# Patient Record
Sex: FEMALE | Race: BLACK OR AFRICAN AMERICAN | NOT HISPANIC OR LATINO | Employment: OTHER | URBAN - METROPOLITAN AREA
[De-identification: names, ages, dates, MRNs, and addresses within clinical notes are randomized per-mention and may not be internally consistent; named-entity substitution may affect disease eponyms.]

---

## 2017-03-04 ENCOUNTER — HISTORICAL (OUTPATIENT)
Dept: ADMINISTRATIVE | Facility: HOSPITAL | Age: 72
End: 2017-03-04

## 2017-03-04 LAB
ALBUMIN SERPL-MCNC: 4.3 G/DL (ref 3.1–4.7)
ALP SERPL-CCNC: 96 IU/L (ref 40–104)
ALT (SGPT): 14 IU/L (ref 3–33)
AST SERPL-CCNC: 17 IU/L (ref 10–40)
BILIRUB SERPL-MCNC: 0.7 MG/DL (ref 0.3–1)
BUN SERPL-MCNC: 16 MG/DL (ref 8–20)
CALCIUM SERPL-MCNC: 10.1 MG/DL (ref 7.7–10.4)
CHLORIDE: 102 MMOL/L (ref 98–110)
CO2 SERPL-SCNC: 30.6 MMOL/L (ref 22.8–31.6)
CREATININE RANDOM URINE: 43 MG/DL
CREATININE: 1.59 MG/DL (ref 0.6–1.4)
GLUCOSE: 156 MG/DL (ref 70–99)
HBA1C MFR BLD: 7 % (ref 3.1–6.5)
MICROALBUM.,U,RANDOM: 2.4 MCG/ML (ref 0–19.9)
MICROALBUMIN/CREATININE RATIO: 6 (ref 0–30)
PHOSPHATE FLD-MCNC: 3.2 MG/DL (ref 2.5–4.9)
POTASSIUM SERPL-SCNC: 3.9 MMOL/L (ref 3.5–5)
PROT SERPL-MCNC: 7.5 G/DL (ref 6–8.2)
SODIUM: 142 MMOL/L (ref 134–144)

## 2017-03-08 ENCOUNTER — OFFICE VISIT (OUTPATIENT)
Dept: PODIATRY | Facility: CLINIC | Age: 72
End: 2017-03-08
Payer: MEDICARE

## 2017-03-08 VITALS — WEIGHT: 174.38 LBS | HEIGHT: 67 IN | BODY MASS INDEX: 27.37 KG/M2

## 2017-03-08 DIAGNOSIS — B35.1 ONYCHOMYCOSIS DUE TO DERMATOPHYTE: ICD-10-CM

## 2017-03-08 DIAGNOSIS — E11.42 DIABETIC POLYNEUROPATHY ASSOCIATED WITH TYPE 2 DIABETES MELLITUS: Primary | ICD-10-CM

## 2017-03-08 PROCEDURE — 1159F MED LIST DOCD IN RCRD: CPT | Mod: S$GLB,,, | Performed by: PODIATRIST

## 2017-03-08 PROCEDURE — 17999 UNLISTD PX SKN MUC MEMB SUBQ: CPT | Mod: CSM,S$GLB,, | Performed by: PODIATRIST

## 2017-03-08 PROCEDURE — 1157F ADVNC CARE PLAN IN RCRD: CPT | Mod: S$GLB,,, | Performed by: PODIATRIST

## 2017-03-08 PROCEDURE — 2022F DILAT RTA XM EVC RTNOPTHY: CPT | Mod: S$GLB,,, | Performed by: PODIATRIST

## 2017-03-08 PROCEDURE — 1160F RVW MEDS BY RX/DR IN RCRD: CPT | Mod: S$GLB,,, | Performed by: PODIATRIST

## 2017-03-08 PROCEDURE — 99999 PR PBB SHADOW E&M-EST. PATIENT-LVL II: CPT | Mod: PBBFAC,,, | Performed by: PODIATRIST

## 2017-03-08 PROCEDURE — 3046F HEMOGLOBIN A1C LEVEL >9.0%: CPT | Mod: S$GLB,,, | Performed by: PODIATRIST

## 2017-03-08 PROCEDURE — 3060F POS MICROALBUMINURIA REV: CPT | Mod: S$GLB,,, | Performed by: PODIATRIST

## 2017-03-08 PROCEDURE — 99213 OFFICE O/P EST LOW 20 MIN: CPT | Mod: S$GLB,,, | Performed by: PODIATRIST

## 2017-03-08 PROCEDURE — 1125F AMNT PAIN NOTED PAIN PRSNT: CPT | Mod: S$GLB,,, | Performed by: PODIATRIST

## 2017-03-08 RX ORDER — PRAMIPEXOLE DIHYDROCHLORIDE 0.12 MG/1
0.12 TABLET ORAL 3 TIMES DAILY
COMMUNITY
End: 2018-02-27 | Stop reason: SDUPTHER

## 2017-03-08 NOTE — PROGRESS NOTES
Subjective:      Patient ID: Adriana Perez is a 71 y.o. female.    Chief Complaint: Nail Care (nail clipping) and Diabetic Foot Exam (AR Care)    Adriana is a 71 y.o. female who presents to the clinic upon referral from Dr. Mag juarez. provider found  for evaluation and treatment of diabetic feet. Adriana has a past medical history of Acid reflux; Depression; Diabetes mellitus; Hyperlipidemia; Hypertension; MVP (mitral valve prolapse); and Urinary, incontinence, stress female. Patient relates no major problem with feet. Only complaints today consist of dark toenails.  Gradual onset, worsening over past several weeks, aggravated by increased weight bearing, shoe gear, pressure.  No previous medical treatment.  OTC med not helping.  .    PCP: Aiden Koch MD    Date Last Seen by PCP:   Chief Complaint   Patient presents with    Nail Care     nail clipping    Diabetic Foot Exam     AR Care         Current shoe gear: Casual shoes    No results found for: HGBA1C          Review of Systems   Constitution: Negative for chills, diaphoresis, fever, malaise/fatigue and night sweats.   Cardiovascular: Negative for claudication, cyanosis, leg swelling and syncope.   Skin: Positive for nail changes. Negative for color change, dry skin, rash, suspicious lesions and unusual hair distribution.   Musculoskeletal: Negative for falls, joint pain, joint swelling, muscle cramps, muscle weakness and stiffness.   Gastrointestinal: Negative for constipation, diarrhea, nausea and vomiting.   Neurological: Positive for sensory change. Negative for brief paralysis, disturbances in coordination, focal weakness, numbness, paresthesias and tremors.           Objective:      Physical Exam   Constitutional: She is oriented to person, place, and time. She appears well-developed and well-nourished. She is cooperative.   Oriented to time, place, and person.   Cardiovascular:   Pulses:       Dorsalis pedis pulses are 1+ on the right side, and 1+ on the  left side.        Posterior tibial pulses are 1+ on the right side, and 1+ on the left side.   Capillary fill time 3-5 seconds.  All toes warm to touch.      Negative lower extremity edema bilateral.    Negative elevational pallor and dependent rubor bilateral.     Musculoskeletal:   Normal angle, base, station of gait. Decreased stride length, early heel off, moderately propulsive toe off bilateral.    All ten toes without clubbing, cyanosis, or signs of ischemia.      No pain to palpation bilateral lower extremities.      Range of motion, stability, muscle strength, and muscle tone are age and health appropriate normal bilateral feet and legs.       Lymphadenopathy:   Negative lymphadenopathy bilateral popliteal fossa and tarsal tunnel.     Neurological: She is alert and oriented to person, place, and time. She has normal strength. She is not disoriented. She displays no atrophy and no tremor. A sensory deficit is present. She exhibits normal muscle tone.   Reflex Scores:       Patellar reflexes are 2+ on the right side and 2+ on the left side.       Achilles reflexes are 2+ on the right side and 2+ on the left side.  Decreased/absent vibratory sensation bilateral feet to 128Hz tuning fork.     Skin: Skin is warm, dry and intact. No abrasion, no bruising, no burn, no ecchymosis, no laceration, no lesion, no petechiae and no rash noted. She is not diaphoretic. No cyanosis or erythema. No pallor. Nails show no clubbing.   Skin thin, atrophic, with decreased density and distribution of pedal hair bilateral, but without hyperpigmentation, frandy discoloration,  ulcers, masses, nodules or cords palpated bilateral feet and legs.      Toenails 1st, 2nd, 3rd, 4th, 5th  bilateral are  discolored tanish brown with tan, gray crumbly subungual debris.  Long, but not tender to distal nail plate pressure, without periungual skin abnormality of each.               Assessment:       Encounter Diagnoses   Name Primary?    Diabetic  polyneuropathy associated with type 2 diabetes mellitus Yes    Onychomycosis due to dermatophyte          Plan:       Adriana was seen today for nail care and diabetic foot exam.    Diagnoses and all orders for this visit:    Diabetic polyneuropathy associated with type 2 diabetes mellitus    Onychomycosis due to dermatophyte      I counseled the patient on her conditions, their implications and medical management.        - Shoe inspection. Diabetic Foot Education. Patient reminded of the importance of good nutrition and blood sugar control to help prevent podiatric complications of diabetes. Patient instructed on proper foot hygeine. We discussed wearing proper shoe gear, daily foot inspections, never walking without protective shoe gear, never putting sharp instruments to feet, routine podiatric visits at least annually.    Continue penlac.    Discussed conservative treatment with shoes of adequate dimensions, material, and style to alleviate symptoms and delay or prevent surgical intervention.     Non covered foot care:      - With patient's permission, nails were aggressively reduced and debrided x 10 to their soft tissue attachment mechanically and with electric , removing all offending nail and debris. Patient relates relief following the procedure. She will continue to monitor the areas daily, inspect her feet, wear protective shoe gear when ambulatory, moisturizer to maintain skin integrity and follow in this office p.r.n.          Return in about 1 year (around 3/8/2018) for Ar exam.

## 2017-03-08 NOTE — MR AVS SNAPSHOT
[facility]  [address] [place] [address]  Phone: [phone]                  Adriana Perez   3/8/2017 8:00 AM   Office Visit    Description:  Female : 1945   Provider:  Joseph Luke DPM   Department:  [facility]           Reason for Visit     Nail Care     Diabetic Foot Exam           Diagnoses this Visit        Comments    Diabetic polyneuropathy associated with type 2 diabetes mellitus    -  Primary     Onychomycosis due to dermatophyte                To Do List           Goals (5 Years of Data)     None      Follow-Up and Disposition     Return in about 1 year (around 3/8/2018) for Ar exam.      [facility] On Call     [facility] On Call Nurse Care Line -  Assistance  Registered nurses in the [facility] On Call Center provide clinical advisement, health education, appointment booking, and other advisory services.  Call for this free service at [phone].             Medications           Message regarding Medications     Verify the changes and/or additions to your medication regime listed below are the same as discussed with your clinician today.  If any of these changes or additions are incorrect, please notify your healthcare provider.        STOP taking these medications     glyBURIDE (DIABETA) 2.5 MG tablet Take 2.5 mg by mouth daily with breakfast.    pravastatin (PRAVACHOL) 40 MG tablet Take 40 mg by mouth once daily.           Verify that the below list of medications is an accurate representation of the medications you are currently taking.  If none reported, the list may be blank. If incorrect, please contact your healthcare provider. Carry this list with you in case of emergency.           Current Medications     amlodipine (NORVASC) 10 MG tablet Take 10 mg by mouth once daily.    aspirin (ECOTRIN) 81 MG EC tablet Take 81 mg by mouth once daily.    calcitRIOL (ROCALTROL) 0.25 MCG Cap Take 0.25 mcg by mouth every Mon, Wed, Fri.    cholecalciferol, vitamin D3,  "(VITAMIN D3) 2,000 unit Cap Take 1 capsule by mouth once daily.    cyclobenzaprine (FLEXERIL) 10 MG tablet Take 10 mg by mouth nightly as needed.    metoprolol succinate (TOPROL-XL) 50 MG 24 hr tablet Take 50 mg by mouth 2 (two) times daily.    olanzapine (ZYPREXA) 10 MG tablet Take 10 mg by mouth every evening.    pramipexole (MIRAPEX) 0.125 MG tablet Take 0.125 mg by mouth 3 (three) times daily.    SITagliptan (JANUVIA) 25 MG Tab Take 100 mg by mouth once daily.    trazodone (DESYREL) 100 MG tablet Take 100 mg by mouth nightly as needed.    ciclopirox (PENLAC) 8 % Soln Apply topically nightly.    pantoprazole (PROTONIX) 40 MG tablet Take 40 mg by mouth once daily.    potassium chloride SA (K-DUR,KLOR-CON) 20 MEQ tablet Take 20 mEq by mouth once daily.           Clinical Reference Information           Your Vitals Were     Height Weight BMI          5' 7" (1.702 m) 79.1 kg (174 lb 6.1 oz) 27.31 kg/m2        Allergies as of 3/8/2017     No Known Allergies      Immunizations Administered on Date of Encounter - 3/8/2017     None      [facility] Sign-Up     Activating your [facility] account is as easy as 1-2-3!     1) Visit [email or web address], select Sign Up Now, enter this activation code and your date of birth, then select Next.  [email or web address]  Expires: 4/22/2017  7:50 AM      2) Create a username and password to use when you visit [facility] in the future and select a security question in case you lose your password and select Next.    3) Enter your e-mail address and click Sign Up!    Additional Information  If you have questions, please e-mail [email or web address] or call [phone] to talk to our [facility] staff. Remember, [facility] is NOT to be used for urgent needs. For medical emergencies, dial 911.         Language Assistance Services     ATTENTION: Language assistance services are available, free of charge. Please call [phone].      ATENCIÓN: Si habla español, tiene a blackburn disposición servicios " shellios de asistencia lingüística. James urbina [phone].     RAKESH Ý: N?u b?n nói Ti?ng Vi?t, có các d?ch v? h? tr? ngôn ng? mi?n phí dành cho b?n. G?i s? [phone].         [facility] complies with applicable Federal civil rights laws and does not discriminate on the basis of race, color, national origin, age, disability, or sex.

## 2017-03-20 ENCOUNTER — OFFICE VISIT (OUTPATIENT)
Dept: PODIATRY | Facility: CLINIC | Age: 72
End: 2017-03-20
Payer: MEDICARE

## 2017-03-20 VITALS — HEIGHT: 67 IN | WEIGHT: 174.38 LBS | BODY MASS INDEX: 27.37 KG/M2

## 2017-03-20 DIAGNOSIS — E11.42 DIABETIC POLYNEUROPATHY ASSOCIATED WITH TYPE 2 DIABETES MELLITUS: Primary | ICD-10-CM

## 2017-03-20 DIAGNOSIS — M79.674 TOE PAIN, RIGHT: ICD-10-CM

## 2017-03-20 DIAGNOSIS — B35.1 ONYCHOMYCOSIS DUE TO DERMATOPHYTE: ICD-10-CM

## 2017-03-20 PROCEDURE — 99999 PR PBB SHADOW E&M-EST. PATIENT-LVL II: CPT | Mod: PBBFAC,,, | Performed by: PODIATRIST

## 2017-03-20 PROCEDURE — 2022F DILAT RTA XM EVC RTNOPTHY: CPT | Mod: S$GLB,,, | Performed by: PODIATRIST

## 2017-03-20 PROCEDURE — 1159F MED LIST DOCD IN RCRD: CPT | Mod: S$GLB,,, | Performed by: PODIATRIST

## 2017-03-20 PROCEDURE — 3046F HEMOGLOBIN A1C LEVEL >9.0%: CPT | Mod: S$GLB,,, | Performed by: PODIATRIST

## 2017-03-20 PROCEDURE — 99213 OFFICE O/P EST LOW 20 MIN: CPT | Mod: S$GLB,,, | Performed by: PODIATRIST

## 2017-03-20 PROCEDURE — 1157F ADVNC CARE PLAN IN RCRD: CPT | Mod: S$GLB,,, | Performed by: PODIATRIST

## 2017-03-20 PROCEDURE — 1125F AMNT PAIN NOTED PAIN PRSNT: CPT | Mod: S$GLB,,, | Performed by: PODIATRIST

## 2017-03-20 PROCEDURE — 1160F RVW MEDS BY RX/DR IN RCRD: CPT | Mod: S$GLB,,, | Performed by: PODIATRIST

## 2017-03-20 PROCEDURE — 3060F POS MICROALBUMINURIA REV: CPT | Mod: S$GLB,,, | Performed by: PODIATRIST

## 2017-03-20 RX ORDER — CICLOPIROX 80 MG/ML
SOLUTION TOPICAL NIGHTLY
Qty: 6.6 ML | Refills: 11 | Status: SHIPPED | OUTPATIENT
Start: 2017-03-20 | End: 2017-03-29

## 2017-03-20 NOTE — PROGRESS NOTES
Subjective:      Patient ID: Adriana Perez is a 71 y.o. female.    Chief Complaint: Toe Pain (right second toe)    Adriana is a 71 y.o. female who presents to the clinic upon referral from Dr. Mag juarez. provider found  for evaluation and treatment of diabetic feet. Adriana has a past medical history of Acid reflux; Depression; Diabetes mellitus; Hyperlipidemia; Hypertension; MVP (mitral valve prolapse); and Urinary, incontinence, stress female. Patient relates no major problem with feet. Only complaints today consist of dark toenails.  Gradual onset, worsening over past several weeks, aggravated by increased weight bearing, shoe gear, pressure.  No previous medical treatment.  OTC med not helping.  .    PCP: Aiden Koch MD    Date Last Seen by PCP:   Chief Complaint   Patient presents with    Toe Pain     right second toe         Current shoe gear: Casual shoes    No results found for: HGBA1C          Review of Systems   Constitution: Negative for chills, diaphoresis, fever, malaise/fatigue and night sweats.   Cardiovascular: Negative for claudication, cyanosis, leg swelling and syncope.   Skin: Positive for nail changes. Negative for color change, dry skin, rash, suspicious lesions and unusual hair distribution.   Musculoskeletal: Negative for falls, joint pain, joint swelling, muscle cramps, muscle weakness and stiffness.   Gastrointestinal: Negative for constipation, diarrhea, nausea and vomiting.   Neurological: Positive for sensory change. Negative for brief paralysis, disturbances in coordination, focal weakness, numbness, paresthesias and tremors.           Objective:      Physical Exam   Constitutional: She is oriented to person, place, and time. She appears well-developed and well-nourished. She is cooperative.   Oriented to time, place, and person.   Cardiovascular:   Pulses:       Dorsalis pedis pulses are 1+ on the right side, and 1+ on the left side.        Posterior tibial pulses are 1+ on the right  side, and 1+ on the left side.   Capillary fill time 3-5 seconds.  All toes warm to touch.      Negative lower extremity edema bilateral.    Negative elevational pallor and dependent rubor bilateral.     Musculoskeletal:   Normal angle, base, station of gait. Decreased stride length, early heel off, moderately propulsive toe off bilateral.    All ten toes without clubbing, cyanosis, or signs of ischemia.      No pain to palpation bilateral lower extremities.      Range of motion, stability, muscle strength, and muscle tone are age and health appropriate normal bilateral feet and legs.       Lymphadenopathy:   Negative lymphadenopathy bilateral popliteal fossa and tarsal tunnel.     Neurological: She is alert and oriented to person, place, and time. She has normal strength. She is not disoriented. She displays no atrophy and no tremor. A sensory deficit is present. She exhibits normal muscle tone.   Reflex Scores:       Patellar reflexes are 2+ on the right side and 2+ on the left side.       Achilles reflexes are 2+ on the right side and 2+ on the left side.  Decreased/absent vibratory sensation bilateral feet to 128Hz tuning fork.     Skin: Skin is warm, dry and intact. No abrasion, no bruising, no burn, no ecchymosis, no laceration, no lesion, no petechiae and no rash noted. She is not diaphoretic. No cyanosis or erythema. No pallor. Nails show no clubbing.   Skin thin, atrophic, with decreased density and distribution of pedal hair bilateral, but without hyperpigmentation, frandy discoloration,  ulcers, masses, nodules or cords palpated bilateral feet and legs.    Pain to palpation distal toe/toenail right 2nd toe  without ulceration, drainage, pus, tracking, fluctuance, malodor, or cardinal signs infection.       Toenails 1st, 2nd, 3rd, 4th, 5th  bilateral are  discolored tanish brown with tan, gray crumbly subungual debris.  Long, but not tender (except right 2nd toe) to distal nail plate pressure, without  periungual skin abnormality of each.               Assessment:       Encounter Diagnoses   Name Primary?    Diabetic polyneuropathy associated with type 2 diabetes mellitus Yes    Onychomycosis due to dermatophyte     Toe pain, right          Plan:       Adriana was seen today for toe pain.    Diagnoses and all orders for this visit:    Diabetic polyneuropathy associated with type 2 diabetes mellitus    Onychomycosis due to dermatophyte    Toe pain, right    Other orders  -     ciclopirox (PENLAC) 8 % Soln; Apply topically nightly.      I counseled the patient on her conditions, their implications and medical management.        - Shoe inspection. Diabetic Foot Education. Patient reminded of the importance of good nutrition and blood sugar control to help prevent podiatric complications of diabetes. Patient instructed on proper foot hygeine. We discussed wearing proper shoe gear, daily foot inspections, never walking without protective shoe gear, never putting sharp instruments to feet, routine podiatric visits at least annually.    Continue penlac.    Discussed conservative treatment with shoes of adequate dimensions, material, and style to alleviate symptoms and delay or prevent surgical intervention.     Non covered foot care:      - With patient's permission, nails were aggressively reduced and debrided x 10 to their soft tissue attachment mechanically and with electric , removing all offending nail and debris. Patient relates relief following the procedure. She will continue to monitor the areas daily, inspect her feet, wear protective shoe gear when ambulatory, moisturizer to maintain skin integrity and follow in this office p.r.n.          Return in about 1 year (around 3/20/2018) for AR exam.

## 2017-03-28 ENCOUNTER — TELEPHONE (OUTPATIENT)
Dept: PODIATRY | Facility: CLINIC | Age: 72
End: 2017-03-28

## 2017-03-28 NOTE — TELEPHONE ENCOUNTER
----- Message from Ronnie Harris sent at 3/22/2017  9:54 AM CDT -----  Contact: SELF [phone]  Dr Luke sent the prescription for Penlac to [facility] on [address] but she is needing the scrip called to [facility] for mailorder/ please call when completed thanks

## 2017-03-29 RX ORDER — CICLOPIROX 80 MG/ML
SOLUTION TOPICAL NIGHTLY
Qty: 6.6 ML | Refills: 11 | Status: SHIPPED | OUTPATIENT
Start: 2017-03-29 | End: 2018-02-27

## 2017-04-04 ENCOUNTER — TELEPHONE (OUTPATIENT)
Dept: PODIATRY | Facility: CLINIC | Age: 72
End: 2017-04-04

## 2017-04-04 NOTE — TELEPHONE ENCOUNTER
----- Message from Odilon Ch sent at 3/29/2017  9:50 AM CDT -----  Contact: self- [phone]  Patient called asking for rx Ciclopirox 8 % to be faxed to [facility] order [facility].  Yamile faxed request to the doctor's  office. Thanks!

## 2017-05-22 PROBLEM — E11.3293 MILD NONPROLIFERATIVE DIABETIC RETINOPATHY OF BOTH EYES WITHOUT MACULAR EDEMA ASSOCIATED WITH TYPE 2 DIABETES MELLITUS: Status: ACTIVE | Noted: 2017-05-22

## 2017-05-25 RX ORDER — TRAMADOL HYDROCHLORIDE AND ACETAMINOPHEN 37.5; 325 MG/1; MG/1
TABLET, FILM COATED ORAL
Qty: 60 TABLET | Refills: 1 | Status: SHIPPED | OUTPATIENT
Start: 2017-05-25 | End: 2017-07-10 | Stop reason: SDUPTHER

## 2017-06-28 RX ORDER — ASPIRIN 81 MG/1
1 TABLET ORAL DAILY
COMMUNITY
Start: 2015-03-12 | End: 2017-07-10 | Stop reason: SDUPTHER

## 2017-06-28 RX ORDER — GLIMEPIRIDE 2 MG/1
1 TABLET ORAL 2 TIMES DAILY
COMMUNITY
Start: 2017-03-08 | End: 2018-02-27

## 2017-06-28 RX ORDER — PRAVASTATIN SODIUM 40 MG/1
1 TABLET ORAL DAILY
COMMUNITY
Start: 2017-03-08 | End: 2018-03-28 | Stop reason: SDUPTHER

## 2017-06-28 RX ORDER — CELECOXIB 100 MG/1
1 CAPSULE ORAL 2 TIMES DAILY
COMMUNITY
Start: 2016-08-17 | End: 2018-02-27 | Stop reason: ALTCHOICE

## 2017-07-05 ENCOUNTER — TELEPHONE (OUTPATIENT)
Dept: FAMILY MEDICINE | Facility: CLINIC | Age: 72
End: 2017-07-05

## 2017-07-05 LAB
ALBUMIN SERPL-MCNC: 4 G/DL (ref 3.1–4.7)
ALP SERPL-CCNC: 88 IU/L (ref 40–104)
ALT (SGPT): 12 IU/L (ref 3–33)
AST SERPL-CCNC: 17 IU/L (ref 10–40)
BASOPHILS NFR BLD: 0 K/UL (ref 0–0.2)
BASOPHILS NFR BLD: 0.5 %
BILIRUB SERPL-MCNC: 0.7 MG/DL (ref 0.3–1)
BUN SERPL-MCNC: 16 MG/DL (ref 8–20)
CALCIUM SERPL-MCNC: 9.6 MG/DL (ref 7.7–10.4)
CHLORIDE: 106 MMOL/L (ref 98–110)
CO2 SERPL-SCNC: 27.1 MMOL/L (ref 22.8–31.6)
CREATININE: 1.75 MG/DL (ref 0.6–1.4)
EOSINOPHIL NFR BLD: 0.2 K/UL (ref 0–0.7)
EOSINOPHIL NFR BLD: 2.6 %
ERYTHROCYTE [DISTWIDTH] IN BLOOD BY AUTOMATED COUNT: 13.6 % (ref 11.7–14.9)
GLUCOSE: 124 MG/DL (ref 70–99)
GRAN #: 2.1 K/UL (ref 1.4–6.5)
GRAN%: 35.1 %
HBA1C MFR BLD: 6.7 % (ref 3.1–6.5)
HCT VFR BLD AUTO: 35.1 % (ref 36–48)
HGB BLD-MCNC: 11.2 G/DL (ref 12–15)
IMMATURE GRANS (ABS): 0 K/UL (ref 0–1)
IMMATURE GRANULOCYTES: 0.2 %
LYMPH #: 3.1 K/UL (ref 1.2–3.4)
LYMPH%: 52.2 %
MCH RBC QN AUTO: 26.1 PG (ref 25–35)
MCHC RBC AUTO-ENTMCNC: 31.9 G/DL (ref 31–36)
MCV RBC AUTO: 81.8 FL (ref 79–98)
MONO #: 0.6 K/UL (ref 0.1–0.6)
MONO%: 9.4 %
NUCLEATED RBCS: 0 %
PHOSPHATE FLD-MCNC: 3.6 MG/DL (ref 2.5–4.9)
PLATELET # BLD AUTO: 235 K/UL (ref 140–440)
PMV BLD AUTO: 11.2 FL (ref 8.8–12.7)
POTASSIUM SERPL-SCNC: 3.5 MMOL/L (ref 3.5–5)
PROT SERPL-MCNC: 7 G/DL (ref 6–8.2)
RBC # BLD AUTO: 4.29 M/UL (ref 3.5–5.5)
SODIUM: 140 MMOL/L (ref 134–144)
WBC # BLD AUTO: 5.8 K/UL (ref 5–10)

## 2017-07-05 NOTE — TELEPHONE ENCOUNTER
PT NEEDS A RETRO REFF TO   [facility]   FOR DOS 5/2/2017   CODE F20.89  SCHIZOPHRENIA  PLEASE LET ME KNOW AS SOON AS YOU HAVE IT   I HAVE TO FAX IT TO A SPECIAL NUMBER TO GET THE CLAIM PAID   THANKS

## 2017-07-10 ENCOUNTER — OFFICE VISIT (OUTPATIENT)
Dept: ORTHOPEDICS | Facility: CLINIC | Age: 72
End: 2017-07-10
Payer: MEDICARE

## 2017-07-10 VITALS
HEIGHT: 67 IN | BODY MASS INDEX: 27.31 KG/M2 | DIASTOLIC BLOOD PRESSURE: 60 MMHG | WEIGHT: 174 LBS | HEART RATE: 87 BPM | SYSTOLIC BLOOD PRESSURE: 124 MMHG

## 2017-07-10 DIAGNOSIS — M17.0 PRIMARY OSTEOARTHRITIS OF BOTH KNEES: Primary | ICD-10-CM

## 2017-07-10 PROCEDURE — 20610 DRAIN/INJ JOINT/BURSA W/O US: CPT | Mod: RT,,, | Performed by: ORTHOPAEDIC SURGERY

## 2017-07-10 PROCEDURE — 1159F MED LIST DOCD IN RCRD: CPT | Mod: ,,, | Performed by: ORTHOPAEDIC SURGERY

## 2017-07-10 PROCEDURE — 99213 OFFICE O/P EST LOW 20 MIN: CPT | Mod: 25,,, | Performed by: ORTHOPAEDIC SURGERY

## 2017-07-10 RX ORDER — TRIAMCINOLONE ACETONIDE 40 MG/ML
40 INJECTION, SUSPENSION INTRA-ARTICULAR; INTRAMUSCULAR
Status: DISCONTINUED | OUTPATIENT
Start: 2017-07-10 | End: 2017-07-10 | Stop reason: HOSPADM

## 2017-07-10 RX ORDER — TRAMADOL HYDROCHLORIDE AND ACETAMINOPHEN 37.5; 325 MG/1; MG/1
1 TABLET, FILM COATED ORAL EVERY 6 HOURS PRN
Qty: 60 TABLET | Refills: 1 | Status: SHIPPED | OUTPATIENT
Start: 2017-07-10 | End: 2017-08-09

## 2017-07-10 RX ADMIN — TRIAMCINOLONE ACETONIDE 40 MG: 40 INJECTION, SUSPENSION INTRA-ARTICULAR; INTRAMUSCULAR at 04:07

## 2017-07-10 NOTE — PROCEDURES
Large Joint Aspiration/Injection  Date/Time: 7/10/2017 4:23 PM  Performed by: JOE MUNOZ  Authorized by: JOE MUNOZ     Consent Done?:  Yes (Verbal)  Indications:  Pain and joint swelling  Procedure site marked: Yes    Timeout: Prior to procedure the correct patient, procedure, and site was verified      Location:  Knee  Site:  R knee  Prep: Patient was prepped and draped in usual sterile fashion    Needle size:  22 G  Approach:  Lateral  Medications:  40 mg triamcinolone acetonide 40 mg/mL  Patient tolerance:  Patient tolerated the procedure well with no immediate complications

## 2017-07-11 ENCOUNTER — OFFICE VISIT (OUTPATIENT)
Dept: FAMILY MEDICINE | Facility: CLINIC | Age: 72
End: 2017-07-11
Payer: MEDICARE

## 2017-07-11 VITALS
BODY MASS INDEX: 26.84 KG/M2 | SYSTOLIC BLOOD PRESSURE: 138 MMHG | WEIGHT: 171 LBS | DIASTOLIC BLOOD PRESSURE: 70 MMHG | HEART RATE: 83 BPM | HEIGHT: 67 IN

## 2017-07-11 DIAGNOSIS — E11.42 DIABETIC POLYNEUROPATHY ASSOCIATED WITH TYPE 2 DIABETES MELLITUS: Primary | ICD-10-CM

## 2017-07-11 DIAGNOSIS — E78.2 MULTIPLE-TYPE HYPERLIPIDEMIA: ICD-10-CM

## 2017-07-11 DIAGNOSIS — N18.30 CHRONIC KIDNEY DISEASE, STAGE 3 (MODERATE): ICD-10-CM

## 2017-07-11 DIAGNOSIS — I10 BENIGN ESSENTIAL HYPERTENSION: ICD-10-CM

## 2017-07-11 PROBLEM — I05.9 MITRAL VALVE DISORDER: Status: ACTIVE | Noted: 2017-07-11

## 2017-07-11 PROBLEM — N39.46 MIXED STRESS AND URGE URINARY INCONTINENCE: Status: ACTIVE | Noted: 2017-07-11

## 2017-07-11 PROBLEM — F20.9 CHRONIC SCHIZOPHRENIA: Status: ACTIVE | Noted: 2017-07-11

## 2017-07-11 PROBLEM — Z78.9 NON-SMOKER: Status: ACTIVE | Noted: 2017-07-11

## 2017-07-11 PROBLEM — G25.81 RESTLESS LEG: Status: ACTIVE | Noted: 2017-07-11

## 2017-07-11 PROBLEM — G47.33 OBSTRUCTIVE SLEEP APNEA SYNDROME: Status: ACTIVE | Noted: 2017-07-11

## 2017-07-11 PROBLEM — Z87.891 PERSONAL HISTORY OF NICOTINE DEPENDENCE: Status: ACTIVE | Noted: 2017-07-11

## 2017-07-11 PROBLEM — M25.569 KNEE PAIN: Status: ACTIVE | Noted: 2017-07-11

## 2017-07-11 PROBLEM — G47.00 INSOMNIA: Status: ACTIVE | Noted: 2017-07-11

## 2017-07-11 PROBLEM — Z13.89 ENCOUNTER FOR SCREENING FOR OTHER DISORDER: Status: ACTIVE | Noted: 2017-07-11

## 2017-07-11 PROBLEM — E11.9 TYPE 2 DIABETES MELLITUS: Status: ACTIVE | Noted: 2017-07-11

## 2017-07-11 PROBLEM — M17.9 OSTEOARTHRITIS OF KNEE: Status: ACTIVE | Noted: 2017-07-11

## 2017-07-11 PROBLEM — E66.3 OVERWEIGHT: Status: ACTIVE | Noted: 2017-07-11

## 2017-07-11 PROBLEM — E11.9 DIABETES MELLITUS: Status: ACTIVE | Noted: 2017-07-11

## 2017-07-11 PROCEDURE — 99213 OFFICE O/P EST LOW 20 MIN: CPT | Mod: ,,, | Performed by: INTERNAL MEDICINE

## 2017-07-11 PROCEDURE — 1159F MED LIST DOCD IN RCRD: CPT | Mod: ,,, | Performed by: INTERNAL MEDICINE

## 2017-07-11 PROCEDURE — 1126F AMNT PAIN NOTED NONE PRSNT: CPT | Mod: ,,, | Performed by: INTERNAL MEDICINE

## 2017-07-11 PROCEDURE — 3044F HG A1C LEVEL LT 7.0%: CPT | Mod: ,,, | Performed by: INTERNAL MEDICINE

## 2017-07-11 NOTE — PROGRESS NOTES
Subjective:       Patient ID: Adriana Perez is a 71 y.o. female.    Chief Complaint: Diabetes (lab review ) and Hypertension    Ms. Adriana Perez is a 71-year-old -American female who comes for follow-up. She has underlying diabetes, hypertension, chronic kidney disease and dyslipidemia. Recently her intact PTH was found to be elevated. It is unclear if she has spoken to the kidney doctor about this problem. Otherwise she is doing okay. The kidney doctor has not made any new changes.      Diabetes   She presents for her follow-up diabetic visit. She has type 2 diabetes mellitus. Hypoglycemia symptoms include nervousness/anxiousness. Pertinent negatives for hypoglycemia include no confusion, dizziness, headaches, pallor, seizures or tremors. Pertinent negatives for diabetes include no chest pain and no fatigue. Symptoms are stable. Risk factors for coronary artery disease include sedentary lifestyle. Her weight is stable. She is following a generally healthy diet. She has not had a previous visit with a dietitian. Her breakfast blood glucose range is generally 130-140 mg/dl. Her highest blood glucose is >200 mg/dl. An ACE inhibitor/angiotensin II receptor blocker is contraindicated. She does not see a podiatrist.Eye exam is current.   Hypertension   This is a chronic problem. The current episode started more than 1 year ago. The problem has been gradually improving since onset. The problem is controlled. Associated symptoms include anxiety. Pertinent negatives include no chest pain, headaches, palpitations or shortness of breath. Risk factors for coronary artery disease include sedentary lifestyle. Identifiable causes of hypertension include chronic renal disease.   Hyperlipidemia   This is a chronic problem. The current episode started more than 1 year ago. Exacerbating diseases include chronic renal disease. Pertinent negatives include no chest pain or shortness of breath. Current antihyperlipidemic  treatment includes statins. The current treatment provides significant improvement of lipids. Compliance problems include psychosocial issues.  Risk factors for coronary artery disease include a sedentary lifestyle, hypertension, diabetes mellitus and dyslipidemia.       Past Medical History:   Diagnosis Date    Acid reflux     Anxiety     CKD (chronic kidney disease), stage III     Depression     Diabetes mellitus     Diabetes mellitus, type 2     Disorder of kidney and ureter     Hyperlipidemia     Hypertension     MVP (mitral valve prolapse)     Primary osteoarthritis of left knee     Restless leg     Schizophrenia, simple, chronic     Urinary, incontinence, stress female      Social History     Social History    Marital status:      Spouse name: N/A    Number of children: N/A    Years of education: N/A     Occupational History    Not on file.     Social History Main Topics    Smoking status: Former Smoker     Types: Cigarettes     Quit date: 9/13/1992    Smokeless tobacco: Never Used    Alcohol use No    Drug use: No    Sexual activity: No     Other Topics Concern    Not on file     Social History Narrative    No narrative on file     Past Surgical History:   Procedure Laterality Date    HYSTERECTOMY      TUBAL LIGATION       Family History   Problem Relation Age of Onset    Sudden death Father     Cancer Mother     Hypertension Mother     Cancer Sister        Review of Systems   Constitutional: Negative for activity change, chills, fatigue, fever and unexpected weight change.   HENT: Negative for congestion, postnasal drip and sinus pressure.    Eyes: Negative for pain, discharge and visual disturbance.   Respiratory: Negative for cough, chest tightness and shortness of breath.    Cardiovascular: Negative for chest pain, palpitations and leg swelling.   Gastrointestinal: Negative for abdominal distention, anal bleeding, constipation and diarrhea.   Genitourinary: Negative  "for difficulty urinating, dysuria, flank pain, frequency, menstrual problem, pelvic pain, vaginal bleeding and vaginal pain.   Musculoskeletal: Negative for arthralgias and joint swelling.   Skin: Negative for color change, pallor and rash.   Allergic/Immunologic: Negative for environmental allergies, food allergies and immunocompromised state.   Neurological: Negative for dizziness, tremors, seizures, syncope, light-headedness and headaches.   Hematological: Negative for adenopathy. Does not bruise/bleed easily.   Psychiatric/Behavioral: Negative for agitation, confusion and dysphoric mood. The patient is nervous/anxious.        Objective:       Vitals:    07/11/17 1039   BP: 138/70   Pulse: 83   Weight: 77.6 kg (171 lb)   Height: 5' 7" (1.702 m)     Physical Exam   Constitutional: She appears well-developed and well-nourished. She is cooperative. No distress.   HENT:   Head: Normocephalic and atraumatic.   Eyes: Conjunctivae, EOM and lids are normal. Pupils are equal, round, and reactive to light. Lids are everted and swept, no foreign bodies found. Right pupil is round and reactive. Left pupil is round and reactive.   Neck: Trachea normal and normal range of motion. Neck supple.   Cardiovascular: Normal rate, regular rhythm, S1 normal, S2 normal and normal heart sounds.    Pulmonary/Chest: Breath sounds normal.   Abdominal: Soft. Bowel sounds are normal. There is no rigidity and no guarding.   Musculoskeletal: Normal range of motion.   Feet:   Right Foot:   Protective Sensation: 3 sites tested. 3 sites sensed.   Skin Integrity: Negative for ulcer or blister.   Left Foot:   Protective Sensation: 3 sites tested. 3 sites sensed.   Skin Integrity: Negative for ulcer or blister.   Lymphadenopathy:     She has no cervical adenopathy.     She has no axillary adenopathy.   Neurological: She is alert.   Skin: Skin is warm and dry. Capillary refill takes less than 2 seconds.   Psychiatric: She has a normal mood and affect. " Her behavior is normal.   Nursing note and vitals reviewed.      Assessment:       1. Diabetic polyneuropathy associated with type 2 diabetes mellitus    2. Benign essential hypertension    3. Chronic kidney disease, stage 3 (moderate)    4. Multiple-type hyperlipidemia         Plan:           Diabetic polyneuropathy associated with type 2 diabetes mellitus    Benign essential hypertension    Chronic kidney disease, stage 3 (moderate)    Multiple-type hyperlipidemia    Patient has hemoglobin A1c of 6.7. Patient's intact PTH is elevated. She saw the kidney doctor yesterday but was not told anything about it. I will advise her to get in touch with a kidney doctor but she has an appointment in 6 months. She would like to wait for 6 months. I've advised her that if she has any problems seeing the kidney doctors I'll arrange for a new doctor. She chooses to stick with her doctor.    Current Outpatient Prescriptions on File Prior to Visit   Medication Sig Dispense Refill    amlodipine (NORVASC) 10 MG tablet Take 10 mg by mouth once daily.      aspirin (ECOTRIN) 81 MG EC tablet Take 81 mg by mouth once daily.      blood sugar diagnostic Strp 1 strip Daily.      calcitRIOL (ROCALTROL) 0.25 MCG Cap Take 0.25 mcg by mouth every Mon, Wed, Fri.      cholecalciferol, vitamin D3, (VITAMIN D3) 2,000 unit Cap Take 1 capsule by mouth once daily.      glimepiride (AMARYL) 2 MG tablet Take 1 tablet by mouth 2 (two) times daily.      metoprolol succinate (TOPROL-XL) 50 MG 24 hr tablet Take 50 mg by mouth 2 (two) times daily.      olanzapine (ZYPREXA) 10 MG tablet Take 10 mg by mouth every evening.      pantoprazole (PROTONIX) 40 MG tablet Take 40 mg by mouth once daily.      potassium chloride SA (K-DUR,KLOR-CON) 20 MEQ tablet Take 20 mEq by mouth once daily.      pramipexole (MIRAPEX) 0.125 MG tablet Take 0.125 mg by mouth 3 (three) times daily.      pravastatin (PRAVACHOL) 40 MG tablet Take 1 tablet by mouth Daily.       SITagliptan (JANUVIA) 25 MG Tab Take 100 mg by mouth once daily.      tramadol-acetaminophen 37.5-325 mg (ULTRACET) 37.5-325 mg Tab Take 1 tablet by mouth every 6 (six) hours as needed. 60 tablet 1    trazodone (DESYREL) 100 MG tablet Take 100 mg by mouth nightly as needed.      celecoxib (CELEBREX) 100 MG capsule Take 1 capsule by mouth 2 (two) times daily.      ciclopirox (PENLAC) 8 % Soln Apply topically nightly. 6.6 mL 11    cyclobenzaprine (FLEXERIL) 10 MG tablet Take 10 mg by mouth nightly as needed.       Current Facility-Administered Medications on File Prior to Visit   Medication Dose Route Frequency Provider Last Rate Last Dose    [DISCONTINUED] triamcinolone acetonide injection 40 mg  40 mg Intra-articular  Yash Wing MD   40 mg at 07/10/17 9445

## 2017-07-11 NOTE — PATIENT INSTRUCTIONS
"  Exercise to Manage Your Blood Sugar    Being physically active every day can help you manage your blood sugar. Thats because an active lifestyle can improve your bodys ability to use insulin. Daily activity can also help delay or prevent complications of diabetes. And its a great way to relieve stress. If you arent normally active, be sure to consult your healthcare provider before getting started.  How much activity do you need?  If daily activity is new to you, start slow and steady. Begin with 10 minutes of activity each day. Then work up to at least 40 minutes of moderate to high intensity physical activity on at least 3 to 4 days each week. Do this by adding a few minutes each week. It doesnt have to be done all at once. Each active period throughout the day adds up.  Just move!  You dont have to join a gym or own pricey sports equipment. Just get out and walk. Walking is an aerobic exercise that makes your heart and lungs work hard. It helps your heart and blood vessels. Walking needs only a sturdy pair of sneakers and your own two feet. The more you walk, the easier it gets:  · Schedule time every day to move your feet.  · Make it part of your daily routine.  · Walk with a friend or a group to keep it interesting and fun.  · Try taking several short walks during the day to meet your daily activity goal.  A pedometer makes every step count  A pedometer is a small device that keeps track of how many steps you take. You can clip it to your belt (or a strap on your arm or leg) and go about your daily routine. "Smartphones" now also have apps to record your walking. At the end of the day, the pedometer shows the total number of steps you took. Use a pedometer to set daily goals for yourself. For instance, if you walk 4,000 steps a day, try adding 200 more steps each day. Aim for a goal of 7,500. With every step, youre doing a little more to help your body use insulin.   Adding resistance " exercise  Resistance exercise (also called strength training), makes muscles stronger. It also helps muscles use insulin better. Ask your healthcare provider whether this type of exercise is right for you. If it is, your healthcare provider can help you work it in to your activity plan.  Staying safe  Being active may cause blood sugar to drop faster than usual. This is especially true if you take medicine to manage your blood sugar. But there are things you can do to help reduce the risk of accidental lows. Keep these tips in mind:  · Always carry identification when you exercise outside your home. Carry a cell phone to use in case of emergency.  · If you can, include friends and family in your activities.  · Wear a medical ID bracelet that says you have diabetes.  · Use the right safety equipment for the activity you do (such as a bicycle helmet when you ride a bicycle outdoors). Wear closed-toed shoes that fit your feet well.  · Drink plenty of water before and during activity.  · Keep a fast-acting sugar (such as glucose tablets) on hand in case of low blood sugar.  · Dress properly for the weather. Wear a hat if its natividad, or wait until evening if its too hot.  · Avoid being active for long periods in very hot or very cold weather.  · Skip activity if youre sick.     Notice how activity affects blood sugar  Physical activity is important when you have diabetes. But you need to keep an eye on your blood sugar level. Check often if you have been active for longer than usual, or if the activity was unplanned. Make it a habit to check your blood sugar before being active. And check again a few hours later. Use your log book to write down how activity affects your numbers. If you take insulin, you may be able to adjust your dose before a planned activity. This can help prevent lows. You may also need to take a small carbohydrate snack before the exercise. Talk to your healthcare provider to learn more.    Date  Last Reviewed: 6/1/2016  © 9083-6991 [employer]. [address] [place] [address], [place] [address]. All rights reserved. This information is not intended as a substitute for professional medical care. Always follow your healthcare professional's instructions.

## 2017-10-16 PROBLEM — Z13.89 ENCOUNTER FOR SCREENING FOR OTHER DISORDER: Status: RESOLVED | Noted: 2017-07-11 | Resolved: 2017-10-16

## 2017-11-02 RX ORDER — AMLODIPINE BESYLATE 10 MG/1
TABLET ORAL
Qty: 90 TABLET | Refills: 3 | Status: SHIPPED | OUTPATIENT
Start: 2017-11-02 | End: 2018-11-15 | Stop reason: SDUPTHER

## 2017-11-03 RX ORDER — TRAMADOL HYDROCHLORIDE AND ACETAMINOPHEN 37.5; 325 MG/1; MG/1
TABLET, FILM COATED ORAL
Qty: 60 TABLET | Refills: 1 | OUTPATIENT
Start: 2017-11-03

## 2017-11-14 ENCOUNTER — OFFICE VISIT (OUTPATIENT)
Dept: FAMILY MEDICINE | Facility: CLINIC | Age: 72
End: 2017-11-14
Payer: MEDICARE

## 2017-11-14 VITALS
WEIGHT: 171 LBS | DIASTOLIC BLOOD PRESSURE: 80 MMHG | HEART RATE: 85 BPM | BODY MASS INDEX: 26.84 KG/M2 | SYSTOLIC BLOOD PRESSURE: 138 MMHG | HEIGHT: 67 IN

## 2017-11-14 DIAGNOSIS — E78.2 MULTIPLE-TYPE HYPERLIPIDEMIA: ICD-10-CM

## 2017-11-14 DIAGNOSIS — K21.9 GASTROESOPHAGEAL REFLUX DISEASE WITHOUT ESOPHAGITIS: ICD-10-CM

## 2017-11-14 DIAGNOSIS — Z23 INFLUENZA VACCINE ADMINISTERED: Primary | ICD-10-CM

## 2017-11-14 DIAGNOSIS — N18.30 CHRONIC KIDNEY DISEASE, STAGE 3 (MODERATE): ICD-10-CM

## 2017-11-14 DIAGNOSIS — E11.42 DIABETIC POLYNEUROPATHY ASSOCIATED WITH TYPE 2 DIABETES MELLITUS: ICD-10-CM

## 2017-11-14 PROCEDURE — 90662 IIV NO PRSV INCREASED AG IM: CPT | Mod: ,,, | Performed by: INTERNAL MEDICINE

## 2017-11-14 PROCEDURE — G0008 ADMIN INFLUENZA VIRUS VAC: HCPCS | Mod: ,,, | Performed by: INTERNAL MEDICINE

## 2017-11-14 PROCEDURE — 99214 OFFICE O/P EST MOD 30 MIN: CPT | Mod: ,,, | Performed by: INTERNAL MEDICINE

## 2017-11-14 NOTE — PROGRESS NOTES
Subjective:       Patient ID: Adriana Perez is a 71 y.o. female.    Chief Complaint: Diabetes; Hypertension; Hyperlipidemia; and Gastroesophageal Reflux    Patient comes for follow-up. I reviewed her blood sugars at home and this seemed to be in low 100s and mid 100s. Her blood pressures seem to be in 120s and 130 systolic. She has chronic kidney disease stage III which is stable and follows up with Dr. Lance Foy.          Diabetes   She presents for her follow-up diabetic visit. She has type 2 diabetes mellitus. Her disease course has been stable. Hypoglycemia symptoms include nervousness/anxiousness. Pertinent negatives for hypoglycemia include no confusion, dizziness, headaches, pallor, seizures or tremors. Pertinent negatives for diabetes include no chest pain, no fatigue, no foot ulcerations, no polydipsia and no polyphagia. Risk factors for coronary artery disease include hypertension, sedentary lifestyle and dyslipidemia. Current diabetic treatment includes oral agent (dual therapy).   Hypertension   This is a chronic problem. The current episode started more than 1 year ago. The problem is controlled. Pertinent negatives include no chest pain, headaches, palpitations or shortness of breath. Identifiable causes of hypertension include chronic renal disease.   Hyperlipidemia   This is a chronic problem. The current episode started more than 1 year ago. Exacerbating diseases include chronic renal disease and diabetes. She has no history of liver disease or obesity. Pertinent negatives include no chest pain or shortness of breath. Current antihyperlipidemic treatment includes statins.   Gastroesophageal Reflux   She complains of heartburn. She reports no chest pain, no coughing or no globus sensation. This is a chronic problem. Pertinent negatives include no fatigue or melena.       Past Medical History:   Diagnosis Date    Acid reflux     Anxiety     CKD (chronic kidney disease), stage III      Depression     Diabetes mellitus     Diabetes mellitus, type 2     Disorder of kidney and ureter     Hyperlipidemia     Hypertension     MVP (mitral valve prolapse)     Primary osteoarthritis of left knee     Restless leg     Schizophrenia, simple, chronic     Urinary, incontinence, stress female      Social History     Social History    Marital status:      Spouse name: N/A    Number of children: N/A    Years of education: N/A     Occupational History    Not on file.     Social History Main Topics    Smoking status: Former Smoker     Types: Cigarettes     Quit date: 9/13/1992    Smokeless tobacco: Never Used    Alcohol use No    Drug use: No    Sexual activity: No     Other Topics Concern    Not on file     Social History Narrative    No narrative on file     Past Surgical History:   Procedure Laterality Date    HYSTERECTOMY      TUBAL LIGATION       Family History   Problem Relation Age of Onset    Sudden death Father     Cancer Mother     Hypertension Mother     Cancer Sister        Review of Systems   Constitutional: Negative for activity change, chills, fatigue, fever and unexpected weight change.   HENT: Negative for congestion, postnasal drip and sinus pressure.    Eyes: Negative for pain, discharge and visual disturbance.   Respiratory: Negative for cough, chest tightness and shortness of breath.    Cardiovascular: Negative for chest pain, palpitations and leg swelling.   Gastrointestinal: Positive for heartburn. Negative for abdominal distention, anal bleeding, constipation, diarrhea and melena.   Endocrine: Negative for polydipsia and polyphagia.        Diabetes mellitus type 2. Chronic kidney disease stage III.   Genitourinary: Negative for difficulty urinating, dysuria and flank pain.   Musculoskeletal: Negative for arthralgias and joint swelling.   Skin: Negative for color change, pallor and rash.   Allergic/Immunologic: Negative for environmental allergies, food  allergies and immunocompromised state.   Neurological: Negative for dizziness, tremors, seizures, syncope, light-headedness and headaches.        While patient is awake and alert, her understanding and cognition seems to be somewhat constricted.   Hematological: Negative for adenopathy. Does not bruise/bleed easily.   Psychiatric/Behavioral: Negative for agitation, confusion and dysphoric mood. The patient is nervous/anxious.         Patient's has history of schizoaffective disorder. This seems to be stable.       Objective:      Physical Exam   Constitutional: She appears well-developed and well-nourished. She is cooperative. No distress.   HENT:   Head: Normocephalic and atraumatic.   Eyes: Conjunctivae, EOM and lids are normal. Pupils are equal, round, and reactive to light. Lids are everted and swept, no foreign bodies found. Right pupil is round and reactive. Left pupil is round and reactive.   Neck: Trachea normal and normal range of motion. Neck supple.   Cardiovascular: Normal rate, regular rhythm, S1 normal, S2 normal and normal heart sounds.    Pulmonary/Chest: Breath sounds normal.   Abdominal: Soft. Bowel sounds are normal. There is no rigidity and no guarding.   Musculoskeletal: Normal range of motion.   Feet:   Right Foot:   Protective Sensation: 3 sites tested. 3 sites sensed.   Skin Integrity: Negative for ulcer or blister.   Left Foot:   Protective Sensation: 3 sites tested. 3 sites sensed.   Skin Integrity: Negative for ulcer or blister.   Lymphadenopathy:     She has no cervical adenopathy.     She has no axillary adenopathy.   Neurological: She is alert.   Skin: Skin is warm and dry.   Nursing note and vitals reviewed.      Assessment:       1. Influenza vaccine administered    2. Diabetic polyneuropathy associated with type 2 diabetes mellitus    3. Chronic kidney disease, stage 3 (moderate)    4. Multiple-type hyperlipidemia    5. Gastroesophageal reflux disease without esophagitis          Plan:           Influenza vaccine administered  -     Influenza - High Dose (65+) (PF) (IM)    Diabetic polyneuropathy associated with type 2 diabetes mellitus  -     Hemoglobin A1c; Future; Expected date: 11/14/2017    Chronic kidney disease, stage 3 (moderate)    Multiple-type hyperlipidemia    Gastroesophageal reflux disease without esophagitis    Patient's reflux seems to be doing stable. I've advised her to start cutting down on pantoprazole to perhaps every alternate or every third day. She needs to continue to watch her diet. There have been recent reports about PPIs causing kidney problems.    Advised Ms. Perez to monitor Blood sugars at home and record them.  Exercise, watch diet and loose weight.  keep a close eye on feet and keep them clean. Annual eye examination. Annual influenza vaccine.  Monitor HgbA1c every 3 to 6 months. Monitor urine microalbumin every year.keep LDL less than 100. Monitor blood pressure and target blood pressure 120/70.      Follow up in 3 months.

## 2017-11-14 NOTE — PATIENT INSTRUCTIONS
Tips to Control Acid Reflux    To control acid reflux, youll need to make some basic diet and lifestyle changes. The simple steps outlined below may be all youll need to ease discomfort.  Watch what you eat  · Avoid fatty foods and spicy foods.  · Eat fewer acidic foods, such as citrus and tomato-based foods. These can increase symptoms.  · Limit drinking alcohol, caffeine, and fizzy beverages. All increase acid reflux.  · Try limiting chocolate, peppermint, and spearmint. These can worsen acid reflux in some people.  Watch when you eat  · Avoid lying down for 3 hours after eating.  · Do not snack before going to bed.  Raise your head  Raising your head and upper body by 4 to 6 inches helps limit reflux when youre lying down. Put blocks under the head of your bed frame to raise it.  Other changes  · Lose weight, if you need to  · Dont exercise near bedtime  · Avoid tight-fitting clothes  · Limit aspirin and ibuprofen  · Stop smoking   Date Last Reviewed: 7/1/2016  © 7571-6854 [employer], [employer]. [address] [place] [address], [place] [address]. All rights reserved. This information is not intended as a substitute for professional medical care. Always follow your healthcare professional's instructions.

## 2017-12-27 DIAGNOSIS — E11.42 DIABETIC POLYNEUROPATHY ASSOCIATED WITH TYPE 2 DIABETES MELLITUS: Primary | ICD-10-CM

## 2017-12-27 RX ORDER — SITAGLIPTIN 50 MG/1
TABLET, FILM COATED ORAL
Qty: 90 TABLET | Refills: 3 | Status: SHIPPED | OUTPATIENT
Start: 2017-12-27 | End: 2018-02-02 | Stop reason: SDUPTHER

## 2018-02-23 LAB — HBA1C MFR BLD: 6.3 % (ref 3.1–6.5)

## 2018-02-27 ENCOUNTER — OFFICE VISIT (OUTPATIENT)
Dept: FAMILY MEDICINE | Facility: CLINIC | Age: 73
End: 2018-02-27
Payer: MEDICARE

## 2018-02-27 VITALS
WEIGHT: 162 LBS | DIASTOLIC BLOOD PRESSURE: 65 MMHG | HEART RATE: 81 BPM | RESPIRATION RATE: 16 BRPM | SYSTOLIC BLOOD PRESSURE: 125 MMHG | HEIGHT: 67 IN | BODY MASS INDEX: 25.43 KG/M2

## 2018-02-27 DIAGNOSIS — N18.30 TYPE 2 DIABETES MELLITUS WITH STAGE 3 CHRONIC KIDNEY DISEASE, WITHOUT LONG-TERM CURRENT USE OF INSULIN: ICD-10-CM

## 2018-02-27 DIAGNOSIS — N18.30 CHRONIC KIDNEY DISEASE, STAGE 3 (MODERATE): ICD-10-CM

## 2018-02-27 DIAGNOSIS — Z78.0 ASYMPTOMATIC MENOPAUSE: ICD-10-CM

## 2018-02-27 DIAGNOSIS — G25.81 RESTLESS LEG: ICD-10-CM

## 2018-02-27 DIAGNOSIS — E11.22 TYPE 2 DIABETES MELLITUS WITH STAGE 3 CHRONIC KIDNEY DISEASE, WITHOUT LONG-TERM CURRENT USE OF INSULIN: ICD-10-CM

## 2018-02-27 DIAGNOSIS — Z23 PNEUMOCOCCAL VACCINE ADMINISTERED: ICD-10-CM

## 2018-02-27 DIAGNOSIS — E78.2 MULTIPLE-TYPE HYPERLIPIDEMIA: Primary | ICD-10-CM

## 2018-02-27 DIAGNOSIS — Z11.59 NEED FOR HEPATITIS C SCREENING TEST: ICD-10-CM

## 2018-02-27 DIAGNOSIS — F20.89 SIMPLE SCHIZOPHRENIA: ICD-10-CM

## 2018-02-27 DIAGNOSIS — N25.81 SECONDARY HYPERPARATHYROIDISM: ICD-10-CM

## 2018-02-27 DIAGNOSIS — K21.9 GASTROESOPHAGEAL REFLUX DISEASE WITHOUT ESOPHAGITIS: ICD-10-CM

## 2018-02-27 PROCEDURE — 1170F FXNL STATUS ASSESSED: CPT | Mod: ,,, | Performed by: INTERNAL MEDICINE

## 2018-02-27 PROCEDURE — 1159F MED LIST DOCD IN RCRD: CPT | Mod: ,,, | Performed by: INTERNAL MEDICINE

## 2018-02-27 PROCEDURE — G0009 ADMIN PNEUMOCOCCAL VACCINE: HCPCS | Mod: ,,, | Performed by: INTERNAL MEDICINE

## 2018-02-27 PROCEDURE — 99214 OFFICE O/P EST MOD 30 MIN: CPT | Mod: ,,, | Performed by: INTERNAL MEDICINE

## 2018-02-27 PROCEDURE — 90732 PPSV23 VACC 2 YRS+ SUBQ/IM: CPT | Mod: ,,, | Performed by: INTERNAL MEDICINE

## 2018-02-27 PROCEDURE — 1126F AMNT PAIN NOTED NONE PRSNT: CPT | Mod: ,,, | Performed by: INTERNAL MEDICINE

## 2018-02-27 PROCEDURE — 3008F BODY MASS INDEX DOCD: CPT | Mod: ,,, | Performed by: INTERNAL MEDICINE

## 2018-02-27 RX ORDER — PRAMIPEXOLE DIHYDROCHLORIDE 0.12 MG/1
0.12 TABLET ORAL 3 TIMES DAILY
Qty: 270 TABLET | Refills: 1
Start: 2018-02-27 | End: 2018-03-17 | Stop reason: DRUGHIGH

## 2018-02-27 RX ORDER — PANTOPRAZOLE SODIUM 20 MG/1
20 TABLET, DELAYED RELEASE ORAL DAILY
Qty: 30 TABLET | Refills: 5
Start: 2018-02-27 | End: 2018-04-06 | Stop reason: DRUGHIGH

## 2018-02-27 RX ORDER — AMANTADINE HYDROCHLORIDE 100 MG/1
100 CAPSULE, GELATIN COATED ORAL 2 TIMES DAILY
COMMUNITY
End: 2020-05-29

## 2018-02-27 NOTE — PROGRESS NOTES
Subjective:       Patient ID: Adriana Perez is a 72 y.o. female.    Chief Complaint: Diabetes (lab review ); Hypertension; and Mental Health Problem    Ms Perez comes for follow up, with underlying Dm, HTN, Lipids. B Sugars are within acceptable range. She has lost weight    Limited insight as to why she has lost weight when her diet and appetite has not changed.    She follows Dr Patterson for schizophrenia and is on meds for same. Recently added amantadine possibly to counteract EPS     ( One side effect of amantadine has been listed as anorexia as per [facility])    Pt also has RLS and is stable on Mirapax    Pt has chronic kidney disease stage 3. Possibly secondary to DM/ HTN.    Pt has elevated Intact PTH indicating secondary Hyperparathyroidism.      Diabetes   She presents for her follow-up diabetic visit. She has type 2 diabetes mellitus. No MedicAlert identification noted. Her disease course has been stable. Hypoglycemia symptoms include nervousness/anxiousness. Pertinent negatives for hypoglycemia include no confusion, dizziness, headaches, pallor, seizures or tremors. Pertinent negatives for diabetes include no chest pain, no fatigue, no foot ulcerations, no polydipsia, no polyphagia, no visual change and no weakness. Risk factors for coronary artery disease include post-menopausal, sedentary lifestyle, hypertension, dyslipidemia and diabetes mellitus. Current diabetic treatment includes oral agent (monotherapy). She is compliant with treatment most of the time. She is following a generally healthy diet. Her home blood glucose trend is fluctuating minimally. An ACE inhibitor/angiotensin II receptor blocker is not being taken.   Hypertension   This is a chronic problem. The current episode started more than 1 year ago. The problem is controlled. Pertinent negatives include no chest pain, headaches, palpitations, peripheral edema, PND or shortness of breath. Risk factors for coronary artery disease include  sedentary lifestyle. Past treatments include calcium channel blockers and beta blockers. There is no history of a hypertension causing med or pheochromocytoma.   Mental Health Problem   The primary symptoms include disorganized speech. The primary symptoms do not include dysphoric mood or bizarre behavior.   Additional symptoms of the illness include unexpected weight change (7 lbs). Additional symptoms of the illness do not include insomnia, fatigue, agitation, euphoric mood, visual change, headaches or seizures. She does not admit to suicidal ideas. Risk factors that are present for mental illness include a history of mental illness.       Past Medical History:   Diagnosis Date    Acid reflux     Allergy     lisinopril    Anxiety     CKD (chronic kidney disease), stage III     Depression     Diabetes mellitus     Diabetes mellitus, type 2     Disorder of kidney and ureter     Hyperlipidemia     Hypertension     MVP (mitral valve prolapse)     Primary osteoarthritis of left knee     Restless leg     Schizophrenia, simple, chronic     Urinary, incontinence, stress female      Social History     Social History    Marital status:      Spouse name: N/A    Number of children: N/A    Years of education: N/A     Occupational History    Not on file.     Social History Main Topics    Smoking status: Former Smoker     Types: Cigarettes     Quit date: 9/13/1992    Smokeless tobacco: Never Used    Alcohol use No    Drug use: No    Sexual activity: No     Other Topics Concern    Not on file     Social History Narrative    No narrative on file     Past Surgical History:   Procedure Laterality Date    HYSTERECTOMY      TUBAL LIGATION       Family History   Problem Relation Age of Onset    Sudden death Father     Cancer Mother     Hypertension Mother     Cancer Sister        Review of Systems   Constitutional: Positive for unexpected weight change (7 lbs). Negative for activity change,  "chills, fatigue and fever.   HENT: Negative for congestion, postnasal drip and sinus pressure.    Eyes: Negative for pain, discharge and visual disturbance.   Respiratory: Negative for cough, chest tightness and shortness of breath.    Cardiovascular: Negative for chest pain, palpitations, leg swelling and PND.        HTN. Lipids   Gastrointestinal: Negative for abdominal distention, anal bleeding, constipation and diarrhea.   Endocrine: Negative for polydipsia and polyphagia.        Diabetes mellitus type 2. Chronic kidney disease stage III.   Genitourinary: Negative for difficulty urinating, dysuria and flank pain.   Musculoskeletal: Negative for arthralgias and joint swelling.   Skin: Negative for color change, pallor and rash.   Allergic/Immunologic: Negative for environmental allergies, food allergies and immunocompromised state.   Neurological: Negative for dizziness, tremors, seizures, syncope, weakness, light-headedness and headaches.        While patient is awake and alert, her understanding and cognition seems to be somewhat constricted.  Hearing loss and frequently has trouble understanding me and I have to maintain direct eye contact and speak slowly and louder.   Hematological: Negative for adenopathy. Does not bruise/bleed easily.   Psychiatric/Behavioral: Negative for agitation, confusion and dysphoric mood. The patient is nervous/anxious. The patient does not have insomnia.         Patient's has history of schizoaffective disorder. This seems to be stable.       Objective:       Vitals:    02/27/18 1354   BP: 125/65   Pulse: 81   Resp: 16   Weight: 73.5 kg (162 lb)   Height: 5' 7" (1.702 m)     Physical Exam   Constitutional: She appears well-developed and well-nourished. She is cooperative. No distress.   HENT:   Head: Normocephalic and atraumatic.   Right Ear: Decreased hearing is noted.   Left Ear: Decreased hearing is noted.   Eyes: Conjunctivae, EOM and lids are normal. Lids are everted and " swept, no foreign bodies found. Right pupil is round and reactive. Left pupil is round and reactive.   Neck: Trachea normal and normal range of motion. Neck supple.   Cardiovascular: Normal rate, regular rhythm, S1 normal, S2 normal and normal heart sounds.    Pulmonary/Chest: Breath sounds normal.   Abdominal: Soft. Bowel sounds are normal. There is no rigidity and no guarding.   Musculoskeletal: She exhibits no deformity.        Right foot: There is no deformity.        Left foot: There is no deformity.   Feet:   Right Foot:   Protective Sensation: 5 sites tested. 5 sites sensed.   Skin Integrity: Negative for ulcer or blister.   Left Foot:   Protective Sensation: 5 sites tested. 5 sites sensed.   Skin Integrity: Negative for ulcer or blister.   Lymphadenopathy:     She has no cervical adenopathy.     She has no axillary adenopathy.   Neurological: She is alert.   Skin: Skin is warm and dry.   Nursing note and vitals reviewed.      Assessment:       1. Multiple-type hyperlipidemia    2. Chronic kidney disease, stage 3 (moderate)    3. Simple schizophrenia    4. Restless leg    5. Gastroesophageal reflux disease without esophagitis    6. Secondary hyperparathyroidism    7. Type 2 diabetes mellitus with stage 3 chronic kidney disease, without long-term current use of insulin    8. Need for hepatitis C screening test    9. Asymptomatic menopause    10. Pneumococcal vaccine administered       Hemoglobin A1c   Order: [id number]   Status:  Final result   Visible to patient:  No (Not Released) Next appt:  None     Ref Range & Units 4d ago 7mo ago    Hemoglobin A1C 3.1 - 6.5 % 6.3  6.7     Resulting Agency  [facility]      Specimen Collected: 02/23/18 10:26 Last Resulted: 02/23/18 15:11 Lab Flowsheet Order Details View Encounter Lab and Collection Details Routing                Plan:           Multiple-type hyperlipidemia    Chronic kidney disease, stage 3 (moderate)    Simple schizophrenia    Restless leg  -      pramipexole (MIRAPEX) 0.125 MG tablet; Take 1 tablet (0.125 mg total) by mouth 3 (three) times daily.  Dispense: 270 tablet; Refill: 1    Gastroesophageal reflux disease without esophagitis  -     pantoprazole (PROTONIX) 20 MG tablet; Take 1 tablet (20 mg total) by mouth once daily.  Dispense: 30 tablet; Refill: 5    Secondary hyperparathyroidism    Type 2 diabetes mellitus with stage 3 chronic kidney disease, without long-term current use of insulin  -     Hemoglobin A1c; Future; Expected date: 02/27/2018    Need for hepatitis C screening test  -     Hepatitis C antibody; Future; Expected date: 02/27/2018    Asymptomatic menopause  -     DXA Bone Density Spine And Hip    Pneumococcal vaccine administered  -     (In Office Administered) Pneumococcal Polysaccharide Vaccine (23 Valent) (SQ/IM)    Avoid NSAIDS, dehydration BActrim and unnecessary tetsing

## 2018-03-17 RX ORDER — GLIMEPIRIDE 2 MG/1
TABLET ORAL
Qty: 180 TABLET | Refills: 3 | Status: SHIPPED | OUTPATIENT
Start: 2018-03-17 | End: 2018-09-19 | Stop reason: SDUPTHER

## 2018-03-17 RX ORDER — PRAMIPEXOLE DIHYDROCHLORIDE 0.25 MG/1
TABLET ORAL
Qty: 90 TABLET | Refills: 3 | Status: SHIPPED | OUTPATIENT
Start: 2018-03-17 | End: 2018-09-13

## 2018-03-28 RX ORDER — PRAVASTATIN SODIUM 40 MG/1
TABLET ORAL
Qty: 90 TABLET | Refills: 3 | Status: SHIPPED | OUTPATIENT
Start: 2018-03-28 | End: 2018-04-09 | Stop reason: SDUPTHER

## 2018-04-05 ENCOUNTER — OFFICE VISIT (OUTPATIENT)
Dept: FAMILY MEDICINE | Facility: CLINIC | Age: 73
End: 2018-04-05
Payer: MEDICARE

## 2018-04-05 VITALS
TEMPERATURE: 98 F | WEIGHT: 160 LBS | SYSTOLIC BLOOD PRESSURE: 132 MMHG | HEART RATE: 82 BPM | DIASTOLIC BLOOD PRESSURE: 76 MMHG | HEIGHT: 67 IN | BODY MASS INDEX: 25.11 KG/M2

## 2018-04-05 DIAGNOSIS — R63.4 WEIGHT LOSS: ICD-10-CM

## 2018-04-05 DIAGNOSIS — R05.9 COUGH: Primary | ICD-10-CM

## 2018-04-05 DIAGNOSIS — R09.81 NASAL CONGESTION: ICD-10-CM

## 2018-04-05 DIAGNOSIS — N39.41 URGE INCONTINENCE OF URINE: ICD-10-CM

## 2018-04-05 DIAGNOSIS — Z78.0 ASYMPTOMATIC MENOPAUSE: ICD-10-CM

## 2018-04-05 PROCEDURE — 3075F SYST BP GE 130 - 139MM HG: CPT | Mod: ,,, | Performed by: INTERNAL MEDICINE

## 2018-04-05 PROCEDURE — 3078F DIAST BP <80 MM HG: CPT | Mod: ,,, | Performed by: INTERNAL MEDICINE

## 2018-04-05 PROCEDURE — 99214 OFFICE O/P EST MOD 30 MIN: CPT | Mod: ,,, | Performed by: INTERNAL MEDICINE

## 2018-04-05 RX ORDER — BENZONATATE 200 MG/1
200 CAPSULE ORAL 3 TIMES DAILY PRN
Qty: 30 CAPSULE | Refills: 1 | Status: SHIPPED | OUTPATIENT
Start: 2018-04-05 | End: 2018-04-15

## 2018-04-05 RX ORDER — CETIRIZINE HYDROCHLORIDE 10 MG/1
10 TABLET ORAL DAILY
Qty: 30 TABLET | Refills: 2 | Status: SHIPPED | OUTPATIENT
Start: 2018-04-05 | End: 2018-11-16

## 2018-04-05 RX ORDER — IPRATROPIUM BROMIDE 42 UG/1
2 SPRAY, METERED NASAL 3 TIMES DAILY
Qty: 15 ML | Refills: 2 | Status: SHIPPED | OUTPATIENT
Start: 2018-04-05 | End: 2019-05-29

## 2018-04-05 NOTE — PROGRESS NOTES
Subjective:       Patient ID: Adriana Perez is a 72 y.o. female.    Chief Complaint: Cough; Weight Loss; and Nasal Congestion    Ms. Perez complains of a cough for the last 3 weeks. This is productive with clear sputum. She denies any fever or chills. It is unclear if she has any symptoms of nasal congestion or postnasal drip. Nobody at home is sick. She does not have any dogs or animals at home. She is not on ACE inhibitors. She does not have history of asthma or smoking.    She recently had a foot surgery on the left side for a mole by Dr. Wise. This was done under local anesthesia. Patient's cough preceded the foot surgery.    She also complains of urgency and frequency to go to the bathroom. She states all this happened once the ultrasound probe was put on her bladder region.    Patient is unaware but she has lost approximately 7+2 is equal to 9 pounds of weight over the last few months.      Cough   This is a new problem. The current episode started 1 to 4 weeks ago. The problem has been unchanged. The problem occurs every few minutes. The cough is productive of sputum. Associated symptoms include postnasal drip. Pertinent negatives include no chest pain, chills, ear congestion, ear pain, fever, headaches, hemoptysis, myalgias, rash, sore throat or shortness of breath. She has tried OTC cough suppressant (Mucinex) for the symptoms. There is no history of emphysema or environmental allergies.   Sinus Problem   This is a new problem. The current episode started 1 to 4 weeks ago. The problem is unchanged. There has been no fever. She is experiencing no pain. Associated symptoms include congestion and coughing. Pertinent negatives include no chills, diaphoresis, ear pain, headaches, hoarse voice, neck pain, shortness of breath, sinus pressure, sneezing, sore throat or swollen glands. Past treatments include nothing.       Past Medical History:   Diagnosis Date    Acid reflux     Allergy     lisinopril     Anxiety     CKD (chronic kidney disease), stage III     Depression     Diabetes mellitus     Diabetes mellitus, type 2     Disorder of kidney and ureter     Hyperlipidemia     Hypertension     MVP (mitral valve prolapse)     Primary osteoarthritis of left knee     Restless leg     Schizophrenia, simple, chronic     Urinary, incontinence, stress female      Social History     Social History    Marital status:      Spouse name: N/A    Number of children: N/A    Years of education: N/A     Occupational History    Not on file.     Social History Main Topics    Smoking status: Former Smoker     Types: Cigarettes     Quit date: 9/13/1992    Smokeless tobacco: Never Used    Alcohol use No    Drug use: No    Sexual activity: No     Other Topics Concern    Not on file     Social History Narrative    No narrative on file     Past Surgical History:   Procedure Laterality Date    FOOT SURGERY      HYSTERECTOMY      TUBAL LIGATION       Family History   Problem Relation Age of Onset    Sudden death Father     Cancer Mother     Hypertension Mother     Cancer Sister        Review of Systems   Constitutional: Positive for unexpected weight change (7 lbs.+ 2 lbs =9 lbs). Negative for activity change, chills, diaphoresis, fatigue and fever.   HENT: Positive for congestion and postnasal drip. Negative for ear pain, hoarse voice, sinus pressure, sneezing and sore throat.    Eyes: Negative for pain, discharge and visual disturbance.   Respiratory: Positive for cough. Negative for hemoptysis, chest tightness and shortness of breath.    Cardiovascular: Negative for chest pain, palpitations and leg swelling.        HTN. Lipids   Gastrointestinal: Negative for abdominal distention, anal bleeding, constipation and diarrhea.   Endocrine: Negative for polydipsia and polyphagia.        Diabetes mellitus type 2. Chronic kidney disease stage III.   Genitourinary: Positive for urgency. Negative for difficulty  "urinating, dysuria and flank pain.        Patient claims to have new onset of bladder incontinence symptoms after the bladder ultrasound.   Musculoskeletal: Negative for arthralgias, joint swelling, myalgias and neck pain.   Skin: Negative for color change, pallor and rash.        Patient had his surgery on the left foot for a mole by Dr. Wise.   Allergic/Immunologic: Negative for environmental allergies, food allergies and immunocompromised state.   Neurological: Negative for dizziness, tremors, seizures, syncope, weakness, light-headedness and headaches.        While patient is awake and alert, her understanding and cognition seems to be somewhat constricted.  Hearing loss and frequently has trouble understanding me and I have to maintain direct eye contact and speak slowly and louder.   Hematological: Negative for adenopathy. Does not bruise/bleed easily.   Psychiatric/Behavioral: Negative for agitation, confusion and dysphoric mood. The patient is nervous/anxious.         Patient's has history of schizoaffective disorder. This seems to be stable.       Objective:       Vitals:    04/05/18 0937   BP: 132/76   Pulse: 82   Temp: 97.9 °F (36.6 °C)   Weight: 72.6 kg (160 lb)   Height: 5' 7" (1.702 m)     Physical Exam   Constitutional: She appears well-developed and well-nourished. She is cooperative. No distress.   HENT:   Head: Normocephalic and atraumatic.   Right Ear: Decreased hearing is noted.   Left Ear: Decreased hearing is noted.   Nose: Mucosal edema present. Right sinus exhibits no frontal sinus tenderness. Left sinus exhibits no frontal sinus tenderness.   Eyes: Conjunctivae, EOM and lids are normal. Lids are everted and swept, no foreign bodies found. Right pupil is round and reactive. Left pupil is round and reactive.   Neck: Trachea normal and normal range of motion. Neck supple.   Cardiovascular: Normal rate, regular rhythm, S1 normal, S2 normal and normal heart sounds.    Pulmonary/Chest: Breath " sounds normal.   Pulmonary auscultation does not reveal any rhonchi or wheezing even after coughing.    While being examined in the office, patient did not have any cough.   Abdominal: Soft. Bowel sounds are normal. There is no rigidity and no guarding.   Musculoskeletal: She exhibits no deformity.   Lymphadenopathy:     She has no cervical adenopathy.   Neurological: She is alert.   Skin: Skin is warm and dry.   Nursing note and vitals reviewed.      Assessment:       1. Cough    2. Weight loss    3. Nasal congestion    4. Asymptomatic menopause    5. Urge incontinence of urine         Plan:           Cough  -     X-Ray Chest PA And Lateral  -     benzonatate (TESSALON) 200 MG capsule; Take 1 capsule (200 mg total) by mouth 3 (three) times daily as needed.  Dispense: 30 capsule; Refill: 1    Weight loss    Nasal congestion  -     ipratropium (ATROVENT) 42 mcg (0.06 %) nasal spray; 2 sprays by Nasal route 3 (three) times daily.  Dispense: 15 mL; Refill: 2  -     cetirizine (ZYRTEC) 10 MG tablet; Take 1 tablet (10 mg total) by mouth once daily.  Dispense: 30 tablet; Refill: 2    Asymptomatic menopause  -     DXA Bone Density Spine And Hip    Urge incontinence of urine  -     Urinalysis; Future; Expected date: 04/05/2018    I feel patient's cough is because of nasal congestion and postnasal drip. Patient is not a very astute historian. I will give trial of Zyrtec and Atrovent nasal spray. Check chest x-ray because weight loss also. (Patient is a nonsmoker however)    Check bone density also.    Try to check bladder ultrasound reports and kidney ultrasound reports.    Other possible causes of cough could include occult asthma versus silent GERD.

## 2018-04-05 NOTE — PATIENT INSTRUCTIONS
Treating Incontinence in Women: Special Therapies     During biofeedback, sensors send signals from your pelvic floor muscles to a computer screen.     Your doctor will discuss your options for treating your urinary incontinence. These depend on the cause of your problem and any other health issues you have. Usually behavioral changes are tried first, followed by various medicines. If these methods are unsuccessful, one or more of the therapies described below may be part of your treatment plan.  Biofeedback  This technique is taught by a nurse or physical therapist. During the therapy, a small sensor is placed in your vagina or rectum. Another sensor is placed on your stomach. Other types of sensors are also available. These sensors read signals from the pelvic floor muscles. When you contract or relax your muscles, these signals are shown as images on a computer screen. Using the images, you can learn to relax or contract certain muscles. This can help you strengthen and better control these muscles. And it can help you learn pelvic floor muscle exercises.  Electrical stimulation  This is a painless therapy that uses a tiny amount of electric current. It helps strengthen very weak or damaged pelvic floor muscles. The electric current is sent through the muscles of the pelvic floor and bladder. This causes the muscles to contract. In time, this helps make the muscles stronger.  Stimulator implants  This technique is used to treat urge incontinence. A small device is implanted under the skin near the stomach. This device gives off mild electrical signals. These block extra signals that are being sent to the bladder muscle. This helps the bladder work more normally.  Date Last Reviewed: 1/1/2017 © 2000-2017 [employer]. [address] [place] [address], [place] [address]. All rights reserved. This information is not intended as a substitute for professional medical care. Always follow your healthcare  professional's instructions.        Cough, Chronic, Uncertain Cause (Adult)    Everyone has had a cough as part of the common cold, flu, or bronchitis. This kind of cough occurs along with an achy feeling, low-grade fever, nasal and sinus congestion, and a scratchy or sore throat. This usually gets better in 2 to 3 weeks. A cough that lasts longer than 3 weeks may be due to other causes.  If your cough does not improve over the next 2 weeks, further testing may be needed. Follow up with your healthcare provider as advised. Cough suppressants may be recommended. Based on your exam today, the exact cause of your cough is not certain. Below are some common causes for persistent cough.  Smokers cough  Smokers cough doesnt go away. If you continue to smoke, it only gets worse. The cough is from irritation in the air passages. Talk to your healthcare provider about quitting. Medicines or nicotine-replacement products, like gum or the patch, may make quitting easier.  Postnasal drip  A cough that is worse at night may be due to postnasal drip. Excess mucus in the nose drains from the back of your nose to your throat. This triggers the cough reflex. Postnasal drip may be due to a sinus infection or allergy. Common allergens include dust, tobacco smoke (both inhaled and secondhand smoke), environmental pollutants, pollen, mold, pets, cleaning agents, room deodorizers, and chemical fumes. Over-the-counter antihistamines or decongestants may be helpful for allergies. A sinus infection may requires antibiotic treatment. See your healthcare provider if symptoms continue.  Medicines  Certain prescribed medicines can cause a chronic cough in some people:  · ACE inhibitors for high blood pressure. These include benazepril, captopril, enalapril, fosinopril, lisinopril, quinapril, ramipril, and others.  · Beta-blockers for high blood pressure and other conditions. These include propranolol, atenolol, metoprolol, nadolol, and  others.  Let your healthcare provider know if you are taking any of these.  Asthma  Cough may be the only sign of mild asthma. You may have tests to find out if asthma is causing your cough. You may also take asthma medicine on a trial basis.  Acid reflux (heartburn, GERD)  The esophagus is the tube that carries food from the mouth to the stomach. A valve at its lower end prevents stomach acids from flowing upward. If this valve does not work properly, acid from the stomach enters the esophagus. This may cause a burning pain in the upper abdomen or lower chest, belching, or cough. Symptoms are often worse when lying flat. Avoid eating or drinking before bedtime. Try using extra pillows to raise your upper body, or place 4-inch blocks under the head of your bed. You may try an over-the-counter antacid or an acid-blocking medicine such as famotidine, cimetidine, ranitidine, esomeprazole, lansoprazole, or omeprazole. Stronger medicines for this condition can be prescribed by your healthcare provider.  Follow-up care  Follow up with your healthcare provider, or as advised, if your cough does not improve. Further testing may be needed.  Note: If an X-ray was taken, a specialist will review it. You will be notified of any new findings that may affect your care.  When to seek medical advice  Call your healthcare provider right away if any of these occur:  · Mild wheezing or difficulty breathing  · Fever of 100.4ºF (38ºC) or higher, or as directed by your healthcare provider  · Unexpected weight loss  · Coughing up large amounts of colored sputum  · Night sweats (sheets and pajamas get soaking wet)  Call 911, or get immediate medical care  Contact emergency services right away if any of these occur:  · Coughing up blood  · Moderate to severe trouble breathing or wheezing  Date Last Reviewed: 9/13/2015  © 0905-2956 [employer]. [address] [place] [address], [place] [address]. All rights reserved. This information is  not intended as a substitute for professional medical care. Always follow your healthcare professional's instructions.

## 2018-04-06 RX ORDER — CALCIUM CITRATE/VITAMIN D3 200MG-6.25
TABLET ORAL
Qty: 100 STRIP | Refills: 3 | Status: SHIPPED | OUTPATIENT
Start: 2018-04-06 | End: 2019-05-02 | Stop reason: SDUPTHER

## 2018-04-06 RX ORDER — LANCETS 33 GAUGE
EACH MISCELLANEOUS
Qty: 100 EACH | Refills: 3 | Status: SHIPPED | OUTPATIENT
Start: 2018-04-06 | End: 2019-05-02 | Stop reason: SDUPTHER

## 2018-04-06 RX ORDER — PANTOPRAZOLE SODIUM 40 MG/1
TABLET, DELAYED RELEASE ORAL
Qty: 90 TABLET | Refills: 3 | Status: SHIPPED | OUTPATIENT
Start: 2018-04-06 | End: 2018-04-09 | Stop reason: SDUPTHER

## 2018-04-08 PROBLEM — N28.1 BILATERAL RENAL CYSTS: Status: ACTIVE | Noted: 2018-02-27

## 2018-04-08 PROBLEM — R93.429 ECHOGENIC KIDNEYS ON RENAL ULTRASOUND: Status: ACTIVE | Noted: 2018-02-27

## 2018-04-09 RX ORDER — PRAVASTATIN SODIUM 40 MG/1
40 TABLET ORAL NIGHTLY
Qty: 90 TABLET | Refills: 3 | Status: SHIPPED | OUTPATIENT
Start: 2018-04-09 | End: 2019-05-13 | Stop reason: SDUPTHER

## 2018-04-09 RX ORDER — PANTOPRAZOLE SODIUM 40 MG/1
40 TABLET, DELAYED RELEASE ORAL DAILY
Qty: 90 TABLET | Refills: 3 | Status: SHIPPED | OUTPATIENT
Start: 2018-04-09 | End: 2019-05-13 | Stop reason: SDUPTHER

## 2018-04-19 ENCOUNTER — OFFICE VISIT (OUTPATIENT)
Dept: FAMILY MEDICINE | Facility: CLINIC | Age: 73
End: 2018-04-19
Payer: MEDICARE

## 2018-04-19 VITALS
BODY MASS INDEX: 24.96 KG/M2 | OXYGEN SATURATION: 99 % | HEIGHT: 67 IN | HEART RATE: 76 BPM | SYSTOLIC BLOOD PRESSURE: 122 MMHG | WEIGHT: 159 LBS | DIASTOLIC BLOOD PRESSURE: 72 MMHG

## 2018-04-19 DIAGNOSIS — R09.82 POST-NASAL DRAINAGE: ICD-10-CM

## 2018-04-19 DIAGNOSIS — H66.002 ACUTE SUPPURATIVE OTITIS MEDIA OF LEFT EAR WITHOUT SPONTANEOUS RUPTURE OF TYMPANIC MEMBRANE, RECURRENCE NOT SPECIFIED: ICD-10-CM

## 2018-04-19 DIAGNOSIS — R05.9 COUGH: ICD-10-CM

## 2018-04-19 DIAGNOSIS — H61.22 IMPACTED CERUMEN OF LEFT EAR: Primary | ICD-10-CM

## 2018-04-19 PROCEDURE — 99213 OFFICE O/P EST LOW 20 MIN: CPT | Mod: 25,,, | Performed by: NURSE PRACTITIONER

## 2018-04-19 PROCEDURE — 3078F DIAST BP <80 MM HG: CPT | Mod: ,,, | Performed by: NURSE PRACTITIONER

## 2018-04-19 PROCEDURE — 69210 REMOVE IMPACTED EAR WAX UNI: CPT | Mod: LT,,, | Performed by: NURSE PRACTITIONER

## 2018-04-19 PROCEDURE — 3074F SYST BP LT 130 MM HG: CPT | Mod: ,,, | Performed by: NURSE PRACTITIONER

## 2018-04-19 RX ORDER — AMOXICILLIN 875 MG/1
875 TABLET, FILM COATED ORAL 2 TIMES DAILY
Qty: 20 TABLET | Refills: 0 | Status: SHIPPED | OUTPATIENT
Start: 2018-04-19 | End: 2018-08-13

## 2018-04-19 NOTE — PROGRESS NOTES
Subjective:       Patient ID: Adriana Perez is a 72 y.o. female.    Chief Complaint: Cough (2 wk. f/u)    Cough   This is a recurrent problem. The current episode started 1 to 4 weeks ago. The problem has been waxing and waning. The problem occurs every few minutes. The cough is non-productive. Associated symptoms include ear congestion, ear pain and postnasal drip. Pertinent negatives include no chest pain, chills, fever, headaches, heartburn, hemoptysis, nasal congestion, rhinorrhea, sore throat, shortness of breath, sweats, weight loss or wheezing. Nothing aggravates the symptoms. She has tried body position changes, OTC cough suppressant and prescription cough suppressant for the symptoms. The treatment provided mild relief. There is no history of asthma, bronchiectasis or COPD.     Review of Systems   Constitutional: Negative for activity change, appetite change, chills, fever and weight loss.   HENT: Positive for ear pain and postnasal drip. Negative for congestion, ear discharge, nosebleeds, rhinorrhea, sinus pain, sinus pressure, sneezing, sore throat, trouble swallowing and voice change.    Eyes: Negative for photophobia, pain, discharge and visual disturbance.   Respiratory: Positive for cough. Negative for hemoptysis, chest tightness, shortness of breath and wheezing.    Cardiovascular: Negative for chest pain and palpitations.   Gastrointestinal: Negative for abdominal pain, heartburn, nausea and vomiting.   Endocrine: Negative for cold intolerance and heat intolerance.   Genitourinary: Negative for difficulty urinating and dysuria.   Musculoskeletal: Negative for arthralgias and gait problem.   Allergic/Immunologic: Negative for immunocompromised state.   Neurological: Negative for speech difficulty and headaches.       Past Medical History:   Diagnosis Date    Acid reflux     Allergy     lisinopril    Anxiety     Bilateral renal cysts 2/27/2018    Renal USG by Dr Foy    CKD (chronic kidney  disease), stage III     Depression     Diabetes mellitus     Diabetes mellitus, type 2     Disorder of kidney and ureter     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/2018    Dr Lance Foy- Also Renal cysts bilateral    Hyperlipidemia     Hypertension     MVP (mitral valve prolapse)     Primary osteoarthritis of left knee     Restless leg     Schizophrenia, simple, chronic     Urinary, incontinence, stress female       Past Surgical History:   Procedure Laterality Date    FOOT SURGERY      HYSTERECTOMY      TUBAL LIGATION         Family History   Problem Relation Age of Onset    Sudden death Father     Cancer Mother     Hypertension Mother     Cancer Sister        Social History     Social History    Marital status:      Spouse name: N/A    Number of children: N/A    Years of education: N/A     Social History Main Topics    Smoking status: Former Smoker     Types: Cigarettes     Quit date: 9/13/1992    Smokeless tobacco: Never Used    Alcohol use No    Drug use: No    Sexual activity: No     Other Topics Concern    None     Social History Narrative    None       Current Outpatient Prescriptions   Medication Sig Dispense Refill    amantadine HCl (SYMMETREL) 100 mg capsule Take 100 mg by mouth 2 (two) times daily.      amLODIPine (NORVASC) 10 MG tablet TAKE 1 TABLET ONE TIME DAILY 90 tablet 3    aspirin (ECOTRIN) 81 MG EC tablet Take 81 mg by mouth once daily.      calcitRIOL (ROCALTROL) 0.25 MCG Cap Take 0.25 mcg by mouth every Mon, Wed, Fri.      cetirizine (ZYRTEC) 10 MG tablet Take 1 tablet (10 mg total) by mouth once daily. 30 tablet 2    cholecalciferol, vitamin D3, (VITAMIN D3) 2,000 unit Cap Take 1 capsule by mouth once daily.      glimepiride (AMARYL) 2 MG tablet TAKE 1 TABLET TWICE DAILY 180 tablet 3    ipratropium (ATROVENT) 42 mcg (0.06 %) nasal spray 2 sprays by Nasal route 3 (three) times daily. 15 mL 2    metoprolol succinate (TOPROL-XL) 50 MG 24 hr tablet  "Take 50 mg by mouth 2 (two) times daily.      olanzapine (ZYPREXA) 10 MG tablet Take 10 mg by mouth every evening.      pantoprazole (PROTONIX) 40 MG tablet Take 1 tablet (40 mg total) by mouth once daily. 90 tablet 3    potassium chloride SA (K-DUR,KLOR-CON) 20 MEQ tablet Take 20 mEq by mouth once daily.      pramipexole (MIRAPEX) 0.25 MG tablet TAKE 1 TABLET DAILY 2 TO 3 HOURS BEFORE BEDTIME 90 tablet 3    pravastatin (PRAVACHOL) 40 MG tablet Take 1 tablet (40 mg total) by mouth nightly. 90 tablet 3    SITagliptin (JANUVIA) 50 MG Tab TAKE 1 TABLET ONE TIME DAILY 90 tablet 3    trazodone (DESYREL) 100 MG tablet Take 100 mg by mouth nightly as needed.      TRUE METRIX GLUCOSE TEST STRIP Strp TEST BLOOD SUGAR EVERY  strip 3    TRUEPLUS LANCETS 33 gauge Misc TEST  DAILY 100 each 3    amoxicillin (AMOXIL) 875 MG tablet Take 1 tablet (875 mg total) by mouth 2 (two) times daily. 20 tablet 0     No current facility-administered medications for this visit.        Review of patient's allergies indicates:   Allergen Reactions    Lisinopril Nausea And Vomiting     Objective:    HPI     Cough    Additional comments: 2 wk. f/u       Last edited by Yasmin Humphries LPN on 4/19/2018  8:08 AM. (History)      Blood pressure 122/72, pulse 76, height 5' 7" (1.702 m), weight 72.1 kg (159 lb), SpO2 99 %. Body mass index is 24.9 kg/m².   Physical Exam   Constitutional: She is oriented to person, place, and time. She appears well-developed. She is cooperative. No distress.   HENT:   Head: Normocephalic and atraumatic.   Right Ear: Tympanic membrane is not erythematous. A middle ear effusion (mild) is present.   Left Ear: Tympanic membrane normal. A foreign body (complete cerumen impaction) is present.   Nose: Nose normal.   Mouth/Throat: Uvula is midline and mucous membranes are normal. Oropharyngeal exudate (clear mild post nasal drainage) present.   Eyes: Conjunctivae, EOM and lids are normal. Pupils are equal, round, " and reactive to light. Lids are everted and swept, no foreign bodies found. Right pupil is round and reactive. Left pupil is round and reactive.   Neck: Trachea normal and normal range of motion. Neck supple.   Cardiovascular: Normal rate, regular rhythm, S1 normal and S2 normal.    Pulmonary/Chest: Effort normal and breath sounds normal. No respiratory distress.   Abdominal: There is no rigidity.   Musculoskeletal: Normal range of motion.   Lymphadenopathy:     She has no cervical adenopathy.     She has no axillary adenopathy.   Neurological: She is alert and oriented to person, place, and time.   Skin: Skin is warm and dry. Capillary refill takes less than 2 seconds.   Nursing note and vitals reviewed.          Assessment:       1. Impacted cerumen of left ear    2. Cough    3. Post-nasal drainage    4. Acute suppurative otitis media of left ear without spontaneous rupture of tympanic membrane, recurrence not specified        Plan:       Adriana was seen today for cough.    Diagnoses and all orders for this visit:    Impacted cerumen of left ear  -Ceruminosis is noted.  Wax is removed by manual debridement from the left ear.  Very large wax impaction removed successfully.  Patient tolerated well and voiced significant relief after removal.  TM intact with mild erythema and middle ear effusion noted.  Instructions for home care to prevent wax buildup are given.    Cough    Post-nasal drainage    Acute suppurative otitis media of left ear without spontaneous rupture of tympanic membrane, recurrence not specified  -     amoxicillin (AMOXIL) 875 MG tablet; Take 1 tablet (875 mg total) by mouth 2 (two) times daily.       Follow up if not improved.  Follow up in June for scheduled appointment with Dr. Koch.

## 2018-04-19 NOTE — PATIENT INSTRUCTIONS
When to Use Antibiotics   Antibiotics are medicines used to treat infections caused by bacteria. They dont work for illnesses caused by viruses or an allergic reaction. In fact, taking antibiotics for reasons other than a bacterial infection can cause problems. For example, you may have side effects from the medicine. And if you really need an antibiotic, it may not work well.                                                                                                                                              When antibiotics wont help  Your healthcare provider wont usually prescribe antibiotics for the following conditions. You can help by not asking for them if you have:   · A cold. This type of illness is caused by a virus. It can cause a runny nose, stuffed-up nose, sneezing, coughing, headache, mild body aches, and low fever. A cold gets better on its own in a few days to a week.  · The flu (influenza). This is a respiratory illness caused by a virus. The flu usually goes away on its own in a week or so. It can cause fever, body aches, sore throat, and fatigue.  · Bronchitis. This is an infection in the lungs most often caused by a virus. You may have coughing, phlegm, body aches, and a low fever. A common type of bronchitis is known as a chest cold (acute bronchitis). This often happens after you have a respiratory infection like a common cold. Bronchitis can take weeks to go away, but antibiotics usually dont help.  · Most sore throats. Sore throats are most often caused by viruses. Your throat may feel scratchy or achy, and it may hurt to swallow. You may also have a low fever and body aches. A sore throat usually gets better in a few days.  · Most ear infections. An ear infection may be caused by a virus or bacteria. It causes pain in the ear. Antibiotics usually dont help, and the infection goes away on its own.  · Most sinus infections (sinusitis). This kind of infection causes sinus pain and  swelling, and a runny nose. In most cases, sinusitis goes away on its own, and antibiotics dont make recovery quicker.  · Allergic rhinitis. This is a set of symptoms caused by an allergic reaction. You may have sneezing, a runny nose, itchy or watery eyes, or a sore throat. Allergies are not treated with antibiotics.  · Low fever. A mild fever thats less than 100.4°F (38°C) most likely doesnt need treatment with antibiotics.   When antibiotics can help   Antibiotics can be used to treat:                                                     · Strep throat. This is a throat infectioncaused by a certain type of bacteria. Symptoms of strep throat include a sore throat, white patches on the tonsils, red spots on the roof of the mouth, fever, body aches, and nausea and vomiting.  · Urinary tract infection (UTI). This is a bacterial infection of the bladder and the tube that takes urine out of the body. It can cause burning pain and urine thats cloudy or tinted with blood. UTIs are very common. Antibiotics usually help treat these infections.  · Some ear infections. In some cases, a healthcare provider may prescribe antibiotics for an ear infection. You may need a test to show whats causing the ear infection.  · Some sinus infections. In some cases, yourhealthcare provider may give you antibiotics. He or she may first need to make sure your symptoms arent caused by a virus, fungus, allergies, or air pollutants such as smoke.   Your doctor may also recommend antibiotics if you have a condition that can affect your immune system, such as diabetes or cancer.   Self-care at home   If your infection cant be treated with antibiotics, you can take other steps to feel better. Try the remedies below. In general:   · Rest and sleep as much as needed.  · Drink water and other clear fluids.  · Dont smoke, and avoid smoke from other people.  · Use over-the-counter medicine such as acetaminophen to ease pain or fever, as  directed by your healthcare provider.   To treat sinus pain or nasal congestion:   · Put a warm, moist washcloth on your face where you feel sinus pain or pressure.  · Use a nasal spray with medicine or saline, as directed by your healthcare provider.  · Breathe in steam from a hot shower.  · Use a humidifier or cool mist vaporizer.   To quiet a cough:   · Use a humidifier or cool mist vaporizer.  · Breathe in steam from a hot shower.  · Use cough lozenges.   To sooth a sore throat:   · Suck on ice chips, popsicles, or lozenges.  · Use a sore throat spray.  · Use a humidifier or cool mist vaporizer.  · Gargle with saltwater.  · Drink warm liquids.   To ease ear pain:   · Hold a warm, moist washcloth on the ear for 10 minutes at a time.  Date Last Reviewed: 9/1/2016  © 6246-3612 [employer]. [address] [place] [address]. All rights reserved. This information is not intended as a substitute for professional medical care. Always follow your healthcare professional's instructions.        Earwax Removal    The ear canal makes earwax from the canals lining. The ears make wax to lubricate and protect the ear canal. The ear canal is the tube that connects the middle ear to the outside of the ear. The wax protects the ear from bacteria, infection, and damage from water or trauma.  The wax that forms in the canal naturally moves toward the outside of the ear and falls out. In some cases, the ear may make too much wax. If the wax causes problems or keeps the healthcare provider from seeing into the ear, the extra wax may be removed.  Too much wax can affect your hearing. It can cause itching. In rare cases, it can be painful. Earwax should not be removed unless it is causing a problem. You should not stick objects into your ear to remove wax unless told to do so by your healthcare provider.  Healthcare providers can remove earwax safely. It is important to stay still during the procedure to avoid  damage to the ear canal. But removing earwax generally doesnt hurt. You will not usually need anesthesia or pain medicine when the provider removes the earwax.  A number of conditions lead to earwax buildup. These include some skin problems, a narrow ear canal, or ears that make too much earwax. Using cotton swabs in the canal pushes earwax deeper into the ear and contributes to the buildup of earwax.  Home care  · The healthcare provider may recommend mineral oil or an over-the-counter eardrop to use at home to soften the earwax. Use these products only if the provider recommends them. Use these products only if the provider recommends them. Carefully follow the instructions given.  · Dont use mineral oil or OTC eardrops if you might have an ear infection or a ruptured eardrum. Tell your healthcare provider right away if you have diabetes or an immune disorder.  · Dont use cotton swabs in your ears. Cotton swabs may push wax deeper into the ear canal or damage the eardrum. Use cotton gauze or a wet washcloth  to gently remove wax on the outside of the ear and around the opening to the ear canal.  · Don't use any probing device or object such as cotton-tipped swabs or bianca pins to clean the inside of your ears.  · Dont use ear candles to clean your ears. Candling can be dangerous. It can burn the ear canal. It can also make the condition worse instead of better.  · Dont use cold water to rinse the ear. This will make you dizzy. If your provider tells you to rinse your ear, use only warm water or follow his or her instructions.  · Check the ear for signs of infection or irritation listed below under When to seek medical advice.  Steps for using eardrops  1. Warm the medicine bottle by rubbing it between your hands for a few minutes.  2. Lie down on your side, with the affected ear up.  3. Place the recommended number of drops in the ear. Wet a cotton ball with the medicine. Gently put the cotton ball into the  ear opening.  Follow-up care  Follow up with your healthcare provider, or as directed.  When to seek medical advice  Call the provider right away if you have:  · Ear pain that gets worse  · Fever of 100.4F°F (38°C) or higher, or as directed by your healthcare provider  · Worsening wax buildup  · Severe pain, dizziness, or nausea  · Bleeding from the ear  · Hearing problems  · Signs of irritation from the eardrops, such as burning, stinging, or swelling and tenderness  · Foul-smelling fluid draining from the ear  · Swelling, redness, or tenderness of the outer ear  · Headache, neck pain, or stiff neck  Date Last Reviewed: 3/22/2015  © 8870-5143 [employer]. [address] [place] [address], [place] [address]. All rights reserved. This information is not intended as a substitute for professional medical care. Always follow your healthcare professional's instructions.

## 2018-04-26 ENCOUNTER — OFFICE VISIT (OUTPATIENT)
Dept: FAMILY MEDICINE | Facility: CLINIC | Age: 73
End: 2018-04-26
Payer: MEDICARE

## 2018-04-26 VITALS
HEIGHT: 67 IN | TEMPERATURE: 99 F | WEIGHT: 159 LBS | BODY MASS INDEX: 24.96 KG/M2 | RESPIRATION RATE: 16 BRPM | DIASTOLIC BLOOD PRESSURE: 78 MMHG | SYSTOLIC BLOOD PRESSURE: 138 MMHG | HEART RATE: 82 BPM

## 2018-04-26 DIAGNOSIS — J45.991 COUGH VARIANT ASTHMA: ICD-10-CM

## 2018-04-26 DIAGNOSIS — R05.9 COUGH: Primary | ICD-10-CM

## 2018-04-26 PROCEDURE — 3078F DIAST BP <80 MM HG: CPT | Mod: ,,, | Performed by: INTERNAL MEDICINE

## 2018-04-26 PROCEDURE — 99213 OFFICE O/P EST LOW 20 MIN: CPT | Mod: ,,, | Performed by: INTERNAL MEDICINE

## 2018-04-26 PROCEDURE — 3075F SYST BP GE 130 - 139MM HG: CPT | Mod: ,,, | Performed by: INTERNAL MEDICINE

## 2018-04-26 RX ORDER — FLUTICASONE FUROATE AND VILANTEROL 100; 25 UG/1; UG/1
1 POWDER RESPIRATORY (INHALATION) DAILY
Qty: 1 EACH | Refills: 0
Start: 2018-04-26 | End: 2018-08-13

## 2018-04-26 RX ORDER — BENZONATATE 200 MG/1
200 CAPSULE ORAL 3 TIMES DAILY PRN
Qty: 30 CAPSULE | Refills: 2 | Status: SHIPPED | OUTPATIENT
Start: 2018-04-26 | End: 2018-05-03 | Stop reason: SDUPTHER

## 2018-04-26 NOTE — PROGRESS NOTES
Subjective:       Patient ID: Adriana Perez is a 72 y.o. female.    Chief Complaint: Cough (x months ) and Sinus Problem    Pt comes back for follow up on cough. Had wax removal last time with dramatic cough improvement last time. The dramatic improvement lasted only 1-2 hours after she went away from office. Thus far she has been tried on nasal inhalation with ipratropium and no relief. She also takes pantoprazole for reflux.    Again patient is not a very astute historian and cannot pinpoint as to any possible allergens are surrounding environment. She is very linear and black-and-white in her answers.      Cough   This is a recurrent problem. The current episode started more than 1 month ago. The problem has been unchanged. Associated symptoms include nasal congestion. Pertinent negatives include no chest pain, chills, ear pain, fever, headaches, myalgias, postnasal drip, rash, sore throat or shortness of breath. Risk factors: Unable to define. The treatment provided no relief. There is no history of asthma, bronchiectasis, bronchitis, COPD, emphysema, environmental allergies or pneumonia.       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     Social History Main Topics     Smoking status: Former Smoker     Types: Cigarettes     Quit date: 9/13/1992    Smokeless tobacco: Never Used    Alcohol use No    Drug use: No    Sexual activity: No     Other Topics Concern    Not on file     Social History Narrative    No narrative on file     Past Surgical History:   Procedure Laterality Date    FOOT SURGERY      HYSTERECTOMY      TUBAL LIGATION       Family History   Problem Relation Age of Onset    Sudden death Father     Cancer Mother     Hypertension Mother     Cancer Sister        Review of Systems   Constitutional: Positive for unexpected weight change (7 lbs.+ 2 lbs =9 lbs). Negative for activity change, chills, diaphoresis, fatigue and fever.   HENT: Negative for congestion, ear pain, postnasal drip, sinus pressure, sneezing and sore throat.    Eyes: Negative for pain, discharge and visual disturbance.   Respiratory: Positive for cough. Negative for chest tightness and shortness of breath.    Cardiovascular: Negative for chest pain, palpitations and leg swelling.        HTN. Lipids   Gastrointestinal: Negative for abdominal distention, anal bleeding, constipation and diarrhea.   Endocrine: Negative for polydipsia and polyphagia.        Diabetes mellitus type 2. Chronic kidney disease stage III.   Genitourinary: Positive for urgency. Negative for difficulty urinating, dysuria and flank pain.        Patient claims to have new onset of bladder incontinence symptoms after the bladder ultrasound.   Musculoskeletal: Negative for arthralgias, joint swelling, myalgias and neck pain.   Skin: Negative for color change, pallor and rash.        Patient had his surgery on the left foot for a mole by Dr. Wise.   Allergic/Immunologic: Negative for environmental allergies, food allergies and immunocompromised state.   Neurological: Negative for dizziness, tremors, seizures, syncope, weakness, light-headedness and headaches.        While patient is awake and alert, her understanding  "and cognition seems to be somewhat constricted.  Hearing loss and frequently has trouble understanding me and I have to maintain direct eye contact and speak slowly and louder.   Hematological: Negative for adenopathy. Does not bruise/bleed easily.   Psychiatric/Behavioral: Negative for agitation, confusion and dysphoric mood. The patient is nervous/anxious.         Patient's has history of schizoaffective disorder. This seems to be stable.       Objective:       Vitals:    04/26/18 1014 04/26/18 1031   BP: (!) 140/78 138/78   Pulse: 82    Resp:  16   Temp: 98.6 °F (37 °C)    Weight: 72.1 kg (159 lb)    Height: 5' 7" (1.702 m)      Physical Exam   Constitutional: She appears well-developed and well-nourished. She is cooperative. No distress.   HENT:   Head: Normocephalic and atraumatic.   Right Ear: Decreased hearing is noted.   Left Ear: Decreased hearing is noted.   Nose: Mucosal edema present. Right sinus exhibits no frontal sinus tenderness. Left sinus exhibits no frontal sinus tenderness.   Eyes: Conjunctivae, EOM and lids are normal. Lids are everted and swept, no foreign bodies found. Right pupil is round and reactive. Left pupil is round and reactive.   Neck: Trachea normal and normal range of motion. Neck supple.   Cardiovascular: Normal rate, regular rhythm, S1 normal, S2 normal and normal heart sounds.    Pulmonary/Chest: Breath sounds normal.   Pulmonary auscultation does not reveal any rhonchi or wheezing even after coughing.       Abdominal: Soft. Bowel sounds are normal. There is no rigidity and no guarding.   Musculoskeletal: She exhibits no deformity.   Lymphadenopathy:     She has no cervical adenopathy.   Neurological: She is alert.   Skin: Skin is warm and dry.   Nursing note and vitals reviewed.      Assessment:       1. Cough    2. Cough variant asthma         Plan:           Cough  -     fluticasone-vilanterol (BREO ELLIPTA) 100-25 mcg/dose diskus inhaler; Inhale 1 puff into the lungs once " daily. Controller sample given  Dispense: 1 each; Refill: 0  -     benzonatate (TESSALON) 200 MG capsule; Take 1 capsule (200 mg total) by mouth 3 (three) times daily as needed for Cough.  Dispense: 30 capsule; Refill: 2    Cough variant asthma  -     fluticasone-vilanterol (BREO ELLIPTA) 100-25 mcg/dose diskus inhaler; Inhale 1 puff into the lungs once daily. Controller sample given  Dispense: 1 each; Refill: 0  -     benzonatate (TESSALON) 200 MG capsule; Take 1 capsule (200 mg total) by mouth 3 (three) times daily as needed for Cough.  Dispense: 30 capsule; Refill: 2    I will give trial Breo Ellipta assuming that she has a cough variant asthma.    Patient has been taught use of this inhaler.    Patient's limited hearing still contributes to hamper an effective communication.    Follow-up in 2 weeks.

## 2018-04-26 NOTE — PATIENT INSTRUCTIONS
Asthma Action Plan     Your name:  _________________________  Emergency contact:  _________________________  Healthcare provider:  _________________________ Today's Date:  _________________________  Phone:  _________________________  Signature:  _________________________ Next appt (date/time):  _________________________  Phone:  _________________________  Phone:  _________________________      Green zone   My symptoms What I should do My medicine   · No wheezing, coughing, or chest tightness  · Asthma is not bothering your sleep, work, or school  · You rarely or never use your quick-relief medicine  Peak flow is:     _____________________  80%-100% of personal best · Keep taking your long-term  controller medicines  · Take your quick-relief   medicines as needed  Avoid your asthma triggers (list):  __________________________     __________________________     __________________________     __________________________     __________________________ Long-term controllers:  __________________________  Name:  __________________________  Dose:  __________________________  How often:  __________________________  Special instructions:  __________________________  Quick-relief:  __________________________  __________________________  Before exercise:  __________________________      Yellow zone   My symptoms What I should do My medicine   · Some wheezing, coughing, or chest tightness  · When at rest, your breathing is a little faster than normal  · Asthma symptoms wake you up at night  Peak flow is:     ___________________________  50%-80% of personal best, or   has lessened by at least 15%     You begin to have symptoms of a respiratory infection, if infections trigger your symptoms · Keep taking your long-term controller medicines  · Use your quick-relief medicine  · If you do not feel better within an hour after using your quick-relief medicine, make sure you know what to do! You might use more medicine or use another  medicine.  · Call your healthcare provider if you are unsure Continue to take long-term controllers:  _________________________  Name:  _________________________  Dose:  _________________________  How often:  _________________________  Special instructions  _________________________     Name:  _________________________  Dose:  _________________________  How often:  _________________________  Special instructions:  _________________________  Quick-relief:  _________________________  _________________________     If your symptoms don't go away after 1 hour, take:  _________________________      Red zone   My symptoms What I should do My medicine   · Continuous wheezing, coughing, or trouble breathing  · Trouble walking or talking  · Asthma symptoms make it hard for you to sleep     Peak flow is:     __________________________  Less than 50% of personal best · Use your quick-relief medicines  · Call your healthcare provider     Call 911 if:  · It is getting harder to breathe  · You can't walk or talk  · Your lips or fingers look gray or blue Quick-relief:  __________________________  __________________________     Quick-relief:  __________________________  __________________________     Quick-relief:  __________________________  __________________________      Date Last Reviewed: 10/1/2016  © 7618-5069 [employer]. [address] [place] [address], [place] [address]. All rights reserved. This information is not intended as a substitute for professional medical care. Always follow your healthcare professional's instructions.

## 2018-05-03 DIAGNOSIS — J45.991 COUGH VARIANT ASTHMA: ICD-10-CM

## 2018-05-03 DIAGNOSIS — R05.9 COUGH: ICD-10-CM

## 2018-05-03 RX ORDER — BENZONATATE 200 MG/1
200 CAPSULE ORAL 3 TIMES DAILY PRN
Qty: 30 CAPSULE | Refills: 2 | Status: SHIPPED | OUTPATIENT
Start: 2018-05-03 | End: 2018-05-13

## 2018-05-09 ENCOUNTER — OFFICE VISIT (OUTPATIENT)
Dept: FAMILY MEDICINE | Facility: CLINIC | Age: 73
End: 2018-05-09
Payer: MEDICARE

## 2018-05-09 VITALS
SYSTOLIC BLOOD PRESSURE: 116 MMHG | TEMPERATURE: 98 F | BODY MASS INDEX: 24.96 KG/M2 | DIASTOLIC BLOOD PRESSURE: 64 MMHG | HEART RATE: 86 BPM | RESPIRATION RATE: 16 BRPM | WEIGHT: 159 LBS | HEIGHT: 67 IN

## 2018-05-09 DIAGNOSIS — R05.9 COUGH: Primary | ICD-10-CM

## 2018-05-09 PROCEDURE — 99213 OFFICE O/P EST LOW 20 MIN: CPT | Mod: ,,, | Performed by: INTERNAL MEDICINE

## 2018-05-09 PROCEDURE — 3074F SYST BP LT 130 MM HG: CPT | Mod: ,,, | Performed by: INTERNAL MEDICINE

## 2018-05-09 PROCEDURE — 3078F DIAST BP <80 MM HG: CPT | Mod: ,,, | Performed by: INTERNAL MEDICINE

## 2018-05-09 RX ORDER — HYDROCODONE POLISTIREX AND CHLORPHENIRAMINE POLISTIREX 10; 8 MG/5ML; MG/5ML
5 SUSPENSION, EXTENDED RELEASE ORAL NIGHTLY PRN
Qty: 150 ML | Refills: 0 | Status: SHIPPED | OUTPATIENT
Start: 2018-05-09 | End: 2018-08-13

## 2018-05-09 NOTE — PROGRESS NOTES
Subjective:       Patient ID: Adriana Perez is a 72 y.o. female.    Chief Complaint: Follow-up (cough) and Cough (not any better )    Ms. Adriana Perez is a 72-year-old -American female who comes for follow-up. She persists to have minimally productive hacking cough since August last year. No history of pneumonia. No family history of sickness. She has been given a trial off nasal sprays-fluticasone, Breo Ellipta, PPIs without relief. She is also been advised to start for gargles and steam inhalation but probably because of lack of understanding she is unable to do that.    She is not on any ACE inhibitor. She is not on ARB. She does not recall any obvious allergens. (Patient is not a very astute historian)    X-rays have been unremarkable. She's never been a smoker.    Also on one occasion her ear wax was removed and she claimed dramatic with cough he may gently following removal but her cough came back the same night.    She has lost some weight but no further loss of weight. Her underlying medical issues include diabetes mellitus, hypertension and chronic kidney disease.      Cough   This is a chronic problem. The current episode started more than 1 month ago. The problem has been unchanged. The problem occurs every few minutes. The cough is non-productive. Associated symptoms include chest pain (on coughing), nasal congestion and weight loss. Pertinent negatives include no chills, ear congestion, ear pain, fever, headaches, heartburn, hemoptysis, myalgias, postnasal drip, rash, sore throat, shortness of breath or wheezing. Nothing aggravates the symptoms. She has tried OTC cough suppressant, a beta-agonist inhaler and steroid inhaler (Nasal decongestants and PPIs) for the symptoms. The treatment provided no relief. There is no history of asthma, bronchiectasis, bronchitis, COPD, emphysema, environmental allergies or pneumonia.       Past Medical History:   Diagnosis Date    Acid reflux     Allergy      lisinopril    Anxiety     Bilateral renal cysts 2/27/2018    Renal USG by Dr Foy    CKD (chronic kidney disease), stage III     Depression     Diabetes mellitus     Diabetes mellitus, type 2     Disorder of kidney and ureter     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/2018    Dr Lance Foy- Also Renal cysts bilateral    Hyperlipidemia     Hypertension     MVP (mitral valve prolapse)     Primary osteoarthritis of left knee     Restless leg     Schizophrenia, simple, chronic     Urinary, incontinence, stress female      Social History     Social History    Marital status:      Spouse name: N/A    Number of children: N/A    Years of education: N/A     Occupational History    Not on file.     Social History Main Topics    Smoking status: Former Smoker     Types: Cigarettes     Quit date: 9/13/1992    Smokeless tobacco: Never Used    Alcohol use No    Drug use: No    Sexual activity: No     Other Topics Concern    Not on file     Social History Narrative    No narrative on file     Past Surgical History:   Procedure Laterality Date    FOOT SURGERY      HYSTERECTOMY      TUBAL LIGATION       Family History   Problem Relation Age of Onset    Sudden death Father     Cancer Mother     Hypertension Mother     Cancer Sister        Review of Systems   Constitutional: Positive for unexpected weight change (7 lbs.+ 2 lbs =9 lbs) and weight loss. Negative for activity change, chills, diaphoresis, fatigue and fever.   HENT: Negative for congestion, ear pain, postnasal drip, sinus pressure, sneezing and sore throat.    Eyes: Negative for pain, discharge and visual disturbance.   Respiratory: Positive for cough. Negative for hemoptysis, chest tightness, shortness of breath and wheezing.    Cardiovascular: Positive for chest pain (on coughing). Negative for palpitations and leg swelling.        HTN. Lipids   Gastrointestinal: Negative for abdominal distention, anal bleeding, constipation,  "diarrhea and heartburn.   Endocrine: Negative for polydipsia and polyphagia.        Diabetes mellitus type 2. Chronic kidney disease stage III.   Genitourinary: Positive for urgency. Negative for difficulty urinating, dysuria and flank pain.        Patient claims to have new onset of bladder incontinence symptoms after the bladder ultrasound.   Musculoskeletal: Negative for arthralgias, joint swelling, myalgias and neck pain.   Skin: Negative for color change, pallor and rash.        Patient had his surgery on the left foot for a mole by Dr. Wise.   Allergic/Immunologic: Negative for environmental allergies, food allergies and immunocompromised state.   Neurological: Negative for dizziness, seizures and headaches.        While patient is awake and alert, her understanding and cognition seems to be somewhat constricted.  Hearing loss and frequently has trouble understanding me and I have to maintain direct eye contact and speak slowly and louder.   Hematological: Negative for adenopathy. Does not bruise/bleed easily.   Psychiatric/Behavioral: Negative for agitation, confusion and dysphoric mood. The patient is nervous/anxious.         Patient's has history of schizoaffective disorder. This seems to be stable.       Objective:       Vitals:    05/09/18 1134   BP: 116/64   Pulse: 86   Resp: 16   Temp: 98.1 °F (36.7 °C)   Weight: 72.1 kg (159 lb)   Height: 5' 7" (1.702 m)     Physical Exam   Constitutional: She appears well-developed and well-nourished. She is cooperative. No distress.   HENT:   Head: Normocephalic and atraumatic.   Right Ear: Decreased hearing is noted.   Left Ear: Decreased hearing is noted.   Nose: Mucosal edema present. Right sinus exhibits no frontal sinus tenderness. Left sinus exhibits no frontal sinus tenderness.   Eyes: Conjunctivae, EOM and lids are normal. Lids are everted and swept, no foreign bodies found. Right pupil is round and reactive. Left pupil is round and reactive.   Neck: " Trachea normal and normal range of motion. Neck supple.   Cardiovascular: Normal rate, regular rhythm, S1 normal, S2 normal and normal heart sounds.    Pulmonary/Chest: Breath sounds normal.   Pulmonary auscultation does not reveal any rhonchi or wheezing even after coughing.       Abdominal: Soft. Bowel sounds are normal. There is no rigidity and no guarding.   Musculoskeletal: She exhibits no deformity.   Lymphadenopathy:     She has no cervical adenopathy.   Neurological: She is alert.   Skin: Skin is warm and dry.   Psychiatric: Cognition and memory are impaired.   Patient is very concrete in her understanding. She understands orders commands  in black and white and not in subtleties. Perhaps some hearing loss and perhaps some cognitive decline may contribute above.   Nursing note and vitals reviewed.      Assessment:       1. Cough       [facility] CHEST, 2 VIEWS XRAY    Indication: Cough.    Comparison: 02/18/2014    Findings: Cardiac silhouette size is normal. There is no airspace consolidation.  There is no pleural effusion or pneumothorax. No acute osseous abnormality.    IMPRESSION: No acute pulmonary process.    Read and electronically signed by: Edgar Sanz MD on 4/12/2018 8:46 AM CDT      EDGAR SANZ MD  Plan:           Cough  -     hydrocodone-chlorpheniramine (TUSSIONEX) 10-8 mg/5 mL suspension; Take 5 mLs by mouth nightly as needed for Cough.  Dispense: 150 mL; Refill: 0  -     Ambulatory referral to Pulmonology    At this point having tried all options, I'm not sure if she has reactive airway disease, unidentified allergen and I will refer her to pulmonary for further advice and treatment.    The cough seems to be bothering this poor lady at nighttime and I will allow her some hydrocodone cough syrup at bedtime since Tessalon Perles do not seem to help her.    Patient has some cognitive issues and lack of understanding of issues prevent an appropriate therapeutic reasoning with her.    She is  unable to make a pulmonary appointment herself and requests me to have the pulmonary doctor office called her specifically.  Cognitive challenges of the elderly with predominate her medical landscape in future.    Follow-up with me in 3 months or earlier as needed.

## 2018-05-09 NOTE — PATIENT INSTRUCTIONS

## 2018-05-09 NOTE — LETTER
May 9, 2018        Karolina Faria MD  [address]  [address]  [place] [address]             [facility]  [address]  [place] [address]  Phone: [phone]  Fax: [phone]   Patient: Adriana Perez   MR Number: [id number]   YOB: 1945   Date of Visit: 5/9/2018     Dear Dr. Faria,     I would appreciate if you office could be kind enough to call this unfortunate lady for a mutually convenient appointment.    She is having cough which is mostly nonproductive since last August 2017.  She is not on any ACE inhibitor or ARB. Nonsmoker and no history of asthma or reactive airway disease thus far. Chest x-ray has been unremarkable thus far.  Patient is not a very astute historian to identify or recall any allergen.    I've given her a trial of nasal steroid sprays, Breo Ellipta and PPIs and over-the-counter cough suppressants including Tessalon Perles without relief. I've even had my nurse practitioner remove her ear cerumen with temporary relief for one hour only.    I recently started her on Tussionex given her misery.    Patient his unable to initiate an appointment call herself given some cognitive limitations and I would request your office to call her for a mutually convenient appointment.    I will be happy to forward x-rays and any of the labs done which are recorded at [facility].    Sincerely,      Aiden Koch MD            CC  No Recipients    Enclosure

## 2018-06-05 RX ORDER — METOPROLOL SUCCINATE 50 MG/1
TABLET, EXTENDED RELEASE ORAL
Qty: 180 TABLET | Refills: 3 | Status: SHIPPED | OUTPATIENT
Start: 2018-06-05 | End: 2018-06-06 | Stop reason: SDUPTHER

## 2018-06-06 RX ORDER — METOPROLOL SUCCINATE 50 MG/1
50 TABLET, EXTENDED RELEASE ORAL 2 TIMES DAILY
Qty: 180 TABLET | Refills: 3 | Status: SHIPPED | OUTPATIENT
Start: 2018-06-06 | End: 2019-05-02

## 2018-06-21 LAB — HBA1C MFR BLD: 6.3 % (ref 3.1–6.5)

## 2018-06-22 LAB — HCV AB SERPL QL IA: <0.1 S/CO RATIO (ref 0–0.9)

## 2018-06-26 ENCOUNTER — TELEPHONE (OUTPATIENT)
Dept: FAMILY MEDICINE | Facility: CLINIC | Age: 73
End: 2018-06-26

## 2018-06-26 NOTE — TELEPHONE ENCOUNTER
----- Message from Aiden Koch MD sent at 6/26/2018  7:37 AM CDT -----  Notify patient that hepatitis C. Screen is  negative

## 2018-07-25 RX ORDER — POTASSIUM CHLORIDE 20 MEQ/1
TABLET, EXTENDED RELEASE ORAL
Qty: 90 TABLET | Refills: 3 | Status: SHIPPED | OUTPATIENT
Start: 2018-07-25 | End: 2019-08-12 | Stop reason: SDUPTHER

## 2018-08-13 ENCOUNTER — OFFICE VISIT (OUTPATIENT)
Dept: FAMILY MEDICINE | Facility: CLINIC | Age: 73
End: 2018-08-13
Payer: MEDICARE

## 2018-08-13 VITALS
HEIGHT: 67 IN | SYSTOLIC BLOOD PRESSURE: 96 MMHG | BODY MASS INDEX: 25.27 KG/M2 | HEART RATE: 86 BPM | DIASTOLIC BLOOD PRESSURE: 65 MMHG | WEIGHT: 161 LBS

## 2018-08-13 DIAGNOSIS — N18.30 TYPE 2 DIABETES MELLITUS WITH STAGE 3 CHRONIC KIDNEY DISEASE, WITHOUT LONG-TERM CURRENT USE OF INSULIN: ICD-10-CM

## 2018-08-13 DIAGNOSIS — I10 BENIGN ESSENTIAL HYPERTENSION: Primary | ICD-10-CM

## 2018-08-13 DIAGNOSIS — E11.22 TYPE 2 DIABETES MELLITUS WITH STAGE 3 CHRONIC KIDNEY DISEASE, WITHOUT LONG-TERM CURRENT USE OF INSULIN: ICD-10-CM
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
## 2018-08-13 DIAGNOSIS — E78.2 MULTIPLE-TYPE HYPERLIPIDEMIA: ICD-10-CM

## 2018-08-13 PROCEDURE — 3078F DIAST BP <80 MM HG: CPT | Mod: ,,, | Performed by: INTERNAL MEDICINE

## 2018-08-13 PROCEDURE — 3074F SYST BP LT 130 MM HG: CPT | Mod: ,,, | Performed by: INTERNAL MEDICINE

## 2018-08-13 PROCEDURE — 3044F HG A1C LEVEL LT 7.0%: CPT | Mod: ,,, | Performed by: INTERNAL MEDICINE

## 2018-08-13 PROCEDURE — 99214 OFFICE O/P EST MOD 30 MIN: CPT | Mod: ,,, | Performed by: INTERNAL MEDICINE

## 2018-08-13 RX ORDER — ALBUTEROL SULFATE 90 UG/1
2 AEROSOL, METERED RESPIRATORY (INHALATION) EVERY 6 HOURS PRN
COMMUNITY
End: 2019-05-02

## 2018-08-13 RX ORDER — AMMONIUM LACTATE 12 G/100G
CREAM TOPICAL
COMMUNITY
End: 2020-04-20 | Stop reason: SDUPTHER

## 2018-08-13 RX ORDER — BUDESONIDE AND FORMOTEROL FUMARATE DIHYDRATE 160; 4.5 UG/1; UG/1
2 AEROSOL RESPIRATORY (INHALATION) EVERY 12 HOURS
COMMUNITY
End: 2019-10-14

## 2018-08-13 NOTE — PROGRESS NOTES
Subjective:       Patient ID: Adriana Perez is a 72 y.o. female.    Chief Complaint: Diabetes; Hypertension; Fall; and Hyperlipidemia    Ms Perez comes for follow up, with underlying Dm, HTN, Lipids. Blood Sugars are within acceptable range. She recently slipped and fell down. Circumstances leading to fall are unclear but patient states that the light was of and the bulb was not working and she slipped and tripped. No loss of consciousness.she is hurting in the hip.    I reviewed her blood sugars and they seem to be varying between 86 on the low side now 174 on the high side. She attributes the highest sugars to some dietary indiscretion.  Pt has chronic kidney disease stage 3. Possibly secondary to DM/ HTN.    Pt has elevated Intact PTH indicating secondary Hyperparathyroidism.      Diabetes   She presents for her follow-up diabetic visit. She has type 2 diabetes mellitus. No MedicAlert identification noted. Her disease course has been stable. Hypoglycemia symptoms include nervousness/anxiousness. Pertinent negatives for hypoglycemia include no confusion, dizziness, headaches, pallor, seizures or tremors. Pertinent negatives for diabetes include no chest pain, no fatigue, no foot ulcerations, no polydipsia, no polyphagia, no visual change and no weakness. Risk factors for coronary artery disease include post-menopausal, sedentary lifestyle, hypertension, dyslipidemia and diabetes mellitus. Current diabetic treatment includes oral agent (monotherapy). She is compliant with treatment most of the time. She is following a generally healthy diet. Her home blood glucose trend is fluctuating minimally. An ACE inhibitor/angiotensin II receptor blocker is not being taken.   Hypertension   This is a chronic problem. The current episode started more than 1 year ago. The problem is controlled. Pertinent negatives include no chest pain, headaches, palpitations, peripheral edema, PND or shortness of breath. Risk factors for  coronary artery disease include sedentary lifestyle. Past treatments include calcium channel blockers and beta blockers. There is no history of a hypertension causing med or pheochromocytoma.   Fall   Pertinent negatives include no fever, headaches or visual change.   Hyperlipidemia   Pertinent negatives include no chest pain or shortness of breath.   Mental Health Problem   The primary symptoms include disorganized speech. The primary symptoms do not include dysphoric mood or bizarre behavior.   Additional symptoms of the illness include unexpected weight change (Gained 2 pounds.). Additional symptoms of the illness do not include insomnia, fatigue, agitation, euphoric mood, visual change, headaches or seizures. She does not admit to suicidal ideas. Risk factors that are present for mental illness include a history of mental illness.       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/2018    Dr Lance Foy- Also Renal cysts bilateral    Hyperlipidemia     Hypertension     MVP (mitral valve prolapse)     Primary osteoarthritis of left knee     Restless leg     Schizophrenia, simple, chronic     Urinary, incontinence, stress female      Social History     Socioeconomic History    Marital status:      Spouse name: Willy Perez    Number of children: Not on file    Years of education: Not on file    Highest education level: Not on file   Social Needs    Financial resource strain: Not on file    Food insecurity - worry: Not on file    Food insecurity - inability: Not on file    Transportation needs - medical: Not on file    Transportation needs - non-medical: Not on file   Occupational  History    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     Comment: [employer]   Tobacco Use    Smoking status: Former Smoker     Types: Cigarettes     Last attempt to quit: 1992     Years since quittin.9    Smokeless tobacco: Never Used   Substance and Sexual Activity    Alcohol use: No    Drug use: No    Sexual activity: Not Currently     Partners: Male   Other Topics Concern    Not on file   Social History Narrative    Not on file     Past Surgical History:   Procedure Laterality Date    FOOT SURGERY      HYSTERECTOMY      TUBAL LIGATION       Family History   Problem Relation Age of Onset    Sudden death Father     Cancer Mother     Hypertension Mother     Cancer Sister        Review of Systems   Constitutional: Positive for unexpected weight change (Gained 2 pounds.). Negative for activity change, chills, fatigue and fever.   HENT: Negative for congestion, postnasal drip and sinus pressure.    Eyes: Negative for pain, discharge and visual disturbance.   Respiratory: Negative for cough, chest tightness and shortness of breath.    Cardiovascular: Negative for chest pain, palpitations, leg swelling and PND.        HTN. Lipids   Gastrointestinal: Negative for abdominal distention, anal bleeding, constipation and diarrhea.   Endocrine: Negative for polydipsia and polyphagia.        Diabetes mellitus type 2. Chronic kidney disease stage III.   Genitourinary: Negative for difficulty urinating, dysuria and flank pain.   Musculoskeletal: Positive for arthralgias. Negative for joint swelling.        Recent falls and mild hip pain.   Skin: Negative for color change, pallor and rash.   Allergic/Immunologic: Negative for environmental allergies, food allergies and immunocompromised state.   Neurological: Negative for dizziness, tremors, seizures, syncope, weakness, light-headedness and headaches.        While patient is awake and alert, her understanding and cognition seems to be somewhat  "constricted.  Hearing loss and frequently has trouble understanding me and I have to maintain direct eye contact and speak slowly and louder.   Hematological: Negative for adenopathy. Does not bruise/bleed easily.   Psychiatric/Behavioral: Negative for agitation, confusion and dysphoric mood. The patient is nervous/anxious. The patient does not have insomnia.         Patient's has history of schizoaffective disorder. This seems to be stable.       Objective:       Vitals:    08/13/18 1116   BP: 96/65   Pulse: 86   Weight: 73 kg (161 lb)   Height: 5' 7" (1.702 m)     Physical Exam   Constitutional: She appears well-developed and well-nourished. She is cooperative. No distress.   HENT:   Head: Normocephalic and atraumatic.   Right Ear: Decreased hearing is noted.   Left Ear: Decreased hearing is noted.   Eyes: Conjunctivae, EOM and lids are normal. Lids are everted and swept, no foreign bodies found. Right pupil is round and reactive. Left pupil is round and reactive.   Neck: Trachea normal and normal range of motion. Neck supple.   Cardiovascular: Normal rate, regular rhythm, S1 normal, S2 normal and normal heart sounds.   Pulmonary/Chest: Breath sounds normal.   Abdominal: Soft. Bowel sounds are normal. There is no rigidity and no guarding.   Musculoskeletal: She exhibits no deformity.        Right foot: There is no deformity.        Left foot: There is no deformity.   Patient's gait was checked and she was asked to step on a step stool. She did fairly well except she was very slightly wobbly.I've told her to do some stretch and some balance exercises.  Range of motion of the hip joint is complete.   Feet:   Right Foot:   Protective Sensation: 5 sites tested. 5 sites sensed.   Skin Integrity: Negative for ulcer or blister.   Left Foot:   Protective Sensation: 5 sites tested. 5 sites sensed.   Skin Integrity: Negative for ulcer or blister.   Lymphadenopathy:     She has no cervical adenopathy.     She has no axillary " adenopathy.   Neurological: She is alert.   Skin: Skin is warm and dry.   Nursing note and vitals reviewed.      Assessment:       1. Benign essential hypertension    2. Type 2 diabetes mellitus with stage 3 chronic kidney disease, without long-term current use of insulin    3. Multiple-type hyperlipidemia    4. Fall, initial encounter       Hemoglobin A1c   Order: [id number]   Status:  Final result   Visible to patient:  No (Not Released) Next appt:  None     Ref Range & Units 4d ago 7mo ago    Hemoglobin A1C 3.1 - 6.5 % 6.3  6.7     [facility]      Specimen Collected: 02/23/18 10:26 Last Resulted: 02/23/18 15:11 Lab Flowsheet Order Details View Encounter Lab and Collection Details Routing              Component      Latest Ref Rng & Units 6/21/2018   Hemoglobin A1C      3.1 - 6.5 % 6.3   Hepatitis C Ab      0.0 - 0.9 s/co ratio <0.1     Plan:           Benign essential hypertension    Type 2 diabetes mellitus with stage 3 chronic kidney disease, without long-term current use of insulin  -     Hemoglobin A1c; Future; Expected date: 08/13/2018    Multiple-type hyperlipidemia  -     Lipid panel; Future; Expected date: 08/13/2018    Fall, initial encounter    Avoid NSAIDS, dehydration BActrim and unnecessary testing    Patient has been advised to do some stretching and balance exercises. I will repeat some labs for next visit.    Advised Ms. Perez to monitor Blood sugars at home and record them.  Exercise, watch diet and loose weight.  keep a close eye on feet and keep them clean. Annual eye examination. Annual influenza vaccine.  Monitor HgbA1c every 3 to 6 months. Monitor urine microalbumin every year.keep LDL less than 100. Monitor blood pressure and target blood pressure 120/70.        Fall cautions    The patient is asked to make an attempt to improve diet and exercise patterns to aid in medical management of this problem.      Fup 3 months

## 2018-08-13 NOTE — PATIENT INSTRUCTIONS
Using a Blood Sugar Log    You have diabetes. This means your body has trouble regulating a sugar called glucose. To help manage your diabetes, youll need to check your blood sugar level as directed by your healthcare provider. Keeping a log of your blood sugar levels will help you track your blood sugar readings. Its a simple and easy way to see how well you are controlling your diabetes.  Checking your blood sugar level  You can check your blood sugar level with a blood glucose meter. Youll first prick the side of your finger with a tiny lancet to draw a tiny drop of blood onto the test strip. Some glucose meters let you use another place on your body to test. But these other places should not be used in some cases as they may be inaccurate. Follow the instructions for your glucose meter. And talk with your healthcare provider before doing the test on other places.  The strip goes into the meter first, then a drop of blood is placed on the tip of the strip. The meter then shows a reading that tells you the level of your blood sugar. Your readings should be in your target range as often as possible. This means not too high or too low. Staying in this range helps lower your risk for complications. Your healthcare provider will help you figure out the target range that is best for you.  Tracking your readings  Every time you check your blood sugar, use your log to keep track of your readings. Your meter will also probably have a memory feature that your healthcare provider can check at your next visit. You may be advised by your healthcare provider to check your blood sugar in the morning, at bedtime, and before and after meals. Be sure to write down all of your numbers. Also use your log to record things that might have affected your blood sugar. Some examples include being sick, certain medicines, being physically active, feeling stressed, or skipping meals.   Lessons learned from your readings  Tracking your  blood sugar readings helps you see patterns. These patterns tell you how your actions affect your blood sugar. For instance, you may have higher numbers after eating certain foods or lower numbers after exercise. They just help you understand how to stay in your target range more often, so that your diabetes remains in good control.  Sharing your log with your healthcare team  Bring your blood sugar log and glucose meter with you to all of your healthcare appointments. This can help your healthcare team make changes to your treatment plan, if needed. This may involve making changes in what you eat, what medicines you take, or how much you exercise.  To learn more  The resources below can help you learn more:  · American Diabetes Association 272-006-8600 www.diabetes.org  · [employer] 080-742-4961 www.lighthouse.org  · [employer] [place] 461-977-0890 www.nei.nih.gov  · [employer] 680-422-6802 www.hormone.org  Date Last Reviewed: 5/1/2016  © 8437-4944 [employer]. [address] [place] [address], [place] [address]. All rights reserved. This information is not intended as a substitute for professional medical care. Always follow your healthcare professional's instructions.

## 2018-09-13 NOTE — PROGRESS NOTES
Pharmacy directed drug drug interaction reviewed. Stop pramipexole. Patient has been prescribed amantadine by Dr. Patterson.

## 2018-09-19 RX ORDER — PRAMIPEXOLE DIHYDROCHLORIDE 0.25 MG/1
0.25 TABLET ORAL 3 TIMES DAILY
COMMUNITY
End: 2018-12-06

## 2018-09-19 RX ORDER — PRAMIPEXOLE DIHYDROCHLORIDE 0.25 MG/1
0.25 TABLET ORAL 3 TIMES DAILY
OUTPATIENT
Start: 2018-09-19

## 2018-09-19 RX ORDER — GLIMEPIRIDE 2 MG/1
TABLET ORAL
Qty: 180 TABLET | Refills: 3 | Status: SHIPPED | OUTPATIENT
Start: 2018-09-19 | End: 2019-06-10 | Stop reason: SINTOL

## 2018-10-03 ENCOUNTER — CLINICAL SUPPORT (OUTPATIENT)
Dept: FAMILY MEDICINE | Facility: CLINIC | Age: 73
End: 2018-10-03
Payer: MEDICARE

## 2018-10-03 DIAGNOSIS — Z23 INFLUENZA VACCINE ADMINISTERED: Primary | ICD-10-CM

## 2018-10-03 PROCEDURE — 90662 IIV NO PRSV INCREASED AG IM: CPT | Mod: ,,, | Performed by: INTERNAL MEDICINE

## 2018-10-03 PROCEDURE — G0008 ADMIN INFLUENZA VIRUS VAC: HCPCS | Mod: ,,, | Performed by: INTERNAL MEDICINE

## 2018-10-11 DIAGNOSIS — R05.9 COUGH: Primary | ICD-10-CM

## 2018-10-11 RX ORDER — MONTELUKAST SODIUM 10 MG/1
10 TABLET ORAL NIGHTLY
Qty: 90 TABLET | Refills: 0 | Status: SHIPPED | OUTPATIENT
Start: 2018-10-11 | End: 2019-10-14 | Stop reason: SDUPTHER

## 2018-11-09 LAB — HBA1C MFR BLD: 6.2 % (ref 3.1–6.5)

## 2018-11-15 RX ORDER — AMLODIPINE BESYLATE 10 MG/1
TABLET ORAL
Qty: 90 TABLET | Refills: 3 | Status: SHIPPED | OUTPATIENT
Start: 2018-11-15 | End: 2019-05-02 | Stop reason: SINTOL

## 2018-11-16 ENCOUNTER — OFFICE VISIT (OUTPATIENT)
Dept: FAMILY MEDICINE | Facility: CLINIC | Age: 73
End: 2018-11-16
Payer: MEDICARE

## 2018-11-16 VITALS
DIASTOLIC BLOOD PRESSURE: 79 MMHG | BODY MASS INDEX: 23.7 KG/M2 | WEIGHT: 151 LBS | HEART RATE: 81 BPM | HEIGHT: 67 IN | SYSTOLIC BLOOD PRESSURE: 128 MMHG

## 2018-11-16 DIAGNOSIS — N18.30 TYPE 2 DIABETES MELLITUS WITH STAGE 3 CHRONIC KIDNEY DISEASE, WITHOUT LONG-TERM CURRENT USE OF INSULIN: ICD-10-CM

## 2018-11-16 DIAGNOSIS — R63.4 WEIGHT LOSS: ICD-10-CM

## 2018-11-16 DIAGNOSIS — N18.30 CHRONIC KIDNEY DISEASE, STAGE 3 (MODERATE): ICD-10-CM

## 2018-11-16 DIAGNOSIS — E78.2 MULTIPLE-TYPE HYPERLIPIDEMIA: ICD-10-CM

## 2018-11-16 DIAGNOSIS — I10 BENIGN ESSENTIAL HYPERTENSION: Primary | ICD-10-CM

## 2018-11-16 DIAGNOSIS — R41.0 CONFUSION: ICD-10-CM

## 2018-11-16 DIAGNOSIS — R42 DIZZINESS: ICD-10-CM

## 2018-11-16 DIAGNOSIS — E53.8 VITAMIN B12 DEFICIENCY: ICD-10-CM

## 2018-11-16 DIAGNOSIS — R26.81 UNSTEADY GAIT: ICD-10-CM

## 2018-11-16 DIAGNOSIS — E11.22 TYPE 2 DIABETES MELLITUS WITH STAGE 3 CHRONIC KIDNEY DISEASE, WITHOUT LONG-TERM CURRENT USE OF INSULIN: ICD-10-CM

## 2018-11-16 PROCEDURE — 3078F DIAST BP <80 MM HG: CPT | Mod: ,,, | Performed by: INTERNAL MEDICINE

## 2018-11-16 PROCEDURE — 3074F SYST BP LT 130 MM HG: CPT | Mod: ,,, | Performed by: INTERNAL MEDICINE

## 2018-11-16 PROCEDURE — 3044F HG A1C LEVEL LT 7.0%: CPT | Mod: ,,, | Performed by: INTERNAL MEDICINE

## 2018-11-16 PROCEDURE — 99215 OFFICE O/P EST HI 40 MIN: CPT | Mod: ,,, | Performed by: INTERNAL MEDICINE

## 2018-11-16 PROCEDURE — 1101F PT FALLS ASSESS-DOCD LE1/YR: CPT | Mod: ,,, | Performed by: INTERNAL MEDICINE

## 2018-11-16 NOTE — PATIENT INSTRUCTIONS
Dizziness (Uncertain Cause)  Dizziness is a common symptom. It may be described as lightheadedness, spinning, or feeling like you are going to faint. Dizziness can have many causes.  Be sure to tell the healthcare provider about:  · All medicines you take, including prescription, over-the-counter, herbs, and supplements  · Any other symptoms you have  · Any health problems you are being treated for  · Anything that causes the dizziness to get worse or better  Today's exam did not show an exact cause for your dizziness. Other tests may be needed. Follow up with your healthcare provider.  Home care  · Dizziness that occurs with sudden standing may be a sign of mild dehydration. Drink extra fluids for the next few days.  · If you recently started a new medicine, stopped a medicine, or had the dose of a current medicine changed, talk with the prescribing healthcare provider. Your medicine plan may need adjustment.  · If dizziness lasts more than a few seconds, sit or lie down until it passes. This may help prevent injury in case you pass out.  · Do not drive or use power tools or dangerous equipment until you have had no dizziness for at least 48 hours.  Follow-up care  Follow up with your healthcare provider for further evaluation within the next 7 days or as advised.  When to seek medical advice  Call your healthcare provider for any of the following:  · Worsening of symptoms or new symptoms  · Passing out or seizure  · Repeated vomiting  · Headache  · Palpitations (the sense that your heart is fluttering or beating fast or hard)  · Shortness of breath  · Blood in vomit or stool (black or red color)  · Weakness of an arm or leg or one side of the face  · Vision or hearing changes  · Trouble walking or speaking  · Chest, arm, neck, back, or jaw pain  Date Last Reviewed: 8/23/2015  © 1326-7699 [employer]. [address] [place] [address], [place] [address]. All rights reserved. This information is not intended as  a substitute for professional medical care. Always follow your healthcare professional's instructions.        Understanding Dizziness, Balance Problems, and Fainting     The eyes, inner ear, joints, and muscles send signals to the brain to achieve balance.     Balance is a group effort of the eyes, inner ear, joints, and muscles. They each send signals to the brain about body position and head movement. Then the brain uses this information to achieve balance. When the brain receives conflicting signals, or when there is a problem with blood flow, dizziness or fainting can happen.  Vertigo  Vertigo is the feeling of spinning. It may happen if the brain receives conflicting balance signals. Vertigo is often caused by a problem in the inner ear. Problems include changes in inner ear structures, infection, swelling, or excess fluid. Sometimes vertigo is due to a brain problem, such as migraine.   Dysequilibrium  Dysequilibrium is the feeling of imbalance without a sense of spinning. It may happen if the signal path between the body and brain is disrupted. There are many causes of dysequilibrium, including diabetes, anemia, head injury, and aging.  Syncope  Syncope is losing consciousness or fainting. The brain needs oxygen-rich blood to function. The heart pumps that blood to the brain. If there is a problem with the heart, blood flow (such as low blood pressure), or blood vessels, faintness may happen.  Date Last Reviewed: 10/6/2015  © 8658-9468 [employer]. [address] [place] [address]. All rights reserved. This information is not intended as a substitute for professional medical care. Always follow your healthcare professional's instructions.

## 2018-11-16 NOTE — LETTER
November 16, 2018        Lance Foy MD  [address]  [facility]  [place] [address]             [facility]  [address] [facility]  [place] [address]  Phone: [phone]  Fax: [phone]   Patient: Adriana Perez   MR Number: [id number]   YOB: 1945   Date of Visit: 11/16/2018     Dear Dr. Foy,     Thanks for taking care of Ms. Adriana Perez for her chronic kidney disease with underlying diabetes, hypertension, dyslipidemia and psychosocial issues.    Ms. Perez has a long-standing history of schizoaffective disorder and is on multiple medications for the same and is under the care of psychiatrist. She was never very cognitively astute and off late she has been declining steadily and recently rapidly. She has been falling and has gait imbalance. I'm working on that.    I did review your progress notes in which you had indicated that she has intact cognition and insight.    Perhaps in future, (if you agree)-we can be on the same page about her cognition and insight-so that when [employer] review multiple records from different providers -there is no discrepancy.     As always, appreciate your evaluation and recommendations for her renal dysfunction.    Sincerely,      Aiden Koch MD            CC  No Recipients    Enclosure

## 2018-11-16 NOTE — PROGRESS NOTES
Subjective:       Patient ID: Adriana Perez is a 72 y.o. female.    Chief Complaint: Hypertension; Hyperlipidemia; Weight Loss; Anxiety (sees bugs ); Fall; and Altered Mental Status    Ms. Adriana Perez is a 72-year-old -American female who comes for follow-up. She has been complaining of feeling somewhat unsteady and having followed a couple of occasions. She is unable to fill me on the details as to what happened and how it happened. These falls have occurred in the last couple of weeks.    There is no visible injury and she is able to walk though she feels somewhat unsteady. In fact he drove to my office.    Patient has also lost approximately 10 pounds of weight since her last visit. Patient has limited insight as to why this would've happened. She simply states that her appetite is fairly good and she eats everything.    In past she would keep a very good log of blood sugars but gray seems seems to have forgotten about her blood sugars. Sometimes she gets anxious and slightly agitated and she has seen some aunts crawling on the wall. She had seen her psychiatrist who has changed some medications. Again patient has limited insight as to realize what medication she is taking.    I did speak to patient's granddaughter Ms. Samuel and she apprise me of her medical issues. Ms. Samuel has come from [place], [place] and to her grandparents needs.    History again has been noted for hypertension, hyperlipidemia, chronic kidney disease and shortness of breath as you've secondary to some degree of COPD.      Hypertension   This is a chronic problem. The current episode started more than 1 year ago. The problem is controlled. Associated symptoms include anxiety and malaise/fatigue. Pertinent negatives include no chest pain, headaches, palpitations, peripheral edema or shortness of breath. Risk factors for coronary artery disease include sedentary lifestyle, dyslipidemia and diabetes mellitus. Past treatments  include beta blockers (ACE inhibitors were stopped because of cough and hyperkalemia.). Compliance problems include psychosocial issues.  There is no history of pheochromocytoma.   Hyperlipidemia   This is a chronic problem. The current episode started more than 1 year ago. The problem is controlled. She has no history of obesity. Pertinent negatives include no chest pain or shortness of breath. Current antihyperlipidemic treatment includes statins. The current treatment provides moderate improvement of lipids. Compliance problems include psychosocial issues.  Risk factors for coronary artery disease include hypertension, a sedentary lifestyle, dyslipidemia and diabetes mellitus.   Anxiety   Presents for follow-up visit. Symptoms include confusion and nervous/anxious behavior. Patient reports no chest pain, depressed mood, dizziness, irritability, nausea, obsessions, palpitations, panic, restlessness or shortness of breath.       Fall   The accident occurred 5 to 7 days ago. The fall occurred in unknown circumstances. She fell from a height of 1 to 2 ft. The point of impact was the head. The patient is experiencing no pain. The symptoms are aggravated by ambulation. Pertinent negatives include no abdominal pain, bowel incontinence, fever, headaches, hearing loss, hematuria, loss of consciousness, nausea, numbness, tingling, visual change or vomiting. She has tried nothing for the symptoms.   Jimmie    [phone]    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/2018    Dr Lance Foy- Also Renal cysts bilateral    Hyperlipidemia     Hypertension     MVP (mitral valve prolapse)     Primary osteoarthritis  of left knee     Restless leg     Schizophrenia, simple, chronic     Urinary, incontinence, stress female      Social History     Socioeconomic History    Marital status:      Spouse name: Willy Perez    Number of children: Not on file    Years of education: Not on file    Highest education level: Not on file   Social Needs    Financial resource strain: Not on file    Food insecurity - worry: Not on file    Food insecurity - inability: Not on file    Transportation needs - medical: Not on file    Transportation needs - non-medical: Not on file   Occupational History    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     Comment: [employer]   Tobacco Use    Smoking status: Former Smoker     Types: Cigarettes     Last attempt to quit: 1992     Years since quittin.1    Smokeless tobacco: Never Used   Substance and Sexual Activity    Alcohol use: No    Drug use: No    Sexual activity: Not Currently     Partners: Male   Other Topics Concern    Not on file   Social History Narrative    Not on file     Past Surgical History:   Procedure Laterality Date    FOOT SURGERY      HYSTERECTOMY      TUBAL LIGATION       Family History   Problem Relation Age of Onset    Sudden death Father     Cancer Mother     Hypertension Mother     Cancer Sister        Review of Systems   Constitutional: Positive for malaise/fatigue and unexpected weight change (Lost 10 lbs). Negative for activity change, chills, fatigue, fever and irritability.   HENT: Negative for congestion, postnasal drip and sinus pressure.    Eyes: Negative for pain, discharge and visual disturbance.   Respiratory: Negative for cough, chest tightness and shortness of breath.    Cardiovascular: Negative for chest pain, palpitations and leg swelling.        HTN. Lipids   Gastrointestinal: Negative for abdominal distention, abdominal pain, anal bleeding, bowel incontinence, constipation, diarrhea, nausea and vomiting.  "  Endocrine: Negative for polydipsia and polyphagia.        Diabetes mellitus type 2. Chronic kidney disease stage III.   Genitourinary: Negative for difficulty urinating, dysuria, flank pain and hematuria.   Musculoskeletal: Positive for arthralgias. Negative for joint swelling.        Recent falls and mild hip pain.   Skin: Negative for color change, pallor and rash.   Allergic/Immunologic: Negative for environmental allergies, food allergies and immunocompromised state.   Neurological: Negative for dizziness, tingling, tremors, seizures, loss of consciousness, syncope, weakness, light-headedness, numbness and headaches.        While patient is awake and alert, her understanding and cognition seems to be somewhat constricted.  Hearing loss and frequently has trouble understanding me and I have to maintain direct eye contact and speak slowly and louder.   Hematological: Negative for adenopathy. Does not bruise/bleed easily.   Psychiatric/Behavioral: Positive for confusion. Negative for agitation and dysphoric mood. The patient is nervous/anxious.         Patient's has history of schizoaffective disorder. This seems to be stable.         Objective:      Blood pressure 128/79, pulse 81, height 5' 7" (1.702 m), weight 68.5 kg (151 lb). Body mass index is 23.65 kg/m².  Physical Exam   Constitutional: She appears well-developed and well-nourished. She is cooperative. No distress.   HENT:   Head: Normocephalic and atraumatic.   Right Ear: Decreased hearing is noted.   Left Ear: Decreased hearing is noted.   Eyes: Conjunctivae, EOM and lids are normal. Lids are everted and swept, no foreign bodies found. Right pupil is round and reactive. Left pupil is round and reactive.   Neck: Trachea normal and normal range of motion. Neck supple.   Cardiovascular: Normal rate, regular rhythm, S1 normal, S2 normal and normal heart sounds.   Pulmonary/Chest: Breath sounds normal.   Abdominal: Soft. Bowel sounds are normal. There is no " rigidity and no guarding.   Musculoskeletal: She exhibits no deformity.        Right foot: There is no deformity.        Left foot: There is no deformity.   Patient's gait was checked and she was asked to step on a step stool. She did fairly well except she was very slightly wobbly.I've told her to do some stretch and some balance exercises.  Range of motion of the hip joint is complete.   Feet:   Right Foot:   Protective Sensation: 5 sites tested. 5 sites sensed.   Skin Integrity: Negative for ulcer or blister.   Left Foot:   Protective Sensation: 5 sites tested. 5 sites sensed.   Skin Integrity: Negative for ulcer or blister.   Lymphadenopathy:     She has no cervical adenopathy.     She has no axillary adenopathy.   Neurological: She is alert. She displays atrophy. She displays no tremor and normal reflexes. No sensory deficit. Coordination and gait abnormal.   Patient can also simple questions.    Some what abn and unsteady gait. She could climb a step stool . Could not stand steady on one leg.   Skin: Skin is warm and dry.   Psychiatric: She is slowed. She is not agitated. Cognition and memory are impaired.   Nursing note and vitals reviewed.        Assessment:       1. Benign essential hypertension    2. Type 2 diabetes mellitus with stage 3 chronic kidney disease, without long-term current use of insulin    3. Multiple-type hyperlipidemia    4. Weight loss    5. Dizziness    6. Unsteady gait    7. Vitamin B12 deficiency    8. Confusion    9. Chronic kidney disease, stage 3 (moderate)           Orders Only on 11/09/2018   Component Date Value Ref Range Status    Hemoglobin A1C 11/09/2018 6.2  3.1 - 6.5 % Final         Plan:           Benign essential hypertension    Type 2 diabetes mellitus with stage 3 chronic kidney disease, without long-term current use of insulin  -     Comprehensive metabolic panel; Future; Expected date: 11/16/2018  -     Hemoglobin A1c; Future; Expected date:  11/16/2018    Multiple-type hyperlipidemia  -     Comprehensive metabolic panel; Future; Expected date: 11/16/2018    Weight loss  -     CBC auto differential; Future; Expected date: 11/16/2018  -     Comprehensive metabolic panel; Future; Expected date: 11/16/2018    Dizziness  -     CT Head Without Contrast    Unsteady gait  -     CT Head Without Contrast    Vitamin B12 deficiency  -     Vitamin B12; Future; Expected date: 11/16/2018    Confusion  -     CT Head Without Contrast  -     RPR; Future; Expected date: 11/16/2018    Chronic kidney disease, stage 3 (moderate)      Pt shows gradual loss of cognition with some recent abrupt changes     She is on multiple medications and has limited insight. She has lost weight also.    Most important thing to consider is possibility of normal pressure hydrocephalus versus a subdural hematoma versus subcortical infarct.    I've advised her granddaughter that she needs help for driving around.    I'll check RPR and labs also. Follow-up in couple of weeks.    Daughter should accompany her next time.    Fall precautions discussed. He should understanding and insight is limited.    Spent 30-35 mts also including discussing with grand daughter    CKD precautions      Current Outpatient Medications:     albuterol (VENTOLIN HFA) 90 mcg/actuation inhaler, Inhale 2 puffs into the lungs every 6 (six) hours as needed for Wheezing. Rescue, Disp: , Rfl:     amantadine HCl (SYMMETREL) 100 mg capsule, Take 100 mg by mouth 2 (two) times daily., Disp: , Rfl:     amLODIPine (NORVASC) 10 MG tablet, TAKE 1 TABLET EVERY DAY, Disp: 90 tablet, Rfl: 3    ammonium lactate 12 % Crea, Apply topically., Disp: , Rfl:     aspirin (ECOTRIN) 81 MG EC tablet, Take 81 mg by mouth once daily., Disp: , Rfl:     budesonide-formoterol 160-4.5 mcg (SYMBICORT) 160-4.5 mcg/actuation HFAA, Inhale 2 puffs into the lungs every 12 (twelve) hours. Controller, Disp: , Rfl:     calcitRIOL (ROCALTROL) 0.25 MCG Cap,  Take 0.25 mcg by mouth every Mon, Wed, Fri., Disp: , Rfl:     cholecalciferol, vitamin D3, (VITAMIN D3) 2,000 unit Cap, Take 1 capsule by mouth once daily., Disp: , Rfl:     glimepiride (AMARYL) 2 MG tablet, TAKE 1 TABLET TWICE DAILY, Disp: 180 tablet, Rfl: 3    ipratropium (ATROVENT) 42 mcg (0.06 %) nasal spray, 2 sprays by Nasal route 3 (three) times daily., Disp: 15 mL, Rfl: 2    metoprolol succinate (TOPROL-XL) 50 MG 24 hr tablet, Take 1 tablet (50 mg total) by mouth 2 (two) times daily., Disp: 180 tablet, Rfl: 3    montelukast (SINGULAIR) 10 mg tablet, Take 1 tablet (10 mg total) by mouth every evening., Disp: 90 tablet, Rfl: 0    olanzapine (ZYPREXA) 10 MG tablet, Take 10 mg by mouth every evening., Disp: , Rfl:     pantoprazole (PROTONIX) 40 MG tablet, Take 1 tablet (40 mg total) by mouth once daily., Disp: 90 tablet, Rfl: 3    potassium chloride SA (K-DUR,KLOR-CON) 20 MEQ tablet, TAKE 1 TABLET EVERY DAY, Disp: 90 tablet, Rfl: 3    pramipexole (MIRAPEX) 0.25 MG tablet, Take 0.25 mg by mouth 3 (three) times daily., Disp: , Rfl:     pravastatin (PRAVACHOL) 40 MG tablet, Take 1 tablet (40 mg total) by mouth nightly., Disp: 90 tablet, Rfl: 3    SITagliptin (JANUVIA) 50 MG Tab, TAKE 1 TABLET ONE TIME DAILY, Disp: 90 tablet, Rfl: 3    trazodone (DESYREL) 100 MG tablet, Take 100 mg by mouth nightly as needed., Disp: , Rfl:     TRUE METRIX GLUCOSE TEST STRIP Strp, TEST BLOOD SUGAR EVERY DAY, Disp: 100 strip, Rfl: 3    TRUEPLUS LANCETS 33 gauge Misc, TEST  DAILY, Disp: 100 each, Rfl: 3

## 2018-11-24 ENCOUNTER — TELEPHONE (OUTPATIENT)
Dept: FAMILY MEDICINE | Facility: CLINIC | Age: 73
End: 2018-11-24

## 2018-11-24 NOTE — TELEPHONE ENCOUNTER
Notify patient's son that Patient CT scan was negative for any acute neurological findings to explain her somewhat unsteady gait and dizziness. I was looking for any evidence of subdural hematoma or normal pressure hydrocephalus.    I did review labs in the [facility]. Creatinine is 2.11 and somewhat elevated.    Patient does not have chronic kidney disease but is not on any diuretics. She probably needs to be hydrated.

## 2018-11-27 ENCOUNTER — OFFICE VISIT (OUTPATIENT)
Dept: PULMONOLOGY | Facility: CLINIC | Age: 73
End: 2018-11-27
Payer: MEDICARE

## 2018-11-27 VITALS
HEART RATE: 102 BPM | DIASTOLIC BLOOD PRESSURE: 80 MMHG | OXYGEN SATURATION: 97 % | SYSTOLIC BLOOD PRESSURE: 130 MMHG | BODY MASS INDEX: 24.17 KG/M2 | WEIGHT: 154 LBS | HEIGHT: 67 IN

## 2018-11-27 DIAGNOSIS — K21.9 GASTROESOPHAGEAL REFLUX DISEASE WITHOUT ESOPHAGITIS: ICD-10-CM

## 2018-11-27 DIAGNOSIS — J45.40 MODERATE PERSISTENT ASTHMA WITHOUT COMPLICATION: ICD-10-CM

## 2018-11-27 PROCEDURE — 3079F DIAST BP 80-89 MM HG: CPT | Mod: ,,, | Performed by: NURSE PRACTITIONER

## 2018-11-27 PROCEDURE — 3075F SYST BP GE 130 - 139MM HG: CPT | Mod: ,,, | Performed by: NURSE PRACTITIONER

## 2018-11-27 PROCEDURE — 1101F PT FALLS ASSESS-DOCD LE1/YR: CPT | Mod: ,,, | Performed by: NURSE PRACTITIONER

## 2018-11-27 PROCEDURE — 99214 OFFICE O/P EST MOD 30 MIN: CPT | Mod: ,,, | Performed by: NURSE PRACTITIONER

## 2018-11-27 RX ORDER — MONTELUKAST SODIUM 10 MG/1
10 TABLET ORAL NIGHTLY
Qty: 30 TABLET | Refills: 6 | Status: SHIPPED | OUTPATIENT
Start: 2018-11-27 | End: 2020-01-17 | Stop reason: SDUPTHER

## 2018-11-27 RX ORDER — BUDESONIDE AND FORMOTEROL FUMARATE DIHYDRATE 160; 4.5 UG/1; UG/1
2 AEROSOL RESPIRATORY (INHALATION) EVERY 12 HOURS
Qty: 1 INHALER | Refills: 3 | Status: SHIPPED | OUTPATIENT
Start: 2018-11-27 | End: 2019-08-18 | Stop reason: SDUPTHER

## 2018-11-27 RX ORDER — PANTOPRAZOLE SODIUM 40 MG/1
40 TABLET, DELAYED RELEASE ORAL DAILY
Qty: 30 TABLET | Refills: 6 | Status: SHIPPED | OUTPATIENT
Start: 2018-11-27 | End: 2019-02-06

## 2018-11-27 NOTE — PROGRESS NOTES
SUBJECTIVE:    Patient ID: Adriana Perez is a 72 y.o. female.    Chief Complaint: Follow-up    HPI   Patient here today feeling well.  She has been using her Symbicort, her peak flow is 450.  She stopped using her Protonix because she did not think she needed it anymore.  She does still cough but not nearly as bad as she did before.   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/2018    Dr Lance Foy- Also Renal cysts bilateral    Hyperlipidemia     Hypertension     MVP (mitral valve prolapse)     Primary osteoarthritis of left knee     Restless leg     Schizophrenia, simple, chronic     Urinary, incontinence, stress female      Past Surgical History:   Procedure Laterality Date    FOOT SURGERY      HYSTERECTOMY      TUBAL LIGATION       Family History   Problem Relation Age of Onset    Sudden death Father     Cancer Mother     Hypertension Mother     Cancer Sister         Social History:   Marital Status:   Occupation: retired teacher  Alcohol History:  reports that she does not drink alcohol.  Tobacco History:  reports that she quit smoking about 26 years ago. Her smoking use included cigarettes. She has a 5.00 pack-year smoking history. she has never used smokeless tobacco.  Drug History:  reports that she does not use drugs.    Review of patient's allergies indicates:   Allergen Reactions    Lisinopril Nausea And Vomiting       Current Outpatient Medications   Medication Sig Dispense Refill    albuterol (VENTOLIN HFA) 90 mcg/actuation inhaler Inhale 2 puffs into the lungs every 6 (six) hours as needed for Wheezing. Rescue      amantadine HCl (SYMMETREL) 100 mg capsule Take 100 mg  by mouth 2 (two) times daily.      amLODIPine (NORVASC) 10 MG tablet TAKE 1 TABLET EVERY DAY 90 tablet 3    ammonium lactate 12 % Crea Apply topically.      aspirin (ECOTRIN) 81 MG EC tablet Take 81 mg by mouth once daily.      budesonide-formoterol 160-4.5 mcg (SYMBICORT) 160-4.5 mcg/actuation HFAA Inhale 2 puffs into the lungs every 12 (twelve) hours. Controller      calcitRIOL (ROCALTROL) 0.25 MCG Cap Take 0.25 mcg by mouth every Mon, Wed, Fri.      cholecalciferol, vitamin D3, (VITAMIN D3) 2,000 unit Cap Take 1 capsule by mouth once daily.      glimepiride (AMARYL) 2 MG tablet TAKE 1 TABLET TWICE DAILY 180 tablet 3    ipratropium (ATROVENT) 42 mcg (0.06 %) nasal spray 2 sprays by Nasal route 3 (three) times daily. 15 mL 2    metoprolol succinate (TOPROL-XL) 50 MG 24 hr tablet Take 1 tablet (50 mg total) by mouth 2 (two) times daily. 180 tablet 3    montelukast (SINGULAIR) 10 mg tablet Take 1 tablet (10 mg total) by mouth every evening. 90 tablet 0    olanzapine (ZYPREXA) 10 MG tablet Take 10 mg by mouth every evening.      pantoprazole (PROTONIX) 40 MG tablet Take 1 tablet (40 mg total) by mouth once daily. 90 tablet 3    potassium chloride SA (K-DUR,KLOR-CON) 20 MEQ tablet TAKE 1 TABLET EVERY DAY 90 tablet 3    pramipexole (MIRAPEX) 0.25 MG tablet Take 0.25 mg by mouth 3 (three) times daily.      pravastatin (PRAVACHOL) 40 MG tablet Take 1 tablet (40 mg total) by mouth nightly. 90 tablet 3    SITagliptin (JANUVIA) 50 MG Tab TAKE 1 TABLET ONE TIME DAILY 90 tablet 3    trazodone (DESYREL) 100 MG tablet Take 100 mg by mouth nightly as needed.      TRUE METRIX GLUCOSE TEST STRIP Strp TEST BLOOD SUGAR EVERY  strip 3    TRUEPLUS LANCETS 33 gauge Misc TEST  DAILY 100 each 3    budesonide-formoterol 160-4.5 mcg (SYMBICORT) 160-4.5 mcg/actuation HFAA Inhale 2 puffs into the lungs every 12 (twelve) hours. Controller 1 Inhaler 3    montelukast (SINGULAIR) 10 mg tablet Take 1 tablet (10 mg  "total) by mouth every evening. 30 tablet 6    pantoprazole (PROTONIX) 40 MG tablet Take 1 tablet (40 mg total) by mouth once daily. 30 tablet 6     No current facility-administered medications for this visit.        Last PFT: 06/25/2018  Last Chest xray 04/12/2018    Review of Systems  General: Feeling Well.  Eyes: Vision is good.  ENT:  Sinus drip   Heart:: No chest pain or palpitations.  Lungs: cough is much better   GI: reflux.  : No dysuria, hesitancy, or nocturia.  Musculoskeletal: No joint pain or myalgias.  Skin: No lesions or rashes.  Neuro: forgetful  Lymph: No edema or adenopathy.  Psych: anxiety  Endo: weight loss     OBJECTIVE:      /80 (BP Location: Right arm, Patient Position: Sitting)   Pulse 102   Ht 5' 7" (1.702 m)   Wt 69.9 kg (154 lb)   SpO2 97%    L/min   BMI 24.12 kg/m²     Physical Exam  GENERAL: Older patient in no distress.  HEENT: Pupils equal and reactive. Extraocular movements intact. Nose intact.      Pharynx moist.  NECK: Supple.   HEART: Regular rate and rhythm. No murmur or gallop auscultated.  LUNGS: Clear to auscultation and percussion. Lung excursion symmetrical. No change in fremitus. No adventitial noises.  ABDOMEN: Bowel sounds present. Non-tender, no masses palpated.  EXTREMITIES: Normal muscle tone and joint movement, no cyanosis or clubbing.   LYMPHATICS: No adenopathy palpated, no edema.  SKIN: Dry, intact, no lesions.   NEURO: Cranial nerves II-XII intact. Motor strength 5/5 bilaterally, upper and lower extremities.  PSYCH: Appropriate affect.    Assessment:       1. Moderate persistent asthma without complication    2. Gastroesophageal reflux disease without esophagitis          Plan:       Moderate persistent asthma without complication    Gastroesophageal reflux disease without esophagitis    Other orders  -     budesonide-formoterol 160-4.5 mcg (SYMBICORT) 160-4.5 mcg/actuation HFAA; Inhale 2 puffs into the lungs every 12 (twelve) hours. Controller  " Dispense: 1 Inhaler; Refill: 3  -     montelukast (SINGULAIR) 10 mg tablet; Take 1 tablet (10 mg total) by mouth every evening.  Dispense: 30 tablet; Refill: 6  -     pantoprazole (PROTONIX) 40 MG tablet; Take 1 tablet (40 mg total) by mouth once daily.  Dispense: 30 tablet; Refill: 6    Stay on your Symbicort 2 puffs twice a day, keep rinsing after you use it  Stay on your Protonix and Singulair by mouth every day  Call if you get sick  Already had her flu shot     Follow-up in about 6 months (around 5/27/2019).

## 2018-11-27 NOTE — PATIENT INSTRUCTIONS
Controlling Your Asthma  You can do a lot to manage your asthma and improve your quality of life. You will need to work with your healthcare provider to develop a plan. But its up to you to put this plan into action.  Why you need to take control  You need to control the inflammation in your lungs. Take all medicine as directed, especially controller medicines, even if you feel that your asthma is under good control. You also need to relieve symptoms when you have them. These are long-term tasks. But the more you stay in control, the better youll feel. If you dont stay in control:  · Asthma symptoms may cause you to miss school, work, or activities that you enjoy.  · Asthma flare-ups can be dangerous, even deadly.  · Uncontrolled asthma makes it more likely that you will need emergency department and in-hospital care.  · Uncontrolled asthma may cause permanent damage to your lungs.    Peak flow monitoring helps measure how open your airways are.   Taking medicine helps you control your asthma and relieve symptoms when they occur.     Using an Asthma Action Plan will help you keep track of and respond to asthma symptoms.   Avoiding triggers--the things that inflame your airways--will help prevent symptoms and flare-ups.   Your action plan  Your healthcare provider will help you prepare, and when needed, update your personal Asthma Action Plan. Your plan tells you what to do based on your current symptoms. If you don't have an Asthma Action Plan, or if yours isn't up-to-date, make sure you talk with your healthcare provider.  Date Last Reviewed: 1/1/2017  © 5505-2134 [employer]. [address] [place] [address], [place] [address]. All rights reserved. This information is not intended as a substitute for professional medical care. Always follow your healthcare professional's instructions.      Stay on your Symbicort 2 puffs twice a day, keep rinsing after you use it  Stay on your Protonix and Singulair by  mouth every day  Call if you get sick  Already had her flu shot

## 2018-12-06 ENCOUNTER — OFFICE VISIT (OUTPATIENT)
Dept: FAMILY MEDICINE | Facility: CLINIC | Age: 73
End: 2018-12-06
Payer: MEDICARE

## 2018-12-06 VITALS
WEIGHT: 153 LBS | HEART RATE: 90 BPM | HEIGHT: 67 IN | SYSTOLIC BLOOD PRESSURE: 103 MMHG | DIASTOLIC BLOOD PRESSURE: 63 MMHG | BODY MASS INDEX: 24.01 KG/M2

## 2018-12-06 DIAGNOSIS — I10 BENIGN ESSENTIAL HYPERTENSION: Primary | ICD-10-CM

## 2018-12-06 DIAGNOSIS — E11.22 TYPE 2 DIABETES MELLITUS WITH STAGE 3 CHRONIC KIDNEY DISEASE, WITHOUT LONG-TERM CURRENT USE OF INSULIN: ICD-10-CM

## 2018-12-06 DIAGNOSIS — E78.2 MULTIPLE-TYPE HYPERLIPIDEMIA: ICD-10-CM

## 2018-12-06 DIAGNOSIS — N18.30 TYPE 2 DIABETES MELLITUS WITH STAGE 3 CHRONIC KIDNEY DISEASE, WITHOUT LONG-TERM CURRENT USE OF INSULIN: ICD-10-CM

## 2018-12-06 DIAGNOSIS — R26.81 UNSTEADY GAIT: ICD-10-CM

## 2018-12-06 PROCEDURE — 1101F PT FALLS ASSESS-DOCD LE1/YR: CPT | Mod: ,,, | Performed by: INTERNAL MEDICINE

## 2018-12-06 PROCEDURE — 3044F HG A1C LEVEL LT 7.0%: CPT | Mod: ,,, | Performed by: INTERNAL MEDICINE

## 2018-12-06 PROCEDURE — 99214 OFFICE O/P EST MOD 30 MIN: CPT | Mod: ,,, | Performed by: INTERNAL MEDICINE

## 2018-12-06 PROCEDURE — 3078F DIAST BP <80 MM HG: CPT | Mod: ,,, | Performed by: INTERNAL MEDICINE

## 2018-12-06 PROCEDURE — 3074F SYST BP LT 130 MM HG: CPT | Mod: ,,, | Performed by: INTERNAL MEDICINE

## 2018-12-06 NOTE — PROGRESS NOTES
Subjective:       Patient ID: Adriana Perez is a 73 y.o. female.    Chief Complaint: Anxiety; Diabetes; Hyperlipidemia; and Hypertension    Ms. Adriana Perez comes back for follow-up. Last visit I ordered a CT scan because of unsteady gait and dizziness. It was negative for any acute strokelike findings or hydrocephalus.    After the CT scan, patient is more or less doing okay. She does have slight unsteady gait but manages well. She has a single-story home. She does not have to climb any stairs.    She is on multiple psychotropic medications. She is currently taking amantadine, trazodone and olanzapine. She is pending her follow-up with Dr. Patterson. We had been notified about her drug drug interaction between pramipexole and amantadine and pramipexole was discontinued.      Diabetes   She presents for her follow-up diabetic visit. She has type 2 diabetes mellitus. Pertinent negatives for hypoglycemia include no headaches, pallor, seizures or tremors. Pertinent negatives for diabetes include no fatigue, no polydipsia, no polyphagia, no weakness and no weight loss. Pertinent negatives for diabetic complications include no nephropathy. Current diabetic treatment includes oral agent (dual therapy). She is compliant with treatment some of the time. Meal planning includes avoidance of concentrated sweets. An ACE inhibitor/angiotensin II receptor blocker is not being taken. Eye exam is not current.   Hypertension   This is a chronic problem. The current episode started more than 1 year ago. The problem is controlled. Pertinent negatives include no headaches. Risk factors for coronary artery disease include sedentary lifestyle. The current treatment provides moderate improvement. There is no history of coarctation of the aorta, hyperaldosteronism, pheochromocytoma or renovascular disease.       Past Medical History:   Diagnosis Date    Acid reflux     Allergy     lisinopril    Anxiety     Bilateral renal cysts  2018    Renal USG by Dr Foy    CKD (chronic kidney disease), stage III     Depression     Diabetes mellitus     Diabetes mellitus, type 2     Disorder of kidney and ureter     Echogenic kidneys on renal ultrasound 2018    Dr Lance Foy- Also Renal cysts bilateral    Hyperlipidemia     Hypertension     MVP (mitral valve prolapse)     Primary osteoarthritis of left knee     Restless leg     Schizophrenia, simple, chronic     Urinary, incontinence, stress female      Social History     Socioeconomic History    Marital status:      Spouse name: Willy Perez    Number of children: Not on file    Years of education: Not on file    Highest education level: Not on file   Social Needs    Financial resource strain: Not on file    Food insecurity - worry: Not on file    Food insecurity - inability: Not on file    Transportation needs - medical: Not on file    Transportation needs - non-medical: Not on file   Occupational History    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     Comment: [employer]   Tobacco Use    Smoking status: Former Smoker     Packs/day: 1.00     Years: 5.00     Pack years: 5.00     Types: Cigarettes     Last attempt to quit: 1992     Years since quittin.2    Smokeless tobacco: Never Used   Substance and Sexual Activity    Alcohol use: No    Drug use: No    Sexual activity: Not Currently     Partners: Male   Other Topics Concern    Not on file   Social History Narrative    Not on file     Past Surgical History:   Procedure Laterality Date    FOOT SURGERY      HYSTERECTOMY      TUBAL LIGATION       Family History   Problem Relation Age of Onset    Sudden death Father     Cancer Mother     Hypertension Mother     Cancer Sister        Review of Systems   Constitutional: Positive for unexpected weight change (Lost 10 lbs.gained 2-3 lbs). Negative for activity change, chills, fatigue, fever and weight loss.   HENT: Negative for  "congestion, postnasal drip and sinus pressure.    Eyes: Negative for pain, discharge and visual disturbance.   Respiratory: Negative for cough and chest tightness.    Cardiovascular: Negative for leg swelling.        HTN. Lipids   Gastrointestinal: Negative for abdominal distention, abdominal pain, anal bleeding, constipation, diarrhea and vomiting.   Endocrine: Negative for polydipsia and polyphagia.        Diabetes mellitus type 2. Chronic kidney disease stage III.   Genitourinary: Negative for difficulty urinating, dysuria, flank pain and hematuria.   Musculoskeletal: Positive for arthralgias. Negative for joint swelling.        Recent falls and mild hip pain.   Skin: Negative for color change, pallor and rash.   Allergic/Immunologic: Negative for environmental allergies, food allergies and immunocompromised state.   Neurological: Negative for tremors, seizures, syncope, weakness, light-headedness, numbness and headaches.        While patient is awake and alert, her understanding and cognition seems to be somewhat constricted.  Hearing loss and frequently has trouble understanding me and I have to maintain direct eye contact and speak slowly and louder.   Hematological: Negative for adenopathy. Does not bruise/bleed easily.   Psychiatric/Behavioral: Negative for agitation and dysphoric mood.        Patient's has history of schizoaffective disorder. This seems to be stable.         Objective:      Blood pressure 103/63, pulse 90, height 5' 7" (1.702 m), weight 69.4 kg (153 lb). Body mass index is 23.96 kg/m².  Physical Exam   Constitutional: She appears well-developed and well-nourished. She is cooperative. No distress.   HENT:   Head: Normocephalic and atraumatic.   Right Ear: Decreased hearing is noted.   Left Ear: Decreased hearing is noted.   Eyes: Conjunctivae, EOM and lids are normal. Lids are everted and swept, no foreign bodies found. Right pupil is round and reactive. Left pupil is round and reactive. "   Neck: Trachea normal and normal range of motion. Neck supple.   Cardiovascular: Normal rate, regular rhythm, S1 normal, S2 normal and normal heart sounds.   Pulmonary/Chest: Breath sounds normal.   Abdominal: Soft. Bowel sounds are normal. There is no rigidity and no guarding.   Musculoskeletal: She exhibits no deformity.        Right foot: There is no deformity.        Left foot: There is no deformity.   Feet:   Right Foot:   Protective Sensation: 5 sites tested. 5 sites sensed.   Skin Integrity: Negative for ulcer or blister.   Left Foot:   Protective Sensation: 5 sites tested. 5 sites sensed.   Skin Integrity: Negative for ulcer or blister.   Lymphadenopathy:     She has no cervical adenopathy.     She has no axillary adenopathy.   Neurological: She is alert. She displays atrophy. She displays no tremor and normal reflexes. No sensory deficit. Gait abnormal. Coordination normal.   Patient can also simple questions.    Slightly wobbly trying to climb  1 step stool   Skin: Skin is warm and dry.   Psychiatric: She is slowed. She is not agitated. Cognition and memory are impaired.   Nursing note and vitals reviewed.        Assessment:       1. Benign essential hypertension    2. Unsteady gait    3. Multiple-type hyperlipidemia    4. Type 2 diabetes mellitus with stage 3 chronic kidney disease, without long-term current use of insulin           Orders Only on 11/09/2018   Component Date Value Ref Range Status    Hemoglobin A1C 11/09/2018 6.2  3.1 - 6.5 % Final         Plan:           Benign essential hypertension  -     Comprehensive metabolic panel; Future; Expected date: 12/06/2018    Unsteady gait    Multiple-type hyperlipidemia    Type 2 diabetes mellitus with stage 3 chronic kidney disease, without long-term current use of insulin  -     Hemoglobin A1c; Future; Expected date: 12/06/2018      Advised Ms. Perez to monitor Blood sugars at home and record them.  Exercise, watch diet and loose weight.  keep a  close eye on feet and keep them clean. Annual eye examination. Annual influenza vaccine.  Monitor HgbA1c every 3 to 6 months. Monitor urine microalbumin every year.keep LDL less than 100. Monitor blood pressure and target blood pressure 120/70.      At this time patient seems to have stable medical condition. Her balance is fairly stable with no worsening. She continues to use caution while walking. She does have a alarm on her wrist.    Check labs before next visit.        Current Outpatient Medications:     albuterol (VENTOLIN HFA) 90 mcg/actuation inhaler, Inhale 2 puffs into the lungs every 6 (six) hours as needed for Wheezing. Rescue, Disp: , Rfl:     amantadine HCl (SYMMETREL) 100 mg capsule, Take 100 mg by mouth 2 (two) times daily., Disp: , Rfl:     amLODIPine (NORVASC) 10 MG tablet, TAKE 1 TABLET EVERY DAY, Disp: 90 tablet, Rfl: 3    ammonium lactate 12 % Crea, Apply topically., Disp: , Rfl:     aspirin (ECOTRIN) 81 MG EC tablet, Take 81 mg by mouth once daily., Disp: , Rfl:     budesonide-formoterol 160-4.5 mcg (SYMBICORT) 160-4.5 mcg/actuation HFAA, Inhale 2 puffs into the lungs every 12 (twelve) hours. Controller, Disp: , Rfl:     budesonide-formoterol 160-4.5 mcg (SYMBICORT) 160-4.5 mcg/actuation HFAA, Inhale 2 puffs into the lungs every 12 (twelve) hours. Controller, Disp: 1 Inhaler, Rfl: 3    calcitRIOL (ROCALTROL) 0.25 MCG Cap, Take 0.25 mcg by mouth every Mon, Wed, Fri., Disp: , Rfl:     cholecalciferol, vitamin D3, (VITAMIN D3) 2,000 unit Cap, Take 1 capsule by mouth once daily., Disp: , Rfl:     glimepiride (AMARYL) 2 MG tablet, TAKE 1 TABLET TWICE DAILY, Disp: 180 tablet, Rfl: 3    ipratropium (ATROVENT) 42 mcg (0.06 %) nasal spray, 2 sprays by Nasal route 3 (three) times daily., Disp: 15 mL, Rfl: 2    metoprolol succinate (TOPROL-XL) 50 MG 24 hr tablet, Take 1 tablet (50 mg total) by mouth 2 (two) times daily., Disp: 180 tablet, Rfl: 3    montelukast (SINGULAIR) 10 mg tablet, Take 1  tablet (10 mg total) by mouth every evening., Disp: 90 tablet, Rfl: 0    olanzapine (ZYPREXA) 10 MG tablet, Take 10 mg by mouth every evening., Disp: , Rfl:     pantoprazole (PROTONIX) 40 MG tablet, Take 1 tablet (40 mg total) by mouth once daily., Disp: 90 tablet, Rfl: 3    pantoprazole (PROTONIX) 40 MG tablet, Take 1 tablet (40 mg total) by mouth once daily., Disp: 30 tablet, Rfl: 6    potassium chloride SA (K-DUR,KLOR-CON) 20 MEQ tablet, TAKE 1 TABLET EVERY DAY, Disp: 90 tablet, Rfl: 3    pravastatin (PRAVACHOL) 40 MG tablet, Take 1 tablet (40 mg total) by mouth nightly., Disp: 90 tablet, Rfl: 3    SITagliptin (JANUVIA) 50 MG Tab, TAKE 1 TABLET ONE TIME DAILY, Disp: 90 tablet, Rfl: 3    trazodone (DESYREL) 100 MG tablet, Take 100 mg by mouth nightly as needed., Disp: , Rfl:     TRUE METRIX GLUCOSE TEST STRIP Strp, TEST BLOOD SUGAR EVERY DAY, Disp: 100 strip, Rfl: 3    TRUEPLUS LANCETS 33 gauge Misc, TEST  DAILY, Disp: 100 each, Rfl: 3

## 2018-12-06 NOTE — PATIENT INSTRUCTIONS
Using a Blood Sugar Log    You have diabetes. This means your body has trouble regulating a sugar called glucose. To help manage your diabetes, youll need to check your blood sugar level as directed by your healthcare provider. Keeping a log of your blood sugar levels will help you track your blood sugar readings. Its a simple and easy way to see how well you are controlling your diabetes.  Checking your blood sugar level  You can check your blood sugar level with a blood glucose meter. Youll first prick the side of your finger with a tiny lancet to draw a tiny drop of blood onto the test strip. Some glucose meters let you use another place on your body to test. But these other places should not be used in some cases as they may be inaccurate. Follow the instructions for your glucose meter. And talk with your healthcare provider before doing the test on other places.  The strip goes into the meter first, then a drop of blood is placed on the tip of the strip. The meter then shows a reading that tells you the level of your blood sugar. Your readings should be in your target range as often as possible. This means not too high or too low. Staying in this range helps lower your risk for complications. Your healthcare provider will help you figure out the target range that is best for you.  Tracking your readings  Every time you check your blood sugar, use your log to keep track of your readings. Your meter will also probably have a memory feature that your healthcare provider can check at your next visit. You may be advised by your healthcare provider to check your blood sugar in the morning, at bedtime, and before and after meals. Be sure to write down all of your numbers. Also use your log to record things that might have affected your blood sugar. Some examples include being sick, certain medicines, being physically active, feeling stressed, or skipping meals.   Lessons learned from your readings  Tracking your  blood sugar readings helps you see patterns. These patterns tell you how your actions affect your blood sugar. For instance, you may have higher numbers after eating certain foods or lower numbers after exercise. They just help you understand how to stay in your target range more often, so that your diabetes remains in good control.  Sharing your log with your healthcare team  Bring your blood sugar log and glucose meter with you to all of your healthcare appointments. This can help your healthcare team make changes to your treatment plan, if needed. This may involve making changes in what you eat, what medicines you take, or how much you exercise.  To learn more  The resources below can help you learn more:  · American Diabetes Association [phone] www.diabetes.org  · [employer] 244-304-5139 www.lighthouse.org  · [facility] [place] 161-562-7356 www.nei.nih.gov  · [facility] 311-102-4740 www.hormone.org  Date Last Reviewed: 5/1/2016  © 4364-6829 [employer]. [address] [place] [address]. All rights reserved. This information is not intended as a substitute for professional medical care. Always follow your healthcare professional's instructions.        Using a Cane  A cane helps you get around on your own. Many different canes are available. The most common type has a single tip. But if you have balance problems, your healthcare provider may recommend that you use a quad (four-point) cane. Always use your cane on the stronger (uninjured or unaffected) side, unless told otherwise. Use the cane on the side opposite your weaker leg.  Walking    1.  · Put all your weight on your stronger leg.  · Find your balance.  · Move the cane and your weaker leg forward.    2.  · Support your weight on both the cane and the affected leg.  · Then step through with your stronger leg.  · Put your weight on the weaker leg and start the next step.    · When using a quad cane,  place the cane so that all of the tips touch the ground.       Up stairs and curbs  · If there is a railing, hold on to it with your free hand.  · Step up with your stronger leg first.  · Then move the cane and weaker leg together as a unit.    Down stairs and curbs  · To walk down, step down with your weaker leg and the cane first.  · Then follow with your stronger leg.  Date Last Reviewed: 9/1/2015  © 9928-7436 [employer]. [address] [place] [address], [place] [address]. All rights reserved. This information is not intended as a substitute for professional medical care. Always follow your healthcare professional's instructions.        Fall Prevention  Falls often occur due to slipping, tripping or losing your balance. Millions of people fall every year and injure themselves. Here are ways to reduce your risk of falling again.  · Think about your fall, was there anything that caused your fall that can be fixed, removed, or replaced?  · Make your home safe by keeping walkways clear of objects you may trip over.  · Use non-slip pads under rugs. Do not use area rugs or small throw rugs.  · Use non-slip mats in bathtubs and showers.  · Install handrails and lights on staircases.  · Do not walk in poorly lit areas.  · Do not stand on chairs or wobbly ladders.  · Use caution when reaching overhead or looking upward. This position can cause a loss of balance.  · Be sure your shoes fit properly, have non-slip bottoms and are in good condition.   · Wear shoes both inside and out. Avoid going barefoot or wearing slippers.  · Be cautious when going up and down stairs, curbs, and when walking on uneven sidewalks.  · If your balance is poor, consider using a cane or walker.  · If your fall was related to alcohol use, stop or limit alcohol intake.   · If your fall was related to use of sleeping medicines, talk to your doctor about this. You may need to reduce your dosage at bedtime if you awaken during the night to go  to the bathroom.    · To reduce the need for nighttime bathroom trips:  ¨ Avoid drinking fluids for several hours before going to bed  ¨ Empty your bladder before going to bed  ¨ Men can keep a urinal at the bedside  · Stay as active as you can. Balance, flexibility, strength, and endurance all come from exercise. They all play a role in preventing falls. Ask your healthcare provider which types of activity are right for you.  · Get your vision checked on a regular basis.  · If you have pets, know where they are before you stand up or walk so you don't trip over them.  · Use night lights.  Date Last Reviewed: 11/5/2015  © 8549-1334 [employer]. [address] [place] [address], [place] [address]. All rights reserved. This information is not intended as a substitute for professional medical care. Always follow your healthcare professional's instructions.

## 2019-02-01 LAB
ALBUMIN SERPL-MCNC: 4.1 G/DL (ref 3.1–4.7)
ALP SERPL-CCNC: 90 IU/L (ref 40–104)
ALT (SGPT): 17 IU/L (ref 3–33)
AST SERPL-CCNC: 19 IU/L (ref 10–40)
BILIRUB SERPL-MCNC: 0.8 MG/DL (ref 0.3–1)
BUN SERPL-MCNC: 27 MG/DL (ref 8–20)
CALCIUM SERPL-MCNC: 10.2 MG/DL (ref 7.7–10.4)
CHLORIDE: 99 MMOL/L (ref 98–110)
CO2 SERPL-SCNC: 27.4 MMOL/L (ref 22.8–31.6)
CREATININE: 2.08 MG/DL (ref 0.6–1.4)
GLUCOSE: 172 MG/DL (ref 70–99)
POTASSIUM SERPL-SCNC: 4 MMOL/L (ref 3.5–5)
PROT SERPL-MCNC: 7.3 G/DL (ref 6–8.2)
SODIUM: 138 MMOL/L (ref 134–144)

## 2019-02-02 LAB — HBA1C MFR BLD: 6.1 % (ref 3.1–6.5)

## 2019-02-04 ENCOUNTER — TELEPHONE (OUTPATIENT)
Dept: FAMILY MEDICINE | Facility: CLINIC | Age: 74
End: 2019-02-04

## 2019-02-06 ENCOUNTER — OFFICE VISIT (OUTPATIENT)
Dept: FAMILY MEDICINE | Facility: CLINIC | Age: 74
End: 2019-02-06
Payer: MEDICARE

## 2019-02-06 VITALS
SYSTOLIC BLOOD PRESSURE: 107 MMHG | DIASTOLIC BLOOD PRESSURE: 62 MMHG | WEIGHT: 153 LBS | HEART RATE: 84 BPM | BODY MASS INDEX: 24.01 KG/M2 | HEIGHT: 67 IN

## 2019-02-06 DIAGNOSIS — E11.22 TYPE 2 DIABETES MELLITUS WITH STAGE 3 CHRONIC KIDNEY DISEASE, WITHOUT LONG-TERM CURRENT USE OF INSULIN: Primary | ICD-10-CM

## 2019-02-06 DIAGNOSIS — E78.2 MULTIPLE-TYPE HYPERLIPIDEMIA: ICD-10-CM

## 2019-02-06 DIAGNOSIS — N18.30 TYPE 2 DIABETES MELLITUS WITH STAGE 3 CHRONIC KIDNEY DISEASE, WITHOUT LONG-TERM CURRENT USE OF INSULIN: Primary | ICD-10-CM
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
## 2019-02-06 DIAGNOSIS — I10 BENIGN ESSENTIAL HYPERTENSION: ICD-10-CM

## 2019-02-06 DIAGNOSIS — N18.30 CHRONIC KIDNEY DISEASE, STAGE 3 (MODERATE): ICD-10-CM

## 2019-02-06 PROBLEM — E66.3 OVERWEIGHT: Status: RESOLVED | Noted: 2017-07-11 | Resolved: 2019-02-06

## 2019-02-06 PROBLEM — R09.81 NASAL CONGESTION: Status: RESOLVED | Noted: 2018-04-05 | Resolved: 2019-02-06
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
## 2019-02-06 PROBLEM — R41.0 CONFUSION: Status: RESOLVED | Noted: 2018-11-16 | Resolved: 2019-02-06

## 2019-02-06 PROCEDURE — 3074F PR MOST RECENT SYSTOLIC BLOOD PRESSURE < 130 MM HG: ICD-10-PCS | Mod: ,,, | Performed by: INTERNAL MEDICINE

## 2019-02-06 PROCEDURE — 3044F HG A1C LEVEL LT 7.0%: CPT | Mod: ,,, | Performed by: INTERNAL MEDICINE

## 2019-02-06 PROCEDURE — 99214 PR OFFICE/OUTPT VISIT, EST, LEVL IV, 30-39 MIN: ICD-10-PCS | Mod: ,,, | Performed by: INTERNAL MEDICINE

## 2019-02-06 PROCEDURE — 3074F SYST BP LT 130 MM HG: CPT | Mod: ,,, | Performed by: INTERNAL MEDICINE

## 2019-02-06 PROCEDURE — 99214 OFFICE O/P EST MOD 30 MIN: CPT | Mod: ,,, | Performed by: INTERNAL MEDICINE

## 2019-02-06 PROCEDURE — 3044F PR MOST RECENT HEMOGLOBIN A1C LEVEL <7.0%: ICD-10-PCS | Mod: ,,, | Performed by: INTERNAL MEDICINE

## 2019-02-06 PROCEDURE — 3078F PR MOST RECENT DIASTOLIC BLOOD PRESSURE < 80 MM HG: ICD-10-PCS | Mod: ,,, | Performed by: INTERNAL MEDICINE

## 2019-02-06 PROCEDURE — 1101F PR PT FALLS ASSESS DOC 0-1 FALLS W/OUT INJ PAST YR: ICD-10-PCS | Mod: ,,, | Performed by: INTERNAL MEDICINE

## 2019-02-06 PROCEDURE — 3078F DIAST BP <80 MM HG: CPT | Mod: ,,, | Performed by: INTERNAL MEDICINE

## 2019-02-06 PROCEDURE — 1101F PT FALLS ASSESS-DOCD LE1/YR: CPT | Mod: ,,, | Performed by: INTERNAL MEDICINE

## 2019-02-06 RX ORDER — MONTELUKAST SODIUM 10 MG/1
TABLET ORAL
Refills: 2 | COMMUNITY
Start: 2019-01-21 | End: 2020-03-10

## 2019-02-06 NOTE — PATIENT INSTRUCTIONS
Taking Your Blood Pressure  Blood pressure is the force of blood against the artery wall as it moves from the heart through the blood vessels. You can take your own blood pressure reading using a digital monitor. Take your readings the same each time, using the same arm. Take readings as often as your healthcare provider instructs.  About blood pressure monitors  Blood pressure monitors are designed for certain ages and cases. You can find monitors for older adults, for pregnant women, and for children. Make sure the one you choose is the right one for your age and situation.  The American Heart Association recommends an automatic cuff monitor that fits on your upper arm (bicep). The cuff should fit your arm size. A cuff thats too large or too small will not give an accurate reading. Measure around your upper arm to find your size.  Monitors that attach to your finger or wrist are not as accurate as monitors for your upper arm.  Ask your healthcare provider for help in choosing a monitor. Bring your monitor to your next provider visit if you need help in using it the correct way.  The steps below are general instructions for using an automatic digital monitor.  Step 1. Relax    · Take your blood pressure at the same time every day, such as in the morning or evening, or at the time your healthcare provider recommends.  · Wait at least a half-hour after smoking, eating, or exercising. Don't drink coffee, tea, soda, or other caffeinated beverages before checking your blood pressure.  · Sit comfortably at a table with both feet on the floor. Do not cross your legs or feet. Place the monitor near you.  · Rest for a few minutes before you begin.  Step 2. Wrap the cuff    · Place your arm on the table, palm up. Your arm should be at the level of your heart. Wrap the cuff around your upper arm, just above your elbow. Its best done on bare skin, not over clothing. Most cuffs will indicate where the brachial artery (the  blood vessel in the middle of the arm at the inner side of the elbow) should line up with the cuff. Look in your monitor's instruction booklet for an illustration. You can also bring your cuff to your healthcare provider and have them show you how to correctly place the cuff.  Step 3. Inflate the cuff    · Push the button that starts the pump.  · The cuff will tighten, then loosen.  · The numbers will change. When they stop changing, your blood pressure reading will appear.  · Take 2 or 3 readings one minute apart.  Step 4. Write down the results of each reading    · Write down your blood pressure numbers for each reading. Note the date and time. Keep your results in one place, such as a notebook. Even if your monitor has a built-in memory, keep a hard copy of the readings.  · Remove the cuff from your arm. Turn off the machine.  · Bring your blood pressure records with your healthcare providers at each visit.  · If you start a new blood pressure medicine, note the day you started the new medicine. Also note the day if you change the dose of your medicine. This information goes on your blood pressure recording sheet. This will help your healthcare provider monitor how well the medicine changes are working.  · Ask your healthcare provider what numbers should prompt you to call him or her. Also ask what numbers should prompt you to get help right away.  Date Last Reviewed: 11/1/2016 © 2000-2017 [employer]. [address] [place] [address], [place] [address]. All rights reserved. This information is not intended as a substitute for professional medical care. Always follow your healthcare professional's instructions.        Using a Blood Sugar Log    You have diabetes. This means your body has trouble regulating a sugar called glucose. To help manage your diabetes, youll need to check your blood sugar level as directed by your healthcare provider. Keeping a log of your blood sugar levels will help you track your  blood sugar readings. Its a simple and easy way to see how well you are controlling your diabetes.  Checking your blood sugar level  You can check your blood sugar level with a blood glucose meter. Youll first prick the side of your finger with a tiny lancet to draw a tiny drop of blood onto the test strip. Some glucose meters let you use another place on your body to test. But these other places should not be used in some cases as they may be inaccurate. Follow the instructions for your glucose meter. And talk with your healthcare provider before doing the test on other places.  The strip goes into the meter first, then a drop of blood is placed on the tip of the strip. The meter then shows a reading that tells you the level of your blood sugar. Your readings should be in your target range as often as possible. This means not too high or too low. Staying in this range helps lower your risk for complications. Your healthcare provider will help you figure out the target range that is best for you.  Tracking your readings  Every time you check your blood sugar, use your log to keep track of your readings. Your meter will also probably have a memory feature that your healthcare provider can check at your next visit. You may be advised by your healthcare provider to check your blood sugar in the morning, at bedtime, and before and after meals. Be sure to write down all of your numbers. Also use your log to record things that might have affected your blood sugar. Some examples include being sick, certain medicines, being physically active, feeling stressed, or skipping meals.   Lessons learned from your readings  Tracking your blood sugar readings helps you see patterns. These patterns tell you how your actions affect your blood sugar. For instance, you may have higher numbers after eating certain foods or lower numbers after exercise. They just help you understand how to stay in your target range more often, so that  your diabetes remains in good control.  Sharing your log with your healthcare team  Bring your blood sugar log and glucose meter with you to all of your healthcare appointments. This can help your healthcare team make changes to your treatment plan, if needed. This may involve making changes in what you eat, what medicines you take, or how much you exercise.  To learn more  The resources below can help you learn more:  · American Diabetes Association 115-516-8836 www.diabetes.org  · [employer] 545-828-5843 www.lighthouse.org  · National [facility] [place] 456-590-0181 www.nei.nih.gov  · [facility] 782-297-4450 www.hormone.org  Date Last Reviewed: 5/1/2016  © 4255-0311 [employer]. [address] [place] [address], [place] [address]. All rights reserved. This information is not intended as a substitute for professional medical care. Always follow your healthcare professional's instructions.

## 2019-02-06 NOTE — PROGRESS NOTES
Subjective:       Patient ID: Adriana Perez is a 73 y.o. female.    Chief Complaint: Diabetes (lab review ) and Hypertension    Patient, Adriana Perez (MRN #[id number]), presented with a recent Estimated Glumerular Filtration Rate (EGFR) between < than 30 . Need repeat test to declare as stage 4.      The patient's chronic kidney disease stage 3 was monitored, evaluated, addressed and/or treated. This addendum to the medical record is made on 02/06/2019.        Diabetes   She presents for her follow-up diabetic visit. She has type 2 diabetes mellitus. Her disease course has been improving. Hypoglycemia symptoms include confusion. Pertinent negatives for hypoglycemia include no pallor, seizures, speech difficulty or tremors. Pertinent negatives for diabetes include no fatigue, no foot ulcerations, no polydipsia and no polyphagia. Symptoms are improving. Risk factors for coronary artery disease include hypertension, sedentary lifestyle, dyslipidemia and diabetes mellitus. Current diabetic treatment includes oral agent (dual therapy). Her weight is stable. Meal planning includes avoidance of concentrated sweets. She never participates in exercise. Her home blood glucose trend is decreasing steadily. Her breakfast blood glucose range is generally 110-130 mg/dl. An ACE inhibitor/angiotensin II receptor blocker is not being taken. Eye exam is current.   Hypertension   This is a chronic problem. The current episode started more than 1 year ago. The problem is controlled. Associated symptoms include shortness of breath. Risk factors for coronary artery disease include sedentary lifestyle. The current treatment provides moderate improvement.   Hyperlipidemia   This is a chronic problem. The current episode started more than 1 year ago. The problem is controlled. She has no history of obesity. Associated symptoms include shortness of breath. The current treatment provides moderate improvement of lipids. Risk factors for  coronary artery disease include a sedentary lifestyle, hypertension and dyslipidemia.   Fall   The accident occurred more than 1 week ago. The fall occurred while walking. She fell from a height of 1 to 2 ft. Pertinent negatives include no hematuria or vomiting. The treatment provided moderate relief.       Past Medical History:   Diagnosis Date    Acid reflux     Allergy     lisinopril    Anxiety     Bilateral renal cysts 2018    Renal USG by Dr Foy    CKD (chronic kidney disease), stage III     Depression     Diabetes mellitus     Diabetes mellitus, type 2     Disorder of kidney and ureter     Echogenic kidneys on renal ultrasound 2018    Dr Lance Foy- Also Renal cysts bilateral    Hyperlipidemia     Hypertension     MVP (mitral valve prolapse)     Primary osteoarthritis of left knee     Restless leg     Schizophrenia, simple, chronic     Urinary, incontinence, stress female      Social History     Socioeconomic History    Marital status:      Spouse name: Willy Perez    Number of children: 2    Years of education: Not on file    Highest education level: Not on file   Social Needs    Financial resource strain: Not on file    Food insecurity - worry: Not on file    Food insecurity - inability: Not on file    Transportation needs - medical: Not on file    Transportation needs - non-medical: Not on file   Occupational History    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     Comment: [employer]   Tobacco Use    Smoking status: Former Smoker     Packs/day: 1.00     Years: 5.00     Pack years: 5.00     Types: Cigarettes     Last attempt to quit: 1992     Years since quittin.4    Smokeless tobacco: Never Used   Substance and Sexual Activity    Alcohol use: No    Drug use: No    Sexual activity: Not Currently     Partners: Male   Other Topics Concern    Not on file   Social History Narrative    Not on file     Past Surgical History:    Procedure Laterality Date    FOOT SURGERY      HYSTERECTOMY      TUBAL LIGATION       Family History   Problem Relation Age of Onset    Sudden death Father     Cancer Mother     Hypertension Mother     Cancer Sister        Review of Systems   Constitutional: Positive for unexpected weight change (Lost 10 lbs.gained 2-3 lbs). Negative for activity change, chills and fatigue.   HENT: Negative for congestion, postnasal drip and sinus pressure.    Eyes: Negative for pain, discharge and visual disturbance.   Respiratory: Positive for cough and shortness of breath. Negative for chest tightness.         Patient has been recently diagnosed with asthmatic bronchitis and has been followed with pulmonary. Her symptoms of cough have abated at this point. She is using Symbicort inhaler and Singulair.   Cardiovascular: Negative for leg swelling.        HTN. Lipids   Gastrointestinal: Negative for abdominal distention, anal bleeding, constipation, diarrhea and vomiting.   Endocrine: Negative for polydipsia and polyphagia.        Diabetes mellitus type 2. Chronic kidney disease stage III.   Genitourinary: Negative for difficulty urinating and hematuria.   Musculoskeletal: Positive for arthralgias. Negative for joint swelling.        Shoulder pains. Bilateral. No history of fall or trauma.   Skin: Negative for color change, pallor and rash.   Allergic/Immunologic: Negative for environmental allergies, food allergies and immunocompromised state.   Neurological: Negative for tremors, seizures, syncope, speech difficulty and light-headedness.        While patient is awake and alert, her understanding and cognition seems to be somewhat constricted.  Hearing loss and frequently has trouble understanding me and I have to maintain direct eye contact and speak slowly and louder.   Hematological: Negative for adenopathy. Does not bruise/bleed easily.   Psychiatric/Behavioral: Positive for confusion. Negative for agitation and  "dysphoric mood.        Patient's has history of schizoaffective disorder. This seems to be stable.         Objective:      Blood pressure 107/62, pulse 84, height 5' 7" (1.702 m), weight 69.4 kg (153 lb). Body mass index is 23.96 kg/m².  Physical Exam   Constitutional: She appears well-developed and well-nourished. She is cooperative. No distress.   HENT:   Head: Normocephalic and atraumatic.   Right Ear: Decreased hearing is noted.   Left Ear: Decreased hearing is noted.   Eyes: Conjunctivae, EOM and lids are normal. Lids are everted and swept, no foreign bodies found. Right pupil is round and reactive. Left pupil is round and reactive.   Neck: Trachea normal and normal range of motion. Neck supple.   Cardiovascular: Normal rate, regular rhythm, S1 normal, S2 normal and normal heart sounds.   Pulmonary/Chest: Breath sounds normal.   Abdominal: Soft. Bowel sounds are normal. There is no rigidity and no guarding.   Musculoskeletal: She exhibits no deformity.        Right foot: There is no deformity.        Left foot: There is no deformity.   And has some restriction in range of motion of both shoulder joints. No localizable tenderness.   Feet:   Right Foot:   Protective Sensation: 5 sites tested. 5 sites sensed.   Skin Integrity: Negative for ulcer or blister.   Left Foot:   Protective Sensation: 5 sites tested. 5 sites sensed.   Skin Integrity: Negative for ulcer or blister.   Lymphadenopathy:     She has no cervical adenopathy.     She has no axillary adenopathy.   Neurological: She is alert. She displays atrophy. She displays no tremor and normal reflexes. No sensory deficit. Gait abnormal. Coordination normal.   Patient can also simple questions.    Slightly wobbly trying to climb  1 step stool   Skin: Skin is warm and dry.   Psychiatric: She is slowed. She is not agitated. Cognition and memory are impaired.   Nursing note and vitals reviewed.        Assessment:       1. Type 2 diabetes mellitus with stage 3 " chronic kidney disease, without long-term current use of insulin    2. Benign essential hypertension    3. Multiple-type hyperlipidemia    4. Chronic kidney disease, stage 3 (moderate)    5. Fall, subsequent encounter           Orders Only on 02/01/2019   Component Date Value Ref Range Status    Glucose 02/01/2019 172* 70 - 99 mg/dL Final    BUN, Bld 02/01/2019 27* 8 - 20 mg/dL Final    Creatinine 02/01/2019 2.08* 0.60 - 1.40 mg/dL Final    Calcium 02/01/2019 10.2  7.7 - 10.4 mg/dL Final    Sodium 02/01/2019 138  134 - 144 mmol/L Final    Potassium 02/01/2019 4.0  3.5 - 5.0 mmol/L Final    Chloride 02/01/2019 99  98 - 110 mmol/L Final    CO2 02/01/2019 27.4  22.8 - 31.6 mmol/L Final    Albumin 02/01/2019 4.1  3.1 - 4.7 g/dL Final    Total Bilirubin 02/01/2019 0.8  0.3 - 1.0 mg/dL Final    Alkaline Phosphatase 02/01/2019 90  40 - 104 IU/L Final    Total Protein 02/01/2019 7.3  6.0 - 8.2 g/dL Final    ALT (SGPT) 02/01/2019 17  3 - 33 IU/L Final    AST 02/01/2019 19  10 - 40 IU/L Final    Hemoglobin A1C 02/01/2019 6.1  3.1 - 6.5 % Final   Orders Only on 11/09/2018   Component Date Value Ref Range Status    Hemoglobin A1C 11/09/2018 6.2  3.1 - 6.5 % Final         Plan:           Type 2 diabetes mellitus with stage 3 chronic kidney disease, without long-term current use of insulin  -     Hemoglobin A1c; Future; Expected date: 02/06/2019    Benign essential hypertension    Multiple-type hyperlipidemia    Chronic kidney disease, stage 3 (moderate)  -     CBC auto differential; Future; Expected date: 02/06/2019  -     Renal function panel; Future; Expected date: 02/06/2019  -     Magnesium; Future; Expected date: 02/06/2019    Fall, subsequent encounter      Patient's diabetes seems to have improved significantly. At this point I'm somewhat concerned about hypoglycemia and also given the fact that patient is not very astute with some cognitive changes. I may cut down on Glimepiride if her sugars tend to go  further lower.    Perhaps her chronic kidney disease may increase propensity for hypoglycemia. I've advised this to the granddaughter to keep a close eye on her sugars especially if she has dizziness and to check for blood sugars.    I am also concerned about her difficulty in ambulation and her not using cane when needed. She has a single floor home with no steps .    Fall precautions have been discussed as she takes showers.      She'll keep her regular follow-up with nephrology-Dr. Foy    In chronic kidney disease precautions have been discussed to keep hydrated, alert unnecessary antibiotics like Bactrim and avoid acids.    Overall prognosis continues to be modest given cognitive changes, multiple medical issues and steady decline over months to years is expected.Fup In 3 months            Current Outpatient Medications:     albuterol (VENTOLIN HFA) 90 mcg/actuation inhaler, Inhale 2 puffs into the lungs every 6 (six) hours as needed for Wheezing. Rescue, Disp: , Rfl:     amantadine HCl (SYMMETREL) 100 mg capsule, Take 100 mg by mouth 2 (two) times daily., Disp: , Rfl:     amLODIPine (NORVASC) 10 MG tablet, TAKE 1 TABLET EVERY DAY, Disp: 90 tablet, Rfl: 3    ammonium lactate 12 % Crea, Apply topically., Disp: , Rfl:     aspirin (ECOTRIN) 81 MG EC tablet, Take 81 mg by mouth once daily., Disp: , Rfl:     budesonide-formoterol 160-4.5 mcg (SYMBICORT) 160-4.5 mcg/actuation HFAA, Inhale 2 puffs into the lungs every 12 (twelve) hours. Controller, Disp: , Rfl:     calcitRIOL (ROCALTROL) 0.25 MCG Cap, Take 0.25 mcg by mouth every Mon, Wed, Fri., Disp: , Rfl:     glimepiride (AMARYL) 2 MG tablet, TAKE 1 TABLET TWICE DAILY, Disp: 180 tablet, Rfl: 3    ipratropium (ATROVENT) 42 mcg (0.06 %) nasal spray, 2 sprays by Nasal route 3 (three) times daily., Disp: 15 mL, Rfl: 2    metoprolol succinate (TOPROL-XL) 50 MG 24 hr tablet, Take 1 tablet (50 mg total) by mouth 2 (two) times daily., Disp: 180 tablet, Rfl: 3     montelukast (SINGULAIR) 10 mg tablet, TK 1 T PO QHS, Disp: , Rfl: 2    olanzapine (ZYPREXA) 10 MG tablet, Take 10 mg by mouth every evening., Disp: , Rfl:     pantoprazole (PROTONIX) 40 MG tablet, Take 1 tablet (40 mg total) by mouth once daily., Disp: 90 tablet, Rfl: 3    potassium chloride SA (K-DUR,KLOR-CON) 20 MEQ tablet, TAKE 1 TABLET EVERY DAY, Disp: 90 tablet, Rfl: 3    pravastatin (PRAVACHOL) 40 MG tablet, Take 1 tablet (40 mg total) by mouth nightly., Disp: 90 tablet, Rfl: 3    SITagliptin (JANUVIA) 50 MG Tab, TAKE 1 TABLET ONE TIME DAILY, Disp: 90 tablet, Rfl: 3    trazodone (DESYREL) 100 MG tablet, Take 100 mg by mouth nightly as needed., Disp: , Rfl:     TRUE METRIX GLUCOSE TEST STRIP Strp, TEST BLOOD SUGAR EVERY DAY, Disp: 100 strip, Rfl: 3    TRUEPLUS LANCETS 33 gauge Misc, TEST  DAILY, Disp: 100 each, Rfl: 3

## 2019-02-12 DIAGNOSIS — R06.02 SOB (SHORTNESS OF BREATH): Primary | ICD-10-CM

## 2019-02-12 RX ORDER — ALBUTEROL SULFATE 90 UG/1
2 AEROSOL, METERED RESPIRATORY (INHALATION) EVERY 6 HOURS PRN
Qty: 1 INHALER | Refills: 4 | Status: SHIPPED | OUTPATIENT
Start: 2019-02-12 | End: 2020-04-20 | Stop reason: SDUPTHER

## 2019-02-26 ENCOUNTER — OFFICE VISIT (OUTPATIENT)
Dept: PODIATRY | Facility: CLINIC | Age: 74
End: 2019-02-26
Payer: MEDICARE

## 2019-02-26 VITALS
HEART RATE: 86 BPM | DIASTOLIC BLOOD PRESSURE: 82 MMHG | WEIGHT: 153 LBS | SYSTOLIC BLOOD PRESSURE: 128 MMHG | BODY MASS INDEX: 24.01 KG/M2 | HEIGHT: 67 IN | TEMPERATURE: 98 F

## 2019-02-26 DIAGNOSIS — E11.9 DIABETES MELLITUS WITHOUT COMPLICATION: Primary | ICD-10-CM

## 2019-02-26 DIAGNOSIS — B35.1 ONYCHOMYCOSIS DUE TO DERMATOPHYTE: ICD-10-CM

## 2019-02-26 PROCEDURE — 99499 NO LOS: ICD-10-PCS | Mod: CSM,,, | Performed by: PODIATRIST

## 2019-02-26 PROCEDURE — 17999 UNLISTD PX SKN MUC MEMB SUBQ: CPT | Mod: CSM,,, | Performed by: PODIATRIST

## 2019-02-26 PROCEDURE — 99499 UNLISTED E&M SERVICE: CPT | Mod: CSM,,, | Performed by: PODIATRIST

## 2019-02-26 PROCEDURE — 17999 PR NON-COVERED FOOT CARE: ICD-10-PCS | Mod: CSM,,, | Performed by: PODIATRIST

## 2019-02-26 NOTE — PROGRESS NOTES
[address] [facility] [address]  [place] [address]  Phone: [phone]   [phone]    Patient's PCP:Aiden Koch MD  Referring Provider: No ref. provider found    Subjective:      Chief Complaint:: Nail Care (Trim B 2-5)    HPI  Adriana Perez is a 73 y.o. female who presents to the clinic for a nail trim.    Systemic Doctor: Dr.Sanjay Koch  Date Last Seen: 2/19  Blood Sugar: 130  Hemoglobin A1c: 6.1    Vitals:    02/26/19 0951   BP: 128/82   Pulse: 86   Temp: 98.3 °F (36.8 °C)     Shoe Size: 8.5-9    Past Surgical History:   Procedure Laterality Date    FOOT SURGERY      HYSTERECTOMY      TUBAL LIGATION       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/2018    Dr Lance Foy- Also Renal cysts bilateral    Hyperlipidemia     Hypertension     MVP (mitral valve prolapse)     Primary osteoarthritis of left knee     Restless leg     Schizophrenia, simple, chronic     Urinary, incontinence, stress female      Family History   Problem Relation Age of Onset    Sudden death Father     Cancer Mother     Hypertension Mother     Cancer Sister         Social History:   Marital Status:   Alcohol History:  reports that she does not drink alcohol.  Tobacco History:  reports that she quit smoking about 26 years ago. Her smoking use included cigarettes. She has a 5.00 pack-year smoking history. she has never used smokeless tobacco.  Drug History:  reports that she does not use drugs.    Review of patient's allergies indicates:   Allergen Reactions    Lisinopril Nausea And Vomiting       Current Outpatient Medications   Medication Sig Dispense Refill    albuterol (PROVENTIL/VENTOLIN HFA) 90  mcg/actuation inhaler Inhale 2 puffs into the lungs every 6 (six) hours as needed for Wheezing. Rescue 1 Inhaler 4    albuterol (VENTOLIN HFA) 90 mcg/actuation inhaler Inhale 2 puffs into the lungs every 6 (six) hours as needed for Wheezing. Rescue      amantadine HCl (SYMMETREL) 100 mg capsule Take 100 mg by mouth 2 (two) times daily.      amLODIPine (NORVASC) 10 MG tablet TAKE 1 TABLET EVERY DAY 90 tablet 3    ammonium lactate 12 % Crea Apply topically.      aspirin (ECOTRIN) 81 MG EC tablet Take 81 mg by mouth once daily.      budesonide-formoterol 160-4.5 mcg (SYMBICORT) 160-4.5 mcg/actuation HFAA Inhale 2 puffs into the lungs every 12 (twelve) hours. Controller      calcitRIOL (ROCALTROL) 0.25 MCG Cap Take 0.25 mcg by mouth every Mon, Wed, Fri.      glimepiride (AMARYL) 2 MG tablet TAKE 1 TABLET TWICE DAILY 180 tablet 3    ipratropium (ATROVENT) 42 mcg (0.06 %) nasal spray 2 sprays by Nasal route 3 (three) times daily. 15 mL 2    metoprolol succinate (TOPROL-XL) 50 MG 24 hr tablet Take 1 tablet (50 mg total) by mouth 2 (two) times daily. 180 tablet 3    montelukast (SINGULAIR) 10 mg tablet TK 1 T PO QHS  2    olanzapine (ZYPREXA) 10 MG tablet Take 10 mg by mouth every evening.      pantoprazole (PROTONIX) 40 MG tablet Take 1 tablet (40 mg total) by mouth once daily. 90 tablet 3    potassium chloride SA (K-DUR,KLOR-CON) 20 MEQ tablet TAKE 1 TABLET EVERY DAY 90 tablet 3    pravastatin (PRAVACHOL) 40 MG tablet Take 1 tablet (40 mg total) by mouth nightly. 90 tablet 3    SITagliptin (JANUVIA) 50 MG Tab TAKE 1 TABLET EVERY DAY 30 tablet 11    trazodone (DESYREL) 100 MG tablet Take 100 mg by mouth nightly as needed.      TRUE METRIX GLUCOSE TEST STRIP Strp TEST BLOOD SUGAR EVERY  strip 3    TRUEPLUS LANCETS 33 gauge Misc TEST  DAILY 100 each 3     No current facility-administered medications for this visit.        Review of Systems   Constitutional: Negative for chills, fatigue, fever and  unexpected weight change.   HENT: Negative for hearing loss and trouble swallowing.    Eyes: Negative for photophobia and visual disturbance.   Respiratory: Negative for cough, shortness of breath and wheezing.    Cardiovascular: Negative for chest pain, palpitations and leg swelling.   Gastrointestinal: Negative for abdominal pain and nausea.   Genitourinary: Negative for dysuria and frequency.   Musculoskeletal: Positive for arthralgias and back pain. Negative for joint swelling.   Skin: Negative for rash.   Neurological: Negative for tremors, seizures, weakness, numbness and headaches.   Hematological: Does not bruise/bleed easily.         Objective:        Physical Exam:   Foot Exam    General  General Appearance: appears stated age and healthy   Orientation: alert and oriented to person, place, and time   Affect: appropriate   Gait: unimpaired       Right Foot/Ankle     Inspection and Palpation  Ecchymosis: none  Tenderness: none   Swelling: none   Arch: normal  Hammertoes: absent  Claw Toes: absent  Hallux valgus: no  Hallux limitus: no  Skin Exam: skin intact;     Neurovascular  Dorsalis pedis: 2+  Posterior tibial: 2+  Saphenous nerve sensation: normal  Tibial nerve sensation: normal  Superficial peroneal nerve sensation: normal  Deep peroneal nerve sensation: normal  Sural nerve sensation: normal      Left Foot/Ankle      Inspection and Palpation  Ecchymosis: none  Tenderness: none   Swelling: none   Arch: normal  Hammertoes: absent  Claw toes: absent  Hallux valgus: no  Hallux limitus: no  Skin Exam: skin intact;     Neurovascular  Dorsalis pedis: 2+  Posterior tibial: 2+  Saphenous nerve sensation: normal  Tibial nerve sensation: normal  Superficial peroneal nerve sensation: normal  Deep peroneal nerve sensation: normal  Sural nerve sensation: normal          Physical Exam   Cardiovascular:   Pulses:       Dorsalis pedis pulses are 2+ on the right side, and 2+ on the left side.        Posterior tibial  pulses are 2+ on the right side, and 2+ on the left side.   Musculoskeletal:        Feet:        Imaging:            Assessment:       1. Diabetes mellitus without complication    2. Onychomycosis due to dermatophyte      Plan:   Diabetes mellitus without complication    Onychomycosis due to dermatophyte      No Follow-up on file.    Routine Foot Care  Date/Time: 2/26/2019 10:17 AM  Performed by: Ezequiel Wise DPM  Authorized by: Ezequiel Wise DPM     Consent Done?:  Yes (Verbal)    Nail Care Type:  Trim  Location(s): All  (Left 1st Toe, Left 3rd Toe, Left 2nd Toe, Left 4th Toe, Left 5th Toe, Right 1st Toe, Right 2nd Toe, Right 3rd Toe, Right 4th Toe and Right 5th Toe)     - None    Counseling:     I provided patient education verbally regarding:   Patient diagnosis, treatment options, as well as alternatives, risks, and benefits.     This note was created using Dragon voice recognition software that occasionally misinterpreted phrases or words.

## 2019-03-01 ENCOUNTER — TELEPHONE (OUTPATIENT)
Dept: PULMONOLOGY | Facility: CLINIC | Age: 74
End: 2019-03-01

## 2019-03-01 NOTE — TELEPHONE ENCOUNTER
PT came into office with a note from [facility] stating they do not cover Albuterol HFA 90mcg. I called pt and LM for her to call [facility] to see what rx is on their formulary drug list and let us know so we can change it.   Spoke to pt and shes calling her insurance co and pharmacy and will let us know what rx they will cover.

## 2019-05-02 ENCOUNTER — OFFICE VISIT (OUTPATIENT)
Dept: FAMILY MEDICINE | Facility: CLINIC | Age: 74
End: 2019-05-02
Payer: MEDICARE

## 2019-05-02 VITALS
SYSTOLIC BLOOD PRESSURE: 91 MMHG | HEIGHT: 67 IN | DIASTOLIC BLOOD PRESSURE: 61 MMHG | BODY MASS INDEX: 22.91 KG/M2 | WEIGHT: 146 LBS | HEART RATE: 93 BPM
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
## 2019-05-02 DIAGNOSIS — N18.30 CHRONIC KIDNEY DISEASE, STAGE 3 (MODERATE): ICD-10-CM

## 2019-05-02 DIAGNOSIS — R41.0 CONFUSION: ICD-10-CM

## 2019-05-02 DIAGNOSIS — E11.22 TYPE 2 DIABETES MELLITUS WITH STAGE 3 CHRONIC KIDNEY DISEASE, WITHOUT LONG-TERM CURRENT USE OF INSULIN: Primary | ICD-10-CM

## 2019-05-02 DIAGNOSIS — E78.2 MULTIPLE-TYPE HYPERLIPIDEMIA: ICD-10-CM

## 2019-05-02 DIAGNOSIS — R41.3 MEMORY LOSS: ICD-10-CM

## 2019-05-02 DIAGNOSIS — N18.30 TYPE 2 DIABETES MELLITUS WITH STAGE 3 CHRONIC KIDNEY DISEASE, WITHOUT LONG-TERM CURRENT USE OF INSULIN: Primary | ICD-10-CM

## 2019-05-02 DIAGNOSIS — R26.81 UNSTEADY GAIT: ICD-10-CM

## 2019-05-02 DIAGNOSIS — I10 BENIGN ESSENTIAL HYPERTENSION: ICD-10-CM

## 2019-05-02 PROCEDURE — 99215 OFFICE O/P EST HI 40 MIN: CPT | Mod: ,,, | Performed by: INTERNAL MEDICINE

## 2019-05-02 PROCEDURE — 99215 PR OFFICE/OUTPT VISIT, EST, LEVL V, 40-54 MIN: ICD-10-PCS | Mod: ,,, | Performed by: INTERNAL MEDICINE

## 2019-05-02 PROCEDURE — 3044F PR MOST RECENT HEMOGLOBIN A1C LEVEL <7.0%: ICD-10-PCS | Mod: ,,, | Performed by: INTERNAL MEDICINE

## 2019-05-02 PROCEDURE — 3074F SYST BP LT 130 MM HG: CPT | Mod: ,,, | Performed by: INTERNAL MEDICINE

## 2019-05-02 PROCEDURE — 1101F PR PT FALLS ASSESS DOC 0-1 FALLS W/OUT INJ PAST YR: ICD-10-PCS | Mod: ,,, | Performed by: INTERNAL MEDICINE

## 2019-05-02 PROCEDURE — 3044F HG A1C LEVEL LT 7.0%: CPT | Mod: ,,, | Performed by: INTERNAL MEDICINE

## 2019-05-02 PROCEDURE — 3078F PR MOST RECENT DIASTOLIC BLOOD PRESSURE < 80 MM HG: ICD-10-PCS | Mod: ,,, | Performed by: INTERNAL MEDICINE

## 2019-05-02 PROCEDURE — 3078F DIAST BP <80 MM HG: CPT | Mod: ,,, | Performed by: INTERNAL MEDICINE

## 2019-05-02 PROCEDURE — 3074F PR MOST RECENT SYSTOLIC BLOOD PRESSURE < 130 MM HG: ICD-10-PCS | Mod: ,,, | Performed by: INTERNAL MEDICINE

## 2019-05-02 PROCEDURE — 1101F PT FALLS ASSESS-DOCD LE1/YR: CPT | Mod: ,,, | Performed by: INTERNAL MEDICINE

## 2019-05-02 RX ORDER — CALCIUM CITRATE/VITAMIN D3 200MG-6.25
TABLET ORAL
Qty: 100 STRIP | Refills: 3 | Status: SHIPPED | OUTPATIENT
Start: 2019-05-02 | End: 2019-10-03 | Stop reason: SDUPTHER

## 2019-05-02 RX ORDER — METOPROLOL SUCCINATE 50 MG/1
50 TABLET, EXTENDED RELEASE ORAL DAILY
Qty: 90 TABLET | Refills: 1
Start: 2019-05-02 | End: 2020-05-23 | Stop reason: CLARIF

## 2019-05-02 RX ORDER — LANCETS 33 GAUGE
EACH MISCELLANEOUS
Qty: 100 EACH | Refills: 3 | Status: SHIPPED | OUTPATIENT
Start: 2019-05-02 | End: 2020-04-22 | Stop reason: SDUPTHER

## 2019-05-02 NOTE — PATIENT INSTRUCTIONS
Using a Cane  A cane helps you get around on your own. Many different canes are available. The most common type has a single tip. But if you have balance problems, your healthcare provider may recommend that you use a quad (four-point) cane. Always use your cane on the stronger (uninjured or unaffected) side, unless told otherwise. Use the cane on the side opposite your weaker leg.  Walking    1.  · Put all your weight on your stronger leg.  · Find your balance.  · Move the cane and your weaker leg forward.    2.  · Support your weight on both the cane and the affected leg.  · Then step through with your stronger leg.  · Put your weight on the weaker leg and start the next step.    · When using a quad cane, place the cane so that all of the tips touch the ground.       Up stairs and curbs  · If there is a railing, hold on to it with your free hand.  · Step up with your stronger leg first.  · Then move the cane and weaker leg together as a unit.    Down stairs and curbs  · To walk down, step down with your weaker leg and the cane first.  · Then follow with your stronger leg.  Date Last Reviewed: 9/1/2015  © 4816-7792 [employer]. [address] [place] [address], [place] [address]. All rights reserved. This information is not intended as a substitute for professional medical care. Always follow your healthcare professional's instructions.

## 2019-05-02 NOTE — PROGRESS NOTES
Subjective:       Patient ID: Adriana Perez is a 73 y.o. female.    Chief Complaint: Fall; Altered Mental Status; Gait Problem; Diabetes; Hypertension; Hyperlipidemia; and Chronic Kidney Disease    Ms. Adriana Perez is a 73-year-old -American female who is accompanied with her grand daughter for a pre-poned appointment. Pts son Mr. Hai Perez Junior is on the speaker phone to hear the conversation and my interaction with the patient and patient's granddaughter.    It seems approximately last Saturday the patient went to the toilet and lost her balance and fell down. The circumstances are not clear if she was wedged between the toilet seat and the wall but she could not get up due to  Hai Perez Senior found her and helped lift her. Patient does not recall the details and the reason for the fall.    The daughter is concerned about this. After that there has been no fall but she has a somewhat unsteady gait and has difficulty getting up by herself without help. Patient seems to have limited insight about her problems and she cannot give succinct account off as to what is going on.    On asking patient's granddaughter and patient's son on the phone if patient has some cognitive decline or she might be losing her memory, did decline in negative. The feel memory is more or less the same as before but she might have episodes of confusions episodically.    Patient's underlying medical issues of diabetes mellitus type 2, hypertension, dyslipidemia and chronic kidney disease have been noted. She also has history of cough and has been assessed to have COPD. She has a long-standing history of schizoaffective disorder for which she is following up with a psychiatrist and is currently on a combination of amantadine, trazodone and Zyprexa.    Patient's daughter also reports that she has noted that her blood pressures are slightly on the low side. Blood sugars are mostly in mid 100s and occasionally into 100s. On  couple of occasions I did review and the blood sugars were less than 100 and on one occasion approximately 61. Has to if she had any further confusion or lightheadedness or dizziness when her blood sugars were 61 is unclear.    Patient son requests home health in the form of a extender or aid for at least 4-6 on the day. Patient's granddaughter leaves home in the morning and she comes home sometimes in the afternoon and she fixes her medications. Patient's  fixes her medications in the morning. Patient tends to be confused about her medications and it is unclear if she might have doubled up on her medication sometimes or she might have missed her medications. She sometimes forgets that she had had breakfast.    To my best that I can recall, she has not had a head trauma or injury. There is no symptom of bladder or bowel incontinence.    Hypertension   This is a chronic problem. The current episode started more than 1 year ago. Associated symptoms include anxiety. Pertinent negatives include no headaches or shortness of breath. Risk factors for coronary artery disease include sedentary lifestyle and dyslipidemia. There is no history of pheochromocytoma or renovascular disease.   Diabetes   She presents for her follow-up diabetic visit. She has type 2 diabetes mellitus. Her disease course has been stable. Hypoglycemia symptoms include confusion. Pertinent negatives for hypoglycemia include no headaches, nervousness/anxiousness, pallor, seizures, speech difficulty or tremors. Pertinent negatives for diabetes include no fatigue, no foot ulcerations, no polydipsia and no polyphagia. Risk factors for coronary artery disease include post-menopausal and sedentary lifestyle. An ACE inhibitor/angiotensin II receptor blocker is contraindicated. Eye exam is current.   Hyperlipidemia   This is a chronic problem. The current episode started more than 1 year ago. The problem is controlled. She has no history of obesity.  Pertinent negatives include no shortness of breath. Current antihyperlipidemic treatment includes statins. Risk factors for coronary artery disease include post-menopausal, hypertension, dyslipidemia and diabetes mellitus.   Fall   The accident occurred 5 to 7 days ago (Last saturday). The fall occurred while standing. She fell from a height of 1 to 2 ft. She landed on hard floor. There was no blood loss. Pertinent negatives include no headaches, hematuria or vomiting.       Past Medical History:   Diagnosis Date    Acid reflux     Allergy     lisinopril    Anxiety     Bilateral renal cysts 2018    Renal USG by Dr Foy    CKD (chronic kidney disease), stage III     Depression     Diabetes mellitus     Diabetes mellitus, type 2     Disorder of kidney and ureter     Echogenic kidneys on renal ultrasound 2018    Dr Lance Foy- Also Renal cysts bilateral    Hyperlipidemia     Hypertension     MVP (mitral valve prolapse)     Primary osteoarthritis of left knee     Restless leg     Schizophrenia, simple, chronic     Urinary, incontinence, stress female      Social History     Socioeconomic History    Marital status:      Spouse name: Willy Perez    Number of children: 2    Years of education: Not on file    Highest education level: Not on file   Occupational History    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     Comment: [employer]   Social Needs    Financial resource strain: Not on file    Food insecurity:     Worry: Not on file     Inability: Not on file    Transportation needs:     Medical: Not on file     Non-medical: Not on file   Tobacco Use    Smoking status: Former Smoker     Packs/day: 1.00     Years: 5.00     Pack years: 5.00     Types: Cigarettes     Last attempt to quit: 1992     Years since quittin.6    Smokeless tobacco: Never Used   Substance and Sexual Activity    Alcohol use: No    Drug use: No    Sexual activity: Not Currently      Partners: Male   Lifestyle    Physical activity:     Days per week: Not on file     Minutes per session: Not on file    Stress: Not on file   Relationships    Social connections:     Talks on phone: Not on file     Gets together: Not on file     Attends Tenriism service: Not on file     Active member of club or organization: Not on file     Attends meetings of clubs or organizations: Not on file     Relationship status: Not on file   Other Topics Concern    Not on file   Social History Narrative    Not on file     Past Surgical History:   Procedure Laterality Date    FOOT SURGERY      HYSTERECTOMY      TUBAL LIGATION       Family History   Problem Relation Age of Onset    Sudden death Father     Cancer Mother     Hypertension Mother     Cancer Sister        Review of Systems   Constitutional: Positive for unexpected weight change (Lost 10 lbs.gained 2-3 lbs). Negative for activity change, chills and fatigue.   HENT: Negative for congestion, postnasal drip and sinus pressure.    Eyes: Negative for pain, discharge and visual disturbance.   Respiratory: Negative for cough, chest tightness and shortness of breath.         Patient has been recently diagnosed with asthmatic bronchitis and has been followed with pulmonary. Her symptoms of cough have abated at this point. She is using Symbicort inhaler and Singulair.   Cardiovascular: Negative for leg swelling.        HTN. Lipids   Gastrointestinal: Negative for abdominal distention, anal bleeding, constipation, diarrhea and vomiting.   Endocrine: Negative for polydipsia and polyphagia.        Diabetes mellitus type 2. Chronic kidney disease stage III.   Genitourinary: Negative for difficulty urinating and hematuria.   Musculoskeletal: Positive for arthralgias and gait problem. Negative for joint swelling.        Recent fall   Skin: Negative for color change, pallor and rash.   Allergic/Immunologic: Negative for environmental allergies, food allergies and  "immunocompromised state.   Neurological: Negative for tremors, seizures, syncope, facial asymmetry, speech difficulty, light-headedness and headaches.        Fall   Hematological: Negative for adenopathy. Does not bruise/bleed easily.   Psychiatric/Behavioral: Positive for behavioral problems and confusion. Negative for agitation and dysphoric mood. The patient is not nervous/anxious.         Patient's has history of schizoaffective disorder. This seems to be stable. Some confusion of late. History of tendency to falling down. Cognitive issues.         Objective:      Blood pressure 91/61, pulse 93, height 5' 7" (1.702 m), weight 66.2 kg (146 lb). Body mass index is 22.87 kg/m².  Physical Exam   Constitutional: She appears well-developed and well-nourished. She is cooperative. No distress.   HENT:   Head: Normocephalic and atraumatic.   Right Ear: Decreased hearing is noted.   Left Ear: Decreased hearing is noted.   Eyes: Conjunctivae, EOM and lids are normal. Lids are everted and swept, no foreign bodies found. Right pupil is round and reactive. Left pupil is round and reactive.   Neck: Trachea normal and normal range of motion. Neck supple.   Cardiovascular: Normal rate, regular rhythm, S1 normal, S2 normal and normal heart sounds.   Pulmonary/Chest: Breath sounds normal.   Abdominal: Soft. Bowel sounds are normal. There is no rigidity and no guarding.   Musculoskeletal: She exhibits no deformity.        Right foot: There is no deformity.        Left foot: There is no deformity.   Patient has somewhat unsteady gait. She tends to stoop forward while walking. Minimal hypertonia. Nothing remarkable. Minimal resting tremors. Her facial expression is flat.   Feet:   Right Foot:   Protective Sensation: 5 sites tested. 5 sites sensed.   Skin Integrity: Negative for ulcer or blister.   Left Foot:   Protective Sensation: 5 sites tested. 5 sites sensed.   Skin Integrity: Negative for ulcer or blister.   Lymphadenopathy:     " She has no cervical adenopathy.     She has no axillary adenopathy.   Neurological: She is alert. She displays atrophy. She displays no tremor and normal reflexes. No sensory deficit. Gait abnormal. Coordination normal.       Slightly wobbly trying to climb  1 step stool   Skin: Skin is warm and dry.   Psychiatric: Her affect is blunt. Her speech is delayed. She is slowed. She is not agitated. Cognition and memory are impaired.   On asking patient name 50 for medical conditions and 5 medications she was unable to do so. She states that she does not read the name of her medications. She stated today as april 30th 2019. She stated today as Wednesday. (Correct date Thursday, 05/02/2019)   Nursing note and vitals reviewed.        Assessment:       1. Type 2 diabetes mellitus with stage 3 chronic kidney disease, without long-term current use of insulin    2. Fall, initial encounter    3. Unsteady gait    4. Benign essential hypertension    5. Multiple-type hyperlipidemia    6. Chronic kidney disease, stage 3 (moderate)    7. Memory loss    8. Confusion           No visits with results within 3 Month(s) from this visit.   Latest known visit with results is:   Orders Only on 02/01/2019   Component Date Value Ref Range Status    Glucose 02/01/2019 172* 70 - 99 mg/dL Final    BUN, Bld 02/01/2019 27* 8 - 20 mg/dL Final    Creatinine 02/01/2019 2.08* 0.60 - 1.40 mg/dL Final    Calcium 02/01/2019 10.2  7.7 - 10.4 mg/dL Final    Sodium 02/01/2019 138  134 - 144 mmol/L Final    Potassium 02/01/2019 4.0  3.5 - 5.0 mmol/L Final    Chloride 02/01/2019 99  98 - 110 mmol/L Final    CO2 02/01/2019 27.4  22.8 - 31.6 mmol/L Final    Albumin 02/01/2019 4.1  3.1 - 4.7 g/dL Final    Total Bilirubin 02/01/2019 0.8  0.3 - 1.0 mg/dL Final    Alkaline Phosphatase 02/01/2019 90  40 - 104 IU/L Final    Total Protein 02/01/2019 7.3  6.0 - 8.2 g/dL Final    ALT (SGPT) 02/01/2019 17  3 - 33 IU/L Final    AST 02/01/2019 19  10 - 40 IU/L  Final    Hemoglobin A1C 02/01/2019 6.1  3.1 - 6.5 % Final         Plan:           Type 2 diabetes mellitus with stage 3 chronic kidney disease, without long-term current use of insulin    Fall, initial encounter  -     WALKER FOR HOME USE  -     Ambulatory referral to Home Health    Unsteady gait  -     WALKER FOR HOME USE  -     Ambulatory referral to Neurology  -     Ambulatory referral to Home Health    Benign essential hypertension  -     metoprolol succinate (TOPROL-XL) 50 MG 24 hr tablet; Take 1 tablet (50 mg total) by mouth once daily.  Dispense: 90 tablet; Refill: 1    Multiple-type hyperlipidemia    Chronic kidney disease, stage 3 (moderate)  -     Ambulatory referral to Home Health    Memory loss  -     Ambulatory referral to Neurology  -     Ambulatory referral to Home Health    Confusion  -     Ambulatory referral to Neurology  -     Ambulatory referral to Home Health    Patient's new onset of fall, memory issues, gait instability and some features of Parkinson's have been noted.    First order of business would be to ensure fall precautions.    I'm not sure how I can procure home aide and help without cost for her.    I'll start her with a home health agency. Letter sent to [facility] health agency and also consideration for physical therapy.    Provide a walker for gait stability.    I will discontinue amlodipine and reduce metoprolol to be milligrams per day. Continue to monitor blood sugars. I'm more concerned about hypoglycemia rather than hyperglycemia.    Most important thing will be to get a neurology referral in view off increasing confusion, cognitive issues, gait instability and suspicion for Parkinson's.    I do note that patient is on amantadine which has been prescribed given her schizoaffective disorder and which is actually sometimes use for Parkinson's but I'm not sure if it as to some of her confusion and cognitive changes.    Patient also has underlying chronic kidney disease for which  she is following up with nephrology.    I spent about 45 minutes with patient and patient daughter and discussion with patient's son on the phone and review of past medical records and plans for future action. 50% of the time was spent in face-to-face conversation.    Follow-up in 3 weeks' time to reassess interview situation.          Current Outpatient Medications:     albuterol (PROVENTIL/VENTOLIN HFA) 90 mcg/actuation inhaler, Inhale 2 puffs into the lungs every 6 (six) hours as needed for Wheezing. Rescue, Disp: 1 Inhaler, Rfl: 4    amantadine HCl (SYMMETREL) 100 mg capsule, Take 100 mg by mouth 2 (two) times daily., Disp: , Rfl:     ammonium lactate 12 % Crea, Apply topically., Disp: , Rfl:     aspirin (ECOTRIN) 81 MG EC tablet, Take 81 mg by mouth once daily., Disp: , Rfl:     budesonide-formoterol 160-4.5 mcg (SYMBICORT) 160-4.5 mcg/actuation HFAA, Inhale 2 puffs into the lungs every 12 (twelve) hours. Controller, Disp: , Rfl:     calcitRIOL (ROCALTROL) 0.25 MCG Cap, Take 0.25 mcg by mouth every Mon, Wed, Fri., Disp: , Rfl:     glimepiride (AMARYL) 2 MG tablet, TAKE 1 TABLET TWICE DAILY, Disp: 180 tablet, Rfl: 3    ipratropium (ATROVENT) 42 mcg (0.06 %) nasal spray, 2 sprays by Nasal route 3 (three) times daily., Disp: 15 mL, Rfl: 2    metoprolol succinate (TOPROL-XL) 50 MG 24 hr tablet, Take 1 tablet (50 mg total) by mouth once daily., Disp: 90 tablet, Rfl: 1    montelukast (SINGULAIR) 10 mg tablet, TK 1 T PO QHS, Disp: , Rfl: 2    olanzapine (ZYPREXA) 10 MG tablet, Take 10 mg by mouth every evening., Disp: , Rfl:     pantoprazole (PROTONIX) 40 MG tablet, Take 1 tablet (40 mg total) by mouth once daily., Disp: 90 tablet, Rfl: 3    potassium chloride SA (K-DUR,KLOR-CON) 20 MEQ tablet, TAKE 1 TABLET EVERY DAY, Disp: 90 tablet, Rfl: 3    pravastatin (PRAVACHOL) 40 MG tablet, Take 1 tablet (40 mg total) by mouth nightly., Disp: 90 tablet, Rfl: 3    SITagliptin (JANUVIA) 50 MG Tab, TAKE 1 TABLET  EVERY DAY, Disp: 30 tablet, Rfl: 11    trazodone (DESYREL) 100 MG tablet, Take 100 mg by mouth nightly as needed., Disp: , Rfl:     TRUE METRIX GLUCOSE TEST STRIP Strp, TEST BLOOD SUGAR EVERY DAY, Disp: 100 strip, Rfl: 3    TRUEPLUS LANCETS 33 gauge Misc, TEST  DAILY, Disp: 100 each, Rfl: 3

## 2019-05-04 ENCOUNTER — OUTSIDE PLACE OF SERVICE (OUTPATIENT)
Dept: FAMILY MEDICINE | Facility: CLINIC | Age: 74
End: 2019-05-04
Payer: MEDICARE

## 2019-05-04 PROCEDURE — G0180 PR HOME HEALTH MD CERTIFICATION: ICD-10-PCS | Mod: ,,, | Performed by: INTERNAL MEDICINE

## 2019-05-04 PROCEDURE — G0180 MD CERTIFICATION HHA PATIENT: HCPCS | Mod: ,,, | Performed by: INTERNAL MEDICINE

## 2019-05-09 ENCOUNTER — OFFICE VISIT (OUTPATIENT)
Dept: PODIATRY | Facility: CLINIC | Age: 74
End: 2019-05-09
Payer: MEDICARE

## 2019-05-09 VITALS
DIASTOLIC BLOOD PRESSURE: 80 MMHG | SYSTOLIC BLOOD PRESSURE: 120 MMHG | HEART RATE: 69 BPM | HEIGHT: 67 IN | WEIGHT: 146 LBS | BODY MASS INDEX: 22.91 KG/M2 | RESPIRATION RATE: 18 BRPM

## 2019-05-09 DIAGNOSIS — B35.1 ONYCHOMYCOSIS DUE TO DERMATOPHYTE: ICD-10-CM

## 2019-05-09 DIAGNOSIS — E11.9 DIABETES MELLITUS WITHOUT COMPLICATION: Primary | ICD-10-CM

## 2019-05-09 DIAGNOSIS — E11.42 DIABETIC POLYNEUROPATHY ASSOCIATED WITH TYPE 2 DIABETES MELLITUS: ICD-10-CM

## 2019-05-09 PROCEDURE — 99499 UNLISTED E&M SERVICE: CPT | Mod: CSM,,, | Performed by: PODIATRIST

## 2019-05-09 PROCEDURE — 17999 UNLISTD PX SKN MUC MEMB SUBQ: CPT | Mod: CSM,,, | Performed by: PODIATRIST

## 2019-05-09 PROCEDURE — 17999 PR NON-COVERED FOOT CARE: ICD-10-PCS | Mod: CSM,,, | Performed by: PODIATRIST

## 2019-05-09 PROCEDURE — 99499 NO LOS: ICD-10-PCS | Mod: CSM,,, | Performed by: PODIATRIST

## 2019-05-09 NOTE — PROGRESS NOTES
[address] [facility] [address]  [place] [address]  Phone: [phone]   [phone]    Patient's PCP:Aiden Koch MD  Referring Provider: No ref. provider found    Subjective:      Chief Complaint:: Nail Care (ACODE)    HPI  Adriana Perez is a 73 y.o. female who presents with a complaint of  Long painful toenails , need to be trimmed lasting for months.Treatment to date have included periodic debridement. Patients rates pain 0/10 on pain scale.    Systemic Doctor: Aiden Koch MD  Date Last Seen: 03/2019  Blood Sugar: 141  Hemoglobin A1c: 6.1    Vitals:    05/09/19 1058   BP: 120/80   Pulse: 69   Resp: 18     Shoe Size: 9    Past Surgical History:   Procedure Laterality Date    FOOT SURGERY      HYSTERECTOMY      TUBAL LIGATION       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/2018    Dr Lance Foy- Also Renal cysts bilateral    Hyperlipidemia     Hypertension     MVP (mitral valve prolapse)     Primary osteoarthritis of left knee     Restless leg     Schizophrenia, simple, chronic     Urinary, incontinence, stress female      Family History   Problem Relation Age of Onset    Sudden death Father     Cancer Mother     Hypertension Mother     Cancer Sister         Social History:   Marital Status:   Alcohol History:  reports that she does not drink alcohol.  Tobacco History:  reports that she quit smoking about 26 years ago. Her smoking use included cigarettes. She has a 5.00 pack-year smoking history. She has never used smokeless tobacco.  Drug History:  reports that she does not use drugs.    Review of patient's allergies indicates:   Allergen Reactions    Lisinopril Nausea And  Vomiting       Current Outpatient Medications   Medication Sig Dispense Refill    albuterol (PROVENTIL/VENTOLIN HFA) 90 mcg/actuation inhaler Inhale 2 puffs into the lungs every 6 (six) hours as needed for Wheezing. Rescue 1 Inhaler 4    amantadine HCl (SYMMETREL) 100 mg capsule Take 100 mg by mouth 2 (two) times daily.      ammonium lactate 12 % Crea Apply topically.      aspirin (ECOTRIN) 81 MG EC tablet Take 81 mg by mouth once daily.      budesonide-formoterol 160-4.5 mcg (SYMBICORT) 160-4.5 mcg/actuation HFAA Inhale 2 puffs into the lungs every 12 (twelve) hours. Controller      calcitRIOL (ROCALTROL) 0.25 MCG Cap Take 0.25 mcg by mouth every Mon, Wed, Fri.      glimepiride (AMARYL) 2 MG tablet TAKE 1 TABLET TWICE DAILY 180 tablet 3    ipratropium (ATROVENT) 42 mcg (0.06 %) nasal spray 2 sprays by Nasal route 3 (three) times daily. 15 mL 2    metoprolol succinate (TOPROL-XL) 50 MG 24 hr tablet Take 1 tablet (50 mg total) by mouth once daily. 90 tablet 1    montelukast (SINGULAIR) 10 mg tablet TK 1 T PO QHS  2    olanzapine (ZYPREXA) 10 MG tablet Take 10 mg by mouth every evening.      pantoprazole (PROTONIX) 40 MG tablet Take 1 tablet (40 mg total) by mouth once daily. 90 tablet 3    potassium chloride SA (K-DUR,KLOR-CON) 20 MEQ tablet TAKE 1 TABLET EVERY DAY 90 tablet 3    pravastatin (PRAVACHOL) 40 MG tablet Take 1 tablet (40 mg total) by mouth nightly. 90 tablet 3    SITagliptin (JANUVIA) 50 MG Tab TAKE 1 TABLET EVERY DAY 30 tablet 11    trazodone (DESYREL) 100 MG tablet Take 100 mg by mouth nightly as needed.      TRUE METRIX GLUCOSE TEST STRIP Strp TEST BLOOD SUGAR EVERY  strip 3    TRUEPLUS LANCETS 33 gauge Misc TEST  DAILY 100 each 3     No current facility-administered medications for this visit.        Review of Systems   Constitutional: Negative for chills, fatigue, fever and unexpected weight change.   HENT: Positive for hearing loss. Negative for trouble swallowing.    Eyes:  Negative for photophobia and visual disturbance.   Respiratory: Negative for cough, shortness of breath and wheezing.    Cardiovascular: Negative for chest pain, palpitations and leg swelling.   Gastrointestinal: Negative for abdominal pain and nausea.   Genitourinary: Positive for frequency. Negative for dysuria.   Musculoskeletal: Negative for arthralgias, back pain and joint swelling.   Skin: Negative for rash.   Neurological: Positive for weakness and numbness. Negative for tremors, seizures and headaches.   Hematological: Bruises/bleeds easily.         Objective:        Physical Exam:   Foot Exam    General  General Appearance: appears stated age and healthy   Orientation: alert and oriented to person, place, and time   Affect: appropriate   Gait: unimpaired       Right Foot/Ankle     Neurovascular  Dorsalis pedis: absent  Posterior tibial: absent  Saphenous nerve sensation: diminished  Tibial nerve sensation: diminished  Superficial peroneal nerve sensation: diminished  Deep peroneal nerve sensation: diminished  Sural nerve sensation: diminished      Left Foot/Ankle      Neurovascular  Dorsalis pedis: absent  Posterior tibial: absent  Saphenous nerve sensation: diminished  Tibial nerve sensation: diminished  Superficial peroneal nerve sensation: diminished  Deep peroneal nerve sensation: diminished  Sural nerve sensation: diminished          Physical Exam   Cardiovascular:   Pulses:       Dorsalis pedis pulses are Absent on the right side, and Absent on the left side.        Posterior tibial pulses are Absent on the right side, and Absent on the left side.       Imaging:            Assessment:       1. Diabetes mellitus without complication    2. Onychomycosis due to dermatophyte    3. Diabetic polyneuropathy associated with type 2 diabetes mellitus      Plan:   Diabetes mellitus without complication    Onychomycosis due to dermatophyte    Diabetic polyneuropathy associated with type 2 diabetes  mellitus    Utilizing sterile nail nippers and electric , I aggressively debrided nails 1-5 bilaterally down to nail beds to thin the nail plates. Pt. tolerated well. No blood was drawn.  Follow up if symptoms worsen or fail to improve, for ACODE.    Procedures - None    Counseling:     I provided patient education verbally regarding:   Patient diagnosis, treatment options, as well as alternatives, risks, and benefits.     This note was created using Dragon voice recognition software that occasionally misinterpreted phrases or words.

## 2019-05-10 ENCOUNTER — TELEPHONE (OUTPATIENT)
Dept: FAMILY MEDICINE | Facility: CLINIC | Age: 74
End: 2019-05-10

## 2019-05-10 NOTE — TELEPHONE ENCOUNTER
I had a phone call discussion with patient's son and patient herself.    Patient now has a home health visiting her and she was going to physical therapy. Overall she seems to be steady with no recurrence of falls.    She does have a neurology appointment a couple of weeks to address the following issues.    #1) does she have Parkinson's? Based upon unsteady gait and a rigid expression.    #2) are any of the psychotropic medications including Zyprexa, Amantidine and trazodone affecting her cognition and balance.    #3) does she have dementia associated with Parkinson's or independently.    Discussed about driving privileges also now. Living will issues, power of  issues also to be discussed with her and family members.    Pt son Hai Perez jr- [phone]

## 2019-05-13 RX ORDER — PRAVASTATIN SODIUM 40 MG/1
40 TABLET ORAL NIGHTLY
Qty: 90 TABLET | Refills: 3 | Status: SHIPPED | OUTPATIENT
Start: 2019-05-13 | End: 2019-05-14 | Stop reason: SDUPTHER

## 2019-05-13 RX ORDER — PRAVASTATIN SODIUM 40 MG/1
40 TABLET ORAL NIGHTLY
Qty: 90 TABLET | Refills: 3 | OUTPATIENT
Start: 2019-05-13

## 2019-05-13 RX ORDER — PANTOPRAZOLE SODIUM 40 MG/1
40 TABLET, DELAYED RELEASE ORAL DAILY
Qty: 90 TABLET | Refills: 3 | Status: SHIPPED | OUTPATIENT
Start: 2019-05-13 | End: 2019-05-14 | Stop reason: SDUPTHER

## 2019-05-13 RX ORDER — PANTOPRAZOLE SODIUM 40 MG/1
40 TABLET, DELAYED RELEASE ORAL DAILY
Qty: 90 TABLET | Refills: 1 | OUTPATIENT
Start: 2019-05-13

## 2019-05-14 RX ORDER — PANTOPRAZOLE SODIUM 40 MG/1
40 TABLET, DELAYED RELEASE ORAL DAILY
Qty: 90 TABLET | Refills: 3 | Status: SHIPPED | OUTPATIENT
Start: 2019-05-14 | End: 2020-04-20 | Stop reason: SDUPTHER

## 2019-05-14 RX ORDER — PRAVASTATIN SODIUM 40 MG/1
40 TABLET ORAL NIGHTLY
Qty: 90 TABLET | Refills: 3 | Status: SHIPPED | OUTPATIENT
Start: 2019-05-14 | End: 2020-04-15

## 2019-05-16 ENCOUNTER — OFFICE VISIT (OUTPATIENT)
Dept: PULMONOLOGY | Facility: CLINIC | Age: 74
End: 2019-05-16
Payer: MEDICARE

## 2019-05-16 VITALS
SYSTOLIC BLOOD PRESSURE: 120 MMHG | HEART RATE: 104 BPM | DIASTOLIC BLOOD PRESSURE: 75 MMHG | OXYGEN SATURATION: 97 % | HEIGHT: 67 IN | WEIGHT: 145 LBS | BODY MASS INDEX: 22.76 KG/M2

## 2019-05-16 DIAGNOSIS — J45.40 MODERATE PERSISTENT ASTHMA WITHOUT COMPLICATION: Primary | ICD-10-CM

## 2019-05-16 PROCEDURE — 99214 OFFICE O/P EST MOD 30 MIN: CPT | Mod: ,,, | Performed by: INTERNAL MEDICINE

## 2019-05-16 PROCEDURE — 3078F DIAST BP <80 MM HG: CPT | Mod: ,,, | Performed by: INTERNAL MEDICINE

## 2019-05-16 PROCEDURE — 1101F PR PT FALLS ASSESS DOC 0-1 FALLS W/OUT INJ PAST YR: ICD-10-PCS | Mod: ,,, | Performed by: INTERNAL MEDICINE

## 2019-05-16 PROCEDURE — 3074F SYST BP LT 130 MM HG: CPT | Mod: ,,, | Performed by: INTERNAL MEDICINE

## 2019-05-16 PROCEDURE — 3078F PR MOST RECENT DIASTOLIC BLOOD PRESSURE < 80 MM HG: ICD-10-PCS | Mod: ,,, | Performed by: INTERNAL MEDICINE

## 2019-05-16 PROCEDURE — 3074F PR MOST RECENT SYSTOLIC BLOOD PRESSURE < 130 MM HG: ICD-10-PCS | Mod: ,,, | Performed by: INTERNAL MEDICINE

## 2019-05-16 PROCEDURE — 1101F PT FALLS ASSESS-DOCD LE1/YR: CPT | Mod: ,,, | Performed by: INTERNAL MEDICINE

## 2019-05-16 PROCEDURE — 99214 PR OFFICE/OUTPT VISIT, EST, LEVL IV, 30-39 MIN: ICD-10-PCS | Mod: ,,, | Performed by: INTERNAL MEDICINE

## 2019-05-16 NOTE — PATIENT INSTRUCTIONS
Controlling Asthma Triggers: Irritants  Irritants are things in the air that can trigger symptoms in some people with asthma or COPD. Below are some common irritants. Some are tiny particles and others are dissolved in the air. You will also find tips to help you stay away from them.     Wear a mask when working around fine particles, like dust or residue from sanding.   Smoke  This is from cigarettes, cigars, pipes, barbecues, outdoor campfires, and fireplaces.  · Dont smoke. And dont let people smoke in your home or car.  · When you travel, ask for rental cars and hotel rooms that are smoke-free.  · Stay away from fireplaces and wood stoves. If you cant, sit away from them. Make sure the smoke goes outside.  · Dont burn incense or use candles.  · Move away from smoky outdoor cooking grills.  Smog  This is from car exhaust and other pollution.  · Read or listen to local air quality reports. These let you know when air quality is poor.  · Stay indoors as much as you can on smoggy days. If possible, use air conditioning instead of opening the windows. Air conditioners filter the air. The filter needs to be cleaned regularly.  · In your car, set air conditioning to use air only inside the car. This will let in less pollution.  Strong odors  These are from air fresheners, deodorizers, and cleaning products. They are also from perfumes, deodorants, and other beauty products. Strong odors also come from incense and candles, and insect sprays and other sprays. The chlorine in swimming pools can affect some people.  · Use products with no scent. An example is scent-free deodorant or body lotion.  · Do not use products with bleach and ammonia for cleaning. Try making a cleaning solution with white vinegar, baking soda, or mild dish soap.  · Use exhaust fans while cooking. Or open a window if you can.  · Do not use perfumes, air fresheners, potpourri, and other scented products.  Other irritants  These are dust,  aerosol sprays, and fine powders.  · Wear a mask while doing tasks like sanding, dusting, sweeping, and yardwork. Make sure any indoor work area is well-ventilated. Open doors and windows when doing these tasks.  · Use pump spray bottles instead of aerosols.  · Pour liquid  instead of spraying them. For example, instead of spraying a window with , pour some on a rag or cloth.  If you have a quick-relief inhaler, carry it with you at all times. If you cant avoid an area with irritants, watch for symptoms. If you have symptoms, leave the area and use your quick-relief inhaler as directed.   Date Last Reviewed: 10/1/2016  © 1409-3175 [employer]. [address] [place] [address], [place] [address]. All rights reserved. This information is not intended as a substitute for professional medical care. Always follow your healthcare professional's instructions.      Stay on your Symbicort 2 puffs twice a day, keep rinsing after you use it  Stay on your Protonix and Singulair by mouth every day  Call if you get sick

## 2019-05-16 NOTE — PROGRESS NOTES
SUBJECTIVE:    Patient ID: Adriana Perez is a 73 y.o. female.    Chief Complaint: Asthma (follow up 6 mo)    HPI   Patient here today feeling well as far as her breathing goes.  She is using Symbicort, Singulair and Protonix.  She is having a hard time understanding how to do peak flow this morning after multiple attempts and coaching.  She has had more weight loss, states she had no appetite.  She is eating more now. She was falling a lot at home as well.  She has home health coming to the house now.  She is going see a neurologist to be evaluated for Parkinson like symptoms.   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/2018    Dr Lance Foy- Also Renal cysts bilateral    Hyperlipidemia     Hypertension     MVP (mitral valve prolapse)     Primary osteoarthritis of left knee     Restless leg     Schizophrenia, simple, chronic     Urinary, incontinence, stress female      Past Surgical History:   Procedure Laterality Date    FOOT SURGERY      HYSTERECTOMY      TUBAL LIGATION       Family History   Problem Relation Age of Onset    Sudden death Father     Cancer Mother     Hypertension Mother     Cancer Sister         Social History:   Marital Status:   Occupation: retired teacher  Alcohol History:  reports that she does not drink alcohol.  Tobacco History:  reports that she quit smoking about 26 years ago. Her smoking use included cigarettes. She has a 5.00 pack-year smoking history. She has never used smokeless tobacco.  Drug History:  reports that she does not use drugs.    Review of patient's allergies indicates:   Allergen Reactions    Lisinopril Nausea And Vomiting       Current  Outpatient Medications   Medication Sig Dispense Refill    albuterol (PROVENTIL/VENTOLIN HFA) 90 mcg/actuation inhaler Inhale 2 puffs into the lungs every 6 (six) hours as needed for Wheezing. Rescue 1 Inhaler 4    amantadine HCl (SYMMETREL) 100 mg capsule Take 100 mg by mouth 2 (two) times daily.      ammonium lactate 12 % Crea Apply topically.      aspirin (ECOTRIN) 81 MG EC tablet Take 81 mg by mouth once daily.      budesonide-formoterol 160-4.5 mcg (SYMBICORT) 160-4.5 mcg/actuation HFAA Inhale 2 puffs into the lungs every 12 (twelve) hours. Controller      calcitRIOL (ROCALTROL) 0.25 MCG Cap Take 0.25 mcg by mouth every Mon, Wed, Fri.      glimepiride (AMARYL) 2 MG tablet TAKE 1 TABLET TWICE DAILY 180 tablet 3    ipratropium (ATROVENT) 42 mcg (0.06 %) nasal spray 2 sprays by Nasal route 3 (three) times daily. 15 mL 2    metoprolol succinate (TOPROL-XL) 50 MG 24 hr tablet Take 1 tablet (50 mg total) by mouth once daily. 90 tablet 1    montelukast (SINGULAIR) 10 mg tablet TK 1 T PO QHS  2    olanzapine (ZYPREXA) 10 MG tablet Take 10 mg by mouth every evening.      pantoprazole (PROTONIX) 40 MG tablet Take 1 tablet (40 mg total) by mouth once daily. 90 tablet 3    potassium chloride SA (K-DUR,KLOR-CON) 20 MEQ tablet TAKE 1 TABLET EVERY DAY 90 tablet 3    pravastatin (PRAVACHOL) 40 MG tablet Take 1 tablet (40 mg total) by mouth nightly. 90 tablet 3    SITagliptin (JANUVIA) 50 MG Tab TAKE 1 TABLET EVERY DAY 30 tablet 11    trazodone (DESYREL) 100 MG tablet Take 100 mg by mouth nightly as needed.      TRUE METRIX GLUCOSE TEST STRIP Strp TEST BLOOD SUGAR EVERY  strip 3    TRUEPLUS LANCETS 33 gauge Misc TEST  DAILY 100 each 3     No current facility-administered medications for this visit.        Last PFT: 06/25/2018  Last Chest xray 04/12/2018    Review of Systems  General: Feeling Well.  Eyes: Vision is good.  ENT:  Sinus drip   Heart:: No chest pain or palpitations.  Lungs: breathing is much  "better no longer coughing   GI: reflux.  : No dysuria, hesitancy, or nocturia.  Musculoskeletal:  has had falls recently   Skin: No lesions or rashes.  Neuro: forgetful, tremor   Lymph: No edema or adenopathy.  Psych: anxiety  Endo: weight loss     OBJECTIVE:      /75 (BP Location: Left arm, Patient Position: Sitting)   Pulse 104   Ht 5' 7" (1.702 m)   Wt 65.8 kg (145 lb)   SpO2 97%   BMI 22.71 kg/m²     Physical Exam  GENERAL: Older patient in no distress.  HEENT: Pupils equal and reactive. Extraocular movements intact. Nose intact.      Pharynx moist.  NECK: Supple.   HEART: Regular rate and rhythm. No murmur or gallop auscultated.  LUNGS: Clear to auscultation and percussion. Lung excursion symmetrical. No change in fremitus. No adventitial noises.  ABDOMEN: Bowel sounds present. Non-tender, no masses palpated.  EXTREMITIES: Normal muscle tone and joint movement, no cyanosis or clubbing.   LYMPHATICS: No adenopathy palpated, no edema.  SKIN: Dry, intact, no lesions.   NEURO: Cranial nerves II-XII intact. Motor strength 5/5 bilaterally, upper and lower extremities.  PSYCH: Appropriate affect.    Jany Fu NP served in the capacity as a "scribe" for this patient encounter.  A "face to face" encounter occurred with Dr. Faria on this date  The treatment plan and medical decision making is outlined below:       Assessment:       1. Moderate persistent asthma without complication          Plan:       Moderate persistent asthma without complication       Stay on your Symbicort 2 puffs twice a day, keep rinsing after you use it  Stay on your Protonix and Singulair by mouth every day  Call if you get sick       Follow up in about 6 months (around 11/16/2019).        "

## 2019-05-29 ENCOUNTER — OFFICE VISIT (OUTPATIENT)
Dept: FAMILY MEDICINE | Facility: CLINIC | Age: 74
End: 2019-05-29
Payer: MEDICARE

## 2019-05-29 VITALS
WEIGHT: 142 LBS | SYSTOLIC BLOOD PRESSURE: 135 MMHG | HEART RATE: 95 BPM | DIASTOLIC BLOOD PRESSURE: 67 MMHG | BODY MASS INDEX: 22.29 KG/M2 | HEIGHT: 67 IN

## 2019-05-29 DIAGNOSIS — E78.2 MULTIPLE-TYPE HYPERLIPIDEMIA: ICD-10-CM

## 2019-05-29 DIAGNOSIS — R26.81 UNSTEADY GAIT: ICD-10-CM

## 2019-05-29 DIAGNOSIS — I10 BENIGN ESSENTIAL HYPERTENSION: Primary | ICD-10-CM

## 2019-05-29 DIAGNOSIS — E11.22 TYPE 2 DIABETES MELLITUS WITH STAGE 3 CHRONIC KIDNEY DISEASE, WITHOUT LONG-TERM CURRENT USE OF INSULIN: ICD-10-CM

## 2019-05-29 DIAGNOSIS — N18.30 TYPE 2 DIABETES MELLITUS WITH STAGE 3 CHRONIC KIDNEY DISEASE, WITHOUT LONG-TERM CURRENT USE OF INSULIN: ICD-10-CM

## 2019-05-29 DIAGNOSIS — N18.30 CHRONIC KIDNEY DISEASE, STAGE 3 (MODERATE): ICD-10-CM

## 2019-05-29 PROCEDURE — 3075F PR MOST RECENT SYSTOLIC BLOOD PRESS GE 130-139MM HG: ICD-10-PCS | Mod: ,,, | Performed by: INTERNAL MEDICINE

## 2019-05-29 PROCEDURE — 3044F PR MOST RECENT HEMOGLOBIN A1C LEVEL <7.0%: ICD-10-PCS | Mod: ,,, | Performed by: INTERNAL MEDICINE

## 2019-05-29 PROCEDURE — 3044F HG A1C LEVEL LT 7.0%: CPT | Mod: ,,, | Performed by: INTERNAL MEDICINE

## 2019-05-29 PROCEDURE — 1101F PR PT FALLS ASSESS DOC 0-1 FALLS W/OUT INJ PAST YR: ICD-10-PCS | Mod: ,,, | Performed by: INTERNAL MEDICINE

## 2019-05-29 PROCEDURE — 99214 OFFICE O/P EST MOD 30 MIN: CPT | Mod: ,,, | Performed by: INTERNAL MEDICINE

## 2019-05-29 PROCEDURE — 1101F PT FALLS ASSESS-DOCD LE1/YR: CPT | Mod: ,,, | Performed by: INTERNAL MEDICINE

## 2019-05-29 PROCEDURE — 3078F PR MOST RECENT DIASTOLIC BLOOD PRESSURE < 80 MM HG: ICD-10-PCS | Mod: ,,, | Performed by: INTERNAL MEDICINE

## 2019-05-29 PROCEDURE — 3075F SYST BP GE 130 - 139MM HG: CPT | Mod: ,,, | Performed by: INTERNAL MEDICINE

## 2019-05-29 PROCEDURE — 3078F DIAST BP <80 MM HG: CPT | Mod: ,,, | Performed by: INTERNAL MEDICINE

## 2019-05-29 PROCEDURE — 99214 PR OFFICE/OUTPT VISIT, EST, LEVL IV, 30-39 MIN: ICD-10-PCS | Mod: ,,, | Performed by: INTERNAL MEDICINE

## 2019-05-29 RX ORDER — CALCITRIOL 0.25 UG/1
0.25 CAPSULE ORAL
Qty: 24 CAPSULE | Refills: 2 | Status: SHIPPED | OUTPATIENT
Start: 2019-05-29 | End: 2020-04-20 | Stop reason: SDUPTHER

## 2019-05-29 NOTE — PROGRESS NOTES
Subjective:       Patient ID: Adriana Perez is a 73 y.o. female.    Chief Complaint: Memory Loss; Gait Problem (balance); Hypertension; Hyperlipidemia; and Diabetes    Ms Adriana Perez is a 73-year-old -American female who comes for follow-up. Today also she is accompanied with her granddaughter. Underlying medical issues of diabetes mellitus type 2, hyperlipidemia, chronic kidney disease stage III and his lipidemia have been noted.    Recently she had decline in her cognitive and functional status with episode confusion. Home health and physical therapy was instituted and apparently as per the patient and as per her granddaughter in attendance, she shows some functional improvement.    She does have an appointment with Dr. Estes sometimes in the month of June for further evaluation of her cognitive decline and episodes of confusion. She does have history of unspecified neuropsychiatric disorder for which she is under the care of psychiatrist Dr. Patterson and she has an appointment with him tomorrow.    Patient states that her memory and cognition is good. Granddaughter also acquiesces.    Patient states today's date as 05/29/1919. It took a few attempts to know that is 05/29/2019. She could only recall no more than 3 or 4 in medications. She is not sure of the reason as to why she is taking the medications. She states that she just takes her pills as directed. (Somehow the grand daughter thinks that this is normal cognition ). And was able to recall granddaughter's birthday and some other details of certain events like granddaughter's prom party and graduation.    Sugars are in low high 100s and sometimes crosses 200s. Probably it may be related to diet.     Walking and amputation seems to be better and she found benefit with home based physical therapy who apparently made her work hard to increase her strength and balance.    Patient's granddaughter's request sending prescriptions to local pharmacy instead  of mail order because of some glitches which are occurring regular basis.    Hypertension   This is a chronic problem. The current episode started more than 1 year ago. The problem is unchanged. The problem is controlled. Pertinent negatives include no headaches or shortness of breath. Risk factors for coronary artery disease include sedentary lifestyle, dyslipidemia, diabetes mellitus and post-menopausal state. Past treatments include beta blockers. The current treatment provides moderate improvement. Compliance problems include psychosocial issues.  There is no history of coarctation of the aorta, hyperaldosteronism or hypercortisolism.   Hyperlipidemia   This is a chronic problem. The current episode started more than 1 year ago. The problem is controlled. She has no history of hypothyroidism or obesity. Pertinent negatives include no shortness of breath. Current antihyperlipidemic treatment includes statins. The current treatment provides moderate improvement of lipids. Risk factors for coronary artery disease include a sedentary lifestyle, post-menopausal, hypertension, dyslipidemia and diabetes mellitus.   Diabetes   She presents for her follow-up diabetic visit. She has type 2 diabetes mellitus. Her disease course has been fluctuating. Hypoglycemia symptoms include confusion (less? better). Pertinent negatives for hypoglycemia include no headaches, nervousness/anxiousness, pallor, seizures, speech difficulty or tremors. Associated symptoms include weight loss. Pertinent negatives for diabetes include no fatigue, no polydipsia and no polyphagia. Risk factors for coronary artery disease include sedentary lifestyle, dyslipidemia and diabetes mellitus. Her weight is fluctuating minimally. Meal planning includes avoidance of concentrated sweets. Her breakfast blood glucose range is generally 140-180 mg/dl. Her overall blood glucose range is >200 mg/dl. An ACE inhibitor/angiotensin II receptor blocker is  contraindicated. Eye exam is current.       Past Medical History:   Diagnosis Date    Acid reflux     Allergy     lisinopril    Anxiety     Bilateral renal cysts 2018    Renal USG by Dr Foy    CKD (chronic kidney disease), stage III     Depression     Diabetes mellitus     Diabetes mellitus, type 2     Disorder of kidney and ureter     Echogenic kidneys on renal ultrasound 2018    Dr Lance Foy- Also Renal cysts bilateral    Hyperlipidemia     Hypertension     MVP (mitral valve prolapse)     Primary osteoarthritis of left knee     Restless leg     Schizophrenia, simple, chronic     Urinary, incontinence, stress female      Social History     Socioeconomic History    Marital status:      Spouse name: Willy Perez    Number of children: 2    Years of education: Not on file    Highest education level: Not on file   Occupational History    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     Comment: [employer]   Social Needs    Financial resource strain: Not on file    Food insecurity:     Worry: Not on file     Inability: Not on file    Transportation needs:     Medical: Not on file     Non-medical: Not on file   Tobacco Use    Smoking status: Former Smoker     Packs/day: 1.00     Years: 5.00     Pack years: 5.00     Types: Cigarettes     Last attempt to quit: 1992     Years since quittin.7    Smokeless tobacco: Never Used   Substance and Sexual Activity    Alcohol use: No    Drug use: No    Sexual activity: Not Currently     Partners: Male   Lifestyle    Physical activity:     Days per week: Not on file     Minutes per session: Not on file    Stress: Not on file   Relationships    Social connections:     Talks on phone: Not on file     Gets together: Not on file     Attends [employer] service: Not on file     Active member of club or organization: Not on file     Attends meetings of clubs or organizations: Not on file     Relationship status: Not  on file   Other Topics Concern    Not on file   Social History Narrative    Not on file     Past Surgical History:   Procedure Laterality Date    FOOT SURGERY      HYSTERECTOMY      TUBAL LIGATION       Family History   Problem Relation Age of Onset    Sudden death Father     Cancer Mother     Hypertension Mother     Cancer Sister        Review of Systems   Constitutional: Positive for unexpected weight change (lost another 3 lbs) and weight loss. Negative for activity change, chills and fatigue.   HENT: Negative for congestion, postnasal drip and sinus pressure.    Eyes: Negative for pain, discharge and visual disturbance.   Respiratory: Negative for cough, chest tightness and shortness of breath.         Patient has been recently diagnosed with asthmatic bronchitis and has been followed with pulmonary. Her symptoms of cough have abated at this point. She is using Symbicort inhaler and Singulair.   Cardiovascular: Negative for leg swelling.        HTN. Lipids   Gastrointestinal: Negative for abdominal distention, anal bleeding, constipation, diarrhea and vomiting.   Endocrine: Negative for polydipsia and polyphagia.        Diabetes mellitus type 2. Chronic kidney disease stage III.   Genitourinary: Negative for difficulty urinating and hematuria.   Musculoskeletal: Positive for arthralgias and gait problem. Negative for joint swelling.        Recent fall   Skin: Negative for color change, pallor and rash.   Allergic/Immunologic: Negative for environmental allergies, food allergies and immunocompromised state.   Neurological: Negative for tremors, seizures, syncope, facial asymmetry, speech difficulty, light-headedness and headaches.        Fall   Hematological: Negative for adenopathy. Does not bruise/bleed easily.   Psychiatric/Behavioral: Positive for confusion (less? better). Negative for agitation, behavioral problems and dysphoric mood. The patient is not nervous/anxious.         Patient's has history  "of schizoaffective disorder. This seems to be stable. Some confusion of late. History of tendency to falling down. Cognitive issues.         Objective:      Blood pressure 135/67, pulse 95, height 5' 7" (1.702 m), weight 64.4 kg (142 lb). Body mass index is 22.24 kg/m².  Physical Exam   Constitutional: She appears well-developed and well-nourished. She is cooperative. No distress.   HENT:   Head: Normocephalic and atraumatic.   Right Ear: Decreased hearing is noted.   Left Ear: Decreased hearing is noted.   Eyes: Conjunctivae, EOM and lids are normal. Lids are everted and swept, no foreign bodies found. Right pupil is round and reactive. Left pupil is round and reactive.   Neck: Trachea normal and normal range of motion. Neck supple.   Cardiovascular: Normal rate, regular rhythm, S1 normal, S2 normal and normal heart sounds.   Pulmonary/Chest: Breath sounds normal.   Abdominal: Soft. Bowel sounds are normal. There is no rigidity and no guarding.   Musculoskeletal: She exhibits no deformity.        Right foot: There is no deformity.        Left foot: There is no deformity.   Patient has somewhat unsteady gait. She tends to stoop forward while walking. Minimal hypertonia. Nothing remarkable. Minimal resting tremors. Her facial expression is flat.   Feet:   Right Foot:   Protective Sensation: 5 sites tested. 5 sites sensed.   Skin Integrity: Negative for ulcer or blister.   Left Foot:   Protective Sensation: 5 sites tested. 5 sites sensed.   Skin Integrity: Negative for ulcer or blister.   Lymphadenopathy:     She has no cervical adenopathy.     She has no axillary adenopathy.   Neurological: She is alert. She displays atrophy. She displays no tremor and normal reflexes. No sensory deficit. Gait (more stable) abnormal. Coordination normal.       Slightly wobbly trying to climb  1 step stool   Skin: Skin is warm and dry.   Psychiatric: Her affect is blunt. Her speech is delayed. She is slowed. She is not agitated. " Cognition and memory are impaired.   Pt could recall name of only 3-4 of her multiple meds.    She stated her grand daughter Sundeep as Feb 3 2019 ( 2020)    He could recall events surrounding her granddaughter's graduation.   Nursing note and vitals reviewed.        Assessment:       1. Benign essential hypertension    2. Multiple-type hyperlipidemia    3. Type 2 diabetes mellitus with stage 3 chronic kidney disease, without long-term current use of insulin    4. Unsteady gait    5. Chronic kidney disease, stage 3 (moderate)           No visits with results within 3 Month(s) from this visit.   Latest known visit with results is:   Orders Only on 02/01/2019   Component Date Value Ref Range Status    Glucose 02/01/2019 172* 70 - 99 mg/dL Final    BUN, Bld 02/01/2019 27* 8 - 20 mg/dL Final    Creatinine 02/01/2019 2.08* 0.60 - 1.40 mg/dL Final    Calcium 02/01/2019 10.2  7.7 - 10.4 mg/dL Final    Sodium 02/01/2019 138  134 - 144 mmol/L Final    Potassium 02/01/2019 4.0  3.5 - 5.0 mmol/L Final    Chloride 02/01/2019 99  98 - 110 mmol/L Final    CO2 02/01/2019 27.4  22.8 - 31.6 mmol/L Final    Albumin 02/01/2019 4.1  3.1 - 4.7 g/dL Final    Total Bilirubin 02/01/2019 0.8  0.3 - 1.0 mg/dL Final    Alkaline Phosphatase 02/01/2019 90  40 - 104 IU/L Final    Total Protein 02/01/2019 7.3  6.0 - 8.2 g/dL Final    ALT (SGPT) 02/01/2019 17  3 - 33 IU/L Final    AST 02/01/2019 19  10 - 40 IU/L Final    Hemoglobin A1C 02/01/2019 6.1  3.1 - 6.5 % Final         Plan:           Benign essential hypertension    Multiple-type hyperlipidemia    Type 2 diabetes mellitus with stage 3 chronic kidney disease, without long-term current use of insulin  -     SITagliptin (JANUVIA) 50 MG Tab; TAKE 1 TABLET EVERY DAY  Dispense: 90 tablet; Refill: 1    Unsteady gait    Chronic kidney disease, stage 3 (moderate)  -     calcitRIOL (ROCALTROL) 0.25 MCG Cap; Take 1 capsule (0.25 mcg total) by mouth every Mon, Wed, Fri.  Dispense: 24  capsule; Refill: 2    Subjectively, patient seems to be doing better with home health and institution of some physical therapy.    Cognition and understanding of her medical conditions continue to remain a challenge.    I do believe she still has cognitive issues which may need to be addressed. Not sure if psychotropic medications affect these. Her gait was somewhat more problematic last visit and this seems to be better. Hence I doubt that amantadine might be causing any problems.    I believe she might have mild dementia which needs further evaluation. Not sure if this is Alzheimer's or frontotemporal dementia.    Patient does have an appointment with neurology in next month.    Home safety precautions discussed. Tight control of diabetes Probably not worthwhile.    Family not completely on par with patients medical conditions and need for less than aggressive treatment.      Advised Ms. Perez to monitor Blood sugars at home and record them.  Exercise, watch diet and loose weight.  keep a close eye on feet and keep them clean. Annual eye examination. Annual influenza vaccine.  Monitor HgbA1c every 3 to 6 months. Monitor urine microalbumin every year.keep LDL less than 100. Monitor blood pressure and target blood pressure 120/70.        Advised Ms. Perez about age and season appropriate immunizations/ cancer screenings.  Also seasonal influenza vaccine, update on tetanus diphtheria vaccination every 10 years.    Fup 1 month to review sugar control and further adjustments    Spent more than 25 minutes with patient which involved review of his medical conditions, labs, medications and with 50% of time face-to-face discussion about medical problems, management and any applicable changes.          Current Outpatient Medications:     albuterol (PROVENTIL/VENTOLIN HFA) 90 mcg/actuation inhaler, Inhale 2 puffs into the lungs every 6 (six) hours as needed for Wheezing. Rescue, Disp: 1 Inhaler, Rfl: 4    amantadine HCl  (SYMMETREL) 100 mg capsule, Take 100 mg by mouth 2 (two) times daily., Disp: , Rfl:     ammonium lactate 12 % Crea, Apply topically., Disp: , Rfl:     aspirin (ECOTRIN) 81 MG EC tablet, Take 81 mg by mouth once daily., Disp: , Rfl:     budesonide-formoterol 160-4.5 mcg (SYMBICORT) 160-4.5 mcg/actuation HFAA, Inhale 2 puffs into the lungs every 12 (twelve) hours. Controller, Disp: , Rfl:     calcitRIOL (ROCALTROL) 0.25 MCG Cap, Take 1 capsule (0.25 mcg total) by mouth every Mon, Wed, Fri., Disp: 24 capsule, Rfl: 2    glimepiride (AMARYL) 2 MG tablet, TAKE 1 TABLET TWICE DAILY, Disp: 180 tablet, Rfl: 3    metoprolol succinate (TOPROL-XL) 50 MG 24 hr tablet, Take 1 tablet (50 mg total) by mouth once daily., Disp: 90 tablet, Rfl: 1    montelukast (SINGULAIR) 10 mg tablet, TK 1 T PO QHS, Disp: , Rfl: 2    olanzapine (ZYPREXA) 10 MG tablet, Take 10 mg by mouth every evening., Disp: , Rfl:     pantoprazole (PROTONIX) 40 MG tablet, Take 1 tablet (40 mg total) by mouth once daily., Disp: 90 tablet, Rfl: 3    potassium chloride SA (K-DUR,KLOR-CON) 20 MEQ tablet, TAKE 1 TABLET EVERY DAY, Disp: 90 tablet, Rfl: 3    pravastatin (PRAVACHOL) 40 MG tablet, Take 1 tablet (40 mg total) by mouth nightly., Disp: 90 tablet, Rfl: 3    SITagliptin (JANUVIA) 50 MG Tab, TAKE 1 TABLET EVERY DAY, Disp: 90 tablet, Rfl: 1    trazodone (DESYREL) 100 MG tablet, Take 100 mg by mouth nightly as needed., Disp: , Rfl:     TRUE METRIX GLUCOSE TEST STRIP Strp, TEST BLOOD SUGAR EVERY DAY, Disp: 100 strip, Rfl: 3    TRUEPLUS LANCETS 33 gauge Misc, TEST  DAILY, Disp: 100 each, Rfl: 3

## 2019-05-29 NOTE — PATIENT INSTRUCTIONS
Using a Cane  A cane helps you get around on your own. Many different canes are available. The most common type has a single tip. But if you have balance problems, your healthcare provider may recommend that you use a quad (four-point) cane. Always use your cane on the stronger (uninjured or unaffected) side, unless told otherwise. Use the cane on the side opposite your weaker leg.  Walking    1.  · Put all your weight on your stronger leg.  · Find your balance.  · Move the cane and your weaker leg forward.    2.  · Support your weight on both the cane and the affected leg.  · Then step through with your stronger leg.  · Put your weight on the weaker leg and start the next step.    · When using a quad cane, place the cane so that all of the tips touch the ground.       Up stairs and curbs  · If there is a railing, hold on to it with your free hand.  · Step up with your stronger leg first.  · Then move the cane and weaker leg together as a unit.    Down stairs and curbs  · To walk down, step down with your weaker leg and the cane first.  · Then follow with your stronger leg.  Date Last Reviewed: 9/1/2015  © 9155-7977 [employer]. [address] [place] [address], [place] [address]. All rights reserved. This information is not intended as a substitute for professional medical care. Always follow your healthcare professional's instructions.        Crutch Walking  Crutch adjustment    Make sure the crutches you use are adjusted to fit you. When you stand, there should be room to fit 2 to 3 fingers between the top of the crutch and your armpit. Your elbow should be slightly bent when holding the hand . When your arms hang down, the crutch handle should be at the top of your hip.   Crutch walking  Place the crutches forward about 1 foot in front of you. The crutches should be a little farther apart than your body. Lean your weight forward as you push down on the hand . Make sure your weight is on your  hands and your strong leg, not your armpits. Let your body swing forward, landing on the strong leg. Move the crutches forward again. The crutch and your injured leg should move together.  Going up steps with no handrails  (Up with the good leg)  · With both crutches (under each armpit) on the same step as your feet, push down on the hand .  · Balancing with very light pressure on the weak leg, let your hands support your weight. Raise your strong leg onto the next higher step.  · Transfer all your weight to your strong leg (still bent). Move the crutches up to the next step, next to your strong leg.  · Keep your weight evenly balanced on the two crutches and your strong leg. Straighten your strong knee as you raise your weak leg up to the next step.  Going down steps with no handrails  (Down with the bad leg)  · With both crutches (under each armpit) on the same step as your feet, push down on the hand .  · Keep your weight evenly balanced on the two crutches and your strong leg. Bend your strong knee as you lower your weak leg down to the next step.  · Let your strong leg support you (still bent) as you move the crutches down next to the weak leg.  · Transfer your weight to your hands. Balance with very light pressure on your weak leg as you lower your strong leg next to your weak leg  Going up steps with handrails  (Up with the good leg)  · Face the stairs, holding the handrail with one hand. Place both crutches under your armpit on the opposite side. Push down on the hand .  · Balancing with very light pressure on the weak leg, let your hands support your weight. Raise your strong leg onto the next higher step.  · Transfer all your weight to your strong leg (still bent) as you move the crutches up (while holding on to the handrail) to the next step next to the strong leg.  · Keep your weight evenly balanced on the handrail, the crutches (still under the same armpit opposite the handrail), and your  strong leg. Straighten your strong knee as you raise the weak leg up to the next step.  Going down steps with handrails  (Down with the bad leg)  · Face the stairs, holding the handrail with one hand. Place both crutches under your armpit on the opposite side. Push down on the hand .  · Balance your weight evenly on the crutches, handrail, and your strong leg. Then bend your strong knee as you lower the weak leg down to the next step.  · Let the handrail and your strong leg support you (still bent) as you move the crutches down alongside the weak leg.  · While holding on to the handrail and crutches (under the same armpit on the other side), transfer your weight to your hands, balancing with very light pressure on the weak leg as you lower your strong leg alongside your weak leg  Tip: If you are worried about falling or you feel unsteady, try sitting when going up or down stairs instead. Sit on the bottom step and keep your injured leg out in front of you. Hold your crutches flat against the stairs. Then slide up to the next step on your bottom. Use your free hand and good leg for support. Face the same way when going down stairs.  Date Last Reviewed: 10/6/2015  © 6651-6724 [employer]. [address] [place] [address], [place] [address]. All rights reserved. This information is not intended as a substitute for professional medical care. Always follow your healthcare professional's instructions.

## 2019-06-10 ENCOUNTER — OFFICE VISIT (OUTPATIENT)
Dept: FAMILY MEDICINE | Facility: CLINIC | Age: 74
End: 2019-06-10
Payer: MEDICARE

## 2019-06-10 VITALS
DIASTOLIC BLOOD PRESSURE: 87 MMHG | HEIGHT: 67 IN | HEART RATE: 85 BPM | WEIGHT: 137 LBS | SYSTOLIC BLOOD PRESSURE: 131 MMHG | BODY MASS INDEX: 21.5 KG/M2

## 2019-06-10 DIAGNOSIS — R63.4 WEIGHT LOSS: ICD-10-CM

## 2019-06-10 DIAGNOSIS — R63.4 UNINTENTIONAL WEIGHT LOSS: ICD-10-CM

## 2019-06-10 DIAGNOSIS — N18.30 TYPE 2 DIABETES MELLITUS WITH STAGE 3 CHRONIC KIDNEY DISEASE, WITHOUT LONG-TERM CURRENT USE OF INSULIN: ICD-10-CM

## 2019-06-10 DIAGNOSIS — R19.05 PERIUMBILICAL MASS: ICD-10-CM

## 2019-06-10 DIAGNOSIS — R42 DIZZINESS: ICD-10-CM
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
## 2019-06-10 DIAGNOSIS — E11.22 TYPE 2 DIABETES MELLITUS WITH STAGE 3 CHRONIC KIDNEY DISEASE, WITHOUT LONG-TERM CURRENT USE OF INSULIN: ICD-10-CM

## 2019-06-10 DIAGNOSIS — R26.81 UNSTEADY GAIT: Primary | ICD-10-CM

## 2019-06-10 PROCEDURE — 1101F PR PT FALLS ASSESS DOC 0-1 FALLS W/OUT INJ PAST YR: ICD-10-PCS | Mod: ,,, | Performed by: INTERNAL MEDICINE

## 2019-06-10 PROCEDURE — 3075F PR MOST RECENT SYSTOLIC BLOOD PRESS GE 130-139MM HG: ICD-10-PCS | Mod: ,,, | Performed by: INTERNAL MEDICINE

## 2019-06-10 PROCEDURE — 99215 PR OFFICE/OUTPT VISIT, EST, LEVL V, 40-54 MIN: ICD-10-PCS | Mod: ,,, | Performed by: INTERNAL MEDICINE

## 2019-06-10 PROCEDURE — 3075F SYST BP GE 130 - 139MM HG: CPT | Mod: ,,, | Performed by: INTERNAL MEDICINE

## 2019-06-10 PROCEDURE — 3079F DIAST BP 80-89 MM HG: CPT | Mod: ,,, | Performed by: INTERNAL MEDICINE

## 2019-06-10 PROCEDURE — 3044F PR MOST RECENT HEMOGLOBIN A1C LEVEL <7.0%: ICD-10-PCS | Mod: ,,, | Performed by: INTERNAL MEDICINE

## 2019-06-10 PROCEDURE — 3044F HG A1C LEVEL LT 7.0%: CPT | Mod: ,,, | Performed by: INTERNAL MEDICINE

## 2019-06-10 PROCEDURE — 99215 OFFICE O/P EST HI 40 MIN: CPT | Mod: ,,, | Performed by: INTERNAL MEDICINE

## 2019-06-10 PROCEDURE — 1101F PT FALLS ASSESS-DOCD LE1/YR: CPT | Mod: ,,, | Performed by: INTERNAL MEDICINE

## 2019-06-10 PROCEDURE — 3079F PR MOST RECENT DIASTOLIC BLOOD PRESSURE 80-89 MM HG: ICD-10-PCS | Mod: ,,, | Performed by: INTERNAL MEDICINE

## 2019-06-10 RX ORDER — FERROUS SULFATE 325(65) MG
1 TABLET ORAL DAILY
Refills: 3 | COMMUNITY
Start: 2019-06-03 | End: 2019-06-10

## 2019-06-10 RX ORDER — FERROUS SULFATE 325(65) MG
325 TABLET ORAL DAILY
COMMUNITY
End: 2020-06-04 | Stop reason: SDUPTHER

## 2019-06-10 NOTE — PATIENT INSTRUCTIONS
Understanding Dizziness, Balance Problems, and Fainting     The eyes, inner ear, joints, and muscles send signals to the brain to achieve balance.     Balance is a group effort of the eyes, inner ear, joints, and muscles. They each send signals to the brain about body position and head movement. Then the brain uses this information to achieve balance. When the brain receives conflicting signals, or when there is a problem with blood flow, dizziness or fainting can happen.  Vertigo  Vertigo is the feeling of spinning. It may happen if the brain receives conflicting balance signals. Vertigo is often caused by a problem in the inner ear. Problems include changes in inner ear structures, infection, swelling, or excess fluid. Sometimes vertigo is due to a brain problem, such as migraine.   Dysequilibrium  Dysequilibrium is the feeling of imbalance without a sense of spinning. It may happen if the signal path between the body and brain is disrupted. There are many causes of dysequilibrium, including diabetes, anemia, head injury, and aging.  Syncope  Syncope is losing consciousness or fainting. The brain needs oxygen-rich blood to function. The heart pumps that blood to the brain. If there is a problem with the heart, blood flow (such as low blood pressure), or blood vessels, faintness may happen.  Date Last Reviewed: 10/6/2015  © 2712-4043 [employer]. [address] [place] [address], [place] [address]. All rights reserved. This information is not intended as a substitute for professional medical care. Always follow your healthcare professional's instructions.

## 2019-06-10 NOTE — PROGRESS NOTES
Subjective:       Patient ID: Adriana Perez is a 73 y.o. female.    Chief Complaint: Fall (2 falls in one day ); Dizziness; Weight Loss; and Extremity Weakness    Ms. Adriana Perez is a 73-year-old -American female who comes for a same day evaluation and is accompanied with her  and son.  She continues to decline slowly and steadily. Yesterday she had episodes of dizziness and a fall. On previous occasions also she has felt somewhat unsteady. She has a pending neurology appointment sometimes next week with  For memory issues and also possibly to consider Parkinson's.    Yesterday she woke up in the morning and states that she took one of the medications which met made her felt dizzy. Patient is not a very good and astute historian as to give further details. And she went to her [employer] around 10:30 AM and was participating in a choir when she was feeling somewhat dizzy and had episodes of some cough. She was given some water by the  as she said down to comfort.    In the late afternoon, she was following her  who intern was following his son to see their yard she had. She apparently tripped on a one-step staircase and he is to fall by falling on the low back. She needed help her get up. Patient does agree (corroborated by her son) that she did not lose her consciousness but somehow could not manage her balance.    Patient is somewhat unaware and not very astute, but she has lost a moderate amount of weight greater than 25 pounds in the last 1 year or so. Patient has limited insight as to why she would have lost such a week. Family members are trying to urge at better appetite and finishing her food. Not much of a benefit though. She is updated on a colonoscopy a few years back and also updated on mammograms.    Patient complains of feeling a lump in her abdomen and this point. She is very vaguely and poorly able to define it.    Also she has episodes of cough and mild shortness  of breath for which she takes inhalers. She does have a follow-up with Dr. Faria in near future.    History of psychiatric disorder has been noted for which she is on amantadine, Zyprexa (surprising she has not gained any weight on Zyprexa) and trazodone.    I've reviewed patient's blood sugars from home and there is no significant hypoglycemia. However she has not checked her blood sugars when she has the so-called episodes of dizziness or lightheadedness.  For diabetes she is currently on Januvia and glimepiride.          Dizziness:   Chronicity:  Recurrent  Onset:  More than 1 month ago  Progression since onset:  Unchanged  Severity:  Moderate  Dizziness characteristics:  Giddiness and off-balance   Associated symptoms: weakness.no headaches, no vomiting and no light-headedness.no environmental allergies.  Fall   The accident occurred 12 to 24 hours ago. The fall occurred while walking. She fell from a height of 1 to 2 ft. There was no blood loss. The point of impact was the buttocks. The pain is at a severity of 0/10. The patient is experiencing no pain. The symptoms are aggravated by ambulation. Pertinent negatives include no headaches, hematuria or vomiting.   Diabetes   She presents for her follow-up diabetic visit. She has type 2 diabetes mellitus. Her disease course has been stable. Hypoglycemia symptoms include confusion (less? better), dizziness and tremors. Pertinent negatives for hypoglycemia include no headaches, nervousness/anxiousness, pallor, seizures or speech difficulty. Associated symptoms include weakness and weight loss. Pertinent negatives for diabetes include no fatigue, no foot ulcerations, no polydipsia and no polyphagia. Symptoms are stable. Risk factors for coronary artery disease include sedentary lifestyle, hypertension and dyslipidemia. Her weight is decreasing steadily. Meal planning includes avoidance of concentrated sweets. She rarely participates in exercise. An ACE  inhibitor/angiotensin II receptor blocker is contraindicated. Eye exam is current.       Past Medical History:   Diagnosis Date    Acid reflux     Allergy     lisinopril    Anxiety     Bilateral renal cysts 2018    Renal USG by Dr Foy    CKD (chronic kidney disease), stage III     Depression     Diabetes mellitus     Diabetes mellitus, type 2     Disorder of kidney and ureter     Echogenic kidneys on renal ultrasound 2018    Dr Lance Foy- Also Renal cysts bilateral    Hyperlipidemia     Hypertension     MVP (mitral valve prolapse)     Primary osteoarthritis of left knee     Restless leg     Schizophrenia, simple, chronic     Urinary, incontinence, stress female      Social History     Socioeconomic History    Marital status:      Spouse name: Willy Perez    Number of children: 2    Years of education: Not on file    Highest education level: Not on file   Occupational History    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     Comment: [employer]   Social Needs    Financial resource strain: Not on file    Food insecurity:     Worry: Not on file     Inability: Not on file    Transportation needs:     Medical: Not on file     Non-medical: Not on file   Tobacco Use    Smoking status: Former Smoker     Packs/day: 1.00     Years: 5.00     Pack years: 5.00     Types: Cigarettes     Last attempt to quit: 1992     Years since quittin.7    Smokeless tobacco: Never Used   Substance and Sexual Activity    Alcohol use: No    Drug use: No    Sexual activity: Not Currently     Partners: Male   Lifestyle    Physical activity:     Days per week: Not on file     Minutes per session: Not on file    Stress: Not at all   Relationships    Social connections:     Talks on phone: Not on file     Gets together: Not on file     Attends [employer] service: Not on file     Active member of club or organization: Not on file     Attends meetings of clubs or  organizations: Not on file     Relationship status: Not on file   Other Topics Concern    Not on file   Social History Narrative    Not on file     Past Surgical History:   Procedure Laterality Date    FOOT SURGERY      HYSTERECTOMY      TUBAL LIGATION       Family History   Problem Relation Age of Onset    Sudden death Father     Cancer Mother     Hypertension Mother     Cancer Sister        Review of Systems   Constitutional: Positive for unexpected weight change (lost another 3 lbs) and weight loss. Negative for activity change, chills and fatigue.   HENT: Negative for congestion, postnasal drip and sinus pressure.    Eyes: Negative for pain, discharge and visual disturbance.   Respiratory: Negative for cough, chest tightness and shortness of breath.         Patient has been recently diagnosed with asthmatic bronchitis and has been followed with pulmonary. Her symptoms of cough have abated at this point. She is using Symbicort inhaler and Singulair.   Cardiovascular: Negative for leg swelling.        HTN. Lipids   Gastrointestinal: Negative for abdominal distention, anal bleeding, constipation, diarrhea and vomiting.   Endocrine: Negative for polydipsia and polyphagia.        Diabetes mellitus type 2. Chronic kidney disease stage III.   Genitourinary: Negative for difficulty urinating and hematuria.   Musculoskeletal: Positive for arthralgias and gait problem. Negative for joint swelling.        Recent fall   Skin: Negative for color change, pallor and rash.   Allergic/Immunologic: Negative for environmental allergies, food allergies and immunocompromised state.   Neurological: Positive for dizziness, tremors and weakness. Negative for seizures, syncope, facial asymmetry, speech difficulty, light-headedness and headaches.        Fall   Hematological: Negative for adenopathy. Does not bruise/bleed easily.   Psychiatric/Behavioral: Positive for confusion (less? better). Negative for agitation, behavioral  "problems and dysphoric mood. The patient is not nervous/anxious.         Patient's has history of schizoaffective disorder. This seems to be stable. Some confusion of late. History of tendency to falling down. Cognitive issues.         Objective:      Blood pressure 131/87, pulse 85, height 5' 7" (1.702 m), weight 62.1 kg (137 lb). Body mass index is 21.46 kg/m².  Physical Exam   Constitutional: She appears well-developed and well-nourished. She is cooperative. No distress.   HENT:   Head: Normocephalic and atraumatic.   Right Ear: Decreased hearing is noted.   Left Ear: Decreased hearing is noted.   Eyes: Conjunctivae, EOM and lids are normal. Lids are everted and swept, no foreign bodies found. Right pupil is round and reactive. Left pupil is round and reactive.   Neck: Trachea normal and normal range of motion. Neck supple.   Cardiovascular: Normal rate, regular rhythm, S1 normal, S2 normal and normal heart sounds.   Pulmonary/Chest: Breath sounds normal.   Abdominal: Soft. Bowel sounds are normal. There is no rigidity and no guarding.   Musculoskeletal: She exhibits no edema or deformity.        Right foot: There is no deformity.        Left foot: There is no deformity.   Patient has somewhat unsteady gait. She tends to stoop forward while walking. Minimal hypertonia. Nothing remarkable. Minimal resting tremors. Her facial expression is flat.   Feet:   Right Foot:   Protective Sensation: 5 sites tested. 5 sites sensed.   Skin Integrity: Negative for ulcer or blister.   Left Foot:   Protective Sensation: 5 sites tested. 5 sites sensed.   Skin Integrity: Negative for ulcer or blister.   Lymphadenopathy:     She has no cervical adenopathy.     She has no axillary adenopathy.   Neurological: She is alert. She displays atrophy and tremor. She displays normal reflexes. No sensory deficit. Gait (more stable) abnormal. Coordination normal.   Patient has difficulty climbing up his step stool by herself. She is unsteady " and needs to be held.    Mild increase in bone in the upper x-rays. Tremors'or visible on outstretched extremities rather than resting. No evidence of dystonia.   Skin: Skin is warm and dry. No rash noted. No pallor.   Psychiatric: Her affect is blunt. Her speech is delayed. She is slowed. She is not agitated. Cognition and memory are impaired.   Nursing note and vitals reviewed.      HISTORY: N 18.3, chronic kidney disease stage III. FINDINGS: Comparison to the CT of 03/21/2016. The right kidney measures 7.6 cm in length, with diffusely increased parenchymal echotexture, and no echogenic calculi, hydronephrosis or discrete solid mass demonstrated. There are multiple subcentimeter anechoic simple cysts with increased through transmission of sound. The left kidney measures 8.7 cm in length and also has diffusely increased parenchymal echotexture, with no echogenic calculi or hydronephrosis demonstrated. A 16 mm circumscribed anechoic simple cyst in the midportion has increased through transmission of sound. The urinary bladder is normal, with no wall thickening or echogenic shadowing bladder calculus demonstrated. There is no pelvic free fluid. The visualized abdominal aorta and IVC are within normal limits. There are multiple echogenic shadowing mobile gallstones incidentally noted in the gallbladder, with no gallbladder wall thickening or pericholecystic fluid. The common duct measures 6 mm in diameter. IMPRESSION: 1. Echogenic kidneys consistent with nonspecific medical renal disease, with no renal calculi or hydronephrosis. 2. Bilateral simple renal cysts. 3. Cholelithiasis. Read and electronically signed by: Drew Power MD on 2/27/2018 9:04 AM CST DREW POWER MD     Assessment:       1. Unsteady gait    2. Fall, subsequent encounter    3. Dizziness    4. Weight loss    5. Periumbilical mass    6. Type 2 diabetes mellitus with stage 3 chronic kidney disease, without long-term current use of insulin            No visits with results within 3 Month(s) from this visit.   Latest known visit with results is:   Orders Only on 02/01/2019   Component Date Value Ref Range Status    Glucose 02/01/2019 172* 70 - 99 mg/dL Final    BUN, Bld 02/01/2019 27* 8 - 20 mg/dL Final    Creatinine 02/01/2019 2.08* 0.60 - 1.40 mg/dL Final    Calcium 02/01/2019 10.2  7.7 - 10.4 mg/dL Final    Sodium 02/01/2019 138  134 - 144 mmol/L Final    Potassium 02/01/2019 4.0  3.5 - 5.0 mmol/L Final    Chloride 02/01/2019 99  98 - 110 mmol/L Final    CO2 02/01/2019 27.4  22.8 - 31.6 mmol/L Final    Albumin 02/01/2019 4.1  3.1 - 4.7 g/dL Final    Total Bilirubin 02/01/2019 0.8  0.3 - 1.0 mg/dL Final    Alkaline Phosphatase 02/01/2019 90  40 - 104 IU/L Final    Total Protein 02/01/2019 7.3  6.0 - 8.2 g/dL Final    ALT (SGPT) 02/01/2019 17  3 - 33 IU/L Final    AST 02/01/2019 19  10 - 40 IU/L Final    Hemoglobin A1C 02/01/2019 6.1  3.1 - 6.5 % Final   Patient has the following issues.    Episodes of lightheadedness and difficulty walking with a couple of falls recently.    Uncertain status of diabetes especially possible hypoglycemic episodes.    Suspicion of Parkinson's based upon unsteady gait, tremors and an expressionless face. Mild hypotonia also.  Prescription of Zyprexa which can possibly cause some Parkinson-like symptoms. ? Is amantadine been prescribed to counteract those side effects.??    Patient has a nonspecific vague abdominal discomfort.              Plan:           Unsteady gait    Fall, subsequent encounter    Dizziness    Weight loss    Periumbilical mass    Type 2 diabetes mellitus with stage 3 chronic kidney disease, without long-term current use of insulin     Check blood sugars at the time of dizziness and unsteadiness.   Reduce metoprolol in half    Use a quad walker instead of a cane for more balance.     Await neurology evaluation.    Weight loss cause uncertain.    Vague lump in the abdomen and consider CT scan of  the abdominal pelvis. Prior ultrasound did show some gallstones and some cyst in the kidney.    Order a CT scan of abdomen and pelvis without contrast. Discuss with radiology also.  Had a lengthy discussion with the family members lasting for about 40-45 minutes with 50% of time face-to-face interaction.    . Discussed with Dr. Estes also concerning the potential of drug-induced Parkinson's.        Current Outpatient Medications:     albuterol (PROVENTIL/VENTOLIN HFA) 90 mcg/actuation inhaler, Inhale 2 puffs into the lungs every 6 (six) hours as needed for Wheezing. Rescue, Disp: 1 Inhaler, Rfl: 4    amantadine HCl (SYMMETREL) 100 mg capsule, Take 100 mg by mouth 2 (two) times daily., Disp: , Rfl:     ammonium lactate 12 % Crea, Apply topically., Disp: , Rfl:     aspirin (ECOTRIN) 81 MG EC tablet, Take 81 mg by mouth once daily., Disp: , Rfl:     budesonide-formoterol 160-4.5 mcg (SYMBICORT) 160-4.5 mcg/actuation HFAA, Inhale 2 puffs into the lungs every 12 (twelve) hours. Controller, Disp: , Rfl:     calcitRIOL (ROCALTROL) 0.25 MCG Cap, Take 1 capsule (0.25 mcg total) by mouth every Mon, Wed, Fri., Disp: 24 capsule, Rfl: 2    ferrous sulfate 324 mg (65 mg iron) TbEC, Take 325 mg by mouth once daily., Disp: , Rfl:     metoprolol succinate (TOPROL-XL) 50 MG 24 hr tablet, Take 1 tablet (50 mg total) by mouth once daily., Disp: 90 tablet, Rfl: 1    montelukast (SINGULAIR) 10 mg tablet, TK 1 T PO QHS, Disp: , Rfl: 2    olanzapine (ZYPREXA) 10 MG tablet, Take 10 mg by mouth every evening., Disp: , Rfl:     pantoprazole (PROTONIX) 40 MG tablet, Take 1 tablet (40 mg total) by mouth once daily., Disp: 90 tablet, Rfl: 3    potassium chloride SA (K-DUR,KLOR-CON) 20 MEQ tablet, TAKE 1 TABLET EVERY DAY, Disp: 90 tablet, Rfl: 3    pravastatin (PRAVACHOL) 40 MG tablet, Take 1 tablet (40 mg total) by mouth nightly., Disp: 90 tablet, Rfl: 3    SITagliptin (JANUVIA) 50 MG Tab, TAKE 1 TABLET EVERY DAY, Disp: 90 tablet,  Rfl: 1    trazodone (DESYREL) 100 MG tablet, Take 100 mg by mouth nightly as needed., Disp: , Rfl:     TRUE METRIX GLUCOSE TEST STRIP Strp, TEST BLOOD SUGAR EVERY DAY, Disp: 100 strip, Rfl: 3    TRUEPLUS LANCETS 33 gauge Misc, TEST  DAILY, Disp: 100 each, Rfl: 3

## 2019-06-13 ENCOUNTER — TELEPHONE (OUTPATIENT)
Dept: FAMILY MEDICINE | Facility: CLINIC | Age: 74
End: 2019-06-13

## 2019-06-13 NOTE — TELEPHONE ENCOUNTER
Patient's son Mr. Willy Perez Rudy notified. CT scan is negative for any cancer. It does show gallbladder stones.

## 2019-06-14 ENCOUNTER — TELEPHONE (OUTPATIENT)
Dept: FAMILY MEDICINE | Facility: CLINIC | Age: 74
End: 2019-06-14

## 2019-06-14 NOTE — TELEPHONE ENCOUNTER
----- Message from Aiden Koch MD sent at 6/13/2019  6:00 PM CDT -----  The results are within acceptable range.  Please keep regular follow up.

## 2019-07-03 ENCOUNTER — OUTSIDE PLACE OF SERVICE (OUTPATIENT)
Dept: FAMILY MEDICINE | Facility: CLINIC | Age: 74
End: 2019-07-03
Payer: MEDICARE

## 2019-07-03 PROCEDURE — G0179 PR HOME HEALTH MD RECERTIFICATION: ICD-10-PCS | Mod: ,,, | Performed by: INTERNAL MEDICINE

## 2019-07-03 PROCEDURE — G0179 MD RECERTIFICATION HHA PT: HCPCS | Mod: ,,, | Performed by: INTERNAL MEDICINE

## 2019-07-08 ENCOUNTER — OFFICE VISIT (OUTPATIENT)
Dept: FAMILY MEDICINE | Facility: CLINIC | Age: 74
End: 2019-07-08
Payer: MEDICARE

## 2019-07-08 VITALS
HEIGHT: 67 IN | DIASTOLIC BLOOD PRESSURE: 56 MMHG | WEIGHT: 138 LBS | BODY MASS INDEX: 21.66 KG/M2 | HEART RATE: 88 BPM | SYSTOLIC BLOOD PRESSURE: 118 MMHG

## 2019-07-08 DIAGNOSIS — E78.2 MULTIPLE-TYPE HYPERLIPIDEMIA: ICD-10-CM

## 2019-07-08 DIAGNOSIS — R26.81 UNSTEADY GAIT: ICD-10-CM

## 2019-07-08 DIAGNOSIS — N18.30 CHRONIC KIDNEY DISEASE, STAGE 3 (MODERATE): ICD-10-CM

## 2019-07-08 DIAGNOSIS — N18.30 TYPE 2 DIABETES MELLITUS WITH STAGE 3 CHRONIC KIDNEY DISEASE, WITHOUT LONG-TERM CURRENT USE OF INSULIN: Primary | ICD-10-CM

## 2019-07-08 DIAGNOSIS — E11.22 TYPE 2 DIABETES MELLITUS WITH STAGE 3 CHRONIC KIDNEY DISEASE, WITHOUT LONG-TERM CURRENT USE OF INSULIN: Primary | ICD-10-CM

## 2019-07-08 DIAGNOSIS — I10 BENIGN ESSENTIAL HYPERTENSION: ICD-10-CM

## 2019-07-08 DIAGNOSIS — R42 DIZZINESS: ICD-10-CM

## 2019-07-08 PROCEDURE — 1101F PR PT FALLS ASSESS DOC 0-1 FALLS W/OUT INJ PAST YR: ICD-10-PCS | Mod: ,,, | Performed by: INTERNAL MEDICINE

## 2019-07-08 PROCEDURE — 3044F PR MOST RECENT HEMOGLOBIN A1C LEVEL <7.0%: ICD-10-PCS | Mod: ,,, | Performed by: INTERNAL MEDICINE

## 2019-07-08 PROCEDURE — 3044F HG A1C LEVEL LT 7.0%: CPT | Mod: ,,, | Performed by: INTERNAL MEDICINE

## 2019-07-08 PROCEDURE — 3078F PR MOST RECENT DIASTOLIC BLOOD PRESSURE < 80 MM HG: ICD-10-PCS | Mod: ,,, | Performed by: INTERNAL MEDICINE

## 2019-07-08 PROCEDURE — 3078F DIAST BP <80 MM HG: CPT | Mod: ,,, | Performed by: INTERNAL MEDICINE

## 2019-07-08 PROCEDURE — 3074F SYST BP LT 130 MM HG: CPT | Mod: ,,, | Performed by: INTERNAL MEDICINE

## 2019-07-08 PROCEDURE — 1101F PT FALLS ASSESS-DOCD LE1/YR: CPT | Mod: ,,, | Performed by: INTERNAL MEDICINE

## 2019-07-08 PROCEDURE — 99214 OFFICE O/P EST MOD 30 MIN: CPT | Mod: ,,, | Performed by: INTERNAL MEDICINE

## 2019-07-08 PROCEDURE — 99214 PR OFFICE/OUTPT VISIT, EST, LEVL IV, 30-39 MIN: ICD-10-PCS | Mod: ,,, | Performed by: INTERNAL MEDICINE

## 2019-07-08 PROCEDURE — 3074F PR MOST RECENT SYSTOLIC BLOOD PRESSURE < 130 MM HG: ICD-10-PCS | Mod: ,,, | Performed by: INTERNAL MEDICINE

## 2019-07-08 NOTE — PATIENT INSTRUCTIONS
Diabetes: Activity Tips    Being more active can help you manage your diabetes. The tips on this sheet can help you get the most from your exercise. They can also help you stay safe.  Staying Active  Its important for adults to spend less time sitting and being inactive. This is especially true if you have type 2 diabetes. When you are sitting for long periods of time, get up for short sessions of light activity every 30 minutes.  You should aim for at least 150 minutes a week of exercise or physical activity. Dont let more than 2 days go by without being active.  Benefit from briskness  Brisk activity gets your heart beating faster. This can help you increase your fitness, lose extra weight, and manage your blood sugar level. Try brisk walking. Or, if you have foot or leg problems, you can try swimming or bike riding. You can break up your exercise into chunks throughout the day. Work up to at least 30 minutes of steady, brisk exercise on most days.  Warm up and cool down  Warming up and cooling down reduce your risk of injury. They also help limit muscle soreness. Do a mild version of your activity for 5 minutes before and after your routine. You can also learn stretches that will help keep your muscles loose. Your healthcare provider may show you good ways to warm up and stretch.  Do the talk-sing test  The talk-sing test is a simple way to tell how hard youre exercising. If you can talk while exercising, youre in a safe range. If youre out of breath, slow down. If you can carry a tune, its time to  the pace. Walk up a hill. Increase the resistance on your stationary bike. Or swim faster.  What about eating?  You may be told to plan your exercise for 1 to 2 hours after a meal. In most cases, you dont need to eat while being active. If you take insulin or medicine that can cause low blood sugar, test your blood sugar before exercising. And carry a fast-acting sugar that will raise your blood sugar  level quickly. This includes glucose tablets or hard candy. Use it if you feel low blood sugar symptoms.  Safety tips  These tips can help you stay safe as you become fit:  · Exercise with a friend or carry a cell phone if you have one.  · Carry or wear identification, such as a necklace or bracelet, that says you have diabetes.  · Use the proper footwear and safety equipment for your activity.  · Drink water before, during, and after exercise.  · Dress properly for the weather.  · Dont exercise in very hot or very cold weather.  · Dont exercise if you are sick.  · If you are instructed to do so, test your blood sugar before and after you exercise. Have a small carbohydrate snack if your blood sugar is low before you start exercising.   When to stop exercising and call your healthcare provider  Stop exercising and call your healthcare provider right away if you notice any of the following:  · Pain, pressure, tightness, or heaviness in the chest  · Pain or heaviness in the neck, shoulders, back, arms, legs, or feet  · Unusual shortness of breath  · Dizziness or lightheadedness  · Unusually rapid or slow pulse  · Increased joint or muscle pain  · Nausea or vomiting  Date Last Reviewed: 5/1/2016  © 2254-8101 [employer]. [address] [place] [address], [place] [address]. All rights reserved. This information is not intended as a substitute for professional medical care. Always follow your healthcare professional's instructions.

## 2019-07-08 NOTE — PROGRESS NOTES
Subjective:       Patient ID: Adriana Perez is a 73 y.o. female.    Chief Complaint: Diabetes; Hyperlipidemia; Hypertension; Chronic Kidney Disease; Shaking; and Cholelithiasis    Ms. Adriana Perez is a 73-year-old  female who comes for follow-up and is accompanied today with her granddaughter and great grandson.  Patient historically has been a poor historian.  Off late and recently she has been suffering from unsteady gait, syncopal episodes and dizziness.    I had advised her granddaughter to keep a tab on her blood sugars if there is any correlation  between her dizziness/lightheadedness and low sugar reaction.  I discontinued the glimepiride recently.  Also reduced the metoprolol to 1 pill a day.      I have reviewed the blood sugars from home add on occasions that patient has felt somewhat dizzy and unsteady-there was no evidence of hypoglycemia.    Dizzziness on and off persists.  Patient continues to be a somewhat poor historian as to find a correlation between her activities of dizziness and as to what other instigated and brother relievers.  Apparently resting gives her relief again she states when she takes her medications in the morning she feels somewhat dizzy.  (The only medication which could not be implicated is probably metoprolol)    No correlation with Sugar levels    Saw  Dr Sharif who will follow her up. Amantadine side effects seems to be a suspected entity.  She does have a follow-up with Dr. Sharif this Thursday.    Patient also had weight loss and there was a suspicious lump in her abdomen following which a CT scan of abdominal pelvis was done.  It had just shown gallstones but no other significant abnormality.  Patient denies any pain in the right upper quadrant.    Diabetes   She presents for her follow-up diabetic visit. She has type 2 diabetes mellitus. Hypoglycemia symptoms include dizziness and tremors. Pertinent negatives for hypoglycemia include no confusion,  nervousness/anxiousness, pallor, seizures or speech difficulty. Associated symptoms include weakness. Pertinent negatives for diabetes include no fatigue, no polydipsia and no polyphagia. Current diabetic treatment includes oral agent (monotherapy). She is compliant with treatment most of the time. Her weight is stable. Meal planning includes avoidance of concentrated sweets. She never participates in exercise. Her breakfast blood glucose range is generally 130-140 mg/dl. An ACE inhibitor/angiotensin II receptor blocker is contraindicated. Eye exam is current.   Dizziness:   Chronicity:  New  Onset:  More than 1 month ago  Progression since onset:  Waxing and waning  Dizziness characteristics:  Sensation of movement, off-balance, giddiness, height vertigo and blacking out   Associated symptoms: weakness.no light-headedness.no cardiac surgery, no balance testing and no environmental allergies.  Hypertension   This is a chronic problem. The current episode started more than 1 year ago. The problem is controlled. Associated symptoms include malaise/fatigue. Pertinent negatives include no shortness of breath. Risk factors for coronary artery disease include sedentary lifestyle. Past treatments include beta blockers. The current treatment provides moderate improvement. Identifiable causes of hypertension include chronic renal disease (stage 3). There is no history of hyperaldosteronism, hypercortisolism or sleep apnea.   Hyperlipidemia   This is a chronic problem. The current episode started more than 1 year ago. Exacerbating diseases include chronic renal disease (stage 3). She has no history of obesity. Pertinent negatives include no shortness of breath. Current antihyperlipidemic treatment includes statins. Risk factors for coronary artery disease include a sedentary lifestyle, hypertension, dyslipidemia and diabetes mellitus.       Past Medical History:   Diagnosis Date    Acid reflux     Allergy     lisinopril     Anxiety     Bilateral renal cysts 2018    Renal USG by Dr Foy    CKD (chronic kidney disease), stage III     Depression     Diabetes mellitus     Diabetes mellitus, type 2     Disorder of kidney and ureter     Echogenic kidneys on renal ultrasound 2018    Dr Lance Foy- Also Renal cysts bilateral    Hyperlipidemia     Hypertension     MVP (mitral valve prolapse)     Primary osteoarthritis of left knee     Restless leg     Schizophrenia, simple, chronic     Urinary, incontinence, stress female      Social History     Socioeconomic History    Marital status:      Spouse name: Willy Perez    Number of children: 2    Years of education: Not on file    Highest education level: Not on file   Occupational History    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     Comment: [employer]   Social Needs    Financial resource strain: Not on file    Food insecurity:     Worry: Not on file     Inability: Not on file    Transportation needs:     Medical: Not on file     Non-medical: Not on file   Tobacco Use    Smoking status: Former Smoker     Packs/day: 1.00     Years: 5.00     Pack years: 5.00     Types: Cigarettes     Last attempt to quit: 1992     Years since quittin.8    Smokeless tobacco: Never Used   Substance and Sexual Activity    Alcohol use: No    Drug use: No    Sexual activity: Not Currently     Partners: Male   Lifestyle    Physical activity:     Days per week: Not on file     Minutes per session: Not on file    Stress: Not at all   Relationships    Social connections:     Talks on phone: Not on file     Gets together: Not on file     Attends Restoration service: Not on file     Active member of club or organization: Not on file     Attends meetings of clubs or organizations: Not on file     Relationship status: Not on file   Other Topics Concern    Not on file   Social History Narrative    Not on file     Past Surgical History:   Procedure  Laterality Date    FOOT SURGERY      HYSTERECTOMY      TUBAL LIGATION       Family History   Problem Relation Age of Onset    Sudden death Father     Cancer Mother     Hypertension Mother     Cancer Sister        Review of Systems   Constitutional: Positive for malaise/fatigue and unexpected weight change (lost another 3 lbs.Gained 1 lb). Negative for activity change, chills and fatigue.   HENT: Negative for congestion, postnasal drip and sinus pressure.    Eyes: Negative for pain, discharge and visual disturbance.   Respiratory: Negative for cough, chest tightness and shortness of breath.         Patient has been recently diagnosed with asthmatic bronchitis and has been followed with pulmonary. Her symptoms of cough have abated at this point. She is using Symbicort inhaler and Singulair.   Cardiovascular: Negative for leg swelling.        HTN. Lipids   Gastrointestinal: Negative for abdominal distention and anal bleeding.   Endocrine: Negative for polydipsia and polyphagia.        Diabetes mellitus type 2. Chronic kidney disease stage III.   Genitourinary: Negative for difficulty urinating.   Musculoskeletal: Positive for gait problem (stable). Negative for joint swelling.        Recent fall   Skin: Negative for color change, pallor and rash.   Allergic/Immunologic: Negative for environmental allergies, food allergies and immunocompromised state.   Neurological: Positive for dizziness, tremors and weakness. Negative for seizures, syncope, facial asymmetry, speech difficulty and light-headedness.        Fall   Hematological: Negative for adenopathy. Does not bruise/bleed easily.   Psychiatric/Behavioral: Negative for agitation, behavioral problems, confusion and dysphoric mood. The patient is not nervous/anxious.         Patient's has history of schizoaffective disorder. This seems to be stable. Some confusion of late. History of tendency to falling down. Cognitive issues.         Objective:      Blood  "pressure (!) 118/56, pulse 88, height 5' 7" (1.702 m), weight 62.6 kg (138 lb). Body mass index is 21.61 kg/m².  Physical Exam   Constitutional: She appears well-developed and well-nourished. She is cooperative. No distress.   HENT:   Head: Normocephalic and atraumatic.   Right Ear: Decreased hearing is noted.   Left Ear: Decreased hearing is noted.   Eyes: Conjunctivae, EOM and lids are normal. Lids are everted and swept, no foreign bodies found. Right pupil is round and reactive. Left pupil is round and reactive.   Neck: Trachea normal and normal range of motion. Neck supple.   Cardiovascular: Normal rate, regular rhythm, S1 normal, S2 normal and normal heart sounds.   Pulmonary/Chest: Breath sounds normal.   Abdominal: Soft. Bowel sounds are normal. She exhibits no distension. There is no tenderness. There is no rigidity and no guarding.   Cholelithiasis without cholecystitis   Musculoskeletal: She exhibits no edema or deformity.        Right foot: There is no deformity.        Left foot: There is no deformity.   Patient has somewhat unsteady gait. She tends to stoop forward while walking. Minimal hypertonia. Nothing remarkable. Minimal resting tremors. Her facial expression is flat.   Feet:   Right Foot:   Protective Sensation: 5 sites tested. 5 sites sensed.   Skin Integrity: Negative for ulcer or blister.   Left Foot:   Protective Sensation: 5 sites tested. 5 sites sensed.   Skin Integrity: Negative for ulcer or blister.   Lymphadenopathy:     She has no cervical adenopathy.     She has no axillary adenopathy.   Neurological: She is alert. She displays atrophy and tremor. She displays normal reflexes. No sensory deficit. Gait (more stable) abnormal. Coordination normal.   Patient has difficulty climbing up his step stool by herself. She is unsteady and needs to be held.    Mild increase in bone in the upper x-rays. Tremors'or visible on outstretched extremities rather than resting. No evidence of dystonia. "   Skin: Skin is warm and dry. No rash noted. No pallor.   Psychiatric: Her affect is blunt. Her speech is delayed. She is slowed. She is not agitated. Cognition and memory are impaired.   Nursing note and vitals reviewed.        Assessment:       1. Type 2 diabetes mellitus with stage 3 chronic kidney disease, without long-term current use of insulin    2. Unsteady gait    3. Benign essential hypertension    4. Multiple-type hyperlipidemia    5. Chronic kidney disease, stage 3 (moderate)    6. Dizziness           No visits with results within 3 Month(s) from this visit.   Latest known visit with results is:   Orders Only on 02/01/2019   Component Date Value Ref Range Status    Glucose 02/01/2019 172* 70 - 99 mg/dL Final    BUN, Bld 02/01/2019 27* 8 - 20 mg/dL Final    Creatinine 02/01/2019 2.08* 0.60 - 1.40 mg/dL Final    Calcium 02/01/2019 10.2  7.7 - 10.4 mg/dL Final    Sodium 02/01/2019 138  134 - 144 mmol/L Final    Potassium 02/01/2019 4.0  3.5 - 5.0 mmol/L Final    Chloride 02/01/2019 99  98 - 110 mmol/L Final    CO2 02/01/2019 27.4  22.8 - 31.6 mmol/L Final    Albumin 02/01/2019 4.1  3.1 - 4.7 g/dL Final    Total Bilirubin 02/01/2019 0.8  0.3 - 1.0 mg/dL Final    Alkaline Phosphatase 02/01/2019 90  40 - 104 IU/L Final    Total Protein 02/01/2019 7.3  6.0 - 8.2 g/dL Final    ALT (SGPT) 02/01/2019 17  3 - 33 IU/L Final    AST 02/01/2019 19  10 - 40 IU/L Final    Hemoglobin A1C 02/01/2019 6.1  3.1 - 6.5 % Final         Plan:           Type 2 diabetes mellitus with stage 3 chronic kidney disease, without long-term current use of insulin    Unsteady gait    Benign essential hypertension    Multiple-type hyperlipidemia    Chronic kidney disease, stage 3 (moderate)    Dizziness      Patient's blood sugars have been checked from home and there is no correlation between dizziness and any low sugar reaction.  In fact she did not have any low sugar symptoms or findings.    Patient's blood pressures are  stable at this point.      Patient has chronic kidney disease stage 3 and is taking calcitriol and calcium and vitamin D3 supplements.  She follows up with Nephrology for the same.    Patient does have an appointment for Dr. lucio this Thursday to make a decision on her lightheadedness and dizziness.    Spent more than 25 minutes with patient which involved review of his medical conditions, labs, medications and with 50% of time face-to-face discussion about medical problems, management and any applicable changes.      Current Outpatient Medications:     albuterol (PROVENTIL/VENTOLIN HFA) 90 mcg/actuation inhaler, Inhale 2 puffs into the lungs every 6 (six) hours as needed for Wheezing. Rescue, Disp: 1 Inhaler, Rfl: 4    amantadine HCl (SYMMETREL) 100 mg capsule, Take 100 mg by mouth once daily. , Disp: , Rfl:     ammonium lactate 12 % Crea, Apply topically., Disp: , Rfl:     aspirin (ECOTRIN) 81 MG EC tablet, Take 81 mg by mouth once daily., Disp: , Rfl:     budesonide-formoterol 160-4.5 mcg (SYMBICORT) 160-4.5 mcg/actuation HFAA, Inhale 2 puffs into the lungs every 12 (twelve) hours. Controller, Disp: , Rfl:     calcitRIOL (ROCALTROL) 0.25 MCG Cap, Take 1 capsule (0.25 mcg total) by mouth every Mon, Wed, Fri., Disp: 24 capsule, Rfl: 2    ferrous sulfate 324 mg (65 mg iron) TbEC, Take 325 mg by mouth once daily., Disp: , Rfl:     metoprolol succinate (TOPROL-XL) 50 MG 24 hr tablet, Take 1 tablet (50 mg total) by mouth once daily., Disp: 90 tablet, Rfl: 1    montelukast (SINGULAIR) 10 mg tablet, TK 1 T PO QHS, Disp: , Rfl: 2    olanzapine (ZYPREXA) 10 MG tablet, Take 10 mg by mouth every evening., Disp: , Rfl:     pantoprazole (PROTONIX) 40 MG tablet, Take 1 tablet (40 mg total) by mouth once daily., Disp: 90 tablet, Rfl: 3    potassium chloride SA (K-DUR,KLOR-CON) 20 MEQ tablet, TAKE 1 TABLET EVERY DAY, Disp: 90 tablet, Rfl: 3    pravastatin (PRAVACHOL) 40 MG tablet, Take 1 tablet (40 mg total) by  mouth nightly., Disp: 90 tablet, Rfl: 3    SITagliptin (JANUVIA) 50 MG Tab, TAKE 1 TABLET EVERY DAY, Disp: 90 tablet, Rfl: 1    trazodone (DESYREL) 100 MG tablet, Take 100 mg by mouth nightly as needed., Disp: , Rfl:     TRUE METRIX GLUCOSE TEST STRIP Strp, TEST BLOOD SUGAR EVERY DAY, Disp: 100 strip, Rfl: 3    TRUEPLUS LANCETS 33 gauge Misc, TEST  DAILY, Disp: 100 each, Rfl: 3

## 2019-07-09 ENCOUNTER — TELEPHONE (OUTPATIENT)
Dept: FAMILY MEDICINE | Facility: CLINIC | Age: 74
End: 2019-07-09

## 2019-07-09 NOTE — TELEPHONE ENCOUNTER
----- Message from Aiden Koch MD sent at 7/8/2019  5:51 PM CDT -----  Regarding: Dosage correction  Dosage of calcitriol is daily instead of 3 times a week.

## 2019-08-08 ENCOUNTER — OFFICE VISIT (OUTPATIENT)
Dept: FAMILY MEDICINE | Facility: CLINIC | Age: 74
End: 2019-08-08
Payer: MEDICARE

## 2019-08-08 VITALS
BODY MASS INDEX: 21.97 KG/M2 | WEIGHT: 140 LBS | DIASTOLIC BLOOD PRESSURE: 70 MMHG | HEART RATE: 97 BPM | SYSTOLIC BLOOD PRESSURE: 138 MMHG | HEIGHT: 67 IN

## 2019-08-08 DIAGNOSIS — R26.81 UNSTEADY GAIT: Chronic | ICD-10-CM

## 2019-08-08 DIAGNOSIS — E78.2 MULTIPLE-TYPE HYPERLIPIDEMIA: Chronic | ICD-10-CM

## 2019-08-08 DIAGNOSIS — E11.9 TYPE 2 DIABETES MELLITUS WITHOUT COMPLICATION, WITHOUT LONG-TERM CURRENT USE OF INSULIN: Primary | Chronic | ICD-10-CM

## 2019-08-08 DIAGNOSIS — N18.30 CHRONIC KIDNEY DISEASE, STAGE 3 (MODERATE): Chronic | ICD-10-CM

## 2019-08-08 DIAGNOSIS — I10 BENIGN ESSENTIAL HYPERTENSION: Chronic | ICD-10-CM

## 2019-08-08 DIAGNOSIS — F20.9 CHRONIC SCHIZOPHRENIA: ICD-10-CM

## 2019-08-08 PROBLEM — R42 DIZZINESS: Status: RESOLVED | Noted: 2018-11-16 | Resolved: 2019-08-08

## 2019-08-08 PROBLEM — E53.8 VITAMIN B12 DEFICIENCY: Status: RESOLVED | Noted: 2018-11-16 | Resolved: 2019-08-08

## 2019-08-08 PROBLEM — G47.00 INSOMNIA: Status: RESOLVED | Noted: 2017-07-11 | Resolved: 2019-08-08

## 2019-08-08 PROBLEM — R63.4 WEIGHT LOSS: Status: RESOLVED | Noted: 2018-04-05 | Resolved: 2019-08-08

## 2019-08-08 PROBLEM — M79.674 TOE PAIN, RIGHT: Status: RESOLVED | Noted: 2017-03-20 | Resolved: 2019-08-08

## 2019-08-08 PROCEDURE — 99214 OFFICE O/P EST MOD 30 MIN: CPT | Mod: S$GLB,,, | Performed by: INTERNAL MEDICINE

## 2019-08-08 PROCEDURE — 3044F PR MOST RECENT HEMOGLOBIN A1C LEVEL <7.0%: ICD-10-PCS | Mod: S$GLB,,, | Performed by: INTERNAL MEDICINE

## 2019-08-08 PROCEDURE — 99999 PR PBB SHADOW E&M-EST. PATIENT-LVL IV: ICD-10-PCS | Mod: PBBFAC,,, | Performed by: INTERNAL MEDICINE

## 2019-08-08 PROCEDURE — 99999 PR PBB SHADOW E&M-EST. PATIENT-LVL IV: CPT | Mod: PBBFAC,,, | Performed by: INTERNAL MEDICINE

## 2019-08-08 PROCEDURE — 3075F SYST BP GE 130 - 139MM HG: CPT | Mod: S$GLB,,, | Performed by: INTERNAL MEDICINE

## 2019-08-08 PROCEDURE — 99214 PR OFFICE/OUTPT VISIT, EST, LEVL IV, 30-39 MIN: ICD-10-PCS | Mod: S$GLB,,, | Performed by: INTERNAL MEDICINE

## 2019-08-08 PROCEDURE — 3044F HG A1C LEVEL LT 7.0%: CPT | Mod: S$GLB,,, | Performed by: INTERNAL MEDICINE

## 2019-08-08 PROCEDURE — 3075F PR MOST RECENT SYSTOLIC BLOOD PRESS GE 130-139MM HG: ICD-10-PCS | Mod: S$GLB,,, | Performed by: INTERNAL MEDICINE

## 2019-08-08 PROCEDURE — 1101F PT FALLS ASSESS-DOCD LE1/YR: CPT | Mod: S$GLB,,, | Performed by: INTERNAL MEDICINE

## 2019-08-08 PROCEDURE — 3078F PR MOST RECENT DIASTOLIC BLOOD PRESSURE < 80 MM HG: ICD-10-PCS | Mod: S$GLB,,, | Performed by: INTERNAL MEDICINE

## 2019-08-08 PROCEDURE — 3078F DIAST BP <80 MM HG: CPT | Mod: S$GLB,,, | Performed by: INTERNAL MEDICINE

## 2019-08-08 PROCEDURE — 1101F PR PT FALLS ASSESS DOC 0-1 FALLS W/OUT INJ PAST YR: ICD-10-PCS | Mod: S$GLB,,, | Performed by: INTERNAL MEDICINE

## 2019-08-08 RX ORDER — GLIMEPIRIDE 2 MG/1
2 TABLET ORAL 2 TIMES DAILY
COMMUNITY
End: 2019-08-08

## 2019-08-08 NOTE — PATIENT INSTRUCTIONS
Diet: Diabetes  Food is an important tool that you can use to control diabetes and stay healthy. Eating well-balanced meals in the correct amounts will help you control your blood glucose levels and prevent low blood sugar reactions. It will also help you reduce the health risks of diabetes. There is no one specific diet that is right for everyone with diabetes. But there are general guidelines to follow. A registered dietitian (RD) will create a tailored diet approach thats just right for you. He or she will also help you plan healthy meals and snacks. If you have any questions, call your dietitian for advice.     Guidelines for success  Talk with your healthcare provider before starting a diabetes diet or weight loss program. If you haven't talked with a dietitian yet, ask your provider for a referral. The following guidelines can help you succeed:  · Select foods from the 6 food groups below. Your dietitian will help you find food choices within each group. He or she will also show you serving sizes and how many servings you can have at each meal.  ¨ Grains, beans, and starchy vegetables  ¨ Vegetables  ¨ Fruit  ¨ Milk or yogurt  ¨ Meat, poultry, fish, or tofu  ¨ Healthy fats  · Check your blood sugar levels as directed by your provider. Take any medicine as prescribed by your provider.  · Learn to read food labels and pick the right portion sizes.  · Eat only the amount of food in your meal plan. Eat about the same amount of food at regular times each day. Dont skip meals. Eat meals 4 to 5 hours apart, with snacks in between.  · Limit alcohol. It raises blood sugar levels. Drink water or calorie-free diet drinks that use safe sweeteners.  · Eat less fat to help lower your risk of heart disease. Use nonfat or low-fat dairy products and lean meats. Avoid fried foods. Use cooking oils that are unsaturated, such as olive, canola, or peanut oil.  · Talk with your dietitian about safe sugar substitutes.  · Avoid  added salt. It can contribute to high blood pressure, which can cause heart disease. People with diabetes already have a risk of high blood pressure and heart disease.  · Stay at a healthy weight. If you need to lose weight, cut down on your portion sizes. But dont skip meals. Exercise is an important part of any weight management program. Talk with your provider about an exercise program thats right for you.  · For more information about the best diet plan for you, talk with a registered dietitian (RD). To find an RD in your area, contact:  ¨ Academy of Nutrition and Dietetics www.eatright.org  ¨ The American Diabetes Association [phone] www.diabetes.org  Date Last Reviewed: 8/1/2016 © 2000-2017 [employer]. [address] [place] [address], [place] [address]. All rights reserved. This information is not intended as a substitute for professional medical care. Always follow your healthcare professional's instructions.

## 2019-08-08 NOTE — PROGRESS NOTES
Subjective:       Patient ID: Adriana Perez is a 73 y.o. female.    Chief Complaint: Diabetes; Hypertension; Chronic Kidney Disease; and Gait Problem    Ms. Adriana Perez is a 73-year-old  female who comes for follow-up and is accompanied today with her son  Patient historically has been a poor historian.  . Unsteady gait persist and no falls,      I had advised her granddaughter to keep a tab on her blood sugars if there is any correlation  between her dizziness/lightheadedness and low sugar reaction.  I discontinued the glimepiride recently.  Also reduced the metoprolol to 1 pill a day.      I have reviewed the blood sugars from home add on occasions that patient has felt somewhat dizzy and unsteady-there was no evidence of hypoglycemia.    Previously, I had asked the family members to keep tabs on her dizziness and blood sugar levels.  It seems that the blood sugars do go down somewhat in 90s and 100s and she feels somewhat lightheaded and dizzy.    Saw  Dr Kole MCMAHAN neurology had seen her at least on a couple of occasions.  I do not have any access to records.  Patient son does not know as to the conclusion of those two encounters.  As per the son from his secondhand information-everything was okay. It is still unclear as to her gait imbalance and cognitive decline.  Patient herself is not very cognitively astute to realize as to the outcome and nature of that particular neurology consultation.    Patient also had weight loss and there was a suspicious lump in her abdomen following which a CT scan of abdominal pelvis was done.  It had just shown gallstones but no other significant abnormality.  Patient denies any pain in the right upper quadrant.    Diabetes   She presents for her follow-up diabetic visit. She has type 2 diabetes mellitus. Hypoglycemia symptoms include dizziness and tremors. Pertinent negatives for hypoglycemia include no confusion, nervousness/anxiousness, pallor, seizures  or speech difficulty. Associated symptoms include weakness. Pertinent negatives for diabetes include no fatigue, no polydipsia and no polyphagia. Current diabetic treatment includes oral agent (monotherapy). She is compliant with treatment most of the time. Her weight is stable. Meal planning includes avoidance of concentrated sweets. She never participates in exercise. Her breakfast blood glucose range is generally 130-140 mg/dl. An ACE inhibitor/angiotensin II receptor blocker is contraindicated. Eye exam is current.   Hypertension   This is a chronic problem. The current episode started more than 1 year ago. The problem is controlled. Associated symptoms include malaise/fatigue. Pertinent negatives include no shortness of breath. Risk factors for coronary artery disease include sedentary lifestyle. Past treatments include beta blockers. The current treatment provides moderate improvement. Identifiable causes of hypertension include chronic renal disease (stage 3). There is no history of hyperaldosteronism, hypercortisolism or sleep apnea.   Dizziness:   Chronicity:  New  Onset:  More than 1 month ago  Progression since onset:  Waxing and waning  Dizziness characteristics:  Sensation of movement, off-balance, giddiness, height vertigo and blacking out   Associated symptoms: weakness.no light-headedness.no cardiac surgery, no balance testing and no environmental allergies.  Hyperlipidemia   This is a chronic problem. The current episode started more than 1 year ago. Exacerbating diseases include chronic renal disease (stage 3). She has no history of obesity. Pertinent negatives include no shortness of breath. Current antihyperlipidemic treatment includes statins. Risk factors for coronary artery disease include a sedentary lifestyle, hypertension, dyslipidemia and diabetes mellitus.       Past Medical History:   Diagnosis Date    Acid reflux     Allergy     lisinopril    Anxiety     Bilateral renal cysts  2018    Renal USG by Dr Foy    CKD (chronic kidney disease), stage III     Depression     Diabetes mellitus     Diabetes mellitus, type 2     Disorder of kidney and ureter     Echogenic kidneys on renal ultrasound 2018    Dr Lance Foy- Also Renal cysts bilateral    Hyperlipidemia     Hypertension     MVP (mitral valve prolapse)     Primary osteoarthritis of left knee     Restless leg     Schizophrenia, simple, chronic     Urinary, incontinence, stress female      Social History     Socioeconomic History    Marital status:      Spouse name: Willy Perez    Number of children: 2    Years of education: Not on file    Highest education level: Not on file   Occupational History    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     Comment: [employer]   Social Needs    Financial resource strain: Not on file    Food insecurity:     Worry: Not on file     Inability: Not on file    Transportation needs:     Medical: Not on file     Non-medical: Not on file   Tobacco Use    Smoking status: Former Smoker     Packs/day: 1.00     Years: 5.00     Pack years: 5.00     Types: Cigarettes     Last attempt to quit: 1992     Years since quittin.9    Smokeless tobacco: Never Used   Substance and Sexual Activity    Alcohol use: No    Drug use: No    Sexual activity: Not Currently     Partners: Male   Lifestyle    Physical activity:     Days per week: Not on file     Minutes per session: Not on file    Stress: Not at all   Relationships    Social connections:     Talks on phone: Not on file     Gets together: Not on file     Attends Worship service: Not on file     Active member of club or organization: Not on file     Attends meetings of clubs or organizations: Not on file     Relationship status: Not on file   Other Topics Concern    Not on file   Social History Narrative    Not on file     Past Surgical History:   Procedure Laterality Date    FOOT SURGERY       HYSTERECTOMY      TUBAL LIGATION       Family History   Problem Relation Age of Onset    Sudden death Father     Cancer Mother     Hypertension Mother     Cancer Sister        Review of Systems   Constitutional: Positive for malaise/fatigue and unexpected weight change (Gained 3 lbs). Negative for activity change, chills and fatigue.   HENT: Negative for congestion, postnasal drip and sinus pressure.    Eyes: Negative for pain, discharge and visual disturbance.   Respiratory: Negative for cough, chest tightness and shortness of breath.         Patient has been recently diagnosed with asthmatic bronchitis and has been followed with pulmonary. Her symptoms of cough have abated at this point. She is using Symbicort inhaler and Singulair.   Cardiovascular: Negative for leg swelling.        HTN. Lipids   Gastrointestinal: Negative for abdominal distention and anal bleeding.   Endocrine: Negative for polydipsia and polyphagia.        Diabetes mellitus type 2. Chronic kidney disease stage III.   Genitourinary: Negative for difficulty urinating, frequency and menstrual problem.   Musculoskeletal: Positive for gait problem (stable). Negative for joint swelling.        Recent fall   Skin: Negative for color change, pallor and rash.   Allergic/Immunologic: Negative for environmental allergies, food allergies and immunocompromised state.   Neurological: Positive for dizziness, tremors and weakness. Negative for seizures, syncope, facial asymmetry, speech difficulty and light-headedness.        Fall   Hematological: Negative for adenopathy. Does not bruise/bleed easily.   Psychiatric/Behavioral: Negative for agitation, behavioral problems, confusion and dysphoric mood. The patient is not nervous/anxious.         Patient's has history of schizoaffective disorder. This seems to be stable. Some confusion of late. History of tendency to falling down. Cognitive issues.         Objective:      Blood pressure 138/70, pulse 97,  "height 5' 7" (1.702 m), weight 63.5 kg (140 lb). Body mass index is 21.93 kg/m².  Physical Exam   Constitutional: She appears well-developed and well-nourished. She is cooperative. No distress.   HENT:   Head: Normocephalic and atraumatic.   Right Ear: Decreased hearing is noted.   Left Ear: Decreased hearing is noted.   Eyes: Conjunctivae, EOM and lids are normal. Lids are everted and swept, no foreign bodies found. Right pupil is round and reactive. Left pupil is round and reactive.   Neck: Trachea normal and normal range of motion. Neck supple.   Cardiovascular: Normal rate, regular rhythm, S1 normal, S2 normal and normal heart sounds.   Pulmonary/Chest: Breath sounds normal.   Abdominal: Soft. Bowel sounds are normal. She exhibits no distension. There is no tenderness. There is no rigidity and no guarding.   Cholelithiasis without cholecystitis   Musculoskeletal: She exhibits no edema, tenderness or deformity.        Right foot: There is no deformity.        Left foot: There is no deformity.   Patient has somewhat unsteady gait. She tends to stoop forward while walking. Minimal hypertonia. Nothing remarkable. Minimal resting tremors. Her facial expression is flat.   Feet:   Right Foot:   Protective Sensation: 5 sites tested. 5 sites sensed.   Skin Integrity: Negative for ulcer or blister.   Left Foot:   Protective Sensation: 5 sites tested. 5 sites sensed.   Skin Integrity: Negative for ulcer or blister.   Lymphadenopathy:     She has no cervical adenopathy.     She has no axillary adenopathy.   Neurological: She is alert. She displays atrophy. Gait (more stable) abnormal. Coordination normal.   Patient seem to be able to step on the steps to today with is somewhat slightly unsteady posterior.  She did not need any assistance.   Skin: Skin is warm and dry. No rash noted. No erythema. No pallor.   Psychiatric: Her affect is blunt. Her speech is delayed. She is slowed. She is not agitated. Cognition and memory are " impaired.   Nursing note and vitals reviewed.        Assessment:       1. Type 2 diabetes mellitus without complication, without long-term current use of insulin    2. Unsteady gait    3. Benign essential hypertension    4. Multiple-type hyperlipidemia    5. Chronic kidney disease, stage 3 (moderate)           No visits with results within 3 Month(s) from this visit.   Latest known visit with results is:   Orders Only on 02/01/2019   Component Date Value Ref Range Status    Glucose 02/01/2019 172* 70 - 99 mg/dL Final    BUN, Bld 02/01/2019 27* 8 - 20 mg/dL Final    Creatinine 02/01/2019 2.08* 0.60 - 1.40 mg/dL Final    Calcium 02/01/2019 10.2  7.7 - 10.4 mg/dL Final    Sodium 02/01/2019 138  134 - 144 mmol/L Final    Potassium 02/01/2019 4.0  3.5 - 5.0 mmol/L Final    Chloride 02/01/2019 99  98 - 110 mmol/L Final    CO2 02/01/2019 27.4  22.8 - 31.6 mmol/L Final    Albumin 02/01/2019 4.1  3.1 - 4.7 g/dL Final    Total Bilirubin 02/01/2019 0.8  0.3 - 1.0 mg/dL Final    Alkaline Phosphatase 02/01/2019 90  40 - 104 IU/L Final    Total Protein 02/01/2019 7.3  6.0 - 8.2 g/dL Final    ALT (SGPT) 02/01/2019 17  3 - 33 IU/L Final    AST 02/01/2019 19  10 - 40 IU/L Final    Hemoglobin A1C 02/01/2019 6.1  3.1 - 6.5 % Final         Plan:           Type 2 diabetes mellitus without complication, without long-term current use of insulin  Comments:  Currently patient is taking Januvia.  Blood sugars seem to be fairly stable with a few drops in low 100s which may be attributed to her episodes of dizziness.  Orders:  -     Hemoglobin A1c; Future; Expected date: 08/08/2019    Unsteady gait  Comments:  Patient persists to have somewhat unsteady gait though more stable.  Neurology evaluation outcome is unclear.    Benign essential hypertension  Comments:  Patient's blood pressures are borderline elevated in the office.  Patient states that her blood pressures are okay at home.  Advice on to monitor her blood  pres    Multiple-type hyperlipidemia  Comments:  Patient taking her medications without problems.    Chronic kidney disease, stage 3 (moderate)  Comments:  Patient advised to remain hydrated and avoid unnecessary medications and NSAIDs.  Keep follow-up with Nephrology.      Patient's blood sugars have been checked from home and there is no correlation between dizziness and any low sugar reaction.  In fact she did not have any low sugar symptoms or findings.    Patient's blood pressures are stable at this point.      Patient has chronic kidney disease stage 3 and is taking calcitriol and calcium and vitamin D3 supplements.  She follows up with Nephrology for the same.    Check hemoglobin A1c for next visit.  Fall precautions.  Son advised to monitor her blood pressures and notify me accordingly.  Next visit with Neurology they need to again address the issue of her imbalance and gait problems and if it is related to any neurological issue.    Follow-up in 2 months.    Spent more than 25 minutes with patient which involved review of his medical conditions, labs, medications and with 50% of time face-to-face discussion about medical problems, management and any applicable changes.      Current Outpatient Medications:     albuterol (PROVENTIL/VENTOLIN HFA) 90 mcg/actuation inhaler, Inhale 2 puffs into the lungs every 6 (six) hours as needed for Wheezing. Rescue, Disp: 1 Inhaler, Rfl: 4    amantadine HCl (SYMMETREL) 100 mg capsule, Take 100 mg by mouth 2 (two) times daily. , Disp: , Rfl:     ammonium lactate 12 % Crea, Apply topically., Disp: , Rfl:     aspirin (ECOTRIN) 81 MG EC tablet, Take 81 mg by mouth once daily., Disp: , Rfl:     budesonide-formoterol 160-4.5 mcg (SYMBICORT) 160-4.5 mcg/actuation HFAA, Inhale 2 puffs into the lungs every 12 (twelve) hours. Controller, Disp: , Rfl:     calcitRIOL (ROCALTROL) 0.25 MCG Cap, Take 1 capsule (0.25 mcg total) by mouth every Mon, Wed, Fri., Disp: 24 capsule, Rfl:  2    ferrous sulfate 324 mg (65 mg iron) TbEC, Take 325 mg by mouth once daily., Disp: , Rfl:     metoprolol succinate (TOPROL-XL) 50 MG 24 hr tablet, Take 1 tablet (50 mg total) by mouth once daily., Disp: 90 tablet, Rfl: 1    montelukast (SINGULAIR) 10 mg tablet, TK 1 T PO QHS, Disp: , Rfl: 2    olanzapine (ZYPREXA) 10 MG tablet, Take 10 mg by mouth every evening., Disp: , Rfl:     pantoprazole (PROTONIX) 40 MG tablet, Take 1 tablet (40 mg total) by mouth once daily., Disp: 90 tablet, Rfl: 3    potassium chloride SA (K-DUR,KLOR-CON) 20 MEQ tablet, TAKE 1 TABLET EVERY DAY, Disp: 90 tablet, Rfl: 3    pravastatin (PRAVACHOL) 40 MG tablet, Take 1 tablet (40 mg total) by mouth nightly., Disp: 90 tablet, Rfl: 3    SITagliptin (JANUVIA) 50 MG Tab, TAKE 1 TABLET EVERY DAY, Disp: 90 tablet, Rfl: 1    trazodone (DESYREL) 100 MG tablet, Take 100 mg by mouth nightly as needed., Disp: , Rfl:     TRUE METRIX GLUCOSE TEST STRIP Strp, TEST BLOOD SUGAR EVERY DAY, Disp: 100 strip, Rfl: 3    TRUEPLUS LANCETS 33 gauge Misc, TEST  DAILY, Disp: 100 each, Rfl: 3

## 2019-08-12 DIAGNOSIS — Z12.39 SCREENING BREAST EXAMINATION: Primary | ICD-10-CM

## 2019-08-12 RX ORDER — POTASSIUM CHLORIDE 20 MEQ/1
20 TABLET, EXTENDED RELEASE ORAL DAILY
Qty: 90 TABLET | Refills: 3 | Status: SHIPPED | OUTPATIENT
Start: 2019-08-12 | End: 2020-04-20 | Stop reason: SDUPTHER

## 2019-08-18 RX ORDER — BUDESONIDE AND FORMOTEROL FUMARATE DIHYDRATE 160; 4.5 UG/1; UG/1
AEROSOL RESPIRATORY (INHALATION)
Qty: 1 INHALER | Refills: 6 | Status: SHIPPED | OUTPATIENT
Start: 2019-08-18 | End: 2019-10-14

## 2019-08-22 DIAGNOSIS — R05.9 COUGH: Primary | ICD-10-CM

## 2019-08-22 RX ORDER — BUDESONIDE AND FORMOTEROL FUMARATE DIHYDRATE 160; 4.5 UG/1; UG/1
2 AEROSOL RESPIRATORY (INHALATION) EVERY 12 HOURS
Qty: 1 INHALER | Refills: 3 | Status: SHIPPED | OUTPATIENT
Start: 2019-08-22 | End: 2019-10-14 | Stop reason: SDUPTHER

## 2019-08-22 NOTE — TELEPHONE ENCOUNTER
----- Message from Jaleel Amaro MA sent at 8/22/2019  9:59 AM CDT -----  Contact: [email or web address]  Can you check on this also  ----- Message -----  From: Josseline Sandoval  Sent: 8/21/2019   4:23 PM  To: [facility] Staff    Stated [facility] sent  Refill request but has not gotten anything back as of today. Granddaughter following up on Rx refill.      Thank You      Josseline

## 2019-08-22 NOTE — TELEPHONE ENCOUNTER
She also stated that she wanst a nasal spray that we prescribed her but I don't see anything astelin

## 2019-08-23 ENCOUNTER — HOSPITAL ENCOUNTER (OUTPATIENT)
Dept: RADIOLOGY | Facility: HOSPITAL | Age: 74
Discharge: HOME | End: 2019-08-23
Attending: SPECIALIST
Payer: MEDICARE

## 2019-08-23 VITALS — WEIGHT: 140 LBS | HEIGHT: 67 IN | BODY MASS INDEX: 21.97 KG/M2

## 2019-08-23 DIAGNOSIS — Z12.39 SCREENING BREAST EXAMINATION: ICD-10-CM

## 2019-08-23 PROCEDURE — 77067 SCR MAMMO BI INCL CAD: CPT | Mod: TC,PO

## 2019-08-29 ENCOUNTER — LAB VISIT (OUTPATIENT)
Dept: LAB | Facility: HOSPITAL | Age: 74
End: 2019-08-29
Attending: INTERNAL MEDICINE
Payer: MEDICARE

## 2019-08-29 DIAGNOSIS — D63.1 ANEMIA OF CHRONIC RENAL FAILURE: ICD-10-CM

## 2019-08-29 DIAGNOSIS — N18.9 ANEMIA OF CHRONIC RENAL FAILURE: ICD-10-CM

## 2019-08-29 DIAGNOSIS — N25.81 SECONDARY HYPERPARATHYROIDISM OF RENAL ORIGIN: ICD-10-CM

## 2019-08-29 DIAGNOSIS — I10 ESSENTIAL HYPERTENSION, MALIGNANT: ICD-10-CM

## 2019-08-29 DIAGNOSIS — E11.9 DIABETES MELLITUS WITHOUT COMPLICATION: ICD-10-CM

## 2019-08-29 DIAGNOSIS — N18.30 CHRONIC KIDNEY DISEASE, STAGE III (MODERATE): ICD-10-CM

## 2019-08-29 DIAGNOSIS — J01.90 ACUTE SINUSITIS, UNSPECIFIED: ICD-10-CM

## 2019-08-29 DIAGNOSIS — R80.9 PROTEINURIA: ICD-10-CM

## 2019-08-29 DIAGNOSIS — N17.9 ACUTE KIDNEY FAILURE, UNSPECIFIED: Primary | ICD-10-CM

## 2019-08-29 LAB
25(OH)D3+25(OH)D2 SERPL-MCNC: 58 NG/ML (ref 30–96)
ALBUMIN SERPL BCP-MCNC: 3.7 G/DL (ref 3.5–5.2)
ALP SERPL-CCNC: 71 U/L (ref 55–135)
ALT SERPL W/O P-5'-P-CCNC: 13 U/L (ref 10–44)
ANION GAP SERPL CALC-SCNC: 9 MMOL/L (ref 8–16)
AST SERPL-CCNC: 16 U/L (ref 10–40)
BASOPHILS # BLD AUTO: 0.03 K/UL (ref 0–0.2)
BASOPHILS NFR BLD: 0.5 % (ref 0–1.9)
BILIRUB SERPL-MCNC: 0.6 MG/DL (ref 0.1–1)
BUN SERPL-MCNC: 32 MG/DL (ref 8–23)
CALCIUM SERPL-MCNC: 10.2 MG/DL (ref 8.7–10.5)
CHLORIDE SERPL-SCNC: 100 MMOL/L (ref 95–110)
CO2 SERPL-SCNC: 29 MMOL/L (ref 23–29)
CREAT SERPL-MCNC: 2 MG/DL (ref 0.5–1.4)
DIFFERENTIAL METHOD: ABNORMAL
EOSINOPHIL # BLD AUTO: 0.1 K/UL (ref 0–0.5)
EOSINOPHIL NFR BLD: 1.3 % (ref 0–8)
ERYTHROCYTE [DISTWIDTH] IN BLOOD BY AUTOMATED COUNT: 14.9 % (ref 11.5–14.5)
EST. GFR  (AFRICAN AMERICAN): 28.1 ML/MIN/1.73 M^2
EST. GFR  (NON AFRICAN AMERICAN): 24.4 ML/MIN/1.73 M^2
FERRITIN SERPL-MCNC: 59 NG/ML (ref 20–300)
GLUCOSE SERPL-MCNC: 156 MG/DL (ref 70–110)
HCT VFR BLD AUTO: 34.9 % (ref 37–48.5)
HGB BLD-MCNC: 10.9 G/DL (ref 12–16)
IMM GRANULOCYTES # BLD AUTO: 0.02 K/UL (ref 0–0.04)
IMM GRANULOCYTES NFR BLD AUTO: 0.3 % (ref 0–0.5)
IRON SERPL-MCNC: 50 UG/DL (ref 30–160)
LYMPHOCYTES # BLD AUTO: 1.8 K/UL (ref 1–4.8)
LYMPHOCYTES NFR BLD: 28.9 % (ref 18–48)
MCH RBC QN AUTO: 25.4 PG (ref 27–31)
MCHC RBC AUTO-ENTMCNC: 31.2 G/DL (ref 32–36)
MCV RBC AUTO: 81 FL (ref 82–98)
MONOCYTES # BLD AUTO: 0.6 K/UL (ref 0.3–1)
MONOCYTES NFR BLD: 9.7 % (ref 4–15)
NEUTROPHILS # BLD AUTO: 3.8 K/UL (ref 1.8–7.7)
NEUTROPHILS NFR BLD: 59.3 % (ref 38–73)
NRBC BLD-RTO: 0 /100 WBC
PHOSPHATE SERPL-MCNC: 3.3 MG/DL (ref 2.7–4.5)
PLATELET # BLD AUTO: 203 K/UL (ref 150–350)
PMV BLD AUTO: 10.9 FL (ref 9.2–12.9)
POTASSIUM SERPL-SCNC: 4 MMOL/L (ref 3.5–5.1)
PROT SERPL-MCNC: 7.1 G/DL (ref 6–8.4)
RBC # BLD AUTO: 4.29 M/UL (ref 4–5.4)
SATURATED IRON: 17 % (ref 20–50)
SODIUM SERPL-SCNC: 138 MMOL/L (ref 136–145)
TOTAL IRON BINDING CAPACITY: 297 UG/DL (ref 250–450)
TRANSFERRIN SERPL-MCNC: 212 MG/DL (ref 200–375)
WBC # BLD AUTO: 6.37 K/UL (ref 3.9–12.7)

## 2019-08-29 PROCEDURE — 82306 VITAMIN D 25 HYDROXY: CPT

## 2019-08-29 PROCEDURE — 84100 ASSAY OF PHOSPHORUS: CPT

## 2019-08-29 PROCEDURE — 83540 ASSAY OF IRON: CPT

## 2019-08-29 PROCEDURE — 82728 ASSAY OF FERRITIN: CPT

## 2019-08-29 PROCEDURE — 85025 COMPLETE CBC W/AUTO DIFF WBC: CPT

## 2019-08-29 PROCEDURE — 80053 COMPREHEN METABOLIC PANEL: CPT

## 2019-08-29 PROCEDURE — 36415 COLL VENOUS BLD VENIPUNCTURE: CPT

## 2019-09-01 ENCOUNTER — OUTSIDE PLACE OF SERVICE (OUTPATIENT)
Dept: FAMILY MEDICINE | Facility: CLINIC | Age: 74
End: 2019-09-01
Payer: MEDICARE

## 2019-09-01 PROCEDURE — G0179 PR HOME HEALTH MD RECERTIFICATION: ICD-10-PCS | Mod: ,,, | Performed by: INTERNAL MEDICINE

## 2019-09-01 PROCEDURE — G0179 MD RECERTIFICATION HHA PT: HCPCS | Mod: ,,, | Performed by: INTERNAL MEDICINE

## 2019-09-09 DIAGNOSIS — J45.40 MODERATE PERSISTENT ASTHMA WITHOUT COMPLICATION: Primary | ICD-10-CM

## 2019-09-09 RX ORDER — FLUTICASONE PROPIONATE 50 MCG
1 SPRAY, SUSPENSION (ML) NASAL DAILY
Qty: 1 BOTTLE | Refills: 3 | Status: SHIPPED | OUTPATIENT
Start: 2019-09-09 | End: 2020-04-20 | Stop reason: SDUPTHER

## 2019-09-10 ENCOUNTER — OFFICE VISIT (OUTPATIENT)
Dept: FAMILY MEDICINE | Facility: CLINIC | Age: 74
End: 2019-09-10
Payer: MEDICARE

## 2019-09-10 VITALS
BODY MASS INDEX: 22.44 KG/M2 | DIASTOLIC BLOOD PRESSURE: 76 MMHG | HEART RATE: 78 BPM | WEIGHT: 143 LBS | HEIGHT: 67 IN | SYSTOLIC BLOOD PRESSURE: 110 MMHG

## 2019-09-10 DIAGNOSIS — M25.561 CHRONIC PAIN OF BOTH KNEES: Primary | ICD-10-CM

## 2019-09-10 DIAGNOSIS — R26.81 UNSTEADY GAIT: ICD-10-CM

## 2019-09-10 DIAGNOSIS — G89.29 CHRONIC PAIN OF BOTH KNEES: Primary | ICD-10-CM

## 2019-09-10 DIAGNOSIS — M25.562 CHRONIC PAIN OF BOTH KNEES: Primary | ICD-10-CM

## 2019-09-10 PROCEDURE — 3078F DIAST BP <80 MM HG: CPT | Mod: S$GLB,,, | Performed by: INTERNAL MEDICINE

## 2019-09-10 PROCEDURE — 99999 PR PBB SHADOW E&M-EST. PATIENT-LVL III: ICD-10-PCS | Mod: PBBFAC,,, | Performed by: INTERNAL MEDICINE

## 2019-09-10 PROCEDURE — 99213 PR OFFICE/OUTPT VISIT, EST, LEVL III, 20-29 MIN: ICD-10-PCS | Mod: S$GLB,,, | Performed by: INTERNAL MEDICINE

## 2019-09-10 PROCEDURE — 1101F PT FALLS ASSESS-DOCD LE1/YR: CPT | Mod: S$GLB,,, | Performed by: INTERNAL MEDICINE

## 2019-09-10 PROCEDURE — 3074F SYST BP LT 130 MM HG: CPT | Mod: S$GLB,,, | Performed by: INTERNAL MEDICINE

## 2019-09-10 PROCEDURE — 1101F PR PT FALLS ASSESS DOC 0-1 FALLS W/OUT INJ PAST YR: ICD-10-PCS | Mod: S$GLB,,, | Performed by: INTERNAL MEDICINE

## 2019-09-10 PROCEDURE — 99999 PR PBB SHADOW E&M-EST. PATIENT-LVL III: CPT | Mod: PBBFAC,,, | Performed by: INTERNAL MEDICINE

## 2019-09-10 PROCEDURE — 3078F PR MOST RECENT DIASTOLIC BLOOD PRESSURE < 80 MM HG: ICD-10-PCS | Mod: S$GLB,,, | Performed by: INTERNAL MEDICINE

## 2019-09-10 PROCEDURE — 99213 OFFICE O/P EST LOW 20 MIN: CPT | Mod: S$GLB,,, | Performed by: INTERNAL MEDICINE

## 2019-09-10 PROCEDURE — 3074F PR MOST RECENT SYSTOLIC BLOOD PRESSURE < 130 MM HG: ICD-10-PCS | Mod: S$GLB,,, | Performed by: INTERNAL MEDICINE

## 2019-09-10 NOTE — PROGRESS NOTES
Subjective:       Patient ID: Adriana Perez is a 73 y.o. female.    Chief Complaint: Knee Pain    Patient comes back for follow-up.  She has bilateral knee pains.  More on the right side.  She had seen Dr. Wing in past were given injection she had felt better temporarily.  She has been told that she needs knee replacement surgery.    She also has some difficulty walking.  She uses a cane for ambulation and balance.    In the meantime I have a echocardiogram report from the cardiology office.  It shows eccentric left ventricular hypertrophy with normal ejection fraction of 66%.  Grade 1 diastolic dysfunction noted.  Also noted mild tricuspid regurgitation and estimated pulmonary artery pressure of 32 mm of mercury.  Dr. Clay.    Knee Pain    The incident occurred more than 1 week ago. There was no injury mechanism. The pain is present in the right knee. The quality of the pain is described as cramping. The pain is moderate. The pain has been fluctuating since onset. Pertinent negatives include no inability to bear weight, loss of sensation or muscle weakness. The symptoms are aggravated by movement, palpation and weight bearing.       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/2018    Dr Lance Foy- Also Renal cysts bilateral    Hyperlipidemia     Hypertension     MVP (mitral valve prolapse)     Primary osteoarthritis of left knee     Restless leg     Schizophrenia, simple, chronic     Urinary, incontinence, stress female      Social History     Socioeconomic History    Marital status:      Spouse name: Willy Perez    Number of children: 2    Years of education: Not on file     Highest education level: Not on file   Occupational History    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     Comment: [employer]   Social Needs    Financial resource strain: Not on file    Food insecurity:     Worry: Not on file     Inability: Not on file    Transportation needs:     Medical: Not on file     Non-medical: Not on file   Tobacco Use    Smoking status: Former Smoker     Packs/day: 1.00     Years: 5.00     Pack years: 5.00     Types: Cigarettes     Last attempt to quit: 1992     Years since quittin.0    Smokeless tobacco: Never Used   Substance and Sexual Activity    Alcohol use: No    Drug use: No    Sexual activity: Not Currently     Partners: Male   Lifestyle    Physical activity:     Days per week: Not on file     Minutes per session: Not on file    Stress: Not at all   Relationships    Social connections:     Talks on phone: Not on file     Gets together: Not on file     Attends Church service: Not on file     Active member of club or organization: Not on file     Attends meetings of clubs or organizations: Not on file     Relationship status: Not on file   Other Topics Concern    Not on file   Social History Narrative    Not on file     Past Surgical History:   Procedure Laterality Date    FOOT SURGERY      HYSTERECTOMY      TUBAL LIGATION       Family History   Problem Relation Age of Onset    Sudden death Father     Cancer Mother     Hypertension Mother     Breast cancer Mother     Cancer Sister     Breast cancer Sister        Review of Systems   Constitutional: Negative for chills, fatigue and fever.   HENT: Negative for congestion, nosebleeds and voice change.    Eyes: Negative for photophobia, redness, itching and visual disturbance.   Respiratory: Negative for cough, choking and shortness of breath.    Cardiovascular: Negative for chest pain, palpitations and leg swelling.   Gastrointestinal: Negative for abdominal distention and abdominal  "pain.   Musculoskeletal: Positive for gait problem.        Bilateral knee pain.  Right greater than left.         Objective:      Blood pressure 110/76, pulse 78, height 5' 7" (1.702 m), weight 64.9 kg (143 lb). Body mass index is 22.4 kg/m².  Physical Exam   Constitutional: She appears well-developed. She is cooperative. No distress.   HENT:   Head: Normocephalic.   Right Ear: Decreased hearing is noted.   Left Ear: Decreased hearing is noted.   Eyes: EOM and lids are normal. Lids are everted and swept, no foreign bodies found. Right pupil is round and reactive. Left pupil is round and reactive.   Neck: Trachea normal and normal range of motion. Neck supple.   Cardiovascular: Normal rate, regular rhythm, S1 normal, S2 normal and normal heart sounds.   Pulmonary/Chest: Breath sounds normal.   Abdominal: Soft. Bowel sounds are normal. There is no rigidity.   Cholelithiasis without cholecystitis   Musculoskeletal: She exhibits deformity. She exhibits no edema.        Right knee: She exhibits decreased range of motion, effusion (? minimal), deformity and bony tenderness. Tenderness found. Medial joint line tenderness noted.   Range of motion of right knee joint is complete.  There is minimal crepitus.  Some mostly tenderness on the right medial side.  Patellar movements are unremarkable.  Anterior drawer sign is negative.   Lymphadenopathy:     She has no cervical adenopathy.     She has no axillary adenopathy.   Neurological: She is alert. She displays atrophy. Gait (more stable) abnormal. Coordination normal.   Patient seem to be able to step on the steps to today with is somewhat slightly unsteady posterior.  She did not need any assistance.   Skin: Skin is warm and dry. No rash noted. No erythema. No pallor.   Psychiatric: Her affect is blunt. Her speech is delayed. She is slowed. She is not agitated. Cognition and memory are impaired.   Nursing note and vitals reviewed.        Assessment:       1. Chronic pain of " both knees    2. Unsteady gait           Lab Visit on 08/29/2019   Component Date Value Ref Range Status    Sodium 08/29/2019 138  136 - 145 mmol/L Final    Potassium 08/29/2019 4.0  3.5 - 5.1 mmol/L Final    Chloride 08/29/2019 100  95 - 110 mmol/L Final    CO2 08/29/2019 29  23 - 29 mmol/L Final    Glucose 08/29/2019 156* 70 - 110 mg/dL Final    BUN, Bld 08/29/2019 32* 8 - 23 mg/dL Final    Creatinine 08/29/2019 2.0* 0.5 - 1.4 mg/dL Final    Calcium 08/29/2019 10.2  8.7 - 10.5 mg/dL Final    Total Protein 08/29/2019 7.1  6.0 - 8.4 g/dL Final    Albumin 08/29/2019 3.7  3.5 - 5.2 g/dL Final    Total Bilirubin 08/29/2019 0.6  0.1 - 1.0 mg/dL Final    Alkaline Phosphatase 08/29/2019 71  55 - 135 U/L Final    AST 08/29/2019 16  10 - 40 U/L Final    ALT 08/29/2019 13  10 - 44 U/L Final    Anion Gap 08/29/2019 9  8 - 16 mmol/L Final    eGFR if  08/29/2019 28.1* >60 mL/min/1.73 m^2 Final    eGFR if non African American 08/29/2019 24.4* >60 mL/min/1.73 m^2 Final    WBC 08/29/2019 6.37  3.90 - 12.70 K/uL Final    RBC 08/29/2019 4.29  4.00 - 5.40 M/uL Final    Hemoglobin 08/29/2019 10.9* 12.0 - 16.0 g/dL Final    Hematocrit 08/29/2019 34.9* 37.0 - 48.5 % Final    Mean Corpuscular Volume 08/29/2019 81* 82 - 98 fL Final    Mean Corpuscular Hemoglobin 08/29/2019 25.4* 27.0 - 31.0 pg Final    Mean Corpuscular Hemoglobin Conc 08/29/2019 31.2* 32.0 - 36.0 g/dL Final    RDW 08/29/2019 14.9* 11.5 - 14.5 % Final    Platelets 08/29/2019 203  150 - 350 K/uL Final    MPV 08/29/2019 10.9  9.2 - 12.9 fL Final    Immature Granulocytes 08/29/2019 0.3  0.0 - 0.5 % Final    Gran # (ANC) 08/29/2019 3.8  1.8 - 7.7 K/uL Final    Immature Grans (Abs) 08/29/2019 0.02  0.00 - 0.04 K/uL Final    Lymph # 08/29/2019 1.8  1.0 - 4.8 K/uL Final    Mono # 08/29/2019 0.6  0.3 - 1.0 K/uL Final    Eos # 08/29/2019 0.1  0.0 - 0.5 K/uL Final    Baso # 08/29/2019 0.03  0.00 - 0.20 K/uL Final    nRBC 08/29/2019  0  0 /100 WBC Final    Gran% 08/29/2019 59.3  38.0 - 73.0 % Final    Lymph% 08/29/2019 28.9  18.0 - 48.0 % Final    Mono% 08/29/2019 9.7  4.0 - 15.0 % Final    Eosinophil% 08/29/2019 1.3  0.0 - 8.0 % Final    Basophil% 08/29/2019 0.5  0.0 - 1.9 % Final    Differential Method 08/29/2019 Automated   Final    Phosphorus 08/29/2019 3.3  2.7 - 4.5 mg/dL Final    Vit D, 25-Hydroxy 08/29/2019 58  30 - 96 ng/mL Final    Iron 08/29/2019 50  30 - 160 ug/dL Final    Transferrin 08/29/2019 212  200 - 375 mg/dL Final    TIBC 08/29/2019 297  250 - 450 ug/dL Final    Saturated Iron 08/29/2019 17* 20 - 50 % Final    Ferritin 08/29/2019 59  20.0 - 300.0 ng/mL Final         Plan:           Chronic pain of both knees  -     Ambulatory Referral to Physical/Occupational Therapy    Unsteady gait  -     Ambulatory Referral to Physical/Occupational Therapy      Patient has chronic arthritis likely osteoarthritis in both the knees and more greater on the right side.  She has had an injection in past.  This probably causes her some gait instability.  She has been told that she needs his surgery.    I am going to try physical therapy 1st and see how she does.    Spoke to pt son also. Hai Perez.    Fup 6 weeks    Current Outpatient Medications:     albuterol (PROVENTIL/VENTOLIN HFA) 90 mcg/actuation inhaler, Inhale 2 puffs into the lungs every 6 (six) hours as needed for Wheezing. Rescue, Disp: 1 Inhaler, Rfl: 4    amantadine HCl (SYMMETREL) 100 mg capsule, Take 100 mg by mouth 2 (two) times daily. , Disp: , Rfl:     ammonium lactate 12 % Crea, Apply topically., Disp: , Rfl:     aspirin (ECOTRIN) 81 MG EC tablet, Take 81 mg by mouth once daily., Disp: , Rfl:     budesonide-formoterol 160-4.5 mcg (SYMBICORT) 160-4.5 mcg/actuation HFAA, Inhale 2 puffs into the lungs every 12 (twelve) hours. Controller, Disp: , Rfl:     budesonide-formoterol 160-4.5 mcg (SYMBICORT) 160-4.5 mcg/actuation HFAA, Inhale 2 puffs into the lungs  every 12 (twelve) hours. Controller, Disp: 1 Inhaler, Rfl: 3    calcitRIOL (ROCALTROL) 0.25 MCG Cap, Take 1 capsule (0.25 mcg total) by mouth every Mon, Wed, Fri., Disp: 24 capsule, Rfl: 2    ferrous sulfate 324 mg (65 mg iron) TbEC, Take 325 mg by mouth once daily., Disp: , Rfl:     fluticasone propionate (FLONASE) 50 mcg/actuation nasal spray, 1 spray (50 mcg total) by Each Nostril route once daily., Disp: 1 Bottle, Rfl: 3    metoprolol succinate (TOPROL-XL) 50 MG 24 hr tablet, Take 1 tablet (50 mg total) by mouth once daily., Disp: 90 tablet, Rfl: 1    montelukast (SINGULAIR) 10 mg tablet, TK 1 T PO QHS, Disp: , Rfl: 2    olanzapine (ZYPREXA) 10 MG tablet, Take 10 mg by mouth every evening., Disp: , Rfl:     pantoprazole (PROTONIX) 40 MG tablet, Take 1 tablet (40 mg total) by mouth once daily., Disp: 90 tablet, Rfl: 3    potassium chloride SA (K-DUR,KLOR-CON) 20 MEQ tablet, Take 1 tablet (20 mEq total) by mouth once daily., Disp: 90 tablet, Rfl: 3    pravastatin (PRAVACHOL) 40 MG tablet, Take 1 tablet (40 mg total) by mouth nightly., Disp: 90 tablet, Rfl: 3    SITagliptin (JANUVIA) 50 MG Tab, TAKE 1 TABLET EVERY DAY, Disp: 90 tablet, Rfl: 1    SYMBICORT 160-4.5 mcg/actuation HFAA, INHALE 2 PUFFS BY MOUTH EVERY 12 HOURS, Disp: 1 Inhaler, Rfl: 6    trazodone (DESYREL) 100 MG tablet, Take 100 mg by mouth nightly as needed., Disp: , Rfl:     TRUE METRIX GLUCOSE TEST STRIP Strp, TEST BLOOD SUGAR EVERY DAY, Disp: 100 strip, Rfl: 3    TRUEPLUS LANCETS 33 gauge Misc, TEST  DAILY, Disp: 100 each, Rfl: 3

## 2019-09-24 ENCOUNTER — CLINICAL SUPPORT (OUTPATIENT)
Dept: REHABILITATION | Facility: HOSPITAL | Age: 74
End: 2019-09-24
Attending: INTERNAL MEDICINE
Payer: MEDICARE

## 2019-09-24 DIAGNOSIS — G89.29 CHRONIC PAIN OF BOTH KNEES: Primary | ICD-10-CM

## 2019-09-24 DIAGNOSIS — M25.562 CHRONIC PAIN OF BOTH KNEES: Primary | ICD-10-CM

## 2019-09-24 DIAGNOSIS — M25.561 CHRONIC PAIN OF BOTH KNEES: Primary | ICD-10-CM

## 2019-09-24 PROCEDURE — 97162 PT EVAL MOD COMPLEX 30 MIN: CPT

## 2019-09-24 NOTE — PROGRESS NOTES
[facility]  Physical Therapy Initial Evaluation    Name: Adriana Perez  Clinic Number: [id number]    Therapy Diagnosis: Bilateral Knee Pain  Encounter Diagnosis   Name Primary?    Chronic pain of both knees Yes     Physician: Aiden Koch MD    Physician Orders: PT Eval and Treat B+ Knee Pain  Medical Diagnosis from Referral: Chronic B+ Knee Pain  Evaluation Date: 9/24/2019   Current POC Certification Period: 9/24/19 - 11/5/19  Authorization Period Expiration: N/A  Plan of Care Expiration: 11/5/19  Visit #: 1/12    Time In: 4: 10 pm  Time Out: 4:56 pm  Total Billable Time: 46 minutes    Precautions: Diabetes and Fall    Subjective   Date of onset: Chronic  History of current condition - Adriana reports: Pt states that she went to Dr. Wing who gave her 3 shots in the past year and it has helped some. He wanted to operate, but she was not ready at the time. She states that her knee is rubbing on the other one and the pain is increasing. Pt states that wearing leggingins helps her knee pain. Pt states that her R+ knee swells, however her L+ does not.     Medical History:   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/2018    Dr Lance Foy- Also Renal cysts bilateral    Hyperlipidemia     Hypertension     MVP (mitral valve prolapse)     Primary osteoarthritis of left knee     Restless leg     Schizophrenia, simple, chronic     Urinary, incontinence, stress female        Surgical History:   Adriana Perez  has a past surgical history that includes Hysterectomy; Tubal ligation; and Foot surgery.    Medications:   Adriana has a current medication list which includes the  following prescription(s): albuterol, amantadine hcl, ammonium lactate, aspirin, budesonide-formoterol 160-4.5 mcg, budesonide-formoterol 160-4.5 mcg, calcitriol, ferrous sulfate, fluticasone propionate, metoprolol succinate, montelukast, olanzapine, pantoprazole, potassium chloride sa, pravastatin, sitagliptin, symbicort, trazodone, true metrix glucose test strip, and trueplus lancets.    Allergies:   Review of patient's allergies indicates:   Allergen Reactions    Lisinopril Nausea And Vomiting        Imaging, none    Prior Therapy: None  Social History:  lives with their spouse  Occupation: Seamstrist - 1x/wk  Prior Level of Function: Modified Independent with basic ADL's and functional mobility   Current Level of Function: Modified Independent to Min A with basic ADL's and functional mobility such as getting in/OOB, walking, driving, etc    Pain:  Current 8/10, worst 9/10, best 7/10   Location: right knee   Description: Dull Ache  Aggravating Factors: Standing, Morning, Getting out of bed/chair and Stairs  Easing Factors: relaxation, heating pad and hot bath, stockings     Pts goals: Wants help with her knees to prevent Sx. Pt states that she would like to be able to walk more stable.    Objective     KNEE      Impaired Knee: Left/Right       Palpation:      Palpation tenderness to: globally surrounding medial and lateral patella on R+, minimal complaints on L+      Flexibility      Muscle Left Right Comment   Gastroc Mild Tightness Mild Tightness    Quadriceps 90 deg in prone Unable to achieve 90 deg in prone due to pain/tight    Hamstrings 47 deg  45 deg     Illiopsoas Mild to Moderate Tightness Mild to Moderate Tightness Notable tightness when attempting to perform hip extension prone    TFL Moderate Tightness Moderate Tightness During hip extension, pull is lateral due to tightness of TFL/IT band          ROM       Knee AROM AROM Comment    Left Right          Flexion 119 120 Pain with knee flexion on R+    Extension -5 -5          MMT      Knee   Status    Left Right    Hip Flexion 3+/5 3/5    Hip ER 4+/5 3+/5 decreased due to pain R+   Hip IR 4+/5 4+/5    Hip Abduction 3+/5 3-/5    Hip Adduction 3/5 3/5    Hip Extension 2+/5 2+/5    Knee Extension 4+/5 4+/5    Knee Flexion 3/5 3-/5    Ankle DF 5/5 5/5           JOINT MOBILITY:          Knee       Joint Force Direction Grade End Feel Comment   Patellofemoral Inferior/Superior 3 hypomobile    Patellofemoral Medial / Lateral 3 Normal            GIRTH:        Knee      Left Right   Mid Joint Line 14.25 in  14 in            FUNCTIONAL TESTS: Lower Extremity Functional Questionnaire     Raw Score:NT /80 - this test is not appropriate for pt due to cognition difficulties on a more complex level and inability;ity to completely express oneself - pt has to be prompted        FUNCTIONAL MOBILITY        Balance: Swan Balance Test: 37/56 - 34% impairment ; According to grading scale, pt needs to walk with assist. Pt ambulates with use of hurry cane daily       Interpretation:       41-56 = independent     21-40 = walking with assistance     0 -20 = wheelchair bound            Gait: Pt ambulates with overall decreased laverne and balance. She also demonstrates the following gait deficits: decreased heel strike, decreased hip extension, and decreased hip flexion with swing phase.         Transfer: Pt required min A for bed mobility on mat during evaluation. Bed mobility includes rolling supine<>sidelying<>prone, and sit <> supine. Additonally, it should be noted that pt is unable to stand without use of UE's        despite multiple tries.        CMS Impairment/Limitation    Limitation Score: 34%  Category: Mobility    Current : CJ = at least 20% but < 40% impaired, limited or restricted  Goal: CI = at least 1% but < 20% impaired, limited or restricted  Discharge: N/A until D/C         TREATMENT     Total Treatment time separate from Evaluation: 0 minutes    Adriana received  therapeutic exercises to develop strength, endurance, ROM and flexibility for 0 minutes including:      Adriana received the following manual therapy techniques: Joint mobilizations and Soft tissue Mobilization were applied to the: N/A for 0 minutes, including:      Adriana participated in neuromuscular re-education activities to improve: Balance for 0 minutes. The following activities were included:      Adriana participated in dynamic functional therapeutic activities to improve functional performance for 0  minutes, including:      Adriana participated in gait training to improve functional mobility and safety for 0  minutes, including:      Adriana received the following direct contact modalities after being cleared for contraindications: Ultrasound:  Adriana received ultrasound to manage pain and inflammation at 100 % duty cycle applied to the 1.5 at an intensity of  number entry box W/cm2  for a duration of 0 minutes. Patient tolerated treatment well without adverse effects. Therapist was in attendance throughout intervention.    Adriana received the following supervised modalities after being cleared for contradictions: IFC Electrical Stimulation:  Adriana received IFC Electrical Stimulation for pain control applied to the n/a. Pt received stimulation for 0 minutes. Adriana tolderated treatment well without any adverse effects.      Adriana received hot pack for 0 minutes to .    Adriana received cold pack for 0 minutes to .    Home Exercises and Patient Education Provided    Education provided:   - Pt was educated on importance of continued use of cane due to results on Swan Balance Score    Written Home Exercises Provided: none provided at [facility].  Exercises were reviewed and Adriana was able to demonstrate them prior to the end of the session.  Adriana demonstrated good  understanding of the education provided.     See EMR under Media for exercises provided none at [facility].    Assessment   Adriana is a 73 y.o. female  referred to outpatient Physical Therapy with a medical diagnosis of B+ Chronic knee pain as a result of OA B+ knee. Pt presents with increased pain, decreased ROM/strength/flexiiblity, gait deficits, decreased balance, and an overall decrease in recreational and functioanl mobility as a result    Pt prognosis is Good.   Pt will benefit from skilled outpatient Physical Therapy to address the deficits stated above and in the chart below, provide pt/family education, and to maximize pt's level of independence.     Plan of care discussed with patient: Yes  Pt's spiritual, cultural and educational needs considered and patient is agreeable to the plan of care and goals as stated below:     Anticipated Barriers for therapy: age    Medical Necessity is demonstrated by the following  History  Co-morbidities and personal factors that may impact the plan of care Co-morbidities:   advanced age, anxiety, CKD stage 3, depression and diabetes    Personal Factors:   age     high   Examination  Body Structures and Functions, activity limitations and participation restrictions that may impact the plan of care Body Regions:   lower extremities    Body Systems:    gross symmetry  ROM  strength  gross coordinated movement  balance  gait  transfers  transitions    Participation Restrictions:   none    Activity limitations:   Learning and applying knowledge  solving problems    General Tasks and Commands  undertaking multiple tasks    Communication  no deficits    Mobility  lifting and carrying objects  walking  moving around using equipment (WC)    Self care  washing oneself (bathing, drying, washing hands)  caring for body parts (brushing teeth, shaving, grooming)  dressing  looking after one's health    Domestic Life  shopping  cooking  doing house work (cleaning house, washing dishes, laundry)    Interactions/Relationships  no deficits    Life Areas  no deficits    Community and Social Life  no deficits         high   Clinical  Presentation evolving clinical presentation with changing clinical characteristics moderate   Decision Making/ Complexity Score: moderate     Goals:  Goal STG/LTG Status   1. Pt to be independent with basic HEP in order to manage chronic condition in 3 weeks  STG In Progress   2. Pt to report B+ Knee pain at worst <= 5/10 in order to improve QOL in 3 weeks STG In progress   2a. Pt to report B+ Knee pain at worst <= 4/10 in order to improve QOL in 6 weeks     3. Pt to improve B+ LE MMT's by 1/2 grade in order to allow pt to improve sit <> stand transition in 3 weeks  STG    3a. Pt to improve B+ LE MMT's >= 4+/5 grade in order to allow pt to improve sit <> stand transition in 6 weeks  LTG    4. Pt to improve Swan Balance Score to >= 40/56 in order to demonstrate that pt is at a reduced risk for falls and does not require the use of a more restrictive AD in 3 weeks  STG    4a. Pt to improve Swan Balance Score to >= 44/56 in order to demonstrate that pt is at a reduced risk for falls and does not require the use of a more restrictive AD in 6 weeks  LTG    5. Pt able to negotiate 10 steps with use of 1 HR, independently in order to allow pt to utilize stairs safely at her place of work or other community places in 6 weeks LTG        Plan   Plan of care Certification: 9/24/2019 to 11/5/19.    Outpatient Physical Therapy 2 times weekly for 6 weeks to include the following interventions: Electrical Stimulation (ESTM), Gait Training, Manual Therapy, Moist Heat/ Ice, Neuromuscular Re-ed, Patient Education, Self Care, Therapeutic Activites, Therapeutic Exercise and Ultrasound.     Mandi Street, PT

## 2019-10-08 ENCOUNTER — OFFICE VISIT (OUTPATIENT)
Dept: PODIATRY | Facility: CLINIC | Age: 74
End: 2019-10-08
Payer: MEDICARE

## 2019-10-08 VITALS
DIASTOLIC BLOOD PRESSURE: 91 MMHG | BODY MASS INDEX: 22.44 KG/M2 | WEIGHT: 143 LBS | HEART RATE: 86 BPM | SYSTOLIC BLOOD PRESSURE: 157 MMHG | HEIGHT: 67 IN

## 2019-10-08 DIAGNOSIS — B35.1 ONYCHOMYCOSIS DUE TO DERMATOPHYTE: Primary | ICD-10-CM

## 2019-10-08 DIAGNOSIS — E11.42 DIABETIC POLYNEUROPATHY ASSOCIATED WITH TYPE 2 DIABETES MELLITUS: ICD-10-CM

## 2019-10-08 PROCEDURE — 17999 UNLISTD PX SKN MUC MEMB SUBQ: CPT | Mod: CSM,,, | Performed by: PODIATRIST

## 2019-10-08 PROCEDURE — 17999 PR NON-COVERED FOOT CARE: ICD-10-PCS | Mod: CSM,,, | Performed by: PODIATRIST

## 2019-10-08 NOTE — PROGRESS NOTES
[address] [facility] [address]  [place] [address]  Phone: [phone]   [phone]    Patient's PCP:Aiden Koch MD  Referring Provider: No ref. provider found    Subjective:      Chief Complaint:: Nail Care    HPI  Adriana Perez is a 73 y.o. female who presents with a complaint of  Long painful toenails , need to be trimmed lasting for months.Treatment to date have included periodic debridement. Patients rates pain 8/10 on pain scale.     Systemic Doctor: Aiden Koch MD  Date Last Seen: 09/10/2019  Blood Sugar: 116  Hemoglobin A1c: 6.1     Vitals:    10/08/19 0944   BP: (!) 157/91   Pulse: 86     Shoe Size: 9    Past Surgical History:   Procedure Laterality Date    FOOT SURGERY      HYSTERECTOMY      TUBAL LIGATION       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/2018    Dr Lance Foy- Also Renal cysts bilateral    Hyperlipidemia     Hypertension     MVP (mitral valve prolapse)     Primary osteoarthritis of left knee     Restless leg     Schizophrenia, simple, chronic     Urinary, incontinence, stress female      Family History   Problem Relation Age of Onset    Sudden death Father     Cancer Mother     Hypertension Mother     Breast cancer Mother     Cancer Sister     Breast cancer Sister         Social History:   Marital Status:   Alcohol History:  reports that she does not drink alcohol.  Tobacco History:  reports that she quit smoking about 27 years ago. Her smoking use included cigarettes. She has a 5.00 pack-year smoking history. She has never used smokeless tobacco.  Drug History:  reports that she does not use drugs.    Review of patient's allergies indicates:    Allergen Reactions    Lisinopril Nausea And Vomiting       Current Outpatient Medications   Medication Sig Dispense Refill    albuterol (PROVENTIL/VENTOLIN HFA) 90 mcg/actuation inhaler Inhale 2 puffs into the lungs every 6 (six) hours as needed for Wheezing. Rescue 1 Inhaler 4    amantadine HCl (SYMMETREL) 100 mg capsule Take 100 mg by mouth 2 (two) times daily.       ammonium lactate 12 % Crea Apply topically.      aspirin (ECOTRIN) 81 MG EC tablet Take 81 mg by mouth once daily.      blood sugar diagnostic (TRUE METRIX GLUCOSE TEST STRIP) Strp TEST BLOOD SUGAR EVERY  strip 3    budesonide-formoterol 160-4.5 mcg (SYMBICORT) 160-4.5 mcg/actuation HFAA Inhale 2 puffs into the lungs every 12 (twelve) hours. Controller      budesonide-formoterol 160-4.5 mcg (SYMBICORT) 160-4.5 mcg/actuation HFAA Inhale 2 puffs into the lungs every 12 (twelve) hours. Controller 1 Inhaler 3    calcitRIOL (ROCALTROL) 0.25 MCG Cap Take 1 capsule (0.25 mcg total) by mouth every Mon, Wed, Fri. 24 capsule 2    ferrous sulfate 324 mg (65 mg iron) TbEC Take 325 mg by mouth once daily.      fluticasone propionate (FLONASE) 50 mcg/actuation nasal spray 1 spray (50 mcg total) by Each Nostril route once daily. 1 Bottle 3    metoprolol succinate (TOPROL-XL) 50 MG 24 hr tablet Take 1 tablet (50 mg total) by mouth once daily. 90 tablet 1    montelukast (SINGULAIR) 10 mg tablet TK 1 T PO QHS  2    olanzapine (ZYPREXA) 10 MG tablet Take 10 mg by mouth every evening.      pantoprazole (PROTONIX) 40 MG tablet Take 1 tablet (40 mg total) by mouth once daily. 90 tablet 3    potassium chloride SA (K-DUR,KLOR-CON) 20 MEQ tablet Take 1 tablet (20 mEq total) by mouth once daily. 90 tablet 3    pravastatin (PRAVACHOL) 40 MG tablet Take 1 tablet (40 mg total) by mouth nightly. 90 tablet 3    SITagliptin (JANUVIA) 50 MG Tab TAKE 1 TABLET EVERY DAY 90 tablet 1    SYMBICORT 160-4.5 mcg/actuation HFAA INHALE 2 PUFFS BY MOUTH EVERY 12  HOURS 1 Inhaler 6    trazodone (DESYREL) 100 MG tablet Take 100 mg by mouth nightly as needed.      TRUEPLUS LANCETS 33 gauge Misc TEST  DAILY 100 each 3     No current facility-administered medications for this visit.        Review of Systems      Objective:        Physical Exam:   Foot Exam    General  General Appearance: appears stated age and healthy   Orientation: alert and oriented to person, place, and time   Affect: appropriate   Gait: unimpaired       Right Foot/Ankle     Neurovascular  Dorsalis pedis: 2+  Posterior tibial: 2+  Saphenous nerve sensation: normal  Tibial nerve sensation: normal  Superficial peroneal nerve sensation: normal  Deep peroneal nerve sensation: normal  Sural nerve sensation: normal      Left Foot/Ankle      Neurovascular  Dorsalis pedis: 2+  Posterior tibial: 2+  Saphenous nerve sensation: normal  Tibial nerve sensation: normal  Superficial peroneal nerve sensation: normal  Deep peroneal nerve sensation: normal  Sural nerve sensation: normal          Physical Exam   Cardiovascular:   Pulses:       Dorsalis pedis pulses are 2+ on the right side, and 2+ on the left side.        Posterior tibial pulses are 2+ on the right side, and 2+ on the left side.   Musculoskeletal:        Feet:        Imaging:            Assessment:       1. Onychomycosis due to dermatophyte    2. Diabetic polyneuropathy associated with type 2 diabetes mellitus      Plan:   Onychomycosis due to dermatophyte    Diabetic polyneuropathy associated with type 2 diabetes mellitus    Utilizing sterile nail nippers and electric , I aggressively debrided nails 1-5 bilaterally down to nail beds to thin the nail plates. Pt. tolerated well. No blood was drawn.  Follow up if symptoms worsen or fail to improve, for ACODE.    Procedures - None    Counseling:     I provided patient education verbally regarding:   Patient diagnosis, treatment options, as well as alternatives, risks, and benefits.     This note was  created using Dragon voice recognition software that occasionally misinterpreted phrases or words.

## 2019-10-14 ENCOUNTER — OFFICE VISIT (OUTPATIENT)
Dept: FAMILY MEDICINE | Facility: CLINIC | Age: 74
End: 2019-10-14
Payer: MEDICARE

## 2019-10-14 VITALS
RESPIRATION RATE: 16 BRPM | HEIGHT: 67 IN | HEART RATE: 102 BPM | SYSTOLIC BLOOD PRESSURE: 127 MMHG | DIASTOLIC BLOOD PRESSURE: 60 MMHG | WEIGHT: 138 LBS | BODY MASS INDEX: 21.66 KG/M2

## 2019-10-14 DIAGNOSIS — Z23 NEEDS FLU SHOT: ICD-10-CM

## 2019-10-14 DIAGNOSIS — E11.9 TYPE 2 DIABETES MELLITUS WITHOUT COMPLICATION, WITHOUT LONG-TERM CURRENT USE OF INSULIN: Primary | ICD-10-CM

## 2019-10-14 DIAGNOSIS — E78.2 MULTIPLE-TYPE HYPERLIPIDEMIA: ICD-10-CM

## 2019-10-14 DIAGNOSIS — R05.9 COUGH: ICD-10-CM

## 2019-10-14 DIAGNOSIS — I10 BENIGN ESSENTIAL HYPERTENSION: ICD-10-CM

## 2019-10-14 DIAGNOSIS — N18.30 CHRONIC KIDNEY DISEASE, STAGE 3 (MODERATE): ICD-10-CM

## 2019-10-14 DIAGNOSIS — R29.6 RECURRENT FALLS: ICD-10-CM

## 2019-10-14 DIAGNOSIS — J45.991 COUGH VARIANT ASTHMA: ICD-10-CM
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
## 2019-10-14 PROCEDURE — 90662 IIV NO PRSV INCREASED AG IM: CPT | Mod: S$GLB,,, | Performed by: INTERNAL MEDICINE

## 2019-10-14 PROCEDURE — G0008 ADMIN INFLUENZA VIRUS VAC: HCPCS | Mod: S$GLB,,, | Performed by: INTERNAL MEDICINE

## 2019-10-14 PROCEDURE — 1101F PR PT FALLS ASSESS DOC 0-1 FALLS W/OUT INJ PAST YR: ICD-10-PCS | Mod: S$GLB,,, | Performed by: INTERNAL MEDICINE

## 2019-10-14 PROCEDURE — 3044F PR MOST RECENT HEMOGLOBIN A1C LEVEL <7.0%: ICD-10-PCS | Mod: S$GLB,,, | Performed by: INTERNAL MEDICINE

## 2019-10-14 PROCEDURE — 99214 OFFICE O/P EST MOD 30 MIN: CPT | Mod: 25,S$GLB,, | Performed by: INTERNAL MEDICINE

## 2019-10-14 PROCEDURE — 1101F PT FALLS ASSESS-DOCD LE1/YR: CPT | Mod: S$GLB,,, | Performed by: INTERNAL MEDICINE

## 2019-10-14 PROCEDURE — 3078F DIAST BP <80 MM HG: CPT | Mod: S$GLB,,, | Performed by: INTERNAL MEDICINE

## 2019-10-14 PROCEDURE — 3078F PR MOST RECENT DIASTOLIC BLOOD PRESSURE < 80 MM HG: ICD-10-PCS | Mod: S$GLB,,, | Performed by: INTERNAL MEDICINE

## 2019-10-14 PROCEDURE — 3074F PR MOST RECENT SYSTOLIC BLOOD PRESSURE < 130 MM HG: ICD-10-PCS | Mod: S$GLB,,, | Performed by: INTERNAL MEDICINE

## 2019-10-14 PROCEDURE — 3044F HG A1C LEVEL LT 7.0%: CPT | Mod: S$GLB,,, | Performed by: INTERNAL MEDICINE

## 2019-10-14 PROCEDURE — 3074F SYST BP LT 130 MM HG: CPT | Mod: S$GLB,,, | Performed by: INTERNAL MEDICINE

## 2019-10-14 PROCEDURE — 99214 PR OFFICE/OUTPT VISIT, EST, LEVL IV, 30-39 MIN: ICD-10-PCS | Mod: 25,S$GLB,, | Performed by: INTERNAL MEDICINE

## 2019-10-14 PROCEDURE — 90662 FLU VACCINE - HIGH DOSE (65+) PRESERVATIVE FREE IM: ICD-10-PCS | Mod: S$GLB,,, | Performed by: INTERNAL MEDICINE

## 2019-10-14 PROCEDURE — G0008 FLU VACCINE - HIGH DOSE (65+) PRESERVATIVE FREE IM: ICD-10-PCS | Mod: S$GLB,,, | Performed by: INTERNAL MEDICINE

## 2019-10-14 RX ORDER — BUDESONIDE AND FORMOTEROL FUMARATE DIHYDRATE 160; 4.5 UG/1; UG/1
AEROSOL RESPIRATORY (INHALATION)
Qty: 3 INHALER | Refills: 2 | Status: SHIPPED | OUTPATIENT
Start: 2019-10-14 | End: 2020-04-20 | Stop reason: SDUPTHER

## 2019-10-14 NOTE — PATIENT INSTRUCTIONS
Asthma Action Plan     Your name:  _________________________  Emergency contact:  _________________________  Healthcare provider:  _________________________ Today's Date:  _________________________  Phone:  _________________________  Signature:  _________________________ Next appt (date/time):  _________________________  Phone:  _________________________  Phone:  _________________________      Green zone   My symptoms What I should do My medicine   · No wheezing, coughing, or chest tightness  · Asthma is not bothering your sleep, work, or school  · You rarely or never use your quick-relief medicine  Peak flow is:     _____________________  80%-100% of personal best · Keep taking your long-term  controller medicines  · Take your quick-relief   medicines as needed  Avoid your asthma triggers (list):  __________________________     __________________________     __________________________     __________________________     __________________________ Long-term controllers:  __________________________  Name:  __________________________  Dose:  __________________________  How often:  __________________________  Special instructions:  __________________________  Quick-relief:  __________________________  __________________________  Before exercise:  __________________________      Yellow zone   My symptoms What I should do My medicine   · Some wheezing, coughing, or chest tightness  · When at rest, your breathing is a little faster than normal  · Asthma symptoms wake you up at night  Peak flow is:     ___________________________  50%-80% of personal best, or   has lessened by at least 15%     You begin to have symptoms of a respiratory infection, if infections trigger your symptoms · Keep taking your long-term controller medicines  · Use your quick-relief medicine  · If you do not feel better within an hour after using your quick-relief medicine, make sure you know what to do! You might use more medicine or use another  medicine.  · Call your healthcare provider if you are unsure Continue to take long-term controllers:  _________________________  Name:  _________________________  Dose:  _________________________  How often:  _________________________  Special instructions  _________________________     Name:  _________________________  Dose:  _________________________  How often:  _________________________  Special instructions:  _________________________  Quick-relief:  _________________________  _________________________     If your symptoms don't go away after 1 hour, take:  _________________________      Red zone   My symptoms What I should do My medicine   · Continuous wheezing, coughing, or trouble breathing  · Trouble walking or talking  · Asthma symptoms make it hard for you to sleep     Peak flow is:     __________________________  Less than 50% of personal best · Use your quick-relief medicines  · Call your healthcare provider     Call 911 if:  · It is getting harder to breathe  · You can't walk or talk  · Your lips or fingers look gray or blue Quick-relief:  __________________________  __________________________     Quick-relief:  __________________________  __________________________     Quick-relief:  __________________________  __________________________      Date Last Reviewed: 10/1/2016  © 8161-0354 [employer]. [address] [place] [address], [place] [address]. All rights reserved. This information is not intended as a substitute for professional medical care. Always follow your healthcare professional's instructions.

## 2019-10-14 NOTE — PROGRESS NOTES
Subjective:       Patient ID: Adriana Perez is a 73 y.o. female.    Chief Complaint: Diabetes; Hypertension; Hyperlipidemia; Fall; and asthma Cough Variant    Diabetes   She presents for her follow-up diabetic visit. She has type 2 diabetes mellitus. There are no hypoglycemic associated symptoms. Pertinent negatives for diabetes include no chest pain. There are no hypoglycemic complications. Symptoms are stable. Diabetic complications include nephropathy. Risk factors for coronary artery disease include hypertension, sedentary lifestyle, post-menopausal and diabetes mellitus. Meal planning includes avoidance of concentrated sweets. She rarely participates in exercise. An ACE inhibitor/angiotensin II receptor blocker is contraindicated. She does not see a podiatrist.Eye exam is current.   Hypertension   This is a chronic problem. The current episode started more than 1 year ago. The problem is controlled. Associated symptoms include malaise/fatigue. Pertinent negatives include no chest pain. Risk factors for coronary artery disease include sedentary lifestyle and dyslipidemia. Past treatments include beta blockers. The current treatment provides moderate improvement. Compliance problems include psychosocial issues.    Cough   This is a chronic problem. The problem has been gradually improving. The problem occurs every few minutes. The cough is non-productive. Pertinent negatives include no chest pain, chills, heartburn, hemoptysis or myalgias. She has tried a beta-agonist inhaler (Montelukast, Flonase, Symbicort) for the symptoms. The treatment provided moderate relief. Her past medical history is significant for asthma.       Past Medical History:   Diagnosis Date    Acid reflux     Allergy     lisinopril    Anxiety     Bilateral renal cysts 2/27/2018    Renal USG by Dr Foy    CKD (chronic kidney disease), stage III     Depression     Diabetes mellitus     Diabetes mellitus, type 2     Disorder of  kidney and ureter     Echogenic kidneys on renal ultrasound 2018    Dr Lance Foy- Also Renal cysts bilateral    Hyperlipidemia     Hypertension     MVP (mitral valve prolapse)     Primary osteoarthritis of left knee     Restless leg     Schizophrenia, simple, chronic     Urinary, incontinence, stress female      Social History     Socioeconomic History    Marital status:      Spouse name: Willy Perez    Number of children: 2    Years of education: Not on file    Highest education level: Not on file   Occupational History    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     Comment: [employer]   Social Needs    Financial resource strain: Not on file    Food insecurity:     Worry: Not on file     Inability: Not on file    Transportation needs:     Medical: Not on file     Non-medical: Not on file   Tobacco Use    Smoking status: Former Smoker     Packs/day: 1.00     Years: 5.00     Pack years: 5.00     Types: Cigarettes     Last attempt to quit: 1992     Years since quittin.1    Smokeless tobacco: Never Used   Substance and Sexual Activity    Alcohol use: No    Drug use: No    Sexual activity: Not Currently     Partners: Male   Lifestyle    Physical activity:     Days per week: Not on file     Minutes per session: Not on file    Stress: Not at all   Relationships    Social connections:     Talks on phone: Not on file     Gets together: Not on file     Attends Tenriism service: Not on file     Active member of club or organization: Not on file     Attends meetings of clubs or organizations: Not on file     Relationship status: Not on file   Other Topics Concern    Not on file   Social History Narrative    Not on file     Past Surgical History:   Procedure Laterality Date    FOOT SURGERY      HYSTERECTOMY      TUBAL LIGATION       Family History   Problem Relation Age of Onset    Sudden death Father     Cancer Mother     Hypertension Mother     Breast  "cancer Mother     Cancer Sister     Breast cancer Sister        Review of Systems   Constitutional: Positive for malaise/fatigue. Negative for chills.   Respiratory: Positive for cough. Negative for hemoptysis.    Cardiovascular: Negative for chest pain.   Gastrointestinal: Negative for heartburn.   Musculoskeletal: Negative for myalgias.         Objective:      Blood pressure 127/60, pulse 102, resp. rate 16, height 5' 7" (1.702 m), weight 62.6 kg (138 lb). Body mass index is 21.61 kg/m².  Physical Exam      Assessment:       1. Type 2 diabetes mellitus without complication, without long-term current use of insulin    2. Benign essential hypertension    3. Multiple-type hyperlipidemia    4. Chronic kidney disease, stage 3 (moderate)    5. Needs flu shot    6. Cough    7. Cough variant asthma    8. Recurrent falls           Lab Visit on 08/29/2019   Component Date Value Ref Range Status    Sodium 08/29/2019 138  136 - 145 mmol/L Final    Potassium 08/29/2019 4.0  3.5 - 5.1 mmol/L Final    Chloride 08/29/2019 100  95 - 110 mmol/L Final    CO2 08/29/2019 29  23 - 29 mmol/L Final    Glucose 08/29/2019 156* 70 - 110 mg/dL Final    BUN, Bld 08/29/2019 32* 8 - 23 mg/dL Final    Creatinine 08/29/2019 2.0* 0.5 - 1.4 mg/dL Final    Calcium 08/29/2019 10.2  8.7 - 10.5 mg/dL Final    Total Protein 08/29/2019 7.1  6.0 - 8.4 g/dL Final    Albumin 08/29/2019 3.7  3.5 - 5.2 g/dL Final    Total Bilirubin 08/29/2019 0.6  0.1 - 1.0 mg/dL Final    Alkaline Phosphatase 08/29/2019 71  55 - 135 U/L Final    AST 08/29/2019 16  10 - 40 U/L Final    ALT 08/29/2019 13  10 - 44 U/L Final    Anion Gap 08/29/2019 9  8 - 16 mmol/L Final    eGFR if  08/29/2019 28.1* >60 mL/min/1.73 m^2 Final    eGFR if non African American 08/29/2019 24.4* >60 mL/min/1.73 m^2 Final    WBC 08/29/2019 6.37  3.90 - 12.70 K/uL Final    RBC 08/29/2019 4.29  4.00 - 5.40 M/uL Final    Hemoglobin 08/29/2019 10.9* 12.0 - 16.0 g/dL " Final    Hematocrit 08/29/2019 34.9* 37.0 - 48.5 % Final    Mean Corpuscular Volume 08/29/2019 81* 82 - 98 fL Final    Mean Corpuscular Hemoglobin 08/29/2019 25.4* 27.0 - 31.0 pg Final    Mean Corpuscular Hemoglobin Conc 08/29/2019 31.2* 32.0 - 36.0 g/dL Final    RDW 08/29/2019 14.9* 11.5 - 14.5 % Final    Platelets 08/29/2019 203  150 - 350 K/uL Final    MPV 08/29/2019 10.9  9.2 - 12.9 fL Final    Immature Granulocytes 08/29/2019 0.3  0.0 - 0.5 % Final    Gran # (ANC) 08/29/2019 3.8  1.8 - 7.7 K/uL Final    Immature Grans (Abs) 08/29/2019 0.02  0.00 - 0.04 K/uL Final    Lymph # 08/29/2019 1.8  1.0 - 4.8 K/uL Final    Mono # 08/29/2019 0.6  0.3 - 1.0 K/uL Final    Eos # 08/29/2019 0.1  0.0 - 0.5 K/uL Final    Baso # 08/29/2019 0.03  0.00 - 0.20 K/uL Final    nRBC 08/29/2019 0  0 /100 WBC Final    Gran% 08/29/2019 59.3  38.0 - 73.0 % Final    Lymph% 08/29/2019 28.9  18.0 - 48.0 % Final    Mono% 08/29/2019 9.7  4.0 - 15.0 % Final    Eosinophil% 08/29/2019 1.3  0.0 - 8.0 % Final    Basophil% 08/29/2019 0.5  0.0 - 1.9 % Final    Differential Method 08/29/2019 Automated   Final    Phosphorus 08/29/2019 3.3  2.7 - 4.5 mg/dL Final    Vit D, 25-Hydroxy 08/29/2019 58  30 - 96 ng/mL Final    Iron 08/29/2019 50  30 - 160 ug/dL Final    Transferrin 08/29/2019 212  200 - 375 mg/dL Final    TIBC 08/29/2019 297  250 - 450 ug/dL Final    Saturated Iron 08/29/2019 17* 20 - 50 % Final    Ferritin 08/29/2019 59  20.0 - 300.0 ng/mL Final         Plan:           Type 2 diabetes mellitus without complication, without long-term current use of insulin  -     flash glucose scanning reader (FREESTYLE JAY 14 DAY READER) Misc; 1 Device by Misc.(Non-Drug; Combo Route) route once daily.  Dispense: 1 each; Refill: 0  -     flash glucose sensor (FREESTYLE JAY 14 DAY SENSOR) Kit; 1 Device by Misc.(Non-Drug; Combo Route) route every 14 (fourteen) days.  Dispense: 6 kit; Refill: 2    Benign essential  hypertension    Multiple-type hyperlipidemia    Chronic kidney disease, stage 3 (moderate)    Needs flu shot  -     Influenza - High Dose (65+) (PF) (IM)    Cough  -     budesonide-formoterol 160-4.5 mcg (SYMBICORT) 160-4.5 mcg/actuation HFAA; INHALE 2 PUFFS BY MOUTH EVERY 12 HOURS  Dispense: 3 Inhaler; Refill: 2    Cough variant asthma  -     budesonide-formoterol 160-4.5 mcg (SYMBICORT) 160-4.5 mcg/actuation HFAA; INHALE 2 PUFFS BY MOUTH EVERY 12 HOURS  Dispense: 3 Inhaler; Refill: 2    Recurrent falls     Today she will get an update on influenza vaccination.  Patient seems to be somewhat concerned about frequent monitoring and sticking her fingers.  She requests the new device which people put on arm.  I doubt it is covered by [employer] insurance for infrequent checking.    At any rate I have given her prescription for that.  Given her non insulin prescription on no significant fluctuations, I do not see any major advantage of using the new device.    As far as frequent needlestick is concerned, she can perhaps check her sugar 3 or 4 times a week instead of daily.    Unfortunately she fell down again recently and this time she might have hurt her right shoulder.  I have advised her range of motion and fall precautions.  If this is not get better in a few weeks then will take it from there.    Patient does state that when she was in the 6 grade she had asthma.  She does not recall clearly as to how she was treated.  She states that she had a inhaler.  I doubt a in 1950s there were any inhalers.    Advised Ms. Perez to monitor Blood sugars at home and record them.  Exercise, watch diet and loose weight.  keep a close eye on feet and keep them clean. Annual eye examination. Annual influenza vaccine.  Monitor HgbA1c every 3 to 6 months. Monitor urine microalbumin every year.keep LDL less than 100. Monitor blood pressure and target blood pressure 120/70.      Advised Ms. Perez about age and season appropriate  immunizations/ cancer screenings.  Also seasonal influenza vaccine, update on tetanus diphtheria vaccination every 10 years.  Patient decline continues.  Risk for fall continues.  I am not sure if she is keeping neurology follow-up.    Had a discussion with patient's granddaughter seems to have interest in her well-being.    Follow-up in a couple of months.    Spent martín 25 minutes with patient which involved review of pts medical conditions, labs, medications and with 50% of time face-to-face discussion about medical problems, management and any applicable changes.      Current Outpatient Medications:     albuterol (PROVENTIL/VENTOLIN HFA) 90 mcg/actuation inhaler, Inhale 2 puffs into the lungs every 6 (six) hours as needed for Wheezing. Rescue, Disp: 1 Inhaler, Rfl: 4    amantadine HCl (SYMMETREL) 100 mg capsule, Take 100 mg by mouth 2 (two) times daily. , Disp: , Rfl:     ammonium lactate 12 % Crea, Apply topically., Disp: , Rfl:     aspirin (ECOTRIN) 81 MG EC tablet, Take 81 mg by mouth once daily., Disp: , Rfl:     blood sugar diagnostic (TRUE METRIX GLUCOSE TEST STRIP) Strp, TEST BLOOD SUGAR EVERY DAY, Disp: 100 strip, Rfl: 3    budesonide-formoterol 160-4.5 mcg (SYMBICORT) 160-4.5 mcg/actuation HFAA, INHALE 2 PUFFS BY MOUTH EVERY 12 HOURS, Disp: 3 Inhaler, Rfl: 2    calcitRIOL (ROCALTROL) 0.25 MCG Cap, Take 1 capsule (0.25 mcg total) by mouth every Mon, Wed, Fri., Disp: 24 capsule, Rfl: 2    ferrous sulfate 324 mg (65 mg iron) TbEC, Take 325 mg by mouth once daily., Disp: , Rfl:     fluticasone propionate (FLONASE) 50 mcg/actuation nasal spray, 1 spray (50 mcg total) by Each Nostril route once daily., Disp: 1 Bottle, Rfl: 3    metoprolol succinate (TOPROL-XL) 50 MG 24 hr tablet, Take 1 tablet (50 mg total) by mouth once daily., Disp: 90 tablet, Rfl: 1    montelukast (SINGULAIR) 10 mg tablet, TK 1 T PO QHS, Disp: , Rfl: 2    olanzapine (ZYPREXA) 10 MG tablet, Take 10 mg by mouth every evening.,  Disp: , Rfl:     pantoprazole (PROTONIX) 40 MG tablet, Take 1 tablet (40 mg total) by mouth once daily., Disp: 90 tablet, Rfl: 3    potassium chloride SA (K-DUR,KLOR-CON) 20 MEQ tablet, Take 1 tablet (20 mEq total) by mouth once daily., Disp: 90 tablet, Rfl: 3    pravastatin (PRAVACHOL) 40 MG tablet, Take 1 tablet (40 mg total) by mouth nightly., Disp: 90 tablet, Rfl: 3    SITagliptin (JANUVIA) 50 MG Tab, TAKE 1 TABLET EVERY DAY, Disp: 90 tablet, Rfl: 1    trazodone (DESYREL) 100 MG tablet, Take 100 mg by mouth nightly as needed., Disp: , Rfl:     TRUEPLUS LANCETS 33 gauge Misc, TEST  DAILY, Disp: 100 each, Rfl: 3    flash glucose scanning reader (FREESTYLE JAY 14 DAY READER) Misc, 1 Device by Misc.(Non-Drug; Combo Route) route once daily., Disp: 1 each, Rfl: 0    flash glucose sensor (FREESTYLE JAY 14 DAY SENSOR) Kit, 1 Device by Misc.(Non-Drug; Combo Route) route every 14 (fourteen) days., Disp: 6 kit, Rfl: 2

## 2019-10-15 RX ORDER — MONTELUKAST SODIUM 10 MG/1
TABLET ORAL
Qty: 90 TABLET | Refills: 3 | Status: SHIPPED | OUTPATIENT
Start: 2019-10-15 | End: 2020-03-10

## 2019-11-14 ENCOUNTER — OFFICE VISIT (OUTPATIENT)
Dept: PULMONOLOGY | Facility: CLINIC | Age: 74
End: 2019-11-14
Payer: MEDICARE

## 2019-11-14 ENCOUNTER — HOSPITAL ENCOUNTER (OUTPATIENT)
Dept: RADIOLOGY | Facility: HOSPITAL | Age: 74
Discharge: HOME | End: 2019-11-14
Attending: NURSE PRACTITIONER
Payer: MEDICARE

## 2019-11-14 ENCOUNTER — TELEPHONE (OUTPATIENT)
Dept: PULMONOLOGY | Facility: CLINIC | Age: 74
End: 2019-11-14

## 2019-11-14 VITALS
BODY MASS INDEX: 21.97 KG/M2 | SYSTOLIC BLOOD PRESSURE: 120 MMHG | DIASTOLIC BLOOD PRESSURE: 80 MMHG | WEIGHT: 140 LBS | HEIGHT: 67 IN | OXYGEN SATURATION: 99 % | HEART RATE: 84 BPM

## 2019-11-14 DIAGNOSIS — R05.9 COUGH: ICD-10-CM

## 2019-11-14 DIAGNOSIS — J45.40 MODERATE PERSISTENT ASTHMA WITHOUT COMPLICATION: ICD-10-CM

## 2019-11-14 DIAGNOSIS — M25.562 LEFT KNEE PAIN, UNSPECIFIED CHRONICITY: Primary | ICD-10-CM

## 2019-11-14 DIAGNOSIS — R05.9 COUGH: Primary | ICD-10-CM

## 2019-11-14 DIAGNOSIS — R09.82 POST-NASAL DRIP: ICD-10-CM

## 2019-11-14 PROCEDURE — 1101F PR PT FALLS ASSESS DOC 0-1 FALLS W/OUT INJ PAST YR: ICD-10-PCS | Mod: S$GLB,,, | Performed by: NURSE PRACTITIONER

## 2019-11-14 PROCEDURE — 99214 PR OFFICE/OUTPT VISIT, EST, LEVL IV, 30-39 MIN: ICD-10-PCS | Mod: S$GLB,,, | Performed by: NURSE PRACTITIONER

## 2019-11-14 PROCEDURE — 99214 OFFICE O/P EST MOD 30 MIN: CPT | Mod: S$GLB,,, | Performed by: NURSE PRACTITIONER

## 2019-11-14 PROCEDURE — 1101F PT FALLS ASSESS-DOCD LE1/YR: CPT | Mod: S$GLB,,, | Performed by: NURSE PRACTITIONER

## 2019-11-14 PROCEDURE — 3079F PR MOST RECENT DIASTOLIC BLOOD PRESSURE 80-89 MM HG: ICD-10-PCS | Mod: S$GLB,,, | Performed by: NURSE PRACTITIONER

## 2019-11-14 PROCEDURE — 3074F PR MOST RECENT SYSTOLIC BLOOD PRESSURE < 130 MM HG: ICD-10-PCS | Mod: S$GLB,,, | Performed by: NURSE PRACTITIONER

## 2019-11-14 PROCEDURE — 71046 X-RAY EXAM CHEST 2 VIEWS: CPT | Mod: TC

## 2019-11-14 PROCEDURE — 3079F DIAST BP 80-89 MM HG: CPT | Mod: S$GLB,,, | Performed by: NURSE PRACTITIONER

## 2019-11-14 PROCEDURE — 3074F SYST BP LT 130 MM HG: CPT | Mod: S$GLB,,, | Performed by: NURSE PRACTITIONER

## 2019-11-14 NOTE — PROGRESS NOTES
SUBJECTIVE:    Patient ID: Adriana Perez is a 73 y.o. female.    Chief Complaint: Follow-up and Asthma    Patient here with her  and brother. She states she is feeling well. She feels her breathing is stable. She has had an occasional cough mainly at night.  She still suffers with chronic post nasal drip. She is taking her Protonix and Singulair. She is using Symbicort two puffs twice a day.  She has not been using her rescue inhaler.   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/2018    Dr Lance Foy- Also Renal cysts bilateral    Hyperlipidemia     Hypertension     MVP (mitral valve prolapse)     Primary osteoarthritis of left knee     Restless leg     Schizophrenia, simple, chronic     Urinary, incontinence, stress female      Past Surgical History:   Procedure Laterality Date    FOOT SURGERY      HYSTERECTOMY      TUBAL LIGATION       Family History   Problem Relation Age of Onset    Sudden death Father     Cancer Mother     Hypertension Mother     Breast cancer Mother     Cancer Sister     Breast cancer Sister         Social History:   Marital Status:   Occupation: retired teacher  Alcohol History:  reports that she does not drink alcohol.  Tobacco History:  reports that she quit smoking about 27 years ago. Her smoking use included cigarettes. She has a 5.00 pack-year smoking history. She has never used smokeless tobacco.  Drug History:  reports that she does not use drugs.    Review of patient's allergies indicates:   Allergen Reactions    Lisinopril Nausea And Vomiting       Current Outpatient Medications   Medication Sig Dispense Refill    albuterol  (PROVENTIL/VENTOLIN HFA) 90 mcg/actuation inhaler Inhale 2 puffs into the lungs every 6 (six) hours as needed for Wheezing. Rescue 1 Inhaler 4    amantadine HCl (SYMMETREL) 100 mg capsule Take 100 mg by mouth 2 (two) times daily.       ammonium lactate 12 % Crea Apply topically.      aspirin (ECOTRIN) 81 MG EC tablet Take 81 mg by mouth once daily.      blood sugar diagnostic (TRUE METRIX GLUCOSE TEST STRIP) Strp TEST BLOOD SUGAR EVERY  strip 3    budesonide-formoterol 160-4.5 mcg (SYMBICORT) 160-4.5 mcg/actuation HFAA INHALE 2 PUFFS BY MOUTH EVERY 12 HOURS 3 Inhaler 2    calcitRIOL (ROCALTROL) 0.25 MCG Cap Take 1 capsule (0.25 mcg total) by mouth every Mon, Wed, Fri. 24 capsule 2    ferrous sulfate 324 mg (65 mg iron) TbEC Take 325 mg by mouth once daily.      flash glucose scanning reader (ZakazakaSTYLE JAY 14 DAY READER) Misc 1 Device by Misc.(Non-Drug; Combo Route) route once daily. 1 each 0    flash glucose sensor (FREESTYLE JAY 14 DAY SENSOR) Kit 1 Device by Misc.(Non-Drug; Combo Route) route every 14 (fourteen) days. 6 kit 2    fluticasone propionate (FLONASE) 50 mcg/actuation nasal spray 1 spray (50 mcg total) by Each Nostril route once daily. 1 Bottle 3    metoprolol succinate (TOPROL-XL) 50 MG 24 hr tablet Take 1 tablet (50 mg total) by mouth once daily. 90 tablet 1    montelukast (SINGULAIR) 10 mg tablet TK 1 T PO QHS  2    montelukast (SINGULAIR) 10 mg tablet TAKE 1 TABLET BY MOUTH EVERY EVENING 90 tablet 3    olanzapine (ZYPREXA) 10 MG tablet Take 10 mg by mouth every evening.      pantoprazole (PROTONIX) 40 MG tablet Take 1 tablet (40 mg total) by mouth once daily. 90 tablet 3    potassium chloride SA (K-DUR,KLOR-CON) 20 MEQ tablet Take 1 tablet (20 mEq total) by mouth once daily. 90 tablet 3    pravastatin (PRAVACHOL) 40 MG tablet Take 1 tablet (40 mg total) by mouth nightly. 90 tablet 3    SITagliptin (JANUVIA) 50 MG Tab TAKE 1 TABLET EVERY DAY 90 tablet 1    trazodone  "(DESYREL) 100 MG tablet Take 100 mg by mouth nightly as needed.      TRUEPLUS LANCETS 33 gauge Misc TEST  DAILY 100 each 3     No current facility-administered medications for this visit.        Last PFT: 06/25/2018  Last Chest xray 04/12/2018    Review of Systems  General: Feeling Well.  Eyes: Vision is good.  ENT:  Chronic Sinus drip   Heart:: No chest pain or palpitations.  Lungs: breathing well, occasional cough  GI: reflux.  : No dysuria, hesitancy, or nocturia.  Musculoskeletal:  Typical aches  Skin: No lesions or rashes.  Neuro: forgetful, tremor   Lymph: No edema or adenopathy.  Psych: anxiety controlled  Endo: weight loss    OBJECTIVE:      /80 (BP Location: Left arm, Patient Position: Sitting, BP Method: Medium (Manual))   Pulse 84   Ht 5' 7" (1.702 m)   Wt 63.5 kg (140 lb)   SpO2 99%   BMI 21.93 kg/m²     Physical Exam  GENERAL: Older patient in no distress.  HEENT: Pupils equal and reactive. Extraocular movements intact. Nose intact.    Post nasal drip and cobblestoned  Pharynx moist.  NECK: Supple.   HEART: Regular rate and rhythm. No murmur or gallop auscultated.  LUNGS: faint expiratory wheeze to posteriorly left lower lobe. Lung excursion symmetrical. No change in fremitus. No adventitial noises.  ABDOMEN: Bowel sounds present. Non-tender, no masses palpated.  EXTREMITIES: Normal muscle tone and joint movement, no cyanosis or clubbing.   LYMPHATICS: No adenopathy palpated, no edema.  SKIN: Dry, intact, no lesions.   NEURO: Cranial nerves II-XII intact. Motor strength 5/5 bilaterally, upper and lower extremities.  PSYCH: Appropriate affect.           Assessment:       1. Cough    2. Moderate persistent asthma without complication    3. Post-nasal drip          Plan:       Cough  -     X-Ray Chest PA And Lateral; Future    Moderate persistent asthma without complication    Post-nasal drip       Stay on your Symbicort 2 puffs twice a day, keep rinsing after you use it  Stay on your " Protonix and Singulair by mouth every day  Call if you get sick  Chest xray   Already had flu shot  Use your rescue inhaler as needed  If post nasal persist and cough comes back will need to see an ENT     Follow up in about 6 months (around 5/14/2020).

## 2019-11-14 NOTE — PATIENT INSTRUCTIONS
Controlling Your Asthma  You can do a lot to manage your asthma and improve your quality of life. You will need to work with your healthcare provider to develop a plan. But its up to you to put this plan into action.  Why you need to take control  You need to control the inflammation in your lungs. Take all medicine as directed, especially controller medicines, even if you feel that your asthma is under good control. You also need to relieve symptoms when you have them. These are long-term tasks. But the more you stay in control, the better youll feel. If you dont stay in control:  · Asthma symptoms may cause you to miss school, work, or activities that you enjoy.  · Asthma flare-ups can be dangerous, even deadly.  · Uncontrolled asthma makes it more likely that you will need emergency department and in-hospital care.  · Uncontrolled asthma may cause permanent damage to your lungs.    Peak flow monitoring helps measure how open your airways are.   Taking medicine helps you control your asthma and relieve symptoms when they occur.     Using an Asthma Action Plan will help you keep track of and respond to asthma symptoms.   Avoiding triggers--the things that inflame your airways--will help prevent symptoms and flare-ups.   Your action plan  Your healthcare provider will help you prepare, and when needed, update your personal Asthma Action Plan. Your plan tells you what to do based on your current symptoms. If you don't have an Asthma Action Plan, or if yours isn't up-to-date, make sure you talk with your healthcare provider.  Date Last Reviewed: 1/1/2017  © 1528-4455 [employer]. [address] [place] [address], [place] [address]. All rights reserved. This information is not intended as a substitute for professional medical care. Always follow your healthcare professional's instructions.    Stay on your Symbicort 2 puffs twice a day, keep rinsing after you use it  Stay on your Protonix and Singulair by mouth  every day  Call if you get sick  Chest xray   Already had flu shot  Use your rescue inhaler as needed

## 2019-11-19 ENCOUNTER — HOSPITAL ENCOUNTER (OUTPATIENT)
Dept: RADIOLOGY | Facility: HOSPITAL | Age: 74
Discharge: HOME | End: 2019-11-19
Attending: ORTHOPAEDIC SURGERY
Payer: MEDICARE

## 2019-11-19 ENCOUNTER — OFFICE VISIT (OUTPATIENT)
Dept: ORTHOPEDICS | Facility: CLINIC | Age: 74
End: 2019-11-19
Payer: MEDICARE

## 2019-11-19 VITALS — WEIGHT: 140 LBS | HEIGHT: 67 IN | BODY MASS INDEX: 21.97 KG/M2

## 2019-11-19 DIAGNOSIS — M25.562 LEFT KNEE PAIN, UNSPECIFIED CHRONICITY: ICD-10-CM

## 2019-11-19 DIAGNOSIS — M17.11 PRIMARY OSTEOARTHRITIS OF RIGHT KNEE: ICD-10-CM

## 2019-11-19 DIAGNOSIS — M17.12 PRIMARY OSTEOARTHRITIS OF LEFT KNEE: Primary | ICD-10-CM

## 2019-11-19 PROCEDURE — 1101F PR PT FALLS ASSESS DOC 0-1 FALLS W/OUT INJ PAST YR: ICD-10-PCS | Mod: CPTII,S$GLB,, | Performed by: ORTHOPAEDIC SURGERY

## 2019-11-19 PROCEDURE — 99999 PR PBB SHADOW E&M-EST. PATIENT-LVL III: CPT | Mod: PBBFAC,,, | Performed by: ORTHOPAEDIC SURGERY

## 2019-11-19 PROCEDURE — 1159F MED LIST DOCD IN RCRD: CPT | Mod: S$GLB,,, | Performed by: ORTHOPAEDIC SURGERY

## 2019-11-19 PROCEDURE — 73564 XR KNEE ORTHO BILAT WITH FLEXION: ICD-10-PCS | Mod: 26,50,, | Performed by: RADIOLOGY

## 2019-11-19 PROCEDURE — 1125F PR PAIN SEVERITY QUANTIFIED, PAIN PRESENT: ICD-10-PCS | Mod: S$GLB,,, | Performed by: ORTHOPAEDIC SURGERY

## 2019-11-19 PROCEDURE — 1159F PR MEDICATION LIST DOCUMENTED IN MEDICAL RECORD: ICD-10-PCS | Mod: S$GLB,,, | Performed by: ORTHOPAEDIC SURGERY

## 2019-11-19 PROCEDURE — 20610 DRAIN/INJ JOINT/BURSA W/O US: CPT | Mod: RT,S$GLB,, | Performed by: ORTHOPAEDIC SURGERY

## 2019-11-19 PROCEDURE — 20610 LARGE JOINT ASPIRATION/INJECTION: R KNEE: ICD-10-PCS | Mod: RT,S$GLB,, | Performed by: ORTHOPAEDIC SURGERY

## 2019-11-19 PROCEDURE — 1101F PT FALLS ASSESS-DOCD LE1/YR: CPT | Mod: CPTII,S$GLB,, | Performed by: ORTHOPAEDIC SURGERY

## 2019-11-19 PROCEDURE — 73564 X-RAY EXAM KNEE 4 OR MORE: CPT | Mod: TC,50,PN

## 2019-11-19 PROCEDURE — 99999 PR PBB SHADOW E&M-EST. PATIENT-LVL III: ICD-10-PCS | Mod: PBBFAC,,, | Performed by: ORTHOPAEDIC SURGERY

## 2019-11-19 PROCEDURE — 99213 PR OFFICE/OUTPT VISIT, EST, LEVL III, 20-29 MIN: ICD-10-PCS | Mod: 25,S$GLB,, | Performed by: ORTHOPAEDIC SURGERY

## 2019-11-19 PROCEDURE — 99213 OFFICE O/P EST LOW 20 MIN: CPT | Mod: 25,S$GLB,, | Performed by: ORTHOPAEDIC SURGERY

## 2019-11-19 PROCEDURE — 1125F AMNT PAIN NOTED PAIN PRSNT: CPT | Mod: S$GLB,,, | Performed by: ORTHOPAEDIC SURGERY

## 2019-11-19 PROCEDURE — 73564 X-RAY EXAM KNEE 4 OR MORE: CPT | Mod: 26,50,, | Performed by: RADIOLOGY

## 2019-11-19 RX ORDER — TRIAMCINOLONE ACETONIDE 40 MG/ML
40 INJECTION, SUSPENSION INTRA-ARTICULAR; INTRAMUSCULAR
Status: DISCONTINUED | OUTPATIENT
Start: 2019-11-19 | End: 2019-11-19 | Stop reason: HOSPADM

## 2019-11-19 RX ADMIN — TRIAMCINOLONE ACETONIDE 40 MG: 40 INJECTION, SUSPENSION INTRA-ARTICULAR; INTRAMUSCULAR at 01:11

## 2019-11-19 NOTE — PROCEDURES
Large Joint Aspiration/Injection: R knee  Date/Time: 11/19/2019 1:30 PM  Performed by: Yash Wing MD  Authorized by: Yash Wing MD     Consent Done?:  Yes (Verbal)  Indications:  Pain and joint swelling  Procedure site marked: Yes    Timeout: Prior to procedure the correct patient, procedure, and site was verified    Anesthesia    Anesthetic: lidocaine 1% without epinephrine    Location:  Knee  Site:  R knee  Prep: Patient was prepped and draped in usual sterile fashion    Needle size:  22 G  Approach:  Lateral  Medications:  40 mg triamcinolone acetonide 40 mg/mL  Patient tolerance:  Patient tolerated the procedure well with no immediate complications

## 2019-11-19 NOTE — PROGRESS NOTES
11/19/2019      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/2018    Dr Lance Foy- Also Renal cysts bilateral    Hyperlipidemia     Hypertension     MVP (mitral valve prolapse)     Primary osteoarthritis of left knee     Restless leg     Schizophrenia, simple, chronic     Urinary, incontinence, stress female        Past Surgical History:   Procedure Laterality Date    FOOT SURGERY      HYSTERECTOMY      TUBAL LIGATION           Current Outpatient Medications:     albuterol (PROVENTIL/VENTOLIN HFA) 90 mcg/actuation inhaler, Inhale 2 puffs into the lungs every 6 (six) hours as needed for Wheezing. Rescue, Disp: 1 Inhaler, Rfl: 4    amantadine HCl (SYMMETREL) 100 mg capsule, Take 100 mg by mouth 2 (two) times daily. , Disp: , Rfl:     ammonium lactate 12 % Crea, Apply topically., Disp: , Rfl:     aspirin (ECOTRIN) 81 MG EC tablet, Take 81 mg by mouth once daily., Disp: , Rfl:     blood sugar diagnostic (TRUE METRIX GLUCOSE TEST STRIP) Strp, TEST BLOOD SUGAR EVERY DAY, Disp: 100 strip, Rfl: 3    budesonide-formoterol 160-4.5 mcg (SYMBICORT) 160-4.5 mcg/actuation HFAA, INHALE 2 PUFFS BY MOUTH EVERY 12 HOURS, Disp: 3 Inhaler, Rfl: 2    calcitRIOL (ROCALTROL) 0.25 MCG Cap, Take 1 capsule (0.25 mcg total) by mouth every Mon, Wed, Fri., Disp: 24 capsule, Rfl: 2    ferrous sulfate 324 mg (65 mg iron) TbEC, Take 325 mg by mouth once daily., Disp: , Rfl:     flash glucose scanning reader (FREESTYLE JAY 14 DAY READER) Misc, 1 Device by Misc.(Non-Drug; Combo Route) route once daily., Disp: 1 each, Rfl: 0    flash glucose sensor (FREESTYLE JAY 14 DAY SENSOR) Kit, 1 Device by Misc.(Non-Drug; Combo  Route) route every 14 (fourteen) days., Disp: 6 kit, Rfl: 2    fluticasone propionate (FLONASE) 50 mcg/actuation nasal spray, 1 spray (50 mcg total) by Each Nostril route once daily., Disp: 1 Bottle, Rfl: 3    metoprolol succinate (TOPROL-XL) 50 MG 24 hr tablet, Take 1 tablet (50 mg total) by mouth once daily., Disp: 90 tablet, Rfl: 1    montelukast (SINGULAIR) 10 mg tablet, TK 1 T PO QHS, Disp: , Rfl: 2    montelukast (SINGULAIR) 10 mg tablet, TAKE 1 TABLET BY MOUTH EVERY EVENING, Disp: 90 tablet, Rfl: 3    olanzapine (ZYPREXA) 10 MG tablet, Take 10 mg by mouth every evening., Disp: , Rfl:     pantoprazole (PROTONIX) 40 MG tablet, Take 1 tablet (40 mg total) by mouth once daily., Disp: 90 tablet, Rfl: 3    potassium chloride SA (K-DUR,KLOR-CON) 20 MEQ tablet, Take 1 tablet (20 mEq total) by mouth once daily., Disp: 90 tablet, Rfl: 3    pravastatin (PRAVACHOL) 40 MG tablet, Take 1 tablet (40 mg total) by mouth nightly., Disp: 90 tablet, Rfl: 3    SITagliptin (JANUVIA) 50 MG Tab, TAKE 1 TABLET EVERY DAY, Disp: 90 tablet, Rfl: 1    trazodone (DESYREL) 100 MG tablet, Take 100 mg by mouth nightly as needed., Disp: , Rfl:     TRUEPLUS LANCETS 33 gauge Misc, TEST  DAILY (Patient not taking: Reported on 11/19/2019), Disp: 100 each, Rfl: 3    Allergies as of 11/19/2019 - Reviewed 11/19/2019   Allergen Reaction Noted    Lisinopril Nausea And Vomiting 08/22/2016       Family History   Problem Relation Age of Onset    Sudden death Father     Cancer Mother     Hypertension Mother     Breast cancer Mother     Cancer Sister     Breast cancer Sister        Social History     Socioeconomic History    Marital status:      Spouse name: Willy Perez    Number of children: 2    Years of education: Not on file    Highest education level: Not on file   Occupational History    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     Comment: [employer]   Social Needs    Financial resource strain: Not on  file    Food insecurity:     Worry: Not on file     Inability: Not on file    Transportation needs:     Medical: Not on file     Non-medical: Not on file   Tobacco Use    Smoking status: Former Smoker     Packs/day: 1.00     Years: 5.00     Pack years: 5.00     Types: Cigarettes     Last attempt to quit: 1992     Years since quittin.2    Smokeless tobacco: Never Used   Substance and Sexual Activity    Alcohol use: No    Drug use: No    Sexual activity: Not Currently     Partners: Male   Lifestyle    Physical activity:     Days per week: Not on file     Minutes per session: Not on file    Stress: Not at all   Relationships    Social connections:     Talks on phone: Not on file     Gets together: Not on file     Attends Sikh service: Not on file     Active member of club or organization: Not on file     Attends meetings of clubs or organizations: Not on file     Relationship status: Not on file   Other Topics Concern    Not on file   Social History Narrative    Not on file             HPI:  Patient has a history of chronic osteoarthritis of both knees with the right greater than the left      Review of Systems   Constitution: Negative for chills and fever.   HENT: Negative for headaches and blurry vision.   Cardiovascular: Negative for chest pain, irregular heartbeat, leg swelling and palpitations.   Respiratory: Negative for cough and shortness of breath.   Endocrine: Negative for polyuria.   Hematologic/Lymphatic: Negative for bleeding problem. Does not bruise/bleed easily.   Skin: Negative for poor wound healing and rash.   Musculoskeletal: Negative for joint pain. Negative for arthritis, joint swelling, muscle weakness and myalgias.   Gastrointestinal: Negative for abdominal pain, heartburn, melena, nausea and vomiting.   Genitourinary: Negative for bladder incontinence and dysuria.   Neurological: Negative for numbness.   Psychiatric/Behavioral: Negative for depression. The patient is  not nervous/anxious.     PE:  [unfilled]    A&Ox3, NAD  Neck-supple no pain  RUE no swelling or deformity  LUE no swelling or deformity  Pelvis no pelvic pain  Back no back pain straight leg raise is negative  RLE patient's right lower extremity is rather frail she has decreased muscle tone decreased strength she has a moderate effusion to the right knee I am able to flex her knee to about 120° she has mostly lateral knee pain  LLE her left knee is not is frail as the right she has fairly good range of motion a slight effusion minimal discomfort    Xrays:  X-ray of her right knee shows a severe valgus deformity with bone-on-bone contact laterally the x-ray of the left knee also shows severe osteoarthritis with bone-on-bone in the joint space        Labs:    No results found for this or any previous visit (from the past 24 hour(s)).    Assessment/Plan:   Bilateral osteoarthritis of the knees with the right greater than left the patient I do not think is a good candidate for total knee replacement she looks rather weak and frail I would be afraid to it do a total knee replaced on min on her I would worry about whether not she would rehab I am going to inject her right knee with Kenalog today and I am going to send her to physical therapy see if we can do some quad hamstring strengthening and see if with physical therapy she may become a candidate for knee replacement

## 2019-12-05 ENCOUNTER — TELEPHONE (OUTPATIENT)
Dept: FAMILY MEDICINE | Facility: CLINIC | Age: 74
End: 2019-12-05

## 2019-12-11 ENCOUNTER — LAB VISIT (OUTPATIENT)
Dept: LAB | Facility: HOSPITAL | Age: 74
End: 2019-12-11
Attending: INTERNAL MEDICINE
Payer: MEDICARE

## 2019-12-11 DIAGNOSIS — E11.22 TYPE 2 DIABETES MELLITUS WITH END-STAGE RENAL DISEASE: ICD-10-CM

## 2019-12-11 DIAGNOSIS — N18.30 CHRONIC KIDNEY DISEASE, STAGE III (MODERATE): ICD-10-CM

## 2019-12-11 DIAGNOSIS — E11.9 DIABETES MELLITUS WITHOUT COMPLICATION: Primary | ICD-10-CM

## 2019-12-11 DIAGNOSIS — E78.41 ELEVATED LIPOPROTEIN A LEVEL: ICD-10-CM

## 2019-12-11 DIAGNOSIS — N18.6 TYPE 2 DIABETES MELLITUS WITH END-STAGE RENAL DISEASE: ICD-10-CM

## 2019-12-11 LAB
ALBUMIN SERPL BCP-MCNC: 4 G/DL (ref 3.5–5.2)
ALBUMIN/CREAT UR: 32 UG/MG (ref 0–30)
ANION GAP SERPL CALC-SCNC: 9 MMOL/L (ref 8–16)
BUN SERPL-MCNC: 32 MG/DL (ref 8–23)
CALCIUM SERPL-MCNC: 10.3 MG/DL (ref 8.7–10.5)
CHLORIDE SERPL-SCNC: 102 MMOL/L (ref 95–110)
CHOLEST SERPL-MCNC: 178 MG/DL (ref 120–199)
CHOLEST/HDLC SERPL: 2.1 {RATIO} (ref 2–5)
CO2 SERPL-SCNC: 31 MMOL/L (ref 23–29)
CREAT SERPL-MCNC: 2.2 MG/DL (ref 0.5–1.4)
CREAT UR-MCNC: 222 MG/DL (ref 15–325)
EST. GFR  (AFRICAN AMERICAN): 24.9 ML/MIN/1.73 M^2
EST. GFR  (NON AFRICAN AMERICAN): 21.6 ML/MIN/1.73 M^2
ESTIMATED AVG GLUCOSE: 148 MG/DL (ref 68–131)
GLUCOSE SERPL-MCNC: 135 MG/DL (ref 70–110)
HBA1C MFR BLD HPLC: 6.8 % (ref 4.5–6.2)
HDLC SERPL-MCNC: 86 MG/DL (ref 40–75)
HDLC SERPL: 48.3 % (ref 20–50)
LDLC SERPL CALC-MCNC: 83.6 MG/DL (ref 63–159)
MAGNESIUM SERPL-MCNC: 2.1 MG/DL (ref 1.6–2.6)
MICROALBUMIN UR DL<=1MG/L-MCNC: 71 UG/ML
NONHDLC SERPL-MCNC: 92 MG/DL
PHOSPHATE SERPL-MCNC: 3.9 MG/DL (ref 2.7–4.5)
POTASSIUM SERPL-SCNC: 4.7 MMOL/L (ref 3.5–5.1)
SODIUM SERPL-SCNC: 142 MMOL/L (ref 136–145)
TRIGL SERPL-MCNC: 42 MG/DL (ref 30–150)

## 2019-12-11 PROCEDURE — 83036 HEMOGLOBIN GLYCOSYLATED A1C: CPT

## 2019-12-11 PROCEDURE — 80069 RENAL FUNCTION PANEL: CPT

## 2019-12-11 PROCEDURE — 82043 UR ALBUMIN QUANTITATIVE: CPT

## 2019-12-11 PROCEDURE — 36415 COLL VENOUS BLD VENIPUNCTURE: CPT

## 2019-12-11 PROCEDURE — 83735 ASSAY OF MAGNESIUM: CPT

## 2019-12-11 PROCEDURE — 80061 LIPID PANEL: CPT

## 2019-12-12 ENCOUNTER — OFFICE VISIT (OUTPATIENT)
Dept: FAMILY MEDICINE | Facility: CLINIC | Age: 74
End: 2019-12-12
Payer: MEDICARE

## 2019-12-12 ENCOUNTER — TELEPHONE (OUTPATIENT)
Dept: FAMILY MEDICINE | Facility: CLINIC | Age: 74
End: 2019-12-12

## 2019-12-12 VITALS
BODY MASS INDEX: 21.66 KG/M2 | HEIGHT: 67 IN | DIASTOLIC BLOOD PRESSURE: 84 MMHG | WEIGHT: 138 LBS | SYSTOLIC BLOOD PRESSURE: 151 MMHG | HEART RATE: 78 BPM

## 2019-12-12 DIAGNOSIS — I10 BENIGN ESSENTIAL HYPERTENSION: ICD-10-CM

## 2019-12-12 DIAGNOSIS — E11.9 TYPE 2 DIABETES MELLITUS WITHOUT COMPLICATION, WITHOUT LONG-TERM CURRENT USE OF INSULIN: Primary | ICD-10-CM

## 2019-12-12 DIAGNOSIS — E78.2 MULTIPLE-TYPE HYPERLIPIDEMIA: ICD-10-CM

## 2019-12-12 DIAGNOSIS — R26.81 UNSTEADY GAIT: ICD-10-CM

## 2019-12-12 DIAGNOSIS — R29.6 RECURRENT FALLS: ICD-10-CM

## 2019-12-12 DIAGNOSIS — N18.30 CHRONIC KIDNEY DISEASE, STAGE 3 (MODERATE): ICD-10-CM

## 2019-12-12 PROCEDURE — 1126F PR PAIN SEVERITY QUANTIFIED, NO PAIN PRESENT: ICD-10-PCS | Mod: S$GLB,,, | Performed by: INTERNAL MEDICINE

## 2019-12-12 PROCEDURE — 1126F AMNT PAIN NOTED NONE PRSNT: CPT | Mod: S$GLB,,, | Performed by: INTERNAL MEDICINE

## 2019-12-12 PROCEDURE — 1101F PR PT FALLS ASSESS DOC 0-1 FALLS W/OUT INJ PAST YR: ICD-10-PCS | Mod: S$GLB,,, | Performed by: INTERNAL MEDICINE

## 2019-12-12 PROCEDURE — 3079F DIAST BP 80-89 MM HG: CPT | Mod: S$GLB,,, | Performed by: INTERNAL MEDICINE

## 2019-12-12 PROCEDURE — 3044F HG A1C LEVEL LT 7.0%: CPT | Mod: S$GLB,,, | Performed by: INTERNAL MEDICINE

## 2019-12-12 PROCEDURE — 3079F PR MOST RECENT DIASTOLIC BLOOD PRESSURE 80-89 MM HG: ICD-10-PCS | Mod: S$GLB,,, | Performed by: INTERNAL MEDICINE

## 2019-12-12 PROCEDURE — 99214 PR OFFICE/OUTPT VISIT, EST, LEVL IV, 30-39 MIN: ICD-10-PCS | Mod: S$GLB,,, | Performed by: INTERNAL MEDICINE

## 2019-12-12 PROCEDURE — 99214 OFFICE O/P EST MOD 30 MIN: CPT | Mod: S$GLB,,, | Performed by: INTERNAL MEDICINE

## 2019-12-12 PROCEDURE — 1101F PT FALLS ASSESS-DOCD LE1/YR: CPT | Mod: S$GLB,,, | Performed by: INTERNAL MEDICINE

## 2019-12-12 PROCEDURE — 1159F MED LIST DOCD IN RCRD: CPT | Mod: S$GLB,,, | Performed by: INTERNAL MEDICINE

## 2019-12-12 PROCEDURE — 3077F PR MOST RECENT SYSTOLIC BLOOD PRESSURE >= 140 MM HG: ICD-10-PCS | Mod: S$GLB,,, | Performed by: INTERNAL MEDICINE

## 2019-12-12 PROCEDURE — 1159F PR MEDICATION LIST DOCUMENTED IN MEDICAL RECORD: ICD-10-PCS | Mod: S$GLB,,, | Performed by: INTERNAL MEDICINE

## 2019-12-12 PROCEDURE — 3077F SYST BP >= 140 MM HG: CPT | Mod: S$GLB,,, | Performed by: INTERNAL MEDICINE

## 2019-12-12 PROCEDURE — 3044F PR MOST RECENT HEMOGLOBIN A1C LEVEL <7.0%: ICD-10-PCS | Mod: S$GLB,,, | Performed by: INTERNAL MEDICINE

## 2019-12-12 RX ORDER — ESCITALOPRAM OXALATE 10 MG/1
10 TABLET ORAL DAILY
Refills: 3 | Status: ON HOLD | COMMUNITY
Start: 2019-11-21 | End: 2020-03-14 | Stop reason: HOSPADM

## 2019-12-12 NOTE — LETTER
December 12, 2019        Lance Foy MD  [address] [facility] [place]  [facility] [address]             [facility]  [address] [place] [id number]  Phone: [phone]  Fax: [phone]   Patient: Adriana Perez   MR Number: [id number]   YOB: 1945   Date of Visit: 12/12/2019     Dear Dr. Foy,     Please evaluate for early follow  up and worsening creatinine.    Sincerely,      Aiden Koch MD            CC  No Recipients    Enclosure

## 2019-12-12 NOTE — TELEPHONE ENCOUNTER
----- Message from Aiden Koch MD sent at 12/12/2019  1:33 PM CST -----  Patient's labs have been reviewed.  Creatinine is slightly more elevated.  She will keep her regular follow-up.  Please fax these results to her nephrologist.  It should be either Dr. Moore or Dr. Foy.

## 2019-12-12 NOTE — PATIENT INSTRUCTIONS
Using a Blood Sugar Log    You have diabetes. This means your body has trouble regulating a sugar called glucose. To help manage your diabetes, youll need to check your blood sugar level as directed by your healthcare provider. Keeping a log of your blood sugar levels will help you track your blood sugar readings. Its a simple and easy way to see how well you are controlling your diabetes.  Checking your blood sugar level  You can check your blood sugar level with a blood glucose meter. Youll first prick the side of your finger with a tiny lancet to draw a tiny drop of blood onto the test strip. Some glucose meters let you use another place on your body to test. But these other places should not be used in some cases as they may be inaccurate. Follow the instructions for your glucose meter. And talk with your healthcare provider before doing the test on other places.  The strip goes into the meter first, then a drop of blood is placed on the tip of the strip. The meter then shows a reading that tells you the level of your blood sugar. Your readings should be in your target range as often as possible. This means not too high or too low. Staying in this range helps lower your risk for complications. Your healthcare provider will help you figure out the target range that is best for you.  Tracking your readings  Every time you check your blood sugar, use your log to keep track of your readings. Your meter will also probably have a memory feature that your healthcare provider can check at your next visit. You may be advised by your healthcare provider to check your blood sugar in the morning, at bedtime, and before and after meals. Be sure to write down all of your numbers. Also use your log to record things that might have affected your blood sugar. Some examples include being sick, certain medicines, being physically active, feeling stressed, or skipping meals.   Lessons learned from your readings  Tracking your  blood sugar readings helps you see patterns. These patterns tell you how your actions affect your blood sugar. For instance, you may have higher numbers after eating certain foods or lower numbers after exercise. They just help you understand how to stay in your target range more often, so that your diabetes remains in good control.  Sharing your log with your healthcare team  Bring your blood sugar log and glucose meter with you to all of your healthcare appointments. This can help your healthcare team make changes to your treatment plan, if needed. This may involve making changes in what you eat, what medicines you take, or how much you exercise.  To learn more  The resources below can help you learn more:  · American Diabetes Association 075-820-0760 www.diabetes.org  · [employer] 051-492-1190 www.lighthouse.org  · National [employer] 825-917-5602 www.nei.nih.gov  · H[employer]one [employer] 181-291-2907 www.hormone.org  Date Last Reviewed: 5/1/2016  © 3816-4168 [employer]. [address] [place] [address], [place] [address]. All rights reserved. This information is not intended as a substitute for professional medical care. Always follow your healthcare professional's instructions.

## 2019-12-12 NOTE — PROGRESS NOTES
I called and spoke to Ketan Suarez patients daughter in law and I verified with her that dr Benjamin Parker wants to see Mrs Shelia Chávez in the office. I transferred her to the front office to make her appointment. Patient's labs have been reviewed.  Creatinine is slightly more elevated.  She will keep her regular follow-up.  Please fax these results to her nephrologist.  It should be either Dr. Moore or Dr. Foy.

## 2019-12-12 NOTE — PROGRESS NOTES
Subjective:       Patient ID: Adriana Perez is a 74 y.o. female.    Chief Complaint: Diabetes (lab review ); Hypertension; Chronic Kidney Disease; and Hyperlipidemia    Patient is a 74 in female who comes for follow-up.  She is accompanied with her granddaughter.  Underlying medical issues of stage 3 kidney disease, type 2 diabetes mellitus, hypertension, dyslipidemia and cognitive decline have been noted.    She has not bought her blood sugar records today.  Degree of control of diabetes is uncertain but pt. does not recall high blood sugars Recently.  Similarly blood pressure documentation has not been adequate at home.    Patient continues to gradually decline overall in multiple domains including self preservation and understanding.  Arthritis continues to plague her shoulders with limited movements.  She also has bilateral knee pains.  More on the right side.  She recently had who felt that she might not be a candidate for surgery given her multiple comorbidities.    History of falls have been noted though no new fall recently.  Recent labs had shown further elevation of creatinine to 2.2.    Diabetes   She presents for her follow-up diabetic visit. She has type 2 diabetes mellitus. Her disease course has been stable. Hypoglycemia symptoms include tremors. Pertinent negatives for hypoglycemia include no confusion, dizziness, headaches, mood changes, nervousness/anxiousness, pallor, seizures or speech difficulty. Associated symptoms include weakness. Pertinent negatives for diabetes include no chest pain, no fatigue, no foot ulcerations, no polydipsia, no polyphagia and no visual change. Pertinent negatives for hypoglycemia complications include no blackouts. Symptoms are stable. Diabetic complications include nephropathy. Risk factors for coronary artery disease include sedentary lifestyle, hypertension, diabetes mellitus and dyslipidemia. Meal planning includes avoidance of concentrated sweets. She has not  had a previous visit with a dietitian. She never participates in exercise. Her home blood glucose trend is fluctuating minimally. She does not see a podiatrist.  Hypertension   This is a chronic problem. The current episode started more than 1 year ago. The problem has been waxing and waning since onset. Associated symptoms include malaise/fatigue. Pertinent negatives include no chest pain, headaches or shortness of breath. Risk factors for coronary artery disease include post-menopausal state, sedentary lifestyle, diabetes mellitus and dyslipidemia. Past treatments include beta blockers. The current treatment provides moderate improvement. Compliance problems include psychosocial issues.    Hyperlipidemia   This is a chronic problem. The current episode started more than 1 year ago. The problem is controlled. She has no history of obesity. Pertinent negatives include no chest pain or shortness of breath. Current antihyperlipidemic treatment includes statins. The current treatment provides moderate improvement of lipids. Compliance problems include psychosocial issues.  Risk factors for coronary artery disease include a sedentary lifestyle, hypertension, diabetes mellitus and dyslipidemia.       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/2018    Dr Lance Foy- Also Renal cysts bilateral    Hyperlipidemia     Hypertension     MVP (mitral valve prolapse)     Primary osteoarthritis of left knee     Restless leg     Schizophrenia, simple, chronic     Urinary, incontinence, stress female      Social History     Socioeconomic History    Marital status:      Spouse name: Willy Perez    Number of  children: 2    Years of education: Not on file    Highest education level: Not on file   Occupational History    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     Comment: [employer]   Social Needs    Financial resource strain: Not on file    Food insecurity:     Worry: Not on file     Inability: Not on file    Transportation needs:     Medical: Not on file     Non-medical: Not on file   Tobacco Use    Smoking status: Former Smoker     Packs/day: 1.00     Years: 5.00     Pack years: 5.00     Types: Cigarettes     Last attempt to quit: 1992     Years since quittin.2    Smokeless tobacco: Never Used   Substance and Sexual Activity    Alcohol use: No    Drug use: No    Sexual activity: Not Currently     Partners: Male   Lifestyle    Physical activity:     Days per week: Not on file     Minutes per session: Not on file    Stress: Not at all   Relationships    Social connections:     Talks on phone: Not on file     Gets together: Not on file     Attends Sabianist service: Not on file     Active member of club or organization: Not on file     Attends meetings of clubs or organizations: Not on file     Relationship status: Not on file   Other Topics Concern    Not on file   Social History Narrative    Not on file     Past Surgical History:   Procedure Laterality Date    FOOT SURGERY      HYSTERECTOMY      TUBAL LIGATION       Family History   Problem Relation Age of Onset    Sudden death Father     Cancer Mother     Hypertension Mother     Breast cancer Mother     Cancer Sister     Breast cancer Sister        Review of Systems   Constitutional: Positive for malaise/fatigue and unexpected weight change (Gained 3 lbs). Negative for activity change, chills and fatigue.   HENT: Negative for congestion, postnasal drip and sinus pressure.    Eyes: Negative for pain, discharge and visual disturbance.   Respiratory: Negative for cough, chest tightness and shortness of breath.          "Patient has been recently diagnosed with asthmatic bronchitis and has been followed with pulmonary. Her symptoms of cough have abated at this point. She is using Symbicort inhaler and Singulair.   Cardiovascular: Negative for chest pain and leg swelling.        HTN. Lipids   Gastrointestinal: Negative for abdominal distention and anal bleeding.   Endocrine: Negative for polydipsia and polyphagia.        Diabetes mellitus type 2. Chronic kidney disease stage III.   Genitourinary: Negative for difficulty urinating, frequency and menstrual problem.        Chronic kidney disease stage 3.  Underlying diabetes peak standing hypertension.   Musculoskeletal: Positive for gait problem (stable). Negative for joint swelling.        Recent fall   Skin: Negative for color change, pallor and rash.   Allergic/Immunologic: Negative for environmental allergies, food allergies and immunocompromised state.   Neurological: Positive for tremors and weakness. Negative for dizziness, seizures, syncope, facial asymmetry, speech difficulty, light-headedness and headaches.        Fall   Hematological: Negative for adenopathy. Does not bruise/bleed easily.   Psychiatric/Behavioral: Negative for agitation, behavioral problems, confusion and dysphoric mood. The patient is not nervous/anxious.         Patient's has history of schizoaffective disorder. This seems to be stable. Some confusion of late. History of tendency to falling down. Cognitive issues.         Objective:      Blood pressure (!) 151/84, pulse 78, height 5' 7" (1.702 m), weight 62.6 kg (138 lb). Body mass index is 21.61 kg/m².  Physical Exam   Constitutional: She appears well-developed and well-nourished. She is cooperative. No distress.   HENT:   Head: Normocephalic and atraumatic.   Right Ear: Decreased hearing is noted.   Left Ear: Decreased hearing is noted.   Eyes: Conjunctivae, EOM and lids are normal. Lids are everted and swept, no foreign bodies found. Right pupil is " round and reactive. Left pupil is round and reactive.   Neck: Trachea normal and normal range of motion. Neck supple.   Cardiovascular: Normal rate, regular rhythm, S1 normal, S2 normal and normal heart sounds.   Pulmonary/Chest: Breath sounds normal.   Abdominal: Soft. Bowel sounds are normal. She exhibits no distension. There is no tenderness. There is no rigidity and no guarding.   Cholelithiasis without cholecystitis   Musculoskeletal: She exhibits no edema, tenderness or deformity.   Patient has somewhat unsteady gait. She tends to stoop forward while walking. Minimal hypertonia. Nothing remarkable. Minimal resting tremors. Her facial expression is flat.   Lymphadenopathy:     She has no cervical adenopathy.     She has no axillary adenopathy.   Neurological: She is alert. She displays atrophy. Gait (more stable) abnormal. Coordination normal.   Patient was able to turn around herself without problems.   Skin: Skin is warm and dry. No rash noted. No erythema. No pallor.   Psychiatric: Her affect is blunt. Her speech is delayed. She is slowed. She is not agitated. Cognition and memory are impaired.   Nursing note and vitals reviewed.        Assessment:       1. Type 2 diabetes mellitus without complication, without long-term current use of insulin    2. Benign essential hypertension    3. Multiple-type hyperlipidemia    4. Chronic kidney disease, stage 3 (moderate)    5. Recurrent falls    6. Unsteady gait           Lab Visit on 12/11/2019   Component Date Value Ref Range Status    Hemoglobin A1C 12/11/2019 6.8* 4.5 - 6.2 % Final    Estimated Avg Glucose 12/11/2019 148* 68 - 131 mg/dL Final    Microalbum.,U,Random 12/11/2019 71.0  ug/mL Final    Creatinine, Random Ur 12/11/2019 222.0  15.0 - 325.0 mg/dL Final    Microalb Creat Ratio 12/11/2019 32.0* 0.0 - 30.0 ug/mg Final    Magnesium 12/11/2019 2.1  1.6 - 2.6 mg/dL Final    Glucose 12/11/2019 135* 70 - 110 mg/dL Final    Sodium 12/11/2019 142  136 - 145  mmol/L Final    Potassium 12/11/2019 4.7  3.5 - 5.1 mmol/L Final    Chloride 12/11/2019 102  95 - 110 mmol/L Final    CO2 12/11/2019 31* 23 - 29 mmol/L Final    BUN, Bld 12/11/2019 32* 8 - 23 mg/dL Final    Calcium 12/11/2019 10.3  8.7 - 10.5 mg/dL Final    Creatinine 12/11/2019 2.2* 0.5 - 1.4 mg/dL Final    Albumin 12/11/2019 4.0  3.5 - 5.2 g/dL Final    Phosphorus 12/11/2019 3.9  2.7 - 4.5 mg/dL Final    eGFR if  12/11/2019 24.9* >60 mL/min/1.73 m^2 Final    eGFR if non  12/11/2019 21.6* >60 mL/min/1.73 m^2 Final    Anion Gap 12/11/2019 9  8 - 16 mmol/L Final    Cholesterol 12/11/2019 178  120 - 199 mg/dL Final    Triglycerides 12/11/2019 42  30 - 150 mg/dL Final    HDL 12/11/2019 86* 40 - 75 mg/dL Final    LDL Cholesterol 12/11/2019 83.6  63.0 - 159.0 mg/dL Final    Hdl/Cholesterol Ratio 12/11/2019 48.3  20.0 - 50.0 % Final    Total Cholesterol/HDL Ratio 12/11/2019 2.1  2.0 - 5.0 Final    Non-HDL Cholesterol 12/11/2019 92  mg/dL Final         Plan:           Type 2 diabetes mellitus without complication, without long-term current use of insulin    Benign essential hypertension    Multiple-type hyperlipidemia    Chronic kidney disease, stage 3 (moderate)    Recurrent falls    Unsteady gait     Patient's multiple medical conditions and steady decline has been noted. Renal functions are getting worse and I have asked her to set up an early appointment within Nephrology.    I do not see any significant diuretics or NSAIDs in her regimen.  No recent treatment with antibiotics.    Arthritis, balance and ambulation seems to be somewhat stable even though she has significant arthritis in her shoulders and knees.  Fall risk continues and appropriate precautions have been recommended to the patient and patient's granddaughter.    Patient seeks physical therapy for strengthening and ambulation.  Referral had been sent by me and also Dr. Wing in past and I am not sure as to  what is happening to the referral.  Advised Ms. Perez about age and season appropriate immunizations/ cancer screenings.  Also seasonal influenza vaccine, update on tetanus diphtheria vaccination every 10 years.    Follow-up in 2-3 months.    Spent martín 25 minutes with patient which involved review of pts medical conditions, labs, medications and with 50% of time face-to-face discussion about medical problems, management and any applicable changes.    Patient has been advised to watch diet and exercise. Avoid fried and fatty food. Compliance to medications and follow up urged.    Advised Ms. Perez to monitor Blood sugars at home and record them.  Exercise, watch diet and loose weight.  keep a close eye on feet and keep them clean. Annual eye examination. Annual influenza vaccine.  Monitor HgbA1c every 3 to 6 months. Monitor urine microalbumin every year.keep LDL less than 100. Monitor blood pressure and target blood pressure 120/70.            Current Outpatient Medications:     albuterol (PROVENTIL/VENTOLIN HFA) 90 mcg/actuation inhaler, Inhale 2 puffs into the lungs every 6 (six) hours as needed for Wheezing. Rescue, Disp: 1 Inhaler, Rfl: 4    amantadine HCl (SYMMETREL) 100 mg capsule, Take 100 mg by mouth 2 (two) times daily. , Disp: , Rfl:     ammonium lactate 12 % Crea, Apply topically., Disp: , Rfl:     aspirin (ECOTRIN) 81 MG EC tablet, Take 81 mg by mouth once daily., Disp: , Rfl:     blood sugar diagnostic (TRUE METRIX GLUCOSE TEST STRIP) Strp, TEST BLOOD SUGAR EVERY DAY, Disp: 100 strip, Rfl: 3    budesonide-formoterol 160-4.5 mcg (SYMBICORT) 160-4.5 mcg/actuation HFAA, INHALE 2 PUFFS BY MOUTH EVERY 12 HOURS, Disp: 3 Inhaler, Rfl: 2    calcitRIOL (ROCALTROL) 0.25 MCG Cap, Take 1 capsule (0.25 mcg total) by mouth every Mon, Wed, Fri., Disp: 24 capsule, Rfl: 2    escitalopram oxalate (LEXAPRO) 10 MG tablet, Take 10 mg by mouth once daily., Disp: , Rfl: 3    ferrous sulfate 324 mg (65 mg iron) TbEC,  Take 325 mg by mouth once daily., Disp: , Rfl:     flash glucose scanning reader (FREESTYLE JAY 14 DAY READER) Misc, 1 Device by Misc.(Non-Drug; Combo Route) route once daily., Disp: 1 each, Rfl: 0    flash glucose sensor (FREESTYLE JAY 14 DAY SENSOR) Kit, 1 Device by Misc.(Non-Drug; Combo Route) route every 14 (fourteen) days., Disp: 6 kit, Rfl: 2    fluticasone propionate (FLONASE) 50 mcg/actuation nasal spray, 1 spray (50 mcg total) by Each Nostril route once daily., Disp: 1 Bottle, Rfl: 3    metoprolol succinate (TOPROL-XL) 50 MG 24 hr tablet, Take 1 tablet (50 mg total) by mouth once daily., Disp: 90 tablet, Rfl: 1    montelukast (SINGULAIR) 10 mg tablet, TK 1 T PO QHS, Disp: , Rfl: 2    montelukast (SINGULAIR) 10 mg tablet, TAKE 1 TABLET BY MOUTH EVERY EVENING, Disp: 90 tablet, Rfl: 3    olanzapine (ZYPREXA) 10 MG tablet, Take 10 mg by mouth every evening., Disp: , Rfl:     pantoprazole (PROTONIX) 40 MG tablet, Take 1 tablet (40 mg total) by mouth once daily., Disp: 90 tablet, Rfl: 3    potassium chloride SA (K-DUR,KLOR-CON) 20 MEQ tablet, Take 1 tablet (20 mEq total) by mouth once daily., Disp: 90 tablet, Rfl: 3    pravastatin (PRAVACHOL) 40 MG tablet, Take 1 tablet (40 mg total) by mouth nightly., Disp: 90 tablet, Rfl: 3    SITagliptin (JANUVIA) 50 MG Tab, TAKE 1 TABLET EVERY DAY, Disp: 90 tablet, Rfl: 1    trazodone (DESYREL) 100 MG tablet, Take 100 mg by mouth nightly as needed., Disp: , Rfl:     TRUEPLUS LANCETS 33 gauge [facility], TEST  DAILY, Disp: 100 each, Rfl: 3

## 2020-01-17 RX ORDER — MONTELUKAST SODIUM 10 MG/1
TABLET ORAL
Qty: 30 TABLET | Refills: 6 | Status: SHIPPED | OUTPATIENT
Start: 2020-01-17 | End: 2020-04-20 | Stop reason: SDUPTHER

## 2020-02-05 DIAGNOSIS — E11.22 TYPE 2 DIABETES MELLITUS WITH STAGE 3 CHRONIC KIDNEY DISEASE, WITHOUT LONG-TERM CURRENT USE OF INSULIN: ICD-10-CM

## 2020-02-05 DIAGNOSIS — N18.30 TYPE 2 DIABETES MELLITUS WITH STAGE 3 CHRONIC KIDNEY DISEASE, WITHOUT LONG-TERM CURRENT USE OF INSULIN: ICD-10-CM

## 2020-02-21 NOTE — PROGRESS NOTES
Past Medical History:   Diagnosis Date    Acid reflux     Anxiety     CKD (chronic kidney disease), stage III     Depression     Diabetes mellitus     Diabetes mellitus, type 2     Disorder of kidney and ureter     Hyperlipidemia     Hypertension     MVP (mitral valve prolapse)     Primary osteoarthritis of left knee     Restless leg     Schizophrenia, simple, chronic     Urinary, incontinence, stress female        Past Surgical History:   Procedure Laterality Date    HYSTERECTOMY      TUBAL LIGATION         Current Outpatient Prescriptions   Medication Sig    amlodipine (NORVASC) 10 MG tablet Take 10 mg by mouth once daily.    aspirin (ECOTRIN) 81 MG EC tablet Take 81 mg by mouth once daily.    blood sugar diagnostic Strp 1 strip Daily.    calcitRIOL (ROCALTROL) 0.25 MCG Cap Take 0.25 mcg by mouth every Mon, Wed, Fri.    celecoxib (CELEBREX) 100 MG capsule Take 1 capsule by mouth 2 (two) times daily.    cholecalciferol, vitamin D3, (VITAMIN D3) 2,000 unit Cap Take 1 capsule by mouth once daily.    ciclopirox (PENLAC) 8 % Soln Apply topically nightly.    cyclobenzaprine (FLEXERIL) 10 MG tablet Take 10 mg by mouth nightly as needed.    glimepiride (AMARYL) 2 MG tablet Take 1 tablet by mouth 2 (two) times daily.    metoprolol succinate (TOPROL-XL) 50 MG 24 hr tablet Take 50 mg by mouth 2 (two) times daily.    olanzapine (ZYPREXA) 10 MG tablet Take 10 mg by mouth every evening.    pantoprazole (PROTONIX) 40 MG tablet Take 40 mg by mouth once daily.    potassium chloride SA (K-DUR,KLOR-CON) 20 MEQ tablet Take 20 mEq by mouth once daily.    pramipexole (MIRAPEX) 0.125 MG tablet Take 0.125 mg by mouth 3 (three) times daily.    pravastatin (PRAVACHOL) 40 MG tablet Take 1 tablet by mouth Daily.    SITagliptan (JANUVIA) 25 MG Tab Take 100 mg by mouth once daily.    tramadol-acetaminophen 37.5-325 mg (ULTRACET) 37.5-325 mg Tab TAKE 1 TABLET EVERY 6 HOURS AS NEEDED    trazodone (DESYREL) 100  "MG tablet Take 100 mg by mouth nightly as needed.     No current facility-administered medications for this visit.        Review of patient's allergies indicates:   Allergen Reactions    Lisinopril Nausea And Vomiting       Family History   Problem Relation Age of Onset    Sudden death Father     Cancer Mother     Hypertension Mother     Cancer Sister        Social History     Social History    Marital status:      Spouse name: N/A    Number of children: N/A    Years of education: N/A     Occupational History    Not on file.     Social History Main Topics    Smoking status: Former Smoker     Types: Cigarettes     Quit date: 9/13/1992    Smokeless tobacco: Never Used    Alcohol use No    Drug use: No    Sexual activity: Not on file     Other Topics Concern    Not on file     Social History Narrative    No narrative on file       Chief Complaint:   Chief Complaint   Patient presents with    Follow-up     Right Knee.  Chronic condition for many years.  Dx: Primary osteo of the right knee.  Pain has been off and on.  Patient had some swelling a few days ago.       Consulting Physician: No ref. provider found    History of Present Illness:    This is a 71 y.o. year old female who complains of Patient has a chronic history of bilateral knee arthritis and pain the right being greater than the left her pain level is 8 out of 10 increases with walking      ROS    Examination:    Vital Signs:    Vitals:    07/10/17 1535   BP: 124/60   Pulse: 87   Weight: 78.9 kg (174 lb)   Height: 5' 7" (1.702 m)       This a well-developed, well nourished patient in no acute distress.    Alert and oriented and cooperative to examination.       Physical Exam: Leftand right Knee Exam    Gait   Antalgic    Skin  Rash:   None  Scars:   None    Inspection  Erythema:  None  Bruising:  None  Effusion:  None  Masses:  None  Lymphadenopathy: None    Range of Motion: 0 to 130°    Medial Joint : positive  Lateral " Joint : positive    Patellofemoral Tenderness: Yes  Patellofemoral Crepitus: Yes    Lachman:  Normal  Anterior Drawer: Normal  Posterior Drawer: Normal    Betzaida's:  Negative  Apley's:  Negative    Varus Stress:  Stable  Valgus Stress:  Stable    Strength:  5/5    Pulses:  Palpable  Sensation:  Intact          Imaging: X-rays ordered and reviewed today -rays today show severe bilateral leg arthritis of the knees    Assessment: evere bilateral arthritis left and right knees    Plan:  The patient does not wish to have any surgical procedures done wishes to have only an injection to her right knee      DISCLAIMER: This note may have been dictated using voice recognition software and may contain grammatical errors.     NOTE: Consult report sent to referring provider via [facility] EMR.   normal LV function

## 2020-02-27 ENCOUNTER — LAB VISIT (OUTPATIENT)
Dept: LAB | Facility: HOSPITAL | Age: 75
End: 2020-02-27
Attending: INTERNAL MEDICINE
Payer: MEDICARE

## 2020-02-27 DIAGNOSIS — D63.1 ANEMIA OF CHRONIC RENAL FAILURE: ICD-10-CM

## 2020-02-27 DIAGNOSIS — N17.9 ACUTE KIDNEY FAILURE, UNSPECIFIED: Primary | ICD-10-CM

## 2020-02-27 DIAGNOSIS — N18.9 ANEMIA OF CHRONIC RENAL FAILURE: ICD-10-CM

## 2020-02-27 DIAGNOSIS — I10 ESSENTIAL HYPERTENSION, MALIGNANT: ICD-10-CM

## 2020-02-27 DIAGNOSIS — N25.81 SECONDARY HYPERPARATHYROIDISM OF RENAL ORIGIN: ICD-10-CM

## 2020-02-27 DIAGNOSIS — R80.9 PROTEINURIA: ICD-10-CM

## 2020-02-27 DIAGNOSIS — E11.9 DIABETES MELLITUS WITHOUT COMPLICATION: ICD-10-CM

## 2020-02-27 DIAGNOSIS — N18.30 CHRONIC KIDNEY DISEASE, STAGE III (MODERATE): ICD-10-CM

## 2020-02-27 DIAGNOSIS — J01.90 ACUTE SINUSITIS, UNSPECIFIED: ICD-10-CM

## 2020-02-27 LAB
ALBUMIN SERPL BCP-MCNC: 3.9 G/DL (ref 3.5–5.2)
ALP SERPL-CCNC: 66 U/L (ref 55–135)
ALT SERPL W/O P-5'-P-CCNC: 13 U/L (ref 10–44)
ANION GAP SERPL CALC-SCNC: 8 MMOL/L (ref 8–16)
AST SERPL-CCNC: 16 U/L (ref 10–40)
BASOPHILS # BLD AUTO: 0.03 K/UL (ref 0–0.2)
BASOPHILS NFR BLD: 0.4 % (ref 0–1.9)
BILIRUB SERPL-MCNC: 0.6 MG/DL (ref 0.1–1)
BUN SERPL-MCNC: 26 MG/DL (ref 8–23)
CALCIUM SERPL-MCNC: 10.1 MG/DL (ref 8.7–10.5)
CHLORIDE SERPL-SCNC: 102 MMOL/L (ref 95–110)
CO2 SERPL-SCNC: 30 MMOL/L (ref 23–29)
CREAT SERPL-MCNC: 1.9 MG/DL (ref 0.5–1.4)
DIFFERENTIAL METHOD: ABNORMAL
EOSINOPHIL # BLD AUTO: 0.1 K/UL (ref 0–0.5)
EOSINOPHIL NFR BLD: 1.8 % (ref 0–8)
ERYTHROCYTE [DISTWIDTH] IN BLOOD BY AUTOMATED COUNT: 13 % (ref 11.5–14.5)
EST. GFR  (AFRICAN AMERICAN): 29.7 ML/MIN/1.73 M^2
EST. GFR  (NON AFRICAN AMERICAN): 25.8 ML/MIN/1.73 M^2
FERRITIN SERPL-MCNC: 83 NG/ML (ref 20–300)
GLUCOSE SERPL-MCNC: 109 MG/DL (ref 70–110)
HCT VFR BLD AUTO: 37.2 % (ref 37–48.5)
HGB BLD-MCNC: 11.8 G/DL (ref 12–16)
IMM GRANULOCYTES # BLD AUTO: 0.02 K/UL (ref 0–0.04)
IMM GRANULOCYTES NFR BLD AUTO: 0.3 % (ref 0–0.5)
IRON SERPL-MCNC: 57 UG/DL (ref 30–160)
LYMPHOCYTES # BLD AUTO: 1.9 K/UL (ref 1–4.8)
LYMPHOCYTES NFR BLD: 28.6 % (ref 18–48)
MCH RBC QN AUTO: 26.8 PG (ref 27–31)
MCHC RBC AUTO-ENTMCNC: 31.7 G/DL (ref 32–36)
MCV RBC AUTO: 85 FL (ref 82–98)
MONOCYTES # BLD AUTO: 0.7 K/UL (ref 0.3–1)
MONOCYTES NFR BLD: 10.3 % (ref 4–15)
NEUTROPHILS # BLD AUTO: 3.9 K/UL (ref 1.8–7.7)
NEUTROPHILS NFR BLD: 58.6 % (ref 38–73)
NRBC BLD-RTO: 0 /100 WBC
PHOSPHATE SERPL-MCNC: 2.9 MG/DL (ref 2.7–4.5)
PLATELET # BLD AUTO: 180 K/UL (ref 150–350)
PMV BLD AUTO: 11.6 FL (ref 9.2–12.9)
POTASSIUM SERPL-SCNC: 4.3 MMOL/L (ref 3.5–5.1)
PROT SERPL-MCNC: 7.1 G/DL (ref 6–8.4)
PTH-INTACT SERPL-MCNC: 43.9 PG/ML (ref 9–77)
RBC # BLD AUTO: 4.4 M/UL (ref 4–5.4)
SATURATED IRON: 21 % (ref 20–50)
SODIUM SERPL-SCNC: 140 MMOL/L (ref 136–145)
TOTAL IRON BINDING CAPACITY: 272 UG/DL (ref 250–450)
TRANSFERRIN SERPL-MCNC: 194 MG/DL (ref 200–375)
WBC # BLD AUTO: 6.67 K/UL (ref 3.9–12.7)

## 2020-02-27 PROCEDURE — 80053 COMPREHEN METABOLIC PANEL: CPT

## 2020-02-27 PROCEDURE — 83540 ASSAY OF IRON: CPT

## 2020-02-27 PROCEDURE — 36415 COLL VENOUS BLD VENIPUNCTURE: CPT

## 2020-02-27 PROCEDURE — 82728 ASSAY OF FERRITIN: CPT

## 2020-02-27 PROCEDURE — 85025 COMPLETE CBC W/AUTO DIFF WBC: CPT

## 2020-02-27 PROCEDURE — 83970 ASSAY OF PARATHORMONE: CPT

## 2020-02-27 PROCEDURE — 84100 ASSAY OF PHOSPHORUS: CPT

## 2020-03-10 ENCOUNTER — OFFICE VISIT (OUTPATIENT)
Dept: PODIATRY | Facility: CLINIC | Age: 75
End: 2020-03-10
Payer: MEDICARE

## 2020-03-10 VITALS
HEART RATE: 82 BPM | HEIGHT: 67 IN | WEIGHT: 139 LBS | BODY MASS INDEX: 21.82 KG/M2 | SYSTOLIC BLOOD PRESSURE: 140 MMHG | DIASTOLIC BLOOD PRESSURE: 80 MMHG

## 2020-03-10 DIAGNOSIS — E11.9 TYPE 2 DIABETES MELLITUS WITHOUT COMPLICATION, WITHOUT LONG-TERM CURRENT USE OF INSULIN: Primary | ICD-10-CM

## 2020-03-10 DIAGNOSIS — B35.1 ONYCHOMYCOSIS DUE TO DERMATOPHYTE: ICD-10-CM

## 2020-03-10 PROCEDURE — 17999 UNLISTD PX SKN MUC MEMB SUBQ: CPT | Mod: CSM,,, | Performed by: PODIATRIST

## 2020-03-10 PROCEDURE — 17999 PR NON-COVERED FOOT CARE: ICD-10-PCS | Mod: CSM,,, | Performed by: PODIATRIST

## 2020-03-10 NOTE — PROGRESS NOTES
[address] [facility] [address]  [place] [address]  Phone: [phone]   [phone]    Patient's PCP:Aiden Koch MD  Referring Provider: No ref. provider found    Subjective:      Chief Complaint:: Nail Care (Routine diabetic nail trim)    HPI  Adriana Perez is a 74 y.o. female who presents for routine diabetic nail trimming for a complaint of Long painful toenails. Treatment to date have included periodic debridement. Patients rates pain 1/10 on pain scale.     Systemic Doctor: Aiden Koch MD  Date Last Seen: December 2019  Blood Sugar: 88  Hemoglobin A1c: 6.8    Vitals:    03/10/20 0908   BP: (!) 140/80   Pulse: 82     Shoe Size:     Past Surgical History:   Procedure Laterality Date    FOOT SURGERY      HYSTERECTOMY      TUBAL LIGATION       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/2018    Dr Lance Foy- Also Renal cysts bilateral    Hyperlipidemia     Hypertension     MVP (mitral valve prolapse)     Primary osteoarthritis of left knee     Restless leg     Schizophrenia, simple, chronic     Urinary, incontinence, stress female      Family History   Problem Relation Age of Onset    Sudden death Father     Cancer Mother     Hypertension Mother     Breast cancer Mother     Cancer Sister     Breast cancer Sister         Social History:   Marital Status:   Alcohol History:  reports that she does not drink alcohol.  Tobacco History:  reports that she quit smoking about 27 years ago. Her smoking use included cigarettes. She has a 5.00 pack-year smoking history. She has never used smokeless tobacco.  Drug History:  reports that she does not use drugs.    Review of patient's  allergies indicates:   Allergen Reactions    Lisinopril Nausea And Vomiting       Current Outpatient Medications   Medication Sig Dispense Refill    albuterol (PROVENTIL/VENTOLIN HFA) 90 mcg/actuation inhaler Inhale 2 puffs into the lungs every 6 (six) hours as needed for Wheezing. Rescue 1 Inhaler 4    amantadine HCl (SYMMETREL) 100 mg capsule Take 100 mg by mouth 2 (two) times daily.       ammonium lactate 12 % Crea Apply topically.      aspirin (ECOTRIN) 81 MG EC tablet Take 81 mg by mouth once daily.      blood sugar diagnostic (TRUE METRIX GLUCOSE TEST STRIP) Strp TEST BLOOD SUGAR EVERY  strip 3    budesonide-formoterol 160-4.5 mcg (SYMBICORT) 160-4.5 mcg/actuation HFAA INHALE 2 PUFFS BY MOUTH EVERY 12 HOURS 3 Inhaler 2    calcitRIOL (ROCALTROL) 0.25 MCG Cap Take 1 capsule (0.25 mcg total) by mouth every Mon, Wed, Fri. 24 capsule 2    escitalopram oxalate (LEXAPRO) 10 MG tablet Take 10 mg by mouth once daily.  3    ferrous sulfate 324 mg (65 mg iron) TbEC Take 325 mg by mouth once daily.      flash glucose scanning reader (FREESTYLE JAY 14 DAY READER) Misc 1 Device by Misc.(Non-Drug; Combo Route) route once daily. 1 each 0    flash glucose sensor (FREESTYLE JAY 14 DAY SENSOR) Kit 1 Device by Misc.(Non-Drug; Combo Route) route every 14 (fourteen) days. 6 kit 2    fluticasone propionate (FLONASE) 50 mcg/actuation nasal spray 1 spray (50 mcg total) by Each Nostril route once daily. 1 Bottle 3    metoprolol succinate (TOPROL-XL) 50 MG 24 hr tablet Take 1 tablet (50 mg total) by mouth once daily. 90 tablet 1    montelukast (SINGULAIR) 10 mg tablet TAKE 1 TABLET BY MOUTH ONCE DAILY IN THE EVENING 30 tablet 6    olanzapine (ZYPREXA) 10 MG tablet Take 10 mg by mouth every evening.      pantoprazole (PROTONIX) 40 MG tablet Take 1 tablet (40 mg total) by mouth once daily. 90 tablet 3    potassium chloride SA (K-DUR,KLOR-CON) 20 MEQ tablet Take 1 tablet (20 mEq total) by mouth once daily. 90  tablet 3    pravastatin (PRAVACHOL) 40 MG tablet Take 1 tablet (40 mg total) by mouth nightly. 90 tablet 3    SITagliptin (JANUVIA) 50 MG Tab TAKE 1 TABLET BY MOUTH ONCE DAILY 30 tablet 5    trazodone (DESYREL) 100 MG tablet Take 100 mg by mouth nightly as needed.      TRUEPLUS LANCETS 33 gauge Misc TEST  DAILY 100 each 3     No current facility-administered medications for this visit.        Review of Systems      Objective:        Physical Exam:   Foot Exam    General  General Appearance: appears stated age and healthy   Orientation: alert and oriented to person, place, and time   Affect: appropriate   Gait: antalgic   Assistance: cane use       Right Foot/Ankle     Inspection and Palpation  Ecchymosis: none  Tenderness: none   Swelling: none   Arch: normal  Hammertoes: absent  Claw Toes: absent  Hallux valgus: no  Hallux limitus: no  Skin Exam: skin intact; (Dystrophic toenails 1, 2, 3, 4, 5)    Neurovascular  Dorsalis pedis: 1+  Posterior tibial: 1+  Saphenous nerve sensation: normal  Tibial nerve sensation: normal  Superficial peroneal nerve sensation: normal  Deep peroneal nerve sensation: normal  Sural nerve sensation: normal      Left Foot/Ankle      Inspection and Palpation  Ecchymosis: none  Tenderness: none   Swelling: none   Arch: normal  Hammertoes: absent  Claw toes: absent  Hallux valgus: no  Hallux limitus: no  Skin Exam: skin intact; (Dystrophic toenails 1, 2, 3, 4, 5)    Neurovascular  Dorsalis pedis: 1+  Posterior tibial: 1+  Saphenous nerve sensation: normal  Tibial nerve sensation: normal  Superficial peroneal nerve sensation: normal  Deep peroneal nerve sensation: normal  Sural nerve sensation: normal          Physical Exam   Cardiovascular:   Pulses:       Dorsalis pedis pulses are 1+ on the right side, and 1+ on the left side.        Posterior tibial pulses are 1+ on the right side, and 1+ on the left side.             Imaging:            Assessment:       1. Type 2 diabetes mellitus  without complication, without long-term current use of insulin    2. Onychomycosis due to dermatophyte      Plan:   Type 2 diabetes mellitus without complication, without long-term current use of insulin    Onychomycosis due to dermatophyte    Utilizing sterile nail nippers and electric , I aggressively debrided nails 1-5 bilaterally down to nail beds to thin the nail plates. Pt. tolerated well. No blood was drawn.  No follow-ups on file.    Procedures - None    Counseling:     I provided patient education verbally regarding:   Patient diagnosis, treatment options, as well as alternatives, risks, and benefits.     This note was created using Dragon voice recognition software that occasionally misinterpreted phrases or words.

## 2020-03-14 ENCOUNTER — CLINICAL SUPPORT (OUTPATIENT)
Dept: CARDIOLOGY | Facility: HOSPITAL | Age: 75
End: 2020-03-14
Attending: STUDENT IN AN ORGANIZED HEALTH CARE EDUCATION/TRAINING PROGRAM
Payer: MEDICARE

## 2020-03-14 ENCOUNTER — HOSPITAL ENCOUNTER (OUTPATIENT)
Facility: HOSPITAL | Age: 75
Discharge: HOME | End: 2020-03-14
Attending: EMERGENCY MEDICINE | Admitting: STUDENT IN AN ORGANIZED HEALTH CARE EDUCATION/TRAINING PROGRAM
Payer: MEDICARE

## 2020-03-14 VITALS
TEMPERATURE: 98 F | RESPIRATION RATE: 18 BRPM | DIASTOLIC BLOOD PRESSURE: 60 MMHG | HEIGHT: 67 IN | HEART RATE: 80 BPM | BODY MASS INDEX: 21.18 KG/M2 | SYSTOLIC BLOOD PRESSURE: 140 MMHG | OXYGEN SATURATION: 97 % | WEIGHT: 134.94 LBS

## 2020-03-14 DIAGNOSIS — S01.81XA LACERATION OF FOREHEAD, INITIAL ENCOUNTER: ICD-10-CM

## 2020-03-14 DIAGNOSIS — R06.02 SHORTNESS OF BREATH: ICD-10-CM

## 2020-03-14 DIAGNOSIS — R55 SYNCOPE, UNSPECIFIED SYNCOPE TYPE: Primary | ICD-10-CM

## 2020-03-14 DIAGNOSIS — R07.9 CHEST PAIN: ICD-10-CM

## 2020-03-14 DIAGNOSIS — R55 SYNCOPE: ICD-10-CM

## 2020-03-14 PROBLEM — G47.33 OBSTRUCTIVE SLEEP APNEA SYNDROME: Status: RESOLVED | Noted: 2017-07-11 | Resolved: 2020-03-14

## 2020-03-14 PROBLEM — F25.9 SCHIZOAFFECTIVE DISORDER: Status: ACTIVE | Noted: 2020-03-14

## 2020-03-14 LAB
ALBUMIN SERPL BCP-MCNC: 4 G/DL (ref 3.5–5.2)
ALP SERPL-CCNC: 71 U/L (ref 55–135)
ALT SERPL W/O P-5'-P-CCNC: 13 U/L (ref 10–44)
ANION GAP SERPL CALC-SCNC: 10 MMOL/L (ref 8–16)
ANION GAP SERPL CALC-SCNC: 11 MMOL/L (ref 8–16)
AORTIC ROOT ANNULUS: 2.83 CM
AORTIC VALVE CUSP SEPERATION: 1.84 CM
AST SERPL-CCNC: 15 U/L (ref 10–40)
AV INDEX (PROSTH): 0.97
AV MEAN GRADIENT: 2 MMHG
AV PEAK GRADIENT: 5 MMHG
AV VALVE AREA: 3.22 CM2
AV VELOCITY RATIO: 87.11
BASOPHILS # BLD AUTO: 0.03 K/UL (ref 0–0.2)
BASOPHILS # BLD AUTO: 0.04 K/UL (ref 0–0.2)
BASOPHILS NFR BLD: 0.4 % (ref 0–1.9)
BASOPHILS NFR BLD: 0.6 % (ref 0–1.9)
BILIRUB SERPL-MCNC: 0.7 MG/DL (ref 0.1–1)
BNP SERPL-MCNC: 57 PG/ML (ref 0–99)
BSA FOR ECHO PROCEDURE: 1.7 M2
BUN SERPL-MCNC: 27 MG/DL (ref 8–23)
BUN SERPL-MCNC: 31 MG/DL (ref 8–23)
CALCIUM SERPL-MCNC: 10 MG/DL (ref 8.7–10.5)
CALCIUM SERPL-MCNC: 9.6 MG/DL (ref 8.7–10.5)
CHLORIDE SERPL-SCNC: 102 MMOL/L (ref 95–110)
CHLORIDE SERPL-SCNC: 103 MMOL/L (ref 95–110)
CO2 SERPL-SCNC: 26 MMOL/L (ref 23–29)
CO2 SERPL-SCNC: 28 MMOL/L (ref 23–29)
CREAT SERPL-MCNC: 1.7 MG/DL (ref 0.5–1.4)
CREAT SERPL-MCNC: 1.8 MG/DL (ref 0.5–1.4)
CV ECHO LV RWT: 0.68 CM
DIFFERENTIAL METHOD: ABNORMAL
DIFFERENTIAL METHOD: ABNORMAL
DOP CALC AO PEAK VEL: 1.1 M/S
DOP CALC AO VTI: 21.69 CM
DOP CALC LVOT AREA: 3.3 CM2
DOP CALC LVOT DIAMETER: 2.05 CM
DOP CALC LVOT PEAK VEL: 95.82 M/S
DOP CALC LVOT STROKE VOLUME: 69.74 CM3
DOP CALCLVOT PEAK VEL VTI: 21.14 CM
E WAVE DECELERATION TIME: 174.04 MSEC
E/A RATIO: 0.63
E/E' RATIO: 9.14 M/S
ECHO LV POSTERIOR WALL: 1.12 CM (ref 0.6–1.1)
EOSINOPHIL # BLD AUTO: 0.1 K/UL (ref 0–0.5)
EOSINOPHIL # BLD AUTO: 0.2 K/UL (ref 0–0.5)
EOSINOPHIL NFR BLD: 1.2 % (ref 0–8)
EOSINOPHIL NFR BLD: 2.2 % (ref 0–8)
ERYTHROCYTE [DISTWIDTH] IN BLOOD BY AUTOMATED COUNT: 13.2 % (ref 11.5–14.5)
ERYTHROCYTE [DISTWIDTH] IN BLOOD BY AUTOMATED COUNT: 13.2 % (ref 11.5–14.5)
EST. GFR  (AFRICAN AMERICAN): 31.7 ML/MIN/1.73 M^2
EST. GFR  (AFRICAN AMERICAN): 34 ML/MIN/1.73 M^2
EST. GFR  (NON AFRICAN AMERICAN): 27.5 ML/MIN/1.73 M^2
EST. GFR  (NON AFRICAN AMERICAN): 29.5 ML/MIN/1.73 M^2
FRACTIONAL SHORTENING: 31 % (ref 28–44)
GLUCOSE SERPL-MCNC: 128 MG/DL (ref 70–110)
GLUCOSE SERPL-MCNC: 147 MG/DL (ref 70–110)
GLUCOSE SERPL-MCNC: 150 MG/DL (ref 70–110)
HCT VFR BLD AUTO: 36.2 % (ref 37–48.5)
HCT VFR BLD AUTO: 40.8 % (ref 37–48.5)
HGB BLD-MCNC: 11.4 G/DL (ref 12–16)
HGB BLD-MCNC: 13 G/DL (ref 12–16)
IMM GRANULOCYTES # BLD AUTO: 0.01 K/UL (ref 0–0.04)
IMM GRANULOCYTES # BLD AUTO: 0.02 K/UL (ref 0–0.04)
IMM GRANULOCYTES NFR BLD AUTO: 0.1 % (ref 0–0.5)
IMM GRANULOCYTES NFR BLD AUTO: 0.3 % (ref 0–0.5)
INR PPP: 1.2
INTERVENTRICULAR SEPTUM: 1.13 CM (ref 0.6–1.1)
LEFT ATRIUM SIZE: 3.22 CM
LEFT INTERNAL DIMENSION IN SYSTOLE: 2.26 CM (ref 2.1–4)
LEFT VENTRICLE MASS INDEX: 65 G/M2
LEFT VENTRICULAR INTERNAL DIMENSION IN DIASTOLE: 3.28 CM (ref 3.5–6)
LEFT VENTRICULAR MASS: 111.94 G
LV LATERAL E/E' RATIO: 7.11 M/S
LV SEPTAL E/E' RATIO: 12.8 M/S
LYMPHOCYTES # BLD AUTO: 1.9 K/UL (ref 1–4.8)
LYMPHOCYTES # BLD AUTO: 2.1 K/UL (ref 1–4.8)
LYMPHOCYTES NFR BLD: 28.3 % (ref 18–48)
LYMPHOCYTES NFR BLD: 29.3 % (ref 18–48)
MAGNESIUM SERPL-MCNC: 1.9 MG/DL (ref 1.6–2.6)
MAGNESIUM SERPL-MCNC: 2.7 MG/DL (ref 1.6–2.6)
MCH RBC QN AUTO: 26.5 PG (ref 27–31)
MCH RBC QN AUTO: 26.6 PG (ref 27–31)
MCHC RBC AUTO-ENTMCNC: 31.5 G/DL (ref 32–36)
MCHC RBC AUTO-ENTMCNC: 31.9 G/DL (ref 32–36)
MCV RBC AUTO: 83 FL (ref 82–98)
MCV RBC AUTO: 84 FL (ref 82–98)
MONOCYTES # BLD AUTO: 0.7 K/UL (ref 0.3–1)
MONOCYTES # BLD AUTO: 0.8 K/UL (ref 0.3–1)
MONOCYTES NFR BLD: 11.7 % (ref 4–15)
MONOCYTES NFR BLD: 9.5 % (ref 4–15)
MV PEAK A VEL: 1.01 M/S
MV PEAK E VEL: 0.64 M/S
NEUTROPHILS # BLD AUTO: 4 K/UL (ref 1.8–7.7)
NEUTROPHILS # BLD AUTO: 4.1 K/UL (ref 1.8–7.7)
NEUTROPHILS NFR BLD: 56.1 % (ref 38–73)
NEUTROPHILS NFR BLD: 60.3 % (ref 38–73)
NRBC BLD-RTO: 0 /100 WBC
NRBC BLD-RTO: 0 /100 WBC
PHOSPHATE SERPL-MCNC: 2.3 MG/DL (ref 2.7–4.5)
PISA TR MAX VEL: 2.52 M/S
PLATELET # BLD AUTO: 194 K/UL (ref 150–350)
PLATELET # BLD AUTO: 211 K/UL (ref 150–350)
PMV BLD AUTO: 11.5 FL (ref 9.2–12.9)
PMV BLD AUTO: 11.6 FL (ref 9.2–12.9)
POTASSIUM SERPL-SCNC: 3.7 MMOL/L (ref 3.5–5.1)
POTASSIUM SERPL-SCNC: 3.9 MMOL/L (ref 3.5–5.1)
PROT SERPL-MCNC: 7.2 G/DL (ref 6–8.4)
PROTHROMBIN TIME: 14.5 SEC (ref 10.6–14.8)
PV PEAK VELOCITY: 66 CM/S
RA PRESSURE: 3 MMHG
RBC # BLD AUTO: 4.31 M/UL (ref 4–5.4)
RBC # BLD AUTO: 4.89 M/UL (ref 4–5.4)
RIGHT VENTRICULAR END-DIASTOLIC DIMENSION: 213 CM
SODIUM SERPL-SCNC: 140 MMOL/L (ref 136–145)
SODIUM SERPL-SCNC: 140 MMOL/L (ref 136–145)
TDI LATERAL: 0.09 M/S
TDI SEPTAL: 0.05 M/S
TDI: 0.07 M/S
TR MAX PG: 25 MMHG
TROPONIN I SERPL DL<=0.01 NG/ML-MCNC: <0.03 NG/ML
TSH SERPL DL<=0.005 MIU/L-ACNC: 2.64 UIU/ML (ref 0.34–5.6)
TV REST PULMONARY ARTERY PRESSURE: 28 MMHG
WBC # BLD AUTO: 6.81 K/UL (ref 3.9–12.7)
WBC # BLD AUTO: 7.2 K/UL (ref 3.9–12.7)

## 2020-03-14 PROCEDURE — 99285 EMERGENCY DEPT VISIT HI MDM: CPT | Mod: 25

## 2020-03-14 PROCEDURE — 96376 TX/PRO/DX INJ SAME DRUG ADON: CPT | Mod: 59

## 2020-03-14 PROCEDURE — 25000003 PHARM REV CODE 250: Performed by: HOSPITALIST

## 2020-03-14 PROCEDURE — 80053 COMPREHEN METABOLIC PANEL: CPT

## 2020-03-14 PROCEDURE — G0378 HOSPITAL OBSERVATION PER HR: HCPCS

## 2020-03-14 PROCEDURE — 97161 PT EVAL LOW COMPLEX 20 MIN: CPT

## 2020-03-14 PROCEDURE — 85025 COMPLETE CBC W/AUTO DIFF WBC: CPT

## 2020-03-14 PROCEDURE — 12011 RPR F/E/E/N/L/M 2.5 CM/<: CPT

## 2020-03-14 PROCEDURE — 96365 THER/PROPH/DIAG IV INF INIT: CPT

## 2020-03-14 PROCEDURE — 96366 THER/PROPH/DIAG IV INF ADDON: CPT

## 2020-03-14 PROCEDURE — 63600175 PHARM REV CODE 636 W HCPCS: Performed by: HOSPITALIST

## 2020-03-14 PROCEDURE — 25000003 PHARM REV CODE 250: Performed by: STUDENT IN AN ORGANIZED HEALTH CARE EDUCATION/TRAINING PROGRAM

## 2020-03-14 PROCEDURE — 97535 SELF CARE MNGMENT TRAINING: CPT

## 2020-03-14 PROCEDURE — 96375 TX/PRO/DX INJ NEW DRUG ADDON: CPT | Mod: 59

## 2020-03-14 PROCEDURE — 84100 ASSAY OF PHOSPHORUS: CPT

## 2020-03-14 PROCEDURE — 97165 OT EVAL LOW COMPLEX 30 MIN: CPT

## 2020-03-14 PROCEDURE — 25000003 PHARM REV CODE 250: Performed by: EMERGENCY MEDICINE

## 2020-03-14 PROCEDURE — 93306 TTE W/DOPPLER COMPLETE: CPT

## 2020-03-14 PROCEDURE — 94761 N-INVAS EAR/PLS OXIMETRY MLT: CPT

## 2020-03-14 PROCEDURE — 99900035 HC TECH TIME PER 15 MIN (STAT)

## 2020-03-14 PROCEDURE — 63600175 PHARM REV CODE 636 W HCPCS: Performed by: STUDENT IN AN ORGANIZED HEALTH CARE EDUCATION/TRAINING PROGRAM

## 2020-03-14 PROCEDURE — 93005 ELECTROCARDIOGRAM TRACING: CPT | Mod: 59 | Performed by: INTERNAL MEDICINE

## 2020-03-14 PROCEDURE — 85610 PROTHROMBIN TIME: CPT

## 2020-03-14 PROCEDURE — 25000242 PHARM REV CODE 250 ALT 637 W/ HCPCS: Performed by: STUDENT IN AN ORGANIZED HEALTH CARE EDUCATION/TRAINING PROGRAM

## 2020-03-14 PROCEDURE — 83735 ASSAY OF MAGNESIUM: CPT | Mod: 91

## 2020-03-14 PROCEDURE — 84484 ASSAY OF TROPONIN QUANT: CPT

## 2020-03-14 PROCEDURE — 84443 ASSAY THYROID STIM HORMONE: CPT

## 2020-03-14 PROCEDURE — 94640 AIRWAY INHALATION TREATMENT: CPT

## 2020-03-14 PROCEDURE — 97116 GAIT TRAINING THERAPY: CPT

## 2020-03-14 PROCEDURE — 36415 COLL VENOUS BLD VENIPUNCTURE: CPT

## 2020-03-14 PROCEDURE — 80048 BASIC METABOLIC PNL TOTAL CA: CPT

## 2020-03-14 PROCEDURE — 83880 ASSAY OF NATRIURETIC PEPTIDE: CPT

## 2020-03-14 PROCEDURE — 83735 ASSAY OF MAGNESIUM: CPT

## 2020-03-14 RX ORDER — MAGNESIUM SULFATE HEPTAHYDRATE 40 MG/ML
2 INJECTION, SOLUTION INTRAVENOUS ONCE
Status: COMPLETED | OUTPATIENT
Start: 2020-03-14 | End: 2020-03-14

## 2020-03-14 RX ORDER — INSULIN ASPART 100 [IU]/ML
0-5 INJECTION, SOLUTION INTRAVENOUS; SUBCUTANEOUS
Status: DISCONTINUED | OUTPATIENT
Start: 2020-03-14 | End: 2020-03-14 | Stop reason: HOSPADM

## 2020-03-14 RX ORDER — PANTOPRAZOLE SODIUM 40 MG/1
40 TABLET, DELAYED RELEASE ORAL DAILY
Status: DISCONTINUED | OUTPATIENT
Start: 2020-03-14 | End: 2020-03-14 | Stop reason: HOSPADM

## 2020-03-14 RX ORDER — ENOXAPARIN SODIUM 100 MG/ML
40 INJECTION SUBCUTANEOUS EVERY 24 HOURS
Status: DISCONTINUED | OUTPATIENT
Start: 2020-03-14 | End: 2020-03-14

## 2020-03-14 RX ORDER — HYDRALAZINE HYDROCHLORIDE 20 MG/ML
10 INJECTION INTRAMUSCULAR; INTRAVENOUS EVERY 8 HOURS PRN
Status: DISCONTINUED | OUTPATIENT
Start: 2020-03-14 | End: 2020-03-14

## 2020-03-14 RX ORDER — SODIUM,POTASSIUM PHOSPHATES 280-250MG
2 POWDER IN PACKET (EA) ORAL
Status: DISCONTINUED | OUTPATIENT
Start: 2020-03-14 | End: 2020-03-14 | Stop reason: HOSPADM

## 2020-03-14 RX ORDER — METOPROLOL SUCCINATE 50 MG/1
50 TABLET, EXTENDED RELEASE ORAL DAILY
Status: DISCONTINUED | OUTPATIENT
Start: 2020-03-14 | End: 2020-03-14 | Stop reason: HOSPADM

## 2020-03-14 RX ORDER — IBUPROFEN 200 MG
16 TABLET ORAL
Status: DISCONTINUED | OUTPATIENT
Start: 2020-03-14 | End: 2020-03-14 | Stop reason: HOSPADM

## 2020-03-14 RX ORDER — HYDRALAZINE HYDROCHLORIDE 20 MG/ML
10 INJECTION INTRAMUSCULAR; INTRAVENOUS
Status: COMPLETED | OUTPATIENT
Start: 2020-03-14 | End: 2020-03-14

## 2020-03-14 RX ORDER — IBUPROFEN 200 MG
24 TABLET ORAL
Status: DISCONTINUED | OUTPATIENT
Start: 2020-03-14 | End: 2020-03-14 | Stop reason: HOSPADM

## 2020-03-14 RX ORDER — SODIUM CHLORIDE 0.9 % (FLUSH) 0.9 %
10 SYRINGE (ML) INJECTION
Status: DISCONTINUED | OUTPATIENT
Start: 2020-03-14 | End: 2020-03-14 | Stop reason: HOSPADM

## 2020-03-14 RX ORDER — POTASSIUM CHLORIDE 20 MEQ/15ML
40 SOLUTION ORAL
Status: DISCONTINUED | OUTPATIENT
Start: 2020-03-14 | End: 2020-03-14 | Stop reason: HOSPADM

## 2020-03-14 RX ORDER — TALC
6 POWDER (GRAM) TOPICAL NIGHTLY PRN
Status: DISCONTINUED | OUTPATIENT
Start: 2020-03-14 | End: 2020-03-14 | Stop reason: HOSPADM

## 2020-03-14 RX ORDER — ONDANSETRON 4 MG/1
8 TABLET, ORALLY DISINTEGRATING ORAL EVERY 8 HOURS PRN
Status: DISCONTINUED | OUTPATIENT
Start: 2020-03-14 | End: 2020-03-14 | Stop reason: HOSPADM

## 2020-03-14 RX ORDER — POTASSIUM CHLORIDE 20 MEQ/1
40 TABLET, EXTENDED RELEASE ORAL ONCE
Status: COMPLETED | OUTPATIENT
Start: 2020-03-14 | End: 2020-03-14

## 2020-03-14 RX ORDER — FLUTICASONE PROPIONATE 50 MCG
1 SPRAY, SUSPENSION (ML) NASAL DAILY
Status: DISCONTINUED | OUTPATIENT
Start: 2020-03-14 | End: 2020-03-14 | Stop reason: HOSPADM

## 2020-03-14 RX ORDER — AMLODIPINE BESYLATE 5 MG/1
5 TABLET ORAL DAILY
Status: DISCONTINUED | OUTPATIENT
Start: 2020-03-14 | End: 2020-03-14

## 2020-03-14 RX ORDER — AMLODIPINE BESYLATE 5 MG/1
10 TABLET ORAL DAILY
Status: DISCONTINUED | OUTPATIENT
Start: 2020-03-15 | End: 2020-03-14 | Stop reason: HOSPADM

## 2020-03-14 RX ORDER — LANOLIN ALCOHOL/MO/W.PET/CERES
800 CREAM (GRAM) TOPICAL
Status: DISCONTINUED | OUTPATIENT
Start: 2020-03-14 | End: 2020-03-14 | Stop reason: HOSPADM

## 2020-03-14 RX ORDER — PRAVASTATIN SODIUM 40 MG/1
40 TABLET ORAL NIGHTLY
Status: DISCONTINUED | OUTPATIENT
Start: 2020-03-14 | End: 2020-03-14 | Stop reason: HOSPADM

## 2020-03-14 RX ORDER — AMLODIPINE BESYLATE 5 MG/1
5 TABLET ORAL ONCE
Status: COMPLETED | OUTPATIENT
Start: 2020-03-14 | End: 2020-03-14

## 2020-03-14 RX ORDER — MONTELUKAST SODIUM 10 MG/1
10 TABLET ORAL NIGHTLY
Status: DISCONTINUED | OUTPATIENT
Start: 2020-03-14 | End: 2020-03-14 | Stop reason: HOSPADM

## 2020-03-14 RX ORDER — ENOXAPARIN SODIUM 100 MG/ML
30 INJECTION SUBCUTANEOUS EVERY 24 HOURS
Status: DISCONTINUED | OUTPATIENT
Start: 2020-03-14 | End: 2020-03-14 | Stop reason: HOSPADM

## 2020-03-14 RX ORDER — METOPROLOL SUCCINATE 25 MG/1
50 TABLET, EXTENDED RELEASE ORAL 2 TIMES DAILY
Status: DISCONTINUED | OUTPATIENT
Start: 2020-03-14 | End: 2020-03-14

## 2020-03-14 RX ORDER — AMLODIPINE BESYLATE 10 MG/1
10 TABLET ORAL DAILY
Qty: 30 TABLET | Refills: 0 | Status: SHIPPED | OUTPATIENT
Start: 2020-03-15 | End: 2020-03-23

## 2020-03-14 RX ORDER — FLUTICASONE FUROATE AND VILANTEROL 100; 25 UG/1; UG/1
1 POWDER RESPIRATORY (INHALATION) DAILY
Status: DISCONTINUED | OUTPATIENT
Start: 2020-03-14 | End: 2020-03-14 | Stop reason: HOSPADM

## 2020-03-14 RX ORDER — ACETAMINOPHEN 325 MG/1
650 TABLET ORAL EVERY 4 HOURS PRN
Status: DISCONTINUED | OUTPATIENT
Start: 2020-03-14 | End: 2020-03-14 | Stop reason: HOSPADM

## 2020-03-14 RX ORDER — HYDRALAZINE HYDROCHLORIDE 20 MG/ML
10 INJECTION INTRAMUSCULAR; INTRAVENOUS EVERY 8 HOURS PRN
Status: DISCONTINUED | OUTPATIENT
Start: 2020-03-14 | End: 2020-03-14 | Stop reason: HOSPADM

## 2020-03-14 RX ORDER — ALBUTEROL SULFATE 90 UG/1
2 AEROSOL, METERED RESPIRATORY (INHALATION) EVERY 6 HOURS PRN
Status: DISCONTINUED | OUTPATIENT
Start: 2020-03-14 | End: 2020-03-14 | Stop reason: HOSPADM

## 2020-03-14 RX ORDER — SODIUM CHLORIDE 9 MG/ML
INJECTION, SOLUTION INTRAVENOUS CONTINUOUS
Status: DISCONTINUED | OUTPATIENT
Start: 2020-03-14 | End: 2020-03-14 | Stop reason: HOSPADM

## 2020-03-14 RX ORDER — OLANZAPINE 5 MG/1
10 TABLET ORAL NIGHTLY
Status: DISCONTINUED | OUTPATIENT
Start: 2020-03-14 | End: 2020-03-14 | Stop reason: HOSPADM

## 2020-03-14 RX ORDER — LIDOCAINE HYDROCHLORIDE 10 MG/ML
5 INJECTION, SOLUTION EPIDURAL; INFILTRATION; INTRACAUDAL; PERINEURAL
Status: COMPLETED | OUTPATIENT
Start: 2020-03-14 | End: 2020-03-14

## 2020-03-14 RX ORDER — GLUCAGON 1 MG
1 KIT INJECTION
Status: DISCONTINUED | OUTPATIENT
Start: 2020-03-14 | End: 2020-03-14 | Stop reason: HOSPADM

## 2020-03-14 RX ORDER — ACETAMINOPHEN 325 MG/1
650 TABLET ORAL EVERY 8 HOURS PRN
Status: DISCONTINUED | OUTPATIENT
Start: 2020-03-14 | End: 2020-03-14

## 2020-03-14 RX ORDER — ASPIRIN 81 MG/1
81 TABLET ORAL DAILY
Status: DISCONTINUED | OUTPATIENT
Start: 2020-03-14 | End: 2020-03-14 | Stop reason: HOSPADM

## 2020-03-14 RX ORDER — MUPIROCIN 20 MG/G
1 OINTMENT TOPICAL
Status: COMPLETED | OUTPATIENT
Start: 2020-03-14 | End: 2020-03-14

## 2020-03-14 RX ORDER — SODIUM CHLORIDE 9 MG/ML
INJECTION, SOLUTION INTRAVENOUS CONTINUOUS
Status: DISCONTINUED | OUTPATIENT
Start: 2020-03-14 | End: 2020-03-14

## 2020-03-14 RX ORDER — TRAZODONE HYDROCHLORIDE 100 MG/1
200 TABLET ORAL NIGHTLY PRN
Start: 2020-03-14

## 2020-03-14 RX ADMIN — MAGNESIUM SULFATE 2 G: 2 INJECTION INTRAVENOUS at 03:03

## 2020-03-14 RX ADMIN — FLUTICASONE PROPIONATE 50 MCG: 50 SPRAY, METERED NASAL at 09:03

## 2020-03-14 RX ADMIN — POTASSIUM CHLORIDE 40 MEQ: 20 TABLET, EXTENDED RELEASE ORAL at 03:03

## 2020-03-14 RX ADMIN — HYDRALAZINE HYDROCHLORIDE 10 MG: 20 INJECTION INTRAMUSCULAR; INTRAVENOUS at 03:03

## 2020-03-14 RX ADMIN — PANTOPRAZOLE SODIUM 40 MG: 40 TABLET, DELAYED RELEASE ORAL at 05:03

## 2020-03-14 RX ADMIN — HYDRALAZINE HYDROCHLORIDE 10 MG: 20 INJECTION INTRAMUSCULAR; INTRAVENOUS at 12:03

## 2020-03-14 RX ADMIN — FLUTICASONE FUROATE AND VILANTEROL TRIFENATATE 1 PUFF: 100; 25 POWDER RESPIRATORY (INHALATION) at 07:03

## 2020-03-14 RX ADMIN — LIDOCAINE HYDROCHLORIDE 50 MG: 10 INJECTION, SOLUTION EPIDURAL; INFILTRATION; INTRACAUDAL; PERINEURAL at 01:03

## 2020-03-14 RX ADMIN — MUPIROCIN 22 G: 20 OINTMENT TOPICAL at 02:03

## 2020-03-14 RX ADMIN — AMLODIPINE BESYLATE 5 MG: 5 TABLET ORAL at 12:03

## 2020-03-14 RX ADMIN — ASPIRIN 81 MG: 81 TABLET, DELAYED RELEASE ORAL at 09:03

## 2020-03-14 RX ADMIN — METOPROLOL SUCCINATE 50 MG: 50 TABLET, EXTENDED RELEASE ORAL at 06:03

## 2020-03-14 RX ADMIN — AMLODIPINE BESYLATE 5 MG: 5 TABLET ORAL at 06:03

## 2020-03-14 NOTE — PLAN OF CARE
Problem: Physical Therapy Goal  Goal: Physical Therapy Goal  Description  Goals to be met by: 2020     Patient will increase functional independence with mobility by performin. Bed to chair transfer with Contact Guard Assistance  2. Gait  x 300 feet with Contact Guard Assistance using Rolling Walker.   3. Lower extremity exercise program x 20 reps, with supervision     Outcome: Ongoing, Progressing

## 2020-03-14 NOTE — H&P
"[facility]  History & Physical    Patient Name: Adriana Perez  MRN: [id number]  Admission Date: 3/14/2020  Attending Physician: Anat Glass MD   Primary Care Provider: Aiden Koch MD         Patient information was obtained from patient, relative(s) and ER records.     Subjective:     Principal Problem:Syncope    Chief Complaint:   Chief Complaint   Patient presents with    Fall     "walking to kitchen and fell hitting head"         HPI: 74-year-old female with past medical history of diabetes with neuropathy, hypertension, CKD 3, schizoaffective disorder presented to ED following syncopal episode.  Patient states that she was lying in bed and got up to get a drink of water from the kitchen started to feel some dizziness and then later workup on the floor.  In ED she was noted to have a laceration to right eyebrow requiring sutures.  Patient denied headache, recent illness, chest pain, palpations, sob, cough,  fever, chills ,nausea/vomiting or diarrhea.  Patient's family is at bedside and states that patient takes multiple sleep medications and has been told to remain in bed for duration by and not get up as she has had falls in the past.    In ED:  4 sutures placed to right forehead by . CT head negative. Orthostatic BP pending. Resting SBP elevated 200's. Hydralazine ordered    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/2018    Dr Lance Foy- Also Renal cysts bilateral    Hyperlipidemia     Hypertension     MVP (mitral valve prolapse)     Primary osteoarthritis of left knee     Restless leg     Schizophrenia, simple, chronic     Urinary, incontinence, stress " female        Past Surgical History:   Procedure Laterality Date    FOOT SURGERY      HYSTERECTOMY      TUBAL LIGATION         Review of patient's allergies indicates:   Allergen Reactions    Lisinopril Nausea And Vomiting       No current facility-administered medications on file prior to encounter.      Current Outpatient Medications on File Prior to Encounter   Medication Sig    albuterol (PROVENTIL/VENTOLIN HFA) 90 mcg/actuation inhaler Inhale 2 puffs into the lungs every 6 (six) hours as needed for Wheezing. Rescue    amantadine HCl (SYMMETREL) 100 mg capsule Take 100 mg by mouth 2 (two) times daily.     ammonium lactate 12 % Crea Apply topically.    aspirin (ECOTRIN) 81 MG EC tablet Take 81 mg by mouth once daily.    budesonide-formoterol 160-4.5 mcg (SYMBICORT) 160-4.5 mcg/actuation HFAA INHALE 2 PUFFS BY MOUTH EVERY 12 HOURS    calcitRIOL (ROCALTROL) 0.25 MCG Cap Take 1 capsule (0.25 mcg total) by mouth every Mon, Wed, Fri. (Patient taking differently: Take 0.25 mcg by mouth once daily. )    ferrous sulfate 324 mg (65 mg iron) TbEC Take 325 mg by mouth once daily.    fluticasone propionate (FLONASE) 50 mcg/actuation nasal spray 1 spray (50 mcg total) by Each Nostril route once daily.    metoprolol succinate (TOPROL-XL) 50 MG 24 hr tablet Take 1 tablet (50 mg total) by mouth once daily. (Patient taking differently: Take 50 mg by mouth 2 (two) times daily. )    montelukast (SINGULAIR) 10 mg tablet TAKE 1 TABLET BY MOUTH ONCE DAILY IN THE EVENING    olanzapine (ZYPREXA) 10 MG tablet Take 10 mg by mouth every evening.    pantoprazole (PROTONIX) 40 MG tablet Take 1 tablet (40 mg total) by mouth once daily.    potassium chloride SA (K-DUR,KLOR-CON) 20 MEQ tablet Take 1 tablet (20 mEq total) by mouth once daily.    pravastatin (PRAVACHOL) 40 MG tablet Take 1 tablet (40 mg total) by mouth nightly.    SITagliptin (JANUVIA) 50 MG Tab TAKE 1 TABLET BY MOUTH ONCE DAILY    trazodone (DESYREL) 100 MG  tablet Take 200 mg by mouth nightly as needed.     blood sugar diagnostic (TRUE METRIX GLUCOSE TEST STRIP) Strp TEST BLOOD SUGAR EVERY DAY    escitalopram oxalate (LEXAPRO) 10 MG tablet Take 10 mg by mouth once daily.    flash glucose scanning reader (FREESTYLE JAY 14 DAY READER) Misc 1 Device by Misc.(Non-Drug; Combo Route) route once daily.    flash glucose sensor (FREESTYLE JAY 14 DAY SENSOR) Kit 1 Device by Misc.(Non-Drug; Combo Route) route every 14 (fourteen) days.    TRUEPLUS LANCETS 33 gauge Misc TEST  DAILY     Family History     Problem Relation (Age of Onset)    Breast cancer Mother, Sister    Cancer Mother, Sister    Hypertension Mother    Sudden death Father        Tobacco Use    Smoking status: Former Smoker     Packs/day: 1.00     Years: 5.00     Pack years: 5.00     Types: Cigarettes     Last attempt to quit: 1992     Years since quittin.5    Smokeless tobacco: Never Used   Substance and Sexual Activity    Alcohol use: No    Drug use: No    Sexual activity: Not Currently     Partners: Male     Review of Systems   Constitutional: Negative for chills and fever.   HENT: Negative for congestion and rhinorrhea.    Eyes: Negative for photophobia and visual disturbance.   Respiratory: Negative for chest tightness, shortness of breath and wheezing.    Cardiovascular: Negative for chest pain and leg swelling.   Gastrointestinal: Negative for abdominal pain, constipation, diarrhea, nausea and vomiting.   Genitourinary: Negative for dysuria and urgency.   Musculoskeletal: Negative for arthralgias and myalgias.   Skin: Positive for wound (right forehead). Negative for rash.   Neurological: Positive for dizziness and syncope. Negative for weakness and headaches.   Psychiatric/Behavioral: Negative for behavioral problems. The patient is not nervous/anxious.      Objective:     Vital Signs (Most Recent):  Temp: 98.4 °F (36.9 °C) (20 012)  Pulse: 68 (20)  Resp: 16 (20  0121)  BP: (!) 220/95 (03/14/20 0121)  SpO2: 98 % (03/14/20 0121) Vital Signs (24h Range):  Temp:  [98.4 °F (36.9 °C)] 98.4 °F (36.9 °C)  Pulse:  [68] 68  Resp:  [16] 16  SpO2:  [98 %] 98 %  BP: (220)/(95) 220/95     Weight: 59 kg (130 lb)  Body mass index is 20.36 kg/m².    Physical Exam   Constitutional: She is oriented to person, place, and time. She appears well-developed and well-nourished.   HENT:   Head: Atraumatic.   Eyes: Pupils are equal, round, and reactive to light. Conjunctivae and EOM are normal.   Neck: Normal range of motion. Neck supple. No JVD present. No thyromegaly present.   Cardiovascular: Normal rate, regular rhythm and normal heart sounds. Exam reveals no gallop and no friction rub.   No murmur heard.  Pulmonary/Chest: Effort normal and breath sounds normal. She has no wheezes. She has no rales.   Abdominal: Soft. Bowel sounds are normal. She exhibits no distension. There is no tenderness.   Musculoskeletal: Normal range of motion. She exhibits no tenderness.   Neurological: She is alert and oriented to person, place, and time. She has normal reflexes. No cranial nerve deficit. She exhibits normal muscle tone. Coordination normal.   Skin: Skin is warm and dry.   4 sutures right forehead near brow line.   Mildly ttp to surrounding area   Psychiatric: She has a normal mood and affect. Her behavior is normal.   Nursing note and vitals reviewed.         Significant Labs:   CBC:   Recent Labs   Lab 03/14/20 0155   WBC 7.20   HGB 11.4*   HCT 36.2*        CMP:   Recent Labs   Lab 03/14/20 0155      K 3.7      CO2 28   *   BUN 31*   CREATININE 1.8*   CALCIUM 9.6   PROT 7.2   ALBUMIN 4.0   BILITOT 0.7   ALKPHOS 71   AST 15   ALT 13   ANIONGAP 10   EGFRNONAA 27.5*     Cardiac Markers:   Recent Labs   Lab 03/14/20 0155   BNP 57     Troponin:   Recent Labs   Lab 03/14/20 0155   TROPONINI <0.030     All pertinent labs within the past 24 hours have been reviewed.    EKG:   Independently reviewed normal sinus rhythm ST segment depressions or elevations    Significant Imaging: I have reviewed all pertinent imaging results/findings within the past 24 hours.\  Imaging Results          CT Head Without Contrast (Final result)  Result time 03/14/20 02:29:03    Final result by Sudarshan Garner MD (03/14/20 02:29:03)                 Narrative:        Exam: CT OF THE BRAIN WITHOUT CONTRAST    Clinical data: Syncope, fainting. Laceration to right eyebrow.    Technique: Contiguous axial images are obtained from the skull base to vertex  without intravenous contrast. Radiation dose: CTDIvol = 52.30 mGy, DLP = 924 mGy  x cm.    Prior studies: No prior studies submitted.    Findings:    Mild, diffuse cerebral atrophy is noted.  There is mild hypoattenuation of the  periventricular white matter bilaterally.  No acute intracranial abnormality is  present. No evidence of acute cortical infarction, hemorrhage, mass or mass  effect. No hydrocephalus or abnormal extra-axial fluid collections are present.  The posterior fossa is unremarkable.    The skull base and calvarium are intact. The included portions of the paranasal  sinuses and mastoid air cells are clear.    IMPRESSION:    1.  There is no evidence of an acute hemorrhagic lesion or mass lesion.    2.  Mild, diffuse cerebral atrophy.    3.  Mild hypoattenuation of the periventricular white matter bilaterally  secondary to age-related ischemic white matter change from microangiopathy.    Recommendation:    Follow up as clinically indicated.    All CT scans at this facility utilize dose modulation, iterative reconstruction,  and/or weight based dosing when appropriate to reduce radiation dose to as low  as reasonably achievable.      Electronically Signed by SUDARSHAN GARNER MD at 3/14/2020 4:18:38 AM EST                             X-Ray Chest AP Portable (Final result)  Result time 03/14/20 03:24:07    Final result by Sudarshan Garner MD  (03/14/20 03:24:07)                 Narrative:        Exam: X-RAY OF THE CHEST    Clinical data: CHF.    Technique: Single view of the chest.    Prior studies: No prior studies submitted.    Findings: The lungs are grossly clear; no evidence of acute infiltrate or  pleural effusion.  Cardiac silhouette is within normal limits.  No acute osseous  abnormality is detected.    IMPRESSION: No acute process.    Recommendation:  Follow up as clinically indicated.      Electronically Signed by TOMMY GARNER MD at 3/14/2020 4:24:07 AM EST                                Assessment/Plan:     * Syncope  Syncopal episode after getting up from bed   Pt takes trazodone and zyprexa at night and has been recommended to stay in bed overnight  CUS and ECHO ordered  Orthostatic blood pressures ordered  Monitor for arrhythmias on cardiac telemetry  UA pending  Trop /bnp wnl  K and Mg low normal in ED. repleted  Pt/ot consulted  Pt appears to be dry: will give IVF      Schizoaffective disorder  Stable  Chronic medical condition  Continuing home medications, Zyprexa      Gastroesophageal reflux disease without esophagitis  Stable  Chronic medical condition  Continuing home medications.      Moderate persistent asthma without complication  Aware  Stable  Chronic medical condition  Continuing home medications.    Type 2 diabetes mellitus with stage 3 chronic kidney disease, without long-term current use of insulin  Last a1c 6.8   bg on admission 128  Holding home meds  Low dose ssi  accuchecks      Chronic kidney disease, stage 3 (moderate)  Baseline cr 1.8-2.0  Cr 1.8 on admission      Benign essential hypertension  Hypertensive urgency and emergency department  's  10 mg IV hydralazine given  Per patient's family her blood pressure has been mildly elevated at home  Will continue toprol 50 mg and add amlodipine 5 mg  monitoring      Active Hospital Problems    Diagnosis  POA    *Syncope [R55]  Yes     Priority: 1 - High     Schizoaffective disorder [F25.9]  Yes    Moderate persistent asthma without complication [J45.40]  Yes    Gastroesophageal reflux disease without esophagitis [K21.9]  Yes    Benign essential hypertension [I10]  Yes     long standing      Chronic kidney disease, stage 3 (moderate) [N18.3]  Yes     03/12/2015:-Patient's kidney functions have stabilized. She will have an appointment in nephrology in couple of months. Patient's creatinine is 1.6 which is more or less in the same range.      Mitral valve disorder [I05.9]  Yes    Type 2 diabetes mellitus with stage 3 chronic kidney disease, without long-term current use of insulin [E11.22, N18.3]  Yes     PT not on ACE because of cough      Mild nonproliferative diabetic retinopathy of both eyes without macular edema associated with type 2 diabetes mellitus [E11.3293]  Yes     Dr. Stuart      Diabetic polyneuropathy associated with type 2 diabetes mellitus [E11.42]  Yes      Resolved Hospital Problems    Diagnosis Date Resolved POA    Obstructive sleep apnea syndrome [G47.33] 03/14/2020 Yes     Patient has been diagnosed with sleep apnea and using a mask.             VTE Risk Mitigation (From admission, onward)         Ordered     enoxaparin injection 40 mg  Daily      03/14/20 0341     IP VTE HIGH RISK PATIENT  Once      03/14/20 0341                   Divina Fonseca DO  Department of Hospital Medicine   [facility]

## 2020-03-14 NOTE — PT/OT/SLP EVAL
Physical Therapy Evaluation    Patient Name:  Adriana Perez   MRN:  [id number]    Recommendations:     Discharge Recommendations:  home   Discharge Equipment Recommendations: none   Barriers to discharge: None    Assessment:     Adriana Perez is a 74 y.o. female admitted with a medical diagnosis of Syncope. Patient reports syncopal episode while walking at home - woke up on the floor and sustained a small laceration lateral to her rt eye. She presents with the following impairments/functional limitations:  weakness, impaired self care skills, impaired endurance, impaired functional mobilty, gait instability, decreased lower extremity function.    Rehab Prognosis: Good; patient would benefit from acute skilled PT services to address these deficits and reach maximum level of function.    Recent Surgery: * No surgery found *      Plan:     During this hospitalization, patient to be seen 5 x/week to address the identified rehab impairments via gait training, therapeutic activities, therapeutic exercises and progress toward the following goals:    · Plan of Care Expires:  03/28/20    Subjective     Chief Complaint: generalized weakness  Patient/Family Comments/goals: improve overall strength/mobility  Pain/Comfort:  · Pain Rating 1: 0/10  · Pain Rating 2: 0/10    Patients cultural, spiritual, Baptism conflicts given the current situation:      Living Environment:  Lives w/ family in 1-story home (no steps).  Prior to admission, patients level of function was supervision needed.  Equipment used at home: walker, rolling, cane, straight, bath bench.  DME owned (not currently used): none.  Upon discharge, patient will have assistance from family.    Objective:     Communicated with nurse prior to session.  Patient found up in chair with peripheral IV, telemetry  upon PT entry to room.    General Precautions: Standard, fall   Orthopedic Precautions:N/A   Braces: N/A     Exams:  · RLE ROM: WFL  · RLE Strength: Deficits:  grossly 4-/5  · LLE ROM: WFL  · LLE Strength: Deficits: grossly 4-/5    Functional Mobility:  · Transfers:     · Sit to Stand:  minimum assistance x 2 with rolling walker  · Gait: 150 ft w/ RW and CGA x 2      Therapeutic Activities and Exercises:   n/a    AM-PAC 6 CLICK MOBILITY  Total Score:16     Patient left up in chair with all lines intact, call button in reach and chair alarm on.    GOALS:   Multidisciplinary Problems     Physical Therapy Goals        Problem: Physical Therapy Goal    Goal Priority Disciplines Outcome Goal Variances Interventions   Physical Therapy Goal     PT, PT/OT      Description:  Goals to be met by: 2020     Patient will increase functional independence with mobility by performin. Bed to chair transfer with Contact Guard Assistance  2. Gait  x 300 feet with Contact Guard Assistance using Rolling Walker.   3. Lower extremity exercise program x 20 reps, with supervision                      History:     Past Medical History:   Diagnosis Date    Acid reflux     Allergy     lisinopril    Anxiety     Bilateral renal cysts 2018    Renal USG by Dr Foy    CKD (chronic kidney disease), stage III     Depression     Diabetes mellitus     Diabetes mellitus, type 2     Disorder of kidney and ureter     Echogenic kidneys on renal ultrasound 2018    Dr Lance Foy- Also Renal cysts bilateral    Hyperlipidemia     Hypertension     MVP (mitral valve prolapse)     Primary osteoarthritis of left knee     Restless leg     Schizophrenia, simple, chronic     Urinary, incontinence, stress female        Past Surgical History:   Procedure Laterality Date    FOOT SURGERY      HYSTERECTOMY      TUBAL LIGATION         Time Tracking:     PT Received On: 20  PT Start Time: 1030     PT Stop Time: 1100  PT Total Time (min): 30 min     Billable Minutes: Evaluation 15 and Gait Training 15      Hai Ruiz, PT  2020

## 2020-03-14 NOTE — NURSING
Pt discharged with all belongings. Iv removed and telemetry discontinued. Pt given follow up information and pt verbalized understanding.

## 2020-03-14 NOTE — SUBJECTIVE & OBJECTIVE
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/2018    Dr Lance Foy- Also Renal cysts bilateral    Hyperlipidemia     Hypertension     MVP (mitral valve prolapse)     Primary osteoarthritis of left knee     Restless leg     Schizophrenia, simple, chronic     Urinary, incontinence, stress female        Past Surgical History:   Procedure Laterality Date    FOOT SURGERY      HYSTERECTOMY      TUBAL LIGATION         Review of patient's allergies indicates:   Allergen Reactions    Lisinopril Nausea And Vomiting       No current facility-administered medications on file prior to encounter.      Current Outpatient Medications on File Prior to Encounter   Medication Sig    albuterol (PROVENTIL/VENTOLIN HFA) 90 mcg/actuation inhaler Inhale 2 puffs into the lungs every 6 (six) hours as needed for Wheezing. Rescue    amantadine HCl (SYMMETREL) 100 mg capsule Take 100 mg by mouth 2 (two) times daily.     ammonium lactate 12 % Crea Apply topically.    aspirin (ECOTRIN) 81 MG EC tablet Take 81 mg by mouth once daily.    budesonide-formoterol 160-4.5 mcg (SYMBICORT) 160-4.5 mcg/actuation HFAA INHALE 2 PUFFS BY MOUTH EVERY 12 HOURS    calcitRIOL (ROCALTROL) 0.25 MCG Cap Take 1 capsule (0.25 mcg total) by mouth every Mon, Wed, Fri. (Patient taking differently: Take 0.25 mcg by mouth once daily. )    ferrous sulfate 324 mg (65 mg iron) TbEC Take 325 mg by mouth once daily.    fluticasone propionate (FLONASE) 50 mcg/actuation nasal spray 1 spray (50 mcg total) by Each Nostril route once daily.    metoprolol succinate (TOPROL-XL) 50 MG 24 hr tablet Take 1 tablet (50 mg total) by mouth once daily. (Patient taking  differently: Take 50 mg by mouth 2 (two) times daily. )    montelukast (SINGULAIR) 10 mg tablet TAKE 1 TABLET BY MOUTH ONCE DAILY IN THE EVENING    olanzapine (ZYPREXA) 10 MG tablet Take 10 mg by mouth every evening.    pantoprazole (PROTONIX) 40 MG tablet Take 1 tablet (40 mg total) by mouth once daily.    potassium chloride SA (K-DUR,KLOR-CON) 20 MEQ tablet Take 1 tablet (20 mEq total) by mouth once daily.    pravastatin (PRAVACHOL) 40 MG tablet Take 1 tablet (40 mg total) by mouth nightly.    SITagliptin (JANUVIA) 50 MG Tab TAKE 1 TABLET BY MOUTH ONCE DAILY    trazodone (DESYREL) 100 MG tablet Take 200 mg by mouth nightly as needed.     blood sugar diagnostic (TRUE METRIX GLUCOSE TEST STRIP) Strp TEST BLOOD SUGAR EVERY DAY    escitalopram oxalate (LEXAPRO) 10 MG tablet Take 10 mg by mouth once daily.    flash glucose scanning reader (FREESTYLE JAY 14 DAY READER) Misc 1 Device by Misc.(Non-Drug; Combo Route) route once daily.    flash glucose sensor (FREESTYLE JAY 14 DAY SENSOR) Kit 1 Device by Misc.(Non-Drug; Combo Route) route every 14 (fourteen) days.    TRUEPLUS LANCETS 33 gauge Misc TEST  DAILY     Family History     Problem Relation (Age of Onset)    Breast cancer Mother, Sister    Cancer Mother, Sister    Hypertension Mother    Sudden death Father        Tobacco Use    Smoking status: Former Smoker     Packs/day: 1.00     Years: 5.00     Pack years: 5.00     Types: Cigarettes     Last attempt to quit: 1992     Years since quittin.5    Smokeless tobacco: Never Used   Substance and Sexual Activity    Alcohol use: No    Drug use: No    Sexual activity: Not Currently     Partners: Male     Review of Systems   Constitutional: Negative for chills and fever.   HENT: Negative for congestion and rhinorrhea.    Eyes: Negative for photophobia and visual disturbance.   Respiratory: Negative for chest tightness, shortness of breath and wheezing.    Cardiovascular: Negative for chest pain  and leg swelling.   Gastrointestinal: Negative for abdominal pain, constipation, diarrhea, nausea and vomiting.   Genitourinary: Negative for dysuria and urgency.   Musculoskeletal: Negative for arthralgias and myalgias.   Skin: Positive for wound (right forehead). Negative for rash.   Neurological: Positive for dizziness and syncope. Negative for weakness and headaches.   Psychiatric/Behavioral: Negative for behavioral problems. The patient is not nervous/anxious.      Objective:     Vital Signs (Most Recent):  Temp: 98.4 °F (36.9 °C) (03/14/20 0121)  Pulse: 68 (03/14/20 0121)  Resp: 16 (03/14/20 0121)  BP: (!) 220/95 (03/14/20 0121)  SpO2: 98 % (03/14/20 0121) Vital Signs (24h Range):  Temp:  [98.4 °F (36.9 °C)] 98.4 °F (36.9 °C)  Pulse:  [68] 68  Resp:  [16] 16  SpO2:  [98 %] 98 %  BP: (220)/(95) 220/95     Weight: 59 kg (130 lb)  Body mass index is 20.36 kg/m².    Physical Exam   Constitutional: She is oriented to person, place, and time. She appears well-developed and well-nourished.   HENT:   Head: Atraumatic.   Eyes: Pupils are equal, round, and reactive to light. Conjunctivae and EOM are normal.   Neck: Normal range of motion. Neck supple. No JVD present. No thyromegaly present.   Cardiovascular: Normal rate, regular rhythm and normal heart sounds. Exam reveals no gallop and no friction rub.   No murmur heard.  Pulmonary/Chest: Effort normal and breath sounds normal. She has no wheezes. She has no rales.   Abdominal: Soft. Bowel sounds are normal. She exhibits no distension. There is no tenderness.   Musculoskeletal: Normal range of motion. She exhibits no tenderness.   Neurological: She is alert and oriented to person, place, and time. She has normal reflexes. No cranial nerve deficit. She exhibits normal muscle tone. Coordination normal.   Skin: Skin is warm and dry.   4 sutures right forehead near brow line.   Mildly ttp to surrounding area   Psychiatric: She has a normal mood and affect. Her behavior is  normal.   Nursing note and vitals reviewed.         Significant Labs:   CBC:   Recent Labs   Lab 03/14/20 0155   WBC 7.20   HGB 11.4*   HCT 36.2*        CMP:   Recent Labs   Lab 03/14/20 0155      K 3.7      CO2 28   *   BUN 31*   CREATININE 1.8*   CALCIUM 9.6   PROT 7.2   ALBUMIN 4.0   BILITOT 0.7   ALKPHOS 71   AST 15   ALT 13   ANIONGAP 10   EGFRNONAA 27.5*     Cardiac Markers:   Recent Labs   Lab 03/14/20 0155   BNP 57     Troponin:   Recent Labs   Lab 03/14/20 0155   TROPONINI <0.030     All pertinent labs within the past 24 hours have been reviewed.    EKG:  Independently reviewed normal sinus rhythm ST segment depressions or elevations    Significant Imaging: I have reviewed all pertinent imaging results/findings within the past 24 hours.\  Imaging Results          CT Head Without Contrast (Final result)  Result time 03/14/20 02:29:03    Final result by Sudarshan rBambila MD (03/14/20 02:29:03)                 Narrative:        Exam: CT OF THE BRAIN WITHOUT CONTRAST    Clinical data: Syncope, fainting. Laceration to right eyebrow.    Technique: Contiguous axial images are obtained from the skull base to vertex  without intravenous contrast. Radiation dose: CTDIvol = 52.30 mGy, DLP = 924 mGy  x cm.    Prior studies: No prior studies submitted.    Findings:    Mild, diffuse cerebral atrophy is noted.  There is mild hypoattenuation of the  periventricular white matter bilaterally.  No acute intracranial abnormality is  present. No evidence of acute cortical infarction, hemorrhage, mass or mass  effect. No hydrocephalus or abnormal extra-axial fluid collections are present.  The posterior fossa is unremarkable.    The skull base and calvarium are intact. The included portions of the paranasal  sinuses and mastoid air cells are clear.    IMPRESSION:    1.  There is no evidence of an acute hemorrhagic lesion or mass lesion.    2.  Mild, diffuse cerebral atrophy.    3.  Mild  hypoattenuation of the periventricular white matter bilaterally  secondary to age-related ischemic white matter change from microangiopathy.    Recommendation:    Follow up as clinically indicated.    All CT scans at this facility utilize dose modulation, iterative reconstruction,  and/or weight based dosing when appropriate to reduce radiation dose to as low  as reasonably achievable.      Electronically Signed by SUDARSHAN GARNER MD at 3/14/2020 4:18:38 AM EST                             X-Ray Chest AP Portable (Final result)  Result time 03/14/20 03:24:07    Final result by Sudarshan Garner MD (03/14/20 03:24:07)                 Narrative:        Exam: X-RAY OF THE CHEST    Clinical data: CHF.    Technique: Single view of the chest.    Prior studies: No prior studies submitted.    Findings: The lungs are grossly clear; no evidence of acute infiltrate or  pleural effusion.  Cardiac silhouette is within normal limits.  No acute osseous  abnormality is detected.    IMPRESSION: No acute process.    Recommendation:  Follow up as clinically indicated.      Electronically Signed by SUDARSHAN GARNER MD at 3/14/2020 4:24:07 AM EST

## 2020-03-14 NOTE — DISCHARGE SUMMARY
[facility] Medicine  Discharge Summary    DOS: 03/14/2020      Patient Name: Adriana Perez  MRN: [id number]  Admission Date: 3/14/2020  Hospital Length of Stay: 0 days  Discharge Date and Time:  03/14/2020 5:38 PM  Attending Physician: Canelo Spear MD   Discharging Provider: Canelo Spear MD  Primary Care Provider: Aiden Koch MD      HPI:   74-year-old female with past medical history of diabetes with neuropathy, hypertension, CKD 3, schizoaffective disorder presented to ED following syncopal episode.  Patient states that she was lying in bed and got up to get a drink of water from the kitchen started to feel some dizziness and then later workup on the floor.  In ED she was noted to have a laceration to right eyebrow requiring sutures.  Patient denied headache, recent illness, chest pain, palpations, sob, cough,  fever, chills ,nausea/vomiting or diarrhea.  Patient's family is at bedside and states that patient takes multiple sleep medications and has been told to remain in bed for duration by and not get up as she has had falls in the past.    In ED:  4 sutures placed to right forehead by . CT head negative. Orthostatic BP pending. Resting SBP elevated 200's. Hydralazine ordered    * No surgery found *      Hospital Course:   During her short hospital stay patient was treated for orthostatic hypotension and was evaluated for syncope.  So far we did not find any reversible cause of her presenting symptoms.  EKGs and telemetry did not show any arrhythmia, neuro imaging did not show any stroke, 2D echo did not show any significant valvular disease.  Later in the day her blood pressure normalized and she was back to her baseline.  Upon my assessment she denied any symptoms.  It appears that patient has clear instructions from his doctor and family not to stand up in the middle of the night after taking trazodone and Zyprexa.  I discussed with her son and explained to him that  we are discharging her as we did not find any reversible causes of her presenting symptoms and they will have to provide supervision.  She was advised to wean herself off trazodone.  She was advised to follow-up with her PCP.  Added a new prescription of amlodipine considering very high blood pressure.     Review of systems:  Mild discomfort around scalp laceration otherwise comprehensive review of system negative    Physical Exam   Constitutional: She is oriented to person, place, and time. She appears well-developed and well-nourished.   HENT:   Head: Atraumatic.   Eyes: Pupils are equal, round, and reactive to light. Conjunctivae and EOM are normal.   Neck: Normal range of motion. Neck supple. No JVD present. No thyromegaly present.   Cardiovascular: Normal rate, regular rhythm and normal heart sounds. Exam reveals no gallop and no friction rub.   No murmur heard.  Pulmonary/Chest: Effort normal and breath sounds normal. She has no wheezes. She has no rales.   Abdominal: Soft. Bowel sounds are normal. She exhibits no distension. There is no tenderness.   Musculoskeletal: Normal range of motion. She exhibits no tenderness.   Neurological: She is alert and oriented to person, place, and time. She has normal reflexes. No cranial nerve deficit. She exhibits normal muscle tone. Coordination normal.   Skin: Skin is warm and dry.   4 sutures right forehead near brow line. Mildly ttp to surrounding area   Psychiatric: She has a normal mood and affect. Her behavior is normal.     Nursing note and vitals reviewed .     Consults:   Consults (From admission, onward)        Status Ordering Provider     Inpatient consult to Hospitalist  Once     Provider:  DO Paul Srivastava PENNY A.          No new Assessment & Plan notes have been filed under this hospital service since the last note was generated.  Service: Hospital Medicine    Final Active Diagnoses:    Diagnosis Date Noted POA    Schizoaffective  disorder [F25.9] 03/14/2020 Yes    Moderate persistent asthma without complication [J45.40] 11/27/2018 Yes    Gastroesophageal reflux disease without esophagitis [K21.9] 11/27/2018 Yes    Benign essential hypertension [I10] 07/11/2017 Yes    Chronic kidney disease, stage 3 (moderate) [N18.3] 07/11/2017 Yes    Mitral valve disorder [I05.9] 07/11/2017 Yes    Type 2 diabetes mellitus with stage 3 chronic kidney disease, without long-term current use of insulin [E11.22, N18.3] 07/11/2017 Yes    Mild nonproliferative diabetic retinopathy of both eyes without macular edema associated with type 2 diabetes mellitus [E11.3293] 05/22/2017 Yes    Diabetic polyneuropathy associated with type 2 diabetes mellitus [E11.42] 03/20/2017 Yes      Problems Resolved During this Admission:    Diagnosis Date Noted Date Resolved POA    PRINCIPAL PROBLEM:  Syncope [R55] 03/14/2020 03/14/2020 Yes    Obstructive sleep apnea syndrome [G47.33] 07/11/2017 03/14/2020 Yes       Discharged Condition: good    Disposition: Home or Self Care    Follow Up:  Follow-up Information     Aiden Koch MD In 1 week.    Specialty:  Internal Medicine  Contact information:  [address] [place], [place] [id number] [phone]                 Patient Instructions:      Diet Cardiac     No driving until:   Order Comments: No driving until cleared by MD     Notify your health care provider if you experience any of the following:  persistent dizziness, light-headedness, or visual disturbances     Activity as tolerated       Significant Diagnostic Studies: Labs:   BMP:   Recent Labs   Lab 03/14/20  0155 03/14/20  0511   * 147*    140   K 3.7 3.9    103   CO2 28 26   BUN 31* 27*   CREATININE 1.8* 1.7*   CALCIUM 9.6 10.0   MG 1.9 2.7*       Pending Diagnostic Studies:     None         Medications:  Reconciled Home Medications:      Medication List      START taking these medications    amLODIPine 10 MG tablet  Commonly known as:   NORVASC  Take 1 tablet (10 mg total) by mouth once daily.  Start taking on:  March 15, 2020        CHANGE how you take these medications    calcitRIOL 0.25 MCG Cap  Commonly known as:  ROCALTROL  Take 1 capsule (0.25 mcg total) by mouth every Mon, Wed, Fri.  What changed:  when to take this     metoprolol succinate 50 MG 24 hr tablet  Commonly known as:  TOPROL-XL  Take 1 tablet (50 mg total) by mouth once daily.  What changed:  when to take this        CONTINUE taking these medications    albuterol 90 mcg/actuation inhaler  Commonly known as:  PROVENTIL/VENTOLIN HFA  Inhale 2 puffs into the lungs every 6 (six) hours as needed for Wheezing. Rescue     amantadine  mg capsule  Commonly known as:  SYMMETREL  Take 100 mg by mouth 2 (two) times daily.     ammonium lactate 12 % Crea  Apply topically.     aspirin 81 MG EC tablet  Commonly known as:  ECOTRIN  Take 81 mg by mouth once daily.     blood sugar diagnostic Strp  Commonly known as:  TRUE METRIX GLUCOSE TEST STRIP  TEST BLOOD SUGAR EVERY DAY     budesonide-formoterol 160-4.5 mcg 160-4.5 mcg/actuation Hfaa  Commonly known as:  SYMBICORT  INHALE 2 PUFFS BY MOUTH EVERY 12 HOURS     ferrous sulfate 324 mg (65 mg iron) Tbec  Take 325 mg by mouth once daily.     flash glucose scanning reader Misc  Commonly known as:  FREESTYLE JAY 14 DAY READER  1 Device by Misc.(Non-Drug; Combo Route) route once daily.     flash glucose sensor Kit  Commonly known as:  FREESTYLE JAY 14 DAY SENSOR  1 Device by Misc.(Non-Drug; Combo Route) route every 14 (fourteen) days.     fluticasone propionate 50 mcg/actuation nasal spray  Commonly known as:  FLONASE  1 spray (50 mcg total) by Each Nostril route once daily.     montelukast 10 mg tablet  Commonly known as:  SINGULAIR  TAKE 1 TABLET BY MOUTH ONCE DAILY IN THE EVENING     OLANZapine 10 MG tablet  Commonly known as:  ZyPREXA  Take 10 mg by mouth every evening.     pantoprazole 40 MG tablet  Commonly known as:  PROTONIX  Take 1  tablet (40 mg total) by mouth once daily.     potassium chloride SA 20 MEQ tablet  Commonly known as:  K-DUR,KLOR-CON  Take 1 tablet (20 mEq total) by mouth once daily.     pravastatin 40 MG tablet  Commonly known as:  PRAVACHOL  Take 1 tablet (40 mg total) by mouth nightly.     SITagliptin 50 MG Tab  Commonly known as:  JANUVIA  TAKE 1 TABLET BY MOUTH ONCE DAILY     traZODone 100 MG tablet  Commonly known as:  DESYREL  Take 2 tablets (200 mg total) by mouth nightly as needed.     TRUEPLUS LANCETS 33 gauge Misc  Generic drug:  lancets  TEST  DAILY        STOP taking these medications    escitalopram oxalate 10 MG tablet  Commonly known as:  LEXAPRO            Indwelling Lines/Drains at time of discharge:   Lines/Drains/Airways     None                 Time spent on the discharge of patient: 26 minutes  Patient was seen and examined on the date of discharge and determined to be suitable for discharge.         Canelo Spear MD  Department of Hospital Medicine  [facility]

## 2020-03-14 NOTE — PLAN OF CARE
03/14/20 1536   Final Note   Assessment Type Final Discharge Note   Anticipated Discharge Disposition Home     Pt discharged home this date and discharge orders reviewed.  No SS / CM orders noted at this time.

## 2020-03-14 NOTE — HOSPITAL COURSE
During her short hospital stay patient was treated for orthostatic hypotension and was evaluated for syncope.  So far we did not find any reversible cause of her presenting symptoms.  EKGs and telemetry did not show any arrhythmia, neuro imaging did not show any stroke, 2D echo did not show any significant valvular disease.  Later in the day her blood pressure normalized and she was back to her baseline.  Upon my assessment she denied any symptoms.  It appears that patient has clear instructions from his doctor and family not to stand up in the middle of the night after taking trazodone and Zyprexa.  I discussed with her son and explained to him that we are discharging her as we did not find any reversible causes of her presenting symptoms and they will have to provide supervision.  She was advised to wean herself off trazodone.  She was advised to follow-up with her PCP.  Added a new prescription of amlodipine considering very high blood pressure.     Review of systems:  Mild discomfort around scalp laceration otherwise comprehensive review of system negative    Physical Exam   Constitutional: She is oriented to person, place, and time. She appears well-developed and well-nourished.   HENT:   Head: Atraumatic.   Eyes: Pupils are equal, round, and reactive to light. Conjunctivae and EOM are normal.   Neck: Normal range of motion. Neck supple. No JVD present. No thyromegaly present.   Cardiovascular: Normal rate, regular rhythm and normal heart sounds. Exam reveals no gallop and no friction rub.   No murmur heard.  Pulmonary/Chest: Effort normal and breath sounds normal. She has no wheezes. She has no rales.   Abdominal: Soft. Bowel sounds are normal. She exhibits no distension. There is no tenderness.   Musculoskeletal: Normal range of motion. She exhibits no tenderness.   Neurological: She is alert and oriented to person, place, and time. She has normal reflexes. No cranial nerve deficit. She exhibits normal muscle  tone. Coordination normal.   Skin: Skin is warm and dry.   4 sutures right forehead near brow line. Mildly ttp to surrounding area   Psychiatric: She has a normal mood and affect. Her behavior is normal.     Nursing note and vitals reviewed.

## 2020-03-14 NOTE — PLAN OF CARE
03/14/20 1128   WESLEY Message   Medicare Outpatient and Observation Notification regarding financial responsibility Given to patient/caregiver;Explained to patient/caregiver;Other (comments)  (Pt refuses to sign at this time.  WESLEY Form explained, completed with this information, and copy left at bedside.  )   Date WESLEY was signed 03/14/20   Time WELSEY was signed [id number]

## 2020-03-14 NOTE — PT/OT/SLP EVAL
Occupational Therapy   Evaluation    Name: Adriana Perez  MRN: [id number]  Admitting Diagnosis:  Syncope      Recommendations:     Discharge Recommendations: home health OT  Discharge Equipment Recommendations:  none  Barriers to discharge:  None    Assessment:     Adriana Perez is a 74 y.o. female with a medical diagnosis of Syncope.  Pt agreeable to OT eval this session. Performance deficits affecting function: weakness, impaired endurance, impaired self care skills, impaired functional mobilty, gait instability, impaired balance, decreased safety awareness.  Pt oriented X 4, no family at bedside. Pt required verbal cues for safe/correct RW management during transfers and functional mobility. Rec HHOT at d/c.     Rehab Prognosis: Good; patient would benefit from acute skilled OT services to address these deficits and reach maximum level of function.       Plan:     Patient to be seen 3 x/week to address the above listed problems via self-care/home management, therapeutic activities, therapeutic exercises  · Plan of Care Expires: 04/14/20  · Plan of Care Reviewed with: patient    Subjective     Chief Complaint: none stated  Patient/Family Comments/goals: to return home with HHOT/PT    Occupational Profile:  Living Environment: Pt lives with her , granddaughter, and great grandchildren in a 1 story home with 1 step to enter. Pt has a walk-in shower, raised toilet, and grab bars present.   Previous level of function: Mod I ADLs/functional mobility. Pt reports her granddaughter cooks  Equipment Used at Home:  shower chair, cane, straight, walker, rolling  Assistance upon Discharge: yes, from family    Pain/Comfort:  · Pain Rating 1: 0/10    Patients cultural, spiritual, Anabaptist conflicts given the current situation:      Objective:     Communicated with: nursing prior to session.  Patient found HOB elevated with bed alarm, telemetry upon OT entry to room.    General Precautions: Standard, fall   Orthopedic  Precautions:N/A   Braces: N/A     Occupational Performance:    Bed Mobility:    · Patient completed Supine to Sit with stand by assistance    Functional Mobility/Transfers:  · Patient completed Sit <> Stand Transfer with stand by assistance  with  rolling walker   · Patient completed Bed <> Chair Transfer using Step Transfer technique with contact guard assistance with rolling walker  · Functional Mobility: pt amb from bed > sink > chair with RW with CGA with no LOB/SOB    Activities of Daily Living:  · Grooming: contact guard assistance standing at sink to brush teeth and wash face  · Lower Body Dressing: stand by assistance seated EOB to don/doff socks     Cognitive/Visual Perceptual:  Cognitive/Psychosocial Skills:     -       Oriented to: Person, Place, Time and Situation   -       Follows Commands/attention:Follows multistep  commands  -       Communication: clear/fluent  -       Memory: No Deficits noted  -       Safety awareness/insight to disability: intact   -       Mood/Affect/Coping skills/emotional control: Appropriate to situation, Cooperative and Pleasant    Physical Exam:  Balance:    -       SBA seated balance; CGA standing balance  Upper Extremity Range of Motion:     -       Right Upper Extremity: WFL  -       Left Upper Extremity: WFL  Upper Extremity Strength:    -       Right Upper Extremity: WFL  -       Left Upper Extremity: WFL   Strength: -       Right Upper Extremity: WFL  -       Left Upper Extremity: WFL  Fine Motor Coordination:    -       Intact  Gross motor coordination:   WFL    AMPAC 6 Click ADL:  AMPAC Total Score: 19    Treatment & Education:  Pt educated on role of OT/POC, correct/safe RW management during transfers, and importance of OOB activity to negate the effects of prolonged bed rest.   Education:     Patient left up in chair with all lines intact, call button in reach and chair alarm on    GOALS:   Multidisciplinary Problems     Occupational Therapy Goals         Problem: Occupational Therapy Goal    Goal Priority Disciplines Outcome Interventions   Occupational Therapy Goal     OT, PT/OT     Description:  Goals to be met by: discharge    Patient will increase functional independence with ADLs by performing:    LE Dressing with Supervision.  Grooming while standing at sink with Supervision.  Toileting from toilet with Supervision for hygiene and clothing management.   Toilet transfer to toilet with Supervision.                      History:     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/2018    Dr Lance Foy- Also Renal cysts bilateral    Hyperlipidemia     Hypertension     MVP (mitral valve prolapse)     Primary osteoarthritis of left knee     Restless leg     Schizophrenia, simple, chronic     Urinary, incontinence, stress female        Past Surgical History:   Procedure Laterality Date    FOOT SURGERY      HYSTERECTOMY      TUBAL LIGATION         Time Tracking:     OT Date of Treatment: 03/14/20  OT Start Time: 0920  OT Stop Time: 0940  OT Total Time (min): 20 min    Billable Minutes:Evaluation 10   Self Care/Home Management 10    Debbie Ignacio OT  3/14/2020

## 2020-03-14 NOTE — ASSESSMENT & PLAN NOTE
Syncopal episode after getting up from bed   Pt takes trazodone and zyprexa at night and has been recommended to stay in bed overnight  CUS and ECHO ordered  Orthostatic blood pressures ordered  Monitor for arrhythmias on cardiac telemetry  UA pending  Trop /bnp wnl  K and Mg low normal in ED. repleted  Pt/ot consulted  Pt appears to be dry: will give IVF

## 2020-03-14 NOTE — ASSESSMENT & PLAN NOTE
Hypertensive urgency and emergency department  's  10 mg IV hydralazine given  Per patient's family her blood pressure has been mildly elevated at home  Will continue toprol 50 mg and add amlodipine 5 mg  monitoring

## 2020-03-14 NOTE — PROGRESS NOTES
Pharmacist Renal Dose Adjustment Note    Adriana Perez is a 74 y.o. female being treated with the medication enoxaparin    Patient Data:    Vital Signs (Most Recent):  Temp: 98.3 °F (36.8 °C) (03/14/20 0345)  Pulse: 71 (03/14/20 0426)  Resp: 18 (03/14/20 0432)  BP: (!) 202/91 (03/14/20 0427)  SpO2: 100 % (03/14/20 0426)   Vital Signs (72h Range):  Temp:  [98.3 °F (36.8 °C)-98.4 °F (36.9 °C)]   Pulse:  [68-78]   Resp:  [16-24]   BP: (170-232)/()   SpO2:  [90 %-100 %]      Recent Labs   Lab 03/14/20 0155   CREATININE 1.8*     Serum creatinine: 1.8 mg/dL (H) 03/14/20 0155  Estimated creatinine clearance: 25.9 mL/min (A)    Medication:enoxaparin 40 mg q 24 h will be changed to enoxaparin 30 mg q 24 h per crcl <30     Pharmacist's Name: Lance Moore  Pharmacist's Extension: [phone]

## 2020-03-14 NOTE — ED PROVIDER NOTES
"Encounter Date: 3/14/2020       History     Chief Complaint   Patient presents with    Fall     "walking to kitchen and fell hitting head"      Female presented emergency department after a syncopal episode.  Patient went to into the kitchen to get some water and next thing she remembers was waking up on the floor.  Patient said she remembers feeling dizzy and was on the floor.  Patient's symptoms consistent with a syncopal episode.  Patient has a laceration on the forehead lateral and superior to the right eye.  Patient denies any headache.  Patient not on blood thinners.  Patient denies fever chills or nausea or vomiting or chest pain or shortness of breath.  Patient said she had a stress test in the past which was unremarkable.        Review of patient's allergies indicates:   Allergen Reactions    Lisinopril Nausea And Vomiting     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/2018    Dr Lance Foy- Also Renal cysts bilateral    Hyperlipidemia     Hypertension     MVP (mitral valve prolapse)     Primary osteoarthritis of left knee     Restless leg     Schizophrenia, simple, chronic     Urinary, incontinence, stress female      Past Surgical History:   Procedure Laterality Date    FOOT SURGERY      HYSTERECTOMY      TUBAL LIGATION       Family History   Problem Relation Age of Onset    Sudden death Father     Cancer Mother     Hypertension Mother     Breast cancer Mother     Cancer Sister     Breast cancer Sister      Social History     Tobacco Use    Smoking status: Former Smoker     Packs/day: 1.00     Years: 5.00     Pack years: 5.00     Types: Cigarettes     Last attempt to quit: " 1992     Years since quittin.5    Smokeless tobacco: Never Used   Substance Use Topics    Alcohol use: No    Drug use: No     Review of Systems   Constitutional: Negative.    HENT: Negative.    Eyes: Negative.    Respiratory: Negative.    Cardiovascular: Negative.  Negative for chest pain.   Gastrointestinal: Negative.    Endocrine: Negative.    Genitourinary: Negative.    Musculoskeletal: Negative.    Skin: Positive for wound.   Allergic/Immunologic: Negative.    Neurological: Positive for dizziness and syncope.   Hematological: Negative.    Psychiatric/Behavioral: Negative.    All other systems reviewed and are negative.      Physical Exam     Initial Vitals [20 0121]   BP Pulse Resp Temp SpO2   (!) 220/95 68 16 98.4 °F (36.9 °C) 98 %      MAP       --         Physical Exam    Nursing note and vitals reviewed.  Constitutional: She appears well-developed and well-nourished.   HENT:   Head: Normocephalic.   Right Ear: External ear normal.   Left Ear: External ear normal.   Nose: Nose normal.   Mouth/Throat: Oropharynx is clear and moist.   Eyes: Conjunctivae and EOM are normal. Pupils are equal, round, and reactive to light.   Neck: Normal range of motion. Neck supple. No thyromegaly present. No tracheal deviation present. No JVD present.   Cardiovascular: Normal rate, regular rhythm, normal heart sounds and intact distal pulses.   No murmur heard.  Pulmonary/Chest: Breath sounds normal. No stridor. No respiratory distress. She has no wheezes. She has no rales.   Abdominal: Soft. Bowel sounds are normal. She exhibits no distension. There is no tenderness.   Musculoskeletal: Normal range of motion. She exhibits no edema.   Neurological: She is alert and oriented to person, place, and time. She has normal strength. No cranial nerve deficit or sensory deficit.   Skin: Skin is warm. Capillary refill takes less than 2 seconds.   Laceration on the right upper face   Psychiatric: She has a normal mood  and affect. Thought content normal.         ED Course   Lac Repair  Date/Time: 3/14/2020 1:33 AM  Performed by: Anat Glass MD  Authorized by: Anat Glass MD   Consent Done: Not Needed  Body area: head/neck  Location details: forehead  Vascular damage: no  Anesthesia: local infiltration    Anesthesia:  Local anesthesia used: yes  Local Anesthetic: lidocaine 1% without epinephrine  Patient sedated: no  Irrigation solution: saline  Skin closure: Ethilon  Number of sutures: 4  Technique: simple  Approximation: close  Approximation difficulty: complex  Dressing: dressing applied  Patient tolerance: Patient tolerated the procedure well with no immediate complications        Labs Reviewed   CBC W/ AUTO DIFFERENTIAL   COMPREHENSIVE METABOLIC PANEL   TROPONIN I   B-TYPE NATRIURETIC PEPTIDE   PROTIME-INR   MAGNESIUM     EKG Readings: (Independently Interpreted)   Initial Reading: No STEMI. Rhythm: Normal Sinus Rhythm. Ectopy: No Ectopy. Conduction: Normal.       Imaging Results    None          Medical Decision Making:   Differential Diagnosis:   74-year-old female presented emergency department after a syncopal episode.  Patient has a laceration on the face which was repaired.  Patient otherwise neurologically intact and back to baseline at this time.  Patient is slightly hypertensive.  Otherwise asymptomatic.  Screening labs and workup done and head CT done for evaluation of patient's syncopal episode and head injury.  Patient now is feeling well and back at baseline and given patient's age and syncopal episode Hospital Medicine consulted for evaluation for further management.  Clinical Tests:   Lab Tests: Reviewed  Radiological Study: Reviewed  Medical Tests: Reviewed                                 Clinical Impression:       ICD-10-CM ICD-9-CM   1. Syncope, unspecified syncope type R55 780.2   2. Laceration of forehead, initial encounter S01.81XA 873.42                                Anat Glass MD  03/14/20 0200

## 2020-03-14 NOTE — HPI
74-year-old female with past medical history of diabetes with neuropathy, hypertension, CKD 3, schizoaffective disorder presented to ED following syncopal episode.  Patient states that she was lying in bed and got up to get a drink of water from the kitchen started to feel some dizziness and then later workup on the floor.  In ED she was noted to have a laceration to right eyebrow requiring sutures.  Patient denied headache, recent illness, chest pain, palpations, sob, cough,  fever, chills ,nausea/vomiting or diarrhea.  Patient's family is at bedside and states that patient takes multiple sleep medications and has been told to remain in bed for duration by and not get up as she has had falls in the past.    In ED:  4 sutures placed to right forehead by . CT head negative. Orthostatic BP pending. Resting SBP elevated 200's. Hydralazine ordered

## 2020-03-14 NOTE — CARE UPDATE
03/14/20 0725   Patient Assessment/Suction   Level of Consciousness (AVPU) alert   Respiratory Effort Normal;Unlabored   Expansion/Accessory Muscles/Retractions no use of accessory muscles   All Lung Fields Breath Sounds diminished   PRE-TX-O2   O2 Device (Oxygen Therapy) room air   SpO2 100 %   Pulse Oximetry Type Intermittent   $ Pulse Oximetry - Multiple Charge Pulse Oximetry - Multiple   Pulse 64   Resp 16   Inhaler   $ Inhaler Charges MDI (Metered Dose Inahler) Treatment;Independent   Daily Review of Necessity (Inhaler) completed   Respiratory Treatment Status (Inhaler) given;mouth rinsed post treatment   Treatment Route (Inhaler) mouthpiece   Patient Position (Inhaler) HOB elevated   Post Treatment Assessment (Inhaler) breath sounds unchanged   Signs of Intolerance (Inhaler) none   Respiratory Evaluation   $ Care Plan Tech Time 15 min   Evaluation For New Orders

## 2020-03-17 ENCOUNTER — PATIENT OUTREACH (OUTPATIENT)
Dept: FAMILY MEDICINE | Facility: CLINIC | Age: 75
End: 2020-03-17

## 2020-03-17 NOTE — PROGRESS NOTES
"Discharge Information     Discharge Date:  3/14/20          Primary Discharge Diagnosis:  syncope          How patient is feeling since discharge from the hospital?  ok          Medication & Order Review     Discharge Medication Review:    Medication reconciliation performed No   If no, state reason why not performed: Pt. Wanted to wait to come in & talk to Radha     Did patient have any difficulty/problems filling prescriptions?  No           If yes, state reason and steps taken to assist in resolving issue: N/A     Does patient have any questions regarding medications?  No           If yes, state question and steps taken to answer question:  N/A    Home Health No   If yes, has home health contacted patient and/or initiated services? N/A   Name of Home Health Agency No   Durable Medical Equipment (DME)  No (Example: walker, wheelchair, nebulizer)   If yes, has the DME provider contacted patient and delivered equipment? N/A    DME Company N/A     Red Flag Review     Was patient educated on "red flags" or things to watch for?  No       If yes:  "Red flags" patient was told to watch for:                                                          Is patient experiencing any red flags today (or any of these symptoms) (List examples of red flags specifically)?      Notes:                      Patient Education & Follow Up               Phone number patient will call if having any questions or problems: [phone] option 2    Appointment scheduled?  Yes      Follow-up/transition of care appointment, including the date/time and location of your appointment: 3/23/20 at 2:30 pm w/MAMIE Solo        Notes:            Rescheduled transition of care appointment if the patient has a conflict:    Appointment re-scheduled?  NA        Patient informed of this appointment?  NA    Notes:          "

## 2020-03-19 NOTE — PT/OT/SLP DISCHARGE
Occupational Therapy Discharge Summary    Adriana Perez  MRN: [id number]   Principal Problem: Syncope      Patient Discharged from acute Occupational Therapy on 3/14/2020.  Please refer to prior OT note dated 3/14/2020 for functional status.    Assessment:      Patient was discharged home from the hospital.. Refer to therapy initial evaluation for initial functional status and goal achievement.  Recommendations made may be found in medical record.    Objective:     GOALS:   Multidisciplinary Problems     Occupational Therapy Goals        Problem: Occupational Therapy Goal    Goal Priority Disciplines Outcome Interventions   Occupational Therapy Goal     OT, PT/OT     Description:  Goals to be met by: discharge    Patient will increase functional independence with ADLs by performing:    LE Dressing with Supervision.  Grooming while standing at sink with Supervision.  Toileting from toilet with Supervision for hygiene and clothing management.   Toilet transfer to toilet with Supervision.                      Reasons for Discontinuation of Therapy Services  Pt discharged home from the hospital       Plan:     Patient Discharged to: Home    Debbie Ignacio OT  3/19/2020

## 2020-03-23 ENCOUNTER — OFFICE VISIT (OUTPATIENT)
Dept: FAMILY MEDICINE | Facility: CLINIC | Age: 75
End: 2020-03-23
Payer: MEDICARE

## 2020-03-23 VITALS
HEIGHT: 67 IN | DIASTOLIC BLOOD PRESSURE: 80 MMHG | SYSTOLIC BLOOD PRESSURE: 128 MMHG | TEMPERATURE: 98 F | BODY MASS INDEX: 21.35 KG/M2 | WEIGHT: 136 LBS

## 2020-03-23 DIAGNOSIS — D50.9 IRON DEFICIENCY ANEMIA, UNSPECIFIED IRON DEFICIENCY ANEMIA TYPE: ICD-10-CM

## 2020-03-23 DIAGNOSIS — S01.111D LACERATION OF RIGHT EYEBROW, SUBSEQUENT ENCOUNTER: ICD-10-CM

## 2020-03-23 DIAGNOSIS — R42 DIZZINESS: ICD-10-CM

## 2020-03-23 DIAGNOSIS — E11.9 TYPE 2 DIABETES MELLITUS WITHOUT COMPLICATION, WITHOUT LONG-TERM CURRENT USE OF INSULIN: ICD-10-CM

## 2020-03-23 DIAGNOSIS — F20.9 CHRONIC SCHIZOPHRENIA: ICD-10-CM

## 2020-03-23 DIAGNOSIS — I10 BENIGN ESSENTIAL HYPERTENSION: Primary | ICD-10-CM

## 2020-03-23 DIAGNOSIS — R29.6 RECURRENT FALLS: ICD-10-CM

## 2020-03-23 DIAGNOSIS — S09.90XS INJURY OF HEAD, SEQUELA: ICD-10-CM

## 2020-03-23 PROCEDURE — 3074F SYST BP LT 130 MM HG: CPT | Mod: S$GLB,,, | Performed by: NURSE PRACTITIONER

## 2020-03-23 PROCEDURE — 99214 OFFICE O/P EST MOD 30 MIN: CPT | Mod: S$GLB,,, | Performed by: NURSE PRACTITIONER

## 2020-03-23 PROCEDURE — 1101F PR PT FALLS ASSESS DOC 0-1 FALLS W/OUT INJ PAST YR: ICD-10-PCS | Mod: S$GLB,,, | Performed by: NURSE PRACTITIONER

## 2020-03-23 PROCEDURE — 1111F PR DISCHARGE MEDS RECONCILED W/ CURRENT OUTPATIENT MED LIST: ICD-10-PCS | Mod: S$GLB,,, | Performed by: NURSE PRACTITIONER

## 2020-03-23 PROCEDURE — 1170F PR FUNCTIONAL STATUS ASSESSED: ICD-10-PCS | Mod: S$GLB,,, | Performed by: NURSE PRACTITIONER

## 2020-03-23 PROCEDURE — 3044F HG A1C LEVEL LT 7.0%: CPT | Mod: S$GLB,,, | Performed by: NURSE PRACTITIONER

## 2020-03-23 PROCEDURE — 3079F PR MOST RECENT DIASTOLIC BLOOD PRESSURE 80-89 MM HG: ICD-10-PCS | Mod: S$GLB,,, | Performed by: NURSE PRACTITIONER

## 2020-03-23 PROCEDURE — 99214 PR OFFICE/OUTPT VISIT, EST, LEVL IV, 30-39 MIN: ICD-10-PCS | Mod: S$GLB,,, | Performed by: NURSE PRACTITIONER

## 2020-03-23 PROCEDURE — 1101F PT FALLS ASSESS-DOCD LE1/YR: CPT | Mod: S$GLB,,, | Performed by: NURSE PRACTITIONER

## 2020-03-23 PROCEDURE — 1126F PR PAIN SEVERITY QUANTIFIED, NO PAIN PRESENT: ICD-10-PCS | Mod: S$GLB,,, | Performed by: NURSE PRACTITIONER

## 2020-03-23 PROCEDURE — 99024 POSTOP FOLLOW-UP VISIT: CPT | Mod: S$GLB,,, | Performed by: NURSE PRACTITIONER

## 2020-03-23 PROCEDURE — 1159F MED LIST DOCD IN RCRD: CPT | Mod: S$GLB,,, | Performed by: NURSE PRACTITIONER

## 2020-03-23 PROCEDURE — 3079F DIAST BP 80-89 MM HG: CPT | Mod: S$GLB,,, | Performed by: NURSE PRACTITIONER

## 2020-03-23 PROCEDURE — 3074F PR MOST RECENT SYSTOLIC BLOOD PRESSURE < 130 MM HG: ICD-10-PCS | Mod: S$GLB,,, | Performed by: NURSE PRACTITIONER

## 2020-03-23 PROCEDURE — 3044F PR MOST RECENT HEMOGLOBIN A1C LEVEL <7.0%: ICD-10-PCS | Mod: S$GLB,,, | Performed by: NURSE PRACTITIONER

## 2020-03-23 PROCEDURE — 1111F DSCHRG MED/CURRENT MED MERGE: CPT | Mod: S$GLB,,, | Performed by: NURSE PRACTITIONER

## 2020-03-23 PROCEDURE — 1159F PR MEDICATION LIST DOCUMENTED IN MEDICAL RECORD: ICD-10-PCS | Mod: S$GLB,,, | Performed by: NURSE PRACTITIONER

## 2020-03-23 PROCEDURE — 99024 PR POST-OP FOLLOW-UP VISIT: ICD-10-PCS | Mod: S$GLB,,, | Performed by: NURSE PRACTITIONER

## 2020-03-23 PROCEDURE — 1126F AMNT PAIN NOTED NONE PRSNT: CPT | Mod: S$GLB,,, | Performed by: NURSE PRACTITIONER

## 2020-03-23 PROCEDURE — 1170F FXNL STATUS ASSESSED: CPT | Mod: S$GLB,,, | Performed by: NURSE PRACTITIONER

## 2020-03-23 RX ORDER — FERROUS GLUCONATE 256(28)MG
1 TABLET ORAL 3 TIMES DAILY
Qty: 360 TABLET | Refills: 1 | Status: SHIPPED | OUTPATIENT
Start: 2020-03-23 | End: 2020-05-23 | Stop reason: CLARIF

## 2020-03-23 NOTE — PATIENT INSTRUCTIONS
Diabetes: Activity Tips    Being more active can help you manage your diabetes. The tips on this sheet can help you get the most from your exercise. They can also help you stay safe.  Staying Active  Its important for adults to spend less time sitting and being inactive. This is especially true if you have type 2 diabetes. When you are sitting for long periods of time, get up for short sessions of light activity every 30 minutes.  You should aim for at least 150 minutes a week of exercise or physical activity. Dont let more than 2 days go by without being active.  Benefit from briskness  Brisk activity gets your heart beating faster. This can help you increase your fitness, lose extra weight, and manage your blood sugar level. Try brisk walking. Or, if you have foot or leg problems, you can try swimming or bike riding. You can break up your exercise into chunks throughout the day. Work up to at least 30 minutes of steady, brisk exercise on most days.  Warm up and cool down  Warming up and cooling down reduce your risk of injury. They also help limit muscle soreness. Do a mild version of your activity for 5 minutes before and after your routine. You can also learn stretches that will help keep your muscles loose. Your healthcare provider may show you good ways to warm up and stretch.  Do the talk-sing test  The talk-sing test is a simple way to tell how hard youre exercising. If you can talk while exercising, youre in a safe range. If youre out of breath, slow down. If you can carry a tune, its time to  the pace. Walk up a hill. Increase the resistance on your stationary bike. Or swim faster.  What about eating?  You may be told to plan your exercise for 1 to 2 hours after a meal. In most cases, you dont need to eat while being active. If you take insulin or medicine that can cause low blood sugar, test your blood sugar before exercising. And carry a fast-acting sugar that will raise your blood sugar  level quickly. This includes glucose tablets or hard candy. Use it if you feel low blood sugar symptoms.  Safety tips  These tips can help you stay safe as you become fit:  · Exercise with a friend or carry a cell phone if you have one.  · Carry or wear identification, such as a necklace or bracelet, that says you have diabetes.  · Use the proper footwear and safety equipment for your activity.  · Drink water before, during, and after exercise.  · Dress properly for the weather.  · Dont exercise in very hot or very cold weather.  · Dont exercise if you are sick.  · If you are instructed to do so, test your blood sugar before and after you exercise. Have a small carbohydrate snack if your blood sugar is low before you start exercising.   When to stop exercising and call your healthcare provider  Stop exercising and call your healthcare provider right away if you notice any of the following:  · Pain, pressure, tightness, or heaviness in the chest  · Pain or heaviness in the neck, shoulders, back, arms, legs, or feet  · Unusual shortness of breath  · Dizziness or lightheadedness  · Unusually rapid or slow pulse  · Increased joint or muscle pain  · Nausea or vomiting  Date Last Reviewed: 5/1/2016  © 0461-0447 [employer]. [address] [place] [address], [place] [address]. All rights reserved. This information is not intended as a substitute for professional medical care. Always follow your healthcare professional's instructions.

## 2020-04-15 RX ORDER — PRAVASTATIN SODIUM 40 MG/1
TABLET ORAL
Qty: 90 TABLET | Refills: 1 | Status: SHIPPED | OUTPATIENT
Start: 2020-04-15 | End: 2020-04-20 | Stop reason: SDUPTHER

## 2020-04-20 DIAGNOSIS — R06.02 SOB (SHORTNESS OF BREATH): ICD-10-CM

## 2020-04-20 DIAGNOSIS — N18.30 CHRONIC KIDNEY DISEASE, STAGE 3 (MODERATE): ICD-10-CM

## 2020-04-20 DIAGNOSIS — R05.9 COUGH: ICD-10-CM

## 2020-04-20 DIAGNOSIS — J45.991 COUGH VARIANT ASTHMA: ICD-10-CM

## 2020-04-20 DIAGNOSIS — J45.40 MODERATE PERSISTENT ASTHMA WITHOUT COMPLICATION: ICD-10-CM

## 2020-04-20 DIAGNOSIS — N18.30 TYPE 2 DIABETES MELLITUS WITH STAGE 3 CHRONIC KIDNEY DISEASE, WITHOUT LONG-TERM CURRENT USE OF INSULIN: ICD-10-CM

## 2020-04-20 DIAGNOSIS — E11.22 TYPE 2 DIABETES MELLITUS WITH STAGE 3 CHRONIC KIDNEY DISEASE, WITHOUT LONG-TERM CURRENT USE OF INSULIN: ICD-10-CM

## 2020-04-20 RX ORDER — PANTOPRAZOLE SODIUM 40 MG/1
40 TABLET, DELAYED RELEASE ORAL DAILY
Qty: 90 TABLET | Refills: 3 | Status: SHIPPED | OUTPATIENT
Start: 2020-04-20 | End: 2021-03-31

## 2020-04-20 RX ORDER — CALCITRIOL 0.25 UG/1
0.25 CAPSULE ORAL
Qty: 24 CAPSULE | Refills: 2 | Status: SHIPPED | OUTPATIENT
Start: 2020-04-20 | End: 2020-10-23

## 2020-04-20 RX ORDER — AMMONIUM LACTATE 12 G/100G
1 CREAM TOPICAL 2 TIMES DAILY PRN
Qty: 140 G | Refills: 2 | Status: SHIPPED | OUTPATIENT
Start: 2020-04-20 | End: 2020-06-09

## 2020-04-20 RX ORDER — FLUTICASONE PROPIONATE 50 MCG
2 SPRAY, SUSPENSION (ML) NASAL DAILY
Qty: 3 G | Refills: 6 | Status: SHIPPED | OUTPATIENT
Start: 2020-04-20 | End: 2021-03-31

## 2020-04-20 RX ORDER — BUDESONIDE AND FORMOTEROL FUMARATE DIHYDRATE 160; 4.5 UG/1; UG/1
AEROSOL RESPIRATORY (INHALATION)
Qty: 3 INHALER | Refills: 2 | Status: SHIPPED | OUTPATIENT
Start: 2020-04-20 | End: 2020-05-23 | Stop reason: CLARIF

## 2020-04-20 RX ORDER — ALBUTEROL SULFATE 90 UG/1
2 AEROSOL, METERED RESPIRATORY (INHALATION) EVERY 6 HOURS PRN
Qty: 18 G | Refills: 5 | Status: SHIPPED | OUTPATIENT
Start: 2020-04-20 | End: 2021-04-20 | Stop reason: SDUPTHER

## 2020-04-20 RX ORDER — PRAVASTATIN SODIUM 40 MG/1
40 TABLET ORAL NIGHTLY
Qty: 90 TABLET | Refills: 3 | Status: SHIPPED | OUTPATIENT
Start: 2020-04-20 | End: 2021-03-31

## 2020-04-20 RX ORDER — MONTELUKAST SODIUM 10 MG/1
10 TABLET ORAL NIGHTLY
Qty: 90 TABLET | Refills: 3 | Status: SHIPPED | OUTPATIENT
Start: 2020-04-20 | End: 2021-03-31

## 2020-04-20 RX ORDER — POTASSIUM CHLORIDE 20 MEQ/1
20 TABLET, EXTENDED RELEASE ORAL DAILY
Qty: 90 TABLET | Refills: 3 | Status: SHIPPED | OUTPATIENT
Start: 2020-04-20 | End: 2021-05-03 | Stop reason: SDUPTHER

## 2020-04-22 RX ORDER — LANCETS 33 GAUGE
EACH MISCELLANEOUS
Qty: 100 EACH | Refills: 3 | Status: SHIPPED | OUTPATIENT
Start: 2020-04-22 | End: 2020-05-29

## 2020-05-03 ENCOUNTER — PATIENT MESSAGE (OUTPATIENT)
Dept: FAMILY MEDICINE | Facility: CLINIC | Age: 75
End: 2020-05-03

## 2020-05-03 DIAGNOSIS — E11.9 TYPE 2 DIABETES MELLITUS WITHOUT COMPLICATION, WITHOUT LONG-TERM CURRENT USE OF INSULIN: ICD-10-CM

## 2020-05-04 DIAGNOSIS — E11.9 TYPE 2 DIABETES MELLITUS WITHOUT COMPLICATION, WITHOUT LONG-TERM CURRENT USE OF INSULIN: ICD-10-CM

## 2020-05-14 ENCOUNTER — PATIENT MESSAGE (OUTPATIENT)
Dept: FAMILY MEDICINE | Facility: CLINIC | Age: 75
End: 2020-05-14

## 2020-05-23 ENCOUNTER — HOSPITAL ENCOUNTER (EMERGENCY)
Facility: HOSPITAL | Age: 75
Discharge: HOME | End: 2020-05-23
Attending: EMERGENCY MEDICINE
Payer: MEDICARE

## 2020-05-23 VITALS
SYSTOLIC BLOOD PRESSURE: 177 MMHG | HEIGHT: 67 IN | RESPIRATION RATE: 20 BRPM | BODY MASS INDEX: 21.66 KG/M2 | TEMPERATURE: 98 F | HEART RATE: 76 BPM | OXYGEN SATURATION: 97 % | WEIGHT: 138 LBS | DIASTOLIC BLOOD PRESSURE: 84 MMHG

## 2020-05-23 DIAGNOSIS — R55 SYNCOPE: Primary | ICD-10-CM

## 2020-05-23 LAB
ALBUMIN SERPL BCP-MCNC: 4 G/DL (ref 3.5–5.2)
ALP SERPL-CCNC: 72 U/L (ref 55–135)
ALT SERPL W/O P-5'-P-CCNC: 14 U/L (ref 10–44)
ANION GAP SERPL CALC-SCNC: 8 MMOL/L (ref 8–16)
AST SERPL-CCNC: 12 U/L (ref 10–40)
BASOPHILS # BLD AUTO: 0.04 K/UL (ref 0–0.2)
BASOPHILS NFR BLD: 0.5 % (ref 0–1.9)
BILIRUB SERPL-MCNC: 0.7 MG/DL (ref 0.1–1)
BILIRUB UR QL STRIP: NEGATIVE
BNP SERPL-MCNC: 27 PG/ML (ref 0–99)
BUN SERPL-MCNC: 35 MG/DL (ref 8–23)
CALCIUM SERPL-MCNC: 10.2 MG/DL (ref 8.7–10.5)
CHLORIDE SERPL-SCNC: 100 MMOL/L (ref 95–110)
CK MB SERPL-MCNC: 2.7 NG/ML (ref 0.1–6.5)
CK SERPL-CCNC: 58 U/L (ref 20–180)
CLARITY UR: CLEAR
CO2 SERPL-SCNC: 30 MMOL/L (ref 23–29)
COLOR UR: YELLOW
CREAT SERPL-MCNC: 2.2 MG/DL (ref 0.5–1.4)
DIFFERENTIAL METHOD: ABNORMAL
EOSINOPHIL # BLD AUTO: 0.1 K/UL (ref 0–0.5)
EOSINOPHIL NFR BLD: 1.7 % (ref 0–8)
ERYTHROCYTE [DISTWIDTH] IN BLOOD BY AUTOMATED COUNT: 13.4 % (ref 11.5–14.5)
EST. GFR  (AFRICAN AMERICAN): 24.9 ML/MIN/1.73 M^2
EST. GFR  (NON AFRICAN AMERICAN): 21.6 ML/MIN/1.73 M^2
GLUCOSE SERPL-MCNC: 130 MG/DL (ref 70–110)
GLUCOSE UR QL STRIP: NEGATIVE
HCT VFR BLD AUTO: 36.2 % (ref 37–48.5)
HGB BLD-MCNC: 11.4 G/DL (ref 12–16)
HGB UR QL STRIP: NEGATIVE
IMM GRANULOCYTES # BLD AUTO: 0.02 K/UL (ref 0–0.04)
IMM GRANULOCYTES NFR BLD AUTO: 0.3 % (ref 0–0.5)
KETONES UR QL STRIP: NEGATIVE
LEUKOCYTE ESTERASE UR QL STRIP: NEGATIVE
LYMPHOCYTES # BLD AUTO: 1.8 K/UL (ref 1–4.8)
LYMPHOCYTES NFR BLD: 22.6 % (ref 18–48)
MCH RBC QN AUTO: 26.8 PG (ref 27–31)
MCHC RBC AUTO-ENTMCNC: 31.5 G/DL (ref 32–36)
MCV RBC AUTO: 85 FL (ref 82–98)
MONOCYTES # BLD AUTO: 1 K/UL (ref 0.3–1)
MONOCYTES NFR BLD: 12.6 % (ref 4–15)
NEUTROPHILS # BLD AUTO: 4.8 K/UL (ref 1.8–7.7)
NEUTROPHILS NFR BLD: 62.3 % (ref 38–73)
NITRITE UR QL STRIP: NEGATIVE
NRBC BLD-RTO: 0 /100 WBC
PH UR STRIP: 7 [PH] (ref 5–8)
PLATELET # BLD AUTO: 182 K/UL (ref 150–350)
PMV BLD AUTO: 10.7 FL (ref 9.2–12.9)
POTASSIUM SERPL-SCNC: 4.2 MMOL/L (ref 3.5–5.1)
PROT SERPL-MCNC: 7.4 G/DL (ref 6–8.4)
PROT UR QL STRIP: NEGATIVE
RBC # BLD AUTO: 4.26 M/UL (ref 4–5.4)
SODIUM SERPL-SCNC: 138 MMOL/L (ref 136–145)
SP GR UR STRIP: 1.01 (ref 1–1.03)
TROPONIN I SERPL DL<=0.01 NG/ML-MCNC: <0.03 NG/ML
TSH SERPL DL<=0.005 MIU/L-ACNC: 2.4 UIU/ML (ref 0.34–5.6)
URN SPEC COLLECT METH UR: NORMAL
UROBILINOGEN UR STRIP-ACNC: NEGATIVE EU/DL
WBC # BLD AUTO: 7.75 K/UL (ref 3.9–12.7)

## 2020-05-23 PROCEDURE — 83880 ASSAY OF NATRIURETIC PEPTIDE: CPT

## 2020-05-23 PROCEDURE — 82550 ASSAY OF CK (CPK): CPT

## 2020-05-23 PROCEDURE — 93005 ELECTROCARDIOGRAM TRACING: CPT | Performed by: INTERNAL MEDICINE

## 2020-05-23 PROCEDURE — 96360 HYDRATION IV INFUSION INIT: CPT

## 2020-05-23 PROCEDURE — 80053 COMPREHEN METABOLIC PANEL: CPT

## 2020-05-23 PROCEDURE — 36415 COLL VENOUS BLD VENIPUNCTURE: CPT

## 2020-05-23 PROCEDURE — 81003 URINALYSIS AUTO W/O SCOPE: CPT

## 2020-05-23 PROCEDURE — 84443 ASSAY THYROID STIM HORMONE: CPT

## 2020-05-23 PROCEDURE — 63600175 PHARM REV CODE 636 W HCPCS: Performed by: EMERGENCY MEDICINE

## 2020-05-23 PROCEDURE — 96361 HYDRATE IV INFUSION ADD-ON: CPT

## 2020-05-23 PROCEDURE — 85025 COMPLETE CBC W/AUTO DIFF WBC: CPT

## 2020-05-23 PROCEDURE — 84484 ASSAY OF TROPONIN QUANT: CPT

## 2020-05-23 PROCEDURE — 82553 CREATINE MB FRACTION: CPT

## 2020-05-23 PROCEDURE — 99285 EMERGENCY DEPT VISIT HI MDM: CPT | Mod: 25

## 2020-05-23 RX ORDER — DOCUSATE SODIUM 100 MG/1
100 CAPSULE, LIQUID FILLED ORAL 2 TIMES DAILY
COMMUNITY
End: 2021-02-23

## 2020-05-23 RX ORDER — HYDROCHLOROTHIAZIDE 12.5 MG/1
12.5 CAPSULE ORAL DAILY
COMMUNITY
End: 2020-07-07

## 2020-05-23 RX ORDER — BUDESONIDE AND FORMOTEROL FUMARATE DIHYDRATE 160; 4.5 UG/1; UG/1
2 AEROSOL RESPIRATORY (INHALATION) EVERY 12 HOURS
COMMUNITY
End: 2020-06-03

## 2020-05-23 RX ORDER — SODIUM CHLORIDE, SODIUM LACTATE, POTASSIUM CHLORIDE, CALCIUM CHLORIDE 600; 310; 30; 20 MG/100ML; MG/100ML; MG/100ML; MG/100ML
1000 INJECTION, SOLUTION INTRAVENOUS
Status: COMPLETED | OUTPATIENT
Start: 2020-05-23 | End: 2020-05-23

## 2020-05-23 RX ORDER — ESCITALOPRAM OXALATE 20 MG/1
20 TABLET ORAL DAILY
COMMUNITY

## 2020-05-23 RX ORDER — LOSARTAN POTASSIUM 50 MG/1
50 TABLET ORAL DAILY
COMMUNITY
End: 2021-04-19

## 2020-05-23 RX ORDER — DONEPEZIL HYDROCHLORIDE 5 MG/1
5 TABLET, FILM COATED ORAL NIGHTLY
COMMUNITY
End: 2020-11-03

## 2020-05-23 RX ORDER — METOPROLOL SUCCINATE 50 MG/1
50 TABLET, EXTENDED RELEASE ORAL 2 TIMES DAILY
COMMUNITY
End: 2021-04-19

## 2020-05-23 RX ADMIN — SODIUM CHLORIDE, SODIUM LACTATE, POTASSIUM CHLORIDE, AND CALCIUM CHLORIDE 1000 ML: .6; .31; .03; .02 INJECTION, SOLUTION INTRAVENOUS at 05:05

## 2020-05-23 NOTE — ED NOTES
"Pt brought to ER by son stating while sitting in a chair for about 30 mins, the patient lost consciousness.  Denies any trauma.  States she was unconscious for less than a minute.  Pt states that prior to losing consciousness, she felt "hot and funny."  Dr. Haro at bedside.  "

## 2020-05-23 NOTE — ED PROVIDER NOTES
Encounter Date: 5/23/2020       History     Chief Complaint   Patient presents with    Loss of Consciousness     happened after noon today while sitting     74-year-old female with history of chronic kidney disease stage 3, depression, non-insulin-dependent diabetes, hypertension, hyperlipidemia schizophrenia, patient presents to the emergency department with a witnessed syncopal episode which occurred while she was getting her fingernails done earlier today.  According to her grandson states she was in a seated position and then passed out while seated.  There was no seizure activity.  Patient did state that she felt overheated in warm just prior to the events.  Patient denied chest pain, no recent illness including vomiting, fever, abdominal pain, diarrhea, no headache, no seizure activity.  Patient was seen approximately 2 weeks ago for similar episode in diagnosed with orthostatic hypotension and syncope.  Current upon arrival patient states she feels back to her baseline without any complaints be        Review of patient's allergies indicates:   Allergen Reactions    Lisinopril Nausea And Vomiting     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/2018    Dr Lance Foy- Also Renal cysts bilateral    Hyperlipidemia     Hypertension     MVP (mitral valve prolapse)     Primary osteoarthritis of left knee     Restless leg     Schizophrenia, simple, chronic     Urinary, incontinence, stress female      Past Surgical History:   Procedure Laterality Date    FOOT SURGERY      HYSTERECTOMY      TUBAL LIGATION       Family History   Problem Relation Age of Onset    Sudden death Father     Cancer Mother      Hypertension Mother     Breast cancer Mother     Cancer Sister     Breast cancer Sister      Social History     Tobacco Use    Smoking status: Former Smoker     Packs/day: 1.00     Years: 5.00     Pack years: 5.00     Types: Cigarettes     Last attempt to quit: 1992     Years since quittin.7    Smokeless tobacco: Never Used   Substance Use Topics    Alcohol use: No    Drug use: No     Review of Systems   Constitutional: Negative for fever.   HENT: Negative for sore throat.    Respiratory: Negative for shortness of breath.    Cardiovascular: Negative for chest pain.   Gastrointestinal: Negative for nausea.   Genitourinary: Negative for dysuria.   Musculoskeletal: Negative for back pain.   Skin: Negative for rash.   Neurological: Positive for syncope. Negative for dizziness, seizures, facial asymmetry, weakness and headaches.   Hematological: Does not bruise/bleed easily.   Psychiatric/Behavioral: The patient is not nervous/anxious.        Physical Exam     Initial Vitals [20 1631]   BP Pulse Resp Temp SpO2   (!) 156/67 80 18 97.8 °F (36.6 °C) 100 %      MAP       --         Physical Exam    Nursing note and vitals reviewed.  Constitutional: She appears well-developed and well-nourished.   HENT:   Head: Normocephalic and atraumatic.   Nose: Nose normal.   Mouth/Throat: Oropharynx is clear and moist.   Eyes: Conjunctivae and EOM are normal. Pupils are equal, round, and reactive to light.   Neck: Normal range of motion. Neck supple. No thyromegaly present. No tracheal deviation present.   Cardiovascular: Normal rate, regular rhythm, normal heart sounds and intact distal pulses. Exam reveals no gallop and no friction rub.    No murmur heard.  Pulmonary/Chest: Breath sounds normal. No stridor. No respiratory distress.   Course bilateral breath sounds no adventitious sounds   Abdominal: Soft. Bowel sounds are normal. She exhibits no mass. There is no rebound and no guarding.   Musculoskeletal:  Normal range of motion. She exhibits no edema.   Lymphadenopathy:     She has no cervical adenopathy.   Neurological: She is alert and oriented to person, place, and time. She has normal strength and normal reflexes. GCS score is 15. GCS eye subscore is 4. GCS verbal subscore is 5. GCS motor subscore is 6.   Skin: Skin is warm and dry. Capillary refill takes less than 2 seconds.   Psychiatric: She has a normal mood and affect.         ED Course   Procedures  Labs Reviewed   CBC W/ AUTO DIFFERENTIAL - Abnormal; Notable for the following components:       Result Value    Hemoglobin 11.4 (*)     Hematocrit 36.2 (*)     Mean Corpuscular Hemoglobin 26.8 (*)     Mean Corpuscular Hemoglobin Conc 31.5 (*)     All other components within normal limits   COMPREHENSIVE METABOLIC PANEL - Abnormal; Notable for the following components:    CO2 30 (*)     Glucose 130 (*)     BUN, Bld 35 (*)     Creatinine 2.2 (*)     eGFR if  24.9 (*)     eGFR if non  21.6 (*)     All other components within normal limits   TROPONIN I   B-TYPE NATRIURETIC PEPTIDE   CK   CK-MB   TSH   URINALYSIS     EKG Readings: (Independently Interpreted)   Initial Reading: No STEMI. Rhythm: Normal Sinus Rhythm. Ectopy: No Ectopy. Conduction: Normal. Axis: Normal.   Normal sinus rhythm, 73, normal axis, no ischemic changes noted.       Imaging Results          X-Ray Chest AP Portable (Final result)  Result time 05/23/20 17:15:28    Final result by Edgar Sanz MD (05/23/20 17:15:28)                 Impression:      Stable chest radiograph.  No acute pulmonary process.      Electronically signed by: Edgar Sanz MD  Date:    05/23/2020  Time:    17:15             Narrative:    EXAMINATION:  XR CHEST AP PORTABLE    CLINICAL HISTORY:  Chest Pain;    COMPARISON:  03/14/2020    FINDINGS:  Cardiac silhouette size is within normal limits.  No airspace consolidation.  No pleural fluid or pneumothorax.  No acute osseous  abnormality.                                 Medical Decision Making:   Initial Assessment:   74-year-old female with history of schizophrenia, hypertension, non-insulin-dependent diabetes hyperlipidemia chronic kidney disease here with witnessed syncopal episode while seated in chair.  Differential Diagnosis:   Syncope, orthostatic hypotension, arrhythmia, seizure,  Clinical Tests:   Lab Tests: Ordered and Reviewed  Radiological Study: Ordered and Reviewed  Medical Tests: Ordered and Reviewed  ED Management:  Patient seen evaluated emergency department for syncopal episode.  Currently at this time patient remains hemodynamically stable.  Patient without any complaints at this time.  Patient was not found to be orthostatic.  Patient denied chest pain, patient had recent cardiac evaluation 2 weeks ago over recent admission.  Patient currently with nonfocal exam GCS 16, with 5/5 motor to upper lower extremities.  No pronator drift appreciated.  At this time patient was seen examined in emergency department and remained hemodynamically stable.  Patient stated that she wished to be discharged home.  Patient did have a bowel movement which was large in size emergency department.  Patient states she felt back to baseline.  Patient was not found to be orthostatic.  Patient did receive 500 mL of LR in emergency department.  Repeat EKG showed no evidence of significant interval changes, rate 74, normal sinus rhythm, normal axis, no ischemic changes.  Will be discharged in the setting of family with close follow-up to primary care physician next week.  They are to return to the emergency department if problems persist or worsen.                                 Clinical Impression:       ICD-10-CM ICD-9-CM   1. Syncope, unspecified syncope type R55 780.2                                Serafin Haro MD  05/23/20 1821       Serafin Haro MD  05/23/20 1847       Serafin Haro MD  05/23/20 1850

## 2020-05-29 ENCOUNTER — OFFICE VISIT (OUTPATIENT)
Dept: FAMILY MEDICINE | Facility: CLINIC | Age: 75
End: 2020-05-29
Payer: MEDICARE

## 2020-05-29 ENCOUNTER — PATIENT MESSAGE (OUTPATIENT)
Dept: FAMILY MEDICINE | Facility: CLINIC | Age: 75
End: 2020-05-29

## 2020-05-29 VITALS
WEIGHT: 143 LBS | BODY MASS INDEX: 22.44 KG/M2 | TEMPERATURE: 98 F | DIASTOLIC BLOOD PRESSURE: 70 MMHG | SYSTOLIC BLOOD PRESSURE: 126 MMHG | OXYGEN SATURATION: 98 % | HEIGHT: 67 IN | HEART RATE: 77 BPM

## 2020-05-29 DIAGNOSIS — R42 DIZZINESS: ICD-10-CM

## 2020-05-29 DIAGNOSIS — E11.9 TYPE 2 DIABETES MELLITUS WITHOUT COMPLICATION, WITHOUT LONG-TERM CURRENT USE OF INSULIN: ICD-10-CM

## 2020-05-29 DIAGNOSIS — R55 SYNCOPE, UNSPECIFIED SYNCOPE TYPE: ICD-10-CM

## 2020-05-29 DIAGNOSIS — I10 BENIGN ESSENTIAL HYPERTENSION: ICD-10-CM

## 2020-05-29 DIAGNOSIS — Z09 HOSPITAL DISCHARGE FOLLOW-UP: Primary | ICD-10-CM

## 2020-05-29 DIAGNOSIS — D50.9 IRON DEFICIENCY ANEMIA, UNSPECIFIED IRON DEFICIENCY ANEMIA TYPE: ICD-10-CM

## 2020-05-29 PROCEDURE — 99495 TCM SERVICES (MODERATE COMPLEXITY): ICD-10-PCS | Mod: S$GLB,,, | Performed by: NURSE PRACTITIONER

## 2020-05-29 PROCEDURE — 99495 TRANSJ CARE MGMT MOD F2F 14D: CPT | Mod: S$GLB,,, | Performed by: NURSE PRACTITIONER

## 2020-05-29 RX ORDER — AMANTADINE HYDROCHLORIDE 50 MG/5ML
SOLUTION ORAL
COMMUNITY
Start: 2020-05-06 | End: 2020-08-27 | Stop reason: ALTCHOICE

## 2020-05-29 RX ORDER — AMLODIPINE BESYLATE 10 MG/1
TABLET ORAL
COMMUNITY
Start: 2020-05-28 | End: 2020-07-07

## 2020-05-29 NOTE — PATIENT INSTRUCTIONS
Diabetes: Activity Tips    Being more active can help you manage your diabetes. The tips on this sheet can help you get the most from your exercise. They can also help you stay safe.  Staying Active  Its important for adults to spend less time sitting and being inactive. This is especially true if you have type 2 diabetes. When you are sitting for long periods of time, get up for short sessions of light activity every 30 minutes.  You should aim for at least 150 minutes a week of exercise or physical activity. Dont let more than 2 days go by without being active.  Benefit from briskness  Brisk activity gets your heart beating faster. This can help you increase your fitness, lose extra weight, and manage your blood sugar level. Try brisk walking. Or, if you have foot or leg problems, you can try swimming or bike riding. You can break up your exercise into chunks throughout the day. Work up to at least 30 minutes of steady, brisk exercise on most days.  Warm up and cool down  Warming up and cooling down reduce your risk of injury. They also help limit muscle soreness. Do a mild version of your activity for 5 minutes before and after your routine. You can also learn stretches that will help keep your muscles loose. Your healthcare provider may show you good ways to warm up and stretch.  Do the talk-sing test  The talk-sing test is a simple way to tell how hard youre exercising. If you can talk while exercising, youre in a safe range. If youre out of breath, slow down. If you can carry a tune, its time to  the pace. Walk up a hill. Increase the resistance on your stationary bike. Or swim faster.  What about eating?  You may be told to plan your exercise for 1 to 2 hours after a meal. In most cases, you dont need to eat while being active. If you take insulin or medicine that can cause low blood sugar, test your blood sugar before exercising. And carry a fast-acting sugar that will raise your blood sugar  level quickly. This includes glucose tablets or hard candy. Use it if you feel low blood sugar symptoms.  Safety tips  These tips can help you stay safe as you become fit:  · Exercise with a friend or carry a cell phone if you have one.  · Carry or wear identification, such as a necklace or bracelet, that says you have diabetes.  · Use the proper footwear and safety equipment for your activity.  · Drink water before, during, and after exercise.  · Dress properly for the weather.  · Dont exercise in very hot or very cold weather.  · Dont exercise if you are sick.  · If you are instructed to do so, test your blood sugar before and after you exercise. Have a small carbohydrate snack if your blood sugar is low before you start exercising.   When to stop exercising and call your healthcare provider  Stop exercising and call your healthcare provider right away if you notice any of the following:  · Pain, pressure, tightness, or heaviness in the chest  · Pain or heaviness in the neck, shoulders, back, arms, legs, or feet  · Unusual shortness of breath  · Dizziness or lightheadedness  · Unusually rapid or slow pulse  · Increased joint or muscle pain  · Nausea or vomiting  Date Last Reviewed: 5/1/2016  © 7258-2524 [employer]. [address] [place] [address], [place] [address]. All rights reserved. This information is not intended as a substitute for professional medical care. Always follow your healthcare professional's instructions.

## 2020-05-29 NOTE — PROGRESS NOTES
SUBJECTIVE:    Patient ID: Adriana Perez is a 74 y.o. female.    Chief Complaint: Hospital Follow Up (er); Loss of Consciousness; and Leg Swelling    Patient presents today following up for emergency room visit that occurred on 05/23/2020.  Patient was brought to the emergency room after having a syncopal episode while getting her nails done.  Patient was sitting in the chair and loss consciousness all the sudden without any position changes.  Patient did not fall after losing her consciousness.  Patient was sitting on the couch at home when this happened.  After a few minutes, patient came back around and was able to talk and acts normally.  Once EMS was at her home, they recommended her to be transported to the hospital for further evaluation.  Patient agreed and patient was brought to the hospital for evaluation. Patient was brought to the emergency room and worked up.  EKG, cardiac labs, chest x-ray, and lab work within acceptable range.  Patient returned to baseline without any complaints of dizziness while in the emergency room.  Patient requested to be discharged home and was discharged home.  No medications were changed.  Patient has a history of chronic conditions including diabetes, schizophrenia, restless leg, asthma, gerd, hypertension, hyperlipidemia, and recurrent fall history.  Patient's son is on the phone at today's visit to help with background and medication reconciliation.       Admit Date: 5/23/20   Discharge Date: 5/23/20  Discharge Facility: Hospital    Medication Reconciliation:  No medication changes.   New Prescriptions filled after discharge: no  Discharge summary reviewed:  yes  Pending test results at discharge reviewed:   no  Follow up appointments scheduled:  yes  Follow up labs/tests ordered:   no  Home Health ordered on discharge:   no  Home Health company name: n/a  DME ordered at discharge:   no  How patient is feeling since discharge from the hospital?  improved          Admission on 05/23/2020, Discharged on 05/23/2020   Component Date Value Ref Range Status    WBC 05/23/2020 7.75  3.90 - 12.70 K/uL Final    RBC 05/23/2020 4.26  4.00 - 5.40 M/uL Final    Hemoglobin 05/23/2020 11.4* 12.0 - 16.0 g/dL Final    Hematocrit 05/23/2020 36.2* 37.0 - 48.5 % Final    Mean Corpuscular Volume 05/23/2020 85  82 - 98 fL Final    Mean Corpuscular Hemoglobin 05/23/2020 26.8* 27.0 - 31.0 pg Final    Mean Corpuscular Hemoglobin Conc 05/23/2020 31.5* 32.0 - 36.0 g/dL Final    RDW 05/23/2020 13.4  11.5 - 14.5 % Final    Platelets 05/23/2020 182  150 - 350 K/uL Final    MPV 05/23/2020 10.7  9.2 - 12.9 fL Final    Immature Granulocytes 05/23/2020 0.3  0.0 - 0.5 % Final    Gran # (ANC) 05/23/2020 4.8  1.8 - 7.7 K/uL Final    Immature Grans (Abs) 05/23/2020 0.02  0.00 - 0.04 K/uL Final    Lymph # 05/23/2020 1.8  1.0 - 4.8 K/uL Final    Mono # 05/23/2020 1.0  0.3 - 1.0 K/uL Final    Eos # 05/23/2020 0.1  0.0 - 0.5 K/uL Final    Baso # 05/23/2020 0.04  0.00 - 0.20 K/uL Final    nRBC 05/23/2020 0  0 /100 WBC Final    Gran% 05/23/2020 62.3  38.0 - 73.0 % Final    Lymph% 05/23/2020 22.6  18.0 - 48.0 % Final    Mono% 05/23/2020 12.6  4.0 - 15.0 % Final    Eosinophil% 05/23/2020 1.7  0.0 - 8.0 % Final    Basophil% 05/23/2020 0.5  0.0 - 1.9 % Final    Differential Method 05/23/2020 Automated   Final    Sodium 05/23/2020 138  136 - 145 mmol/L Final    Potassium 05/23/2020 4.2  3.5 - 5.1 mmol/L Final    Chloride 05/23/2020 100  95 - 110 mmol/L Final    CO2 05/23/2020 30* 23 - 29 mmol/L Final    Glucose 05/23/2020 130* 70 - 110 mg/dL Final    BUN, Bld 05/23/2020 35* 8 - 23 mg/dL Final    Creatinine 05/23/2020 2.2* 0.5 - 1.4 mg/dL Final    Calcium 05/23/2020 10.2  8.7 - 10.5 mg/dL Final    Total Protein 05/23/2020 7.4  6.0 - 8.4 g/dL Final    Albumin 05/23/2020 4.0  3.5 - 5.2 g/dL Final    Total Bilirubin 05/23/2020 0.7  0.1 - 1.0 mg/dL Final    Alkaline Phosphatase 05/23/2020  72  55 - 135 U/L Final    AST 05/23/2020 12  10 - 40 U/L Final    ALT 05/23/2020 14  10 - 44 U/L Final    Anion Gap 05/23/2020 8  8 - 16 mmol/L Final    eGFR if African American 05/23/2020 24.9* >60 mL/min/1.73 m^2 Final    eGFR if non African American 05/23/2020 21.6* >60 mL/min/1.73 m^2 Final    Troponin I 05/23/2020 <0.030  <=0.040 ng/mL Final    BNP 05/23/2020 27  0 - 99 pg/mL Final    CPK 05/23/2020 58  20 - 180 U/L Final    CPK MB 05/23/2020 2.7  0.1 - 6.5 ng/mL Final    TSH 05/23/2020 2.400  0.340 - 5.600 uIU/mL Final    Specimen UA 05/23/2020 Urine, Catheterized   Final    Color, UA 05/23/2020 Yellow  Yellow, Straw, [place]ka Final    Appearance, UA 05/23/2020 Clear  Clear Final    pH, UA 05/23/2020 7.0  5.0 - 8.0 Final    Specific [place], UA 05/23/2020 1.015  1.005 - 1.030 Final    Protein, UA 05/23/2020 Negative  Negative Final    Glucose, UA 05/23/2020 Negative  Negative Final    Ketones, UA 05/23/2020 Negative  Negative Final    Bilirubin (UA) 05/23/2020 Negative  Negative Final    Occult Blood UA 05/23/2020 Negative  Negative Final    Nitrite, UA 05/23/2020 Negative  Negative Final    Urobilinogen, UA 05/23/2020 Negative  Negative EU/dL Final    Leukocytes, UA 05/23/2020 Negative  Negative Final   Admission on 03/14/2020, Discharged on 03/14/2020   Component Date Value Ref Range Status    WBC 03/14/2020 7.20  3.90 - 12.70 K/uL Final    RBC 03/14/2020 4.31  4.00 - 5.40 M/uL Final    Hemoglobin 03/14/2020 11.4* 12.0 - 16.0 g/dL Final    Hematocrit 03/14/2020 36.2* 37.0 - 48.5 % Final    Mean Corpuscular Volume 03/14/2020 84  82 - 98 fL Final    Mean Corpuscular Hemoglobin 03/14/2020 26.5* 27.0 - 31.0 pg Final    Mean Corpuscular Hemoglobin Conc 03/14/2020 31.5* 32.0 - 36.0 g/dL Final    RDW 03/14/2020 13.2  11.5 - 14.5 % Final    Platelets 03/14/2020 194  150 - 350 K/uL Final    MPV 03/14/2020 11.5  9.2 - 12.9 fL Final    Immature Granulocytes 03/14/2020 0.1  0.0 - 0.5 %  Final    Gran # (ANC) 03/14/2020 4.0  1.8 - 7.7 K/uL Final    Immature Grans (Abs) 03/14/2020 0.01  0.00 - 0.04 K/uL Final    Lymph # 03/14/2020 2.1  1.0 - 4.8 K/uL Final    Mono # 03/14/2020 0.8  0.3 - 1.0 K/uL Final    Eos # 03/14/2020 0.2  0.0 - 0.5 K/uL Final    Baso # 03/14/2020 0.04  0.00 - 0.20 K/uL Final    nRBC 03/14/2020 0  0 /100 WBC Final    Gran% 03/14/2020 56.1  38.0 - 73.0 % Final    Lymph% 03/14/2020 29.3  18.0 - 48.0 % Final    Mono% 03/14/2020 11.7  4.0 - 15.0 % Final    Eosinophil% 03/14/2020 2.2  0.0 - 8.0 % Final    Basophil% 03/14/2020 0.6  0.0 - 1.9 % Final    Differential Method 03/14/2020 Automated   Final    Sodium 03/14/2020 140  136 - 145 mmol/L Final    Potassium 03/14/2020 3.7  3.5 - 5.1 mmol/L Final    Chloride 03/14/2020 102  95 - 110 mmol/L Final    CO2 03/14/2020 28  23 - 29 mmol/L Final    Glucose 03/14/2020 128* 70 - 110 mg/dL Final    BUN, Bld 03/14/2020 31* 8 - 23 mg/dL Final    Creatinine 03/14/2020 1.8* 0.5 - 1.4 mg/dL Final    Calcium 03/14/2020 9.6  8.7 - 10.5 mg/dL Final    Total Protein 03/14/2020 7.2  6.0 - 8.4 g/dL Final    Albumin 03/14/2020 4.0  3.5 - 5.2 g/dL Final    Total Bilirubin 03/14/2020 0.7  0.1 - 1.0 mg/dL Final    Alkaline Phosphatase 03/14/2020 71  55 - 135 U/L Final    AST 03/14/2020 15  10 - 40 U/L Final    ALT 03/14/2020 13  10 - 44 U/L Final    Anion Gap 03/14/2020 10  8 - 16 mmol/L Final    eGFR if  03/14/2020 31.7* >60 mL/min/1.73 m^2 Final    eGFR if non African American 03/14/2020 27.5* >60 mL/min/1.73 m^2 Final    Troponin I 03/14/2020 <0.030  <=0.040 ng/mL Final    BNP 03/14/2020 57  0 - 99 pg/mL Final    PT 03/14/2020 14.5  10.6 - 14.8 sec Final    INR 03/14/2020 1.2   Final    Magnesium 03/14/2020 1.9  1.6 - 2.6 mg/dL Final    Sodium 03/14/2020 140  136 - 145 mmol/L Final    Potassium 03/14/2020 3.9  3.5 - 5.1 mmol/L Final    Chloride 03/14/2020 103  95 - 110 mmol/L Final    CO2 03/14/2020  26  23 - 29 mmol/L Final    Glucose 03/14/2020 147* 70 - 110 mg/dL Final    BUN, Bld 03/14/2020 27* 8 - 23 mg/dL Final    Creatinine 03/14/2020 1.7* 0.5 - 1.4 mg/dL Final    Calcium 03/14/2020 10.0  8.7 - 10.5 mg/dL Final    Anion Gap 03/14/2020 11  8 - 16 mmol/L Final    eGFR if  03/14/2020 34.0* >60 mL/min/1.73 m^2 Final    eGFR if non African American 03/14/2020 29.5* >60 mL/min/1.73 m^2 Final    Magnesium 03/14/2020 2.7* 1.6 - 2.6 mg/dL Final    Phosphorus 03/14/2020 2.3* 2.7 - 4.5 mg/dL Final    WBC 03/14/2020 6.81  3.90 - 12.70 K/uL Final    RBC 03/14/2020 4.89  4.00 - 5.40 M/uL Final    Hemoglobin 03/14/2020 13.0  12.0 - 16.0 g/dL Final    Hematocrit 03/14/2020 40.8  37.0 - 48.5 % Final    Mean Corpuscular Volume 03/14/2020 83  82 - 98 fL Final    Mean Corpuscular Hemoglobin 03/14/2020 26.6* 27.0 - 31.0 pg Final    Mean Corpuscular Hemoglobin Conc 03/14/2020 31.9* 32.0 - 36.0 g/dL Final    RDW 03/14/2020 13.2  11.5 - 14.5 % Final    Platelets 03/14/2020 211  150 - 350 K/uL Final    MPV 03/14/2020 11.6  9.2 - 12.9 fL Final    Immature Granulocytes 03/14/2020 0.3  0.0 - 0.5 % Final    Gran # (ANC) 03/14/2020 4.1  1.8 - 7.7 K/uL Final    Immature Grans (Abs) 03/14/2020 0.02  0.00 - 0.04 K/uL Final    Lymph # 03/14/2020 1.9  1.0 - 4.8 K/uL Final    Mono # 03/14/2020 0.7  0.3 - 1.0 K/uL Final    Eos # 03/14/2020 0.1  0.0 - 0.5 K/uL Final    Baso # 03/14/2020 0.03  0.00 - 0.20 K/uL Final    nRBC 03/14/2020 0  0 /100 WBC Final    Gran% 03/14/2020 60.3  38.0 - 73.0 % Final    Lymph% 03/14/2020 28.3  18.0 - 48.0 % Final    Mono% 03/14/2020 9.5  4.0 - 15.0 % Final    Eosinophil% 03/14/2020 1.2  0.0 - 8.0 % Final    Basophil% 03/14/2020 0.4  0.0 - 1.9 % Final    Differential Method 03/14/2020 Automated   Final    TSH 03/14/2020 2.640  0.340 - 5.600 uIU/mL Final    BSA 03/14/2020 1.7  m2 Final    Right Atrial Pressure (from IVC) 03/14/2020 3  mmHg Final    TDI SEPTAL  03/14/2020 0.05  m/s Final    LV LATERAL E/E' RATIO 03/14/2020 7.11  m/s Final    LV SEPTAL E/E' RATIO 03/14/2020 12.80  m/s Final    AORTIC VALVE CUSP SEPERATION 03/14/2020 1.84  cm Final    TDI LATERAL 03/14/2020 0.09  m/s Final    PV PEAK VELOCITY 03/14/2020 66.00  cm/s Final    LVIDD 03/14/2020 3.28* 3.5 - 6.0 cm Final    IVS 03/14/2020 1.13* 0.6 - 1.1 cm Final    PW 03/14/2020 1.12* 0.6 - 1.1 cm Final    Ao root annulus 03/14/2020 2.83  cm Final    LVIDS 03/14/2020 2.26  2.1 - 4.0 cm Final    FS 03/14/2020 31  28 - 44 % Final    LV mass 03/14/2020 111.94  g Final    LA size 03/14/2020 3.22  cm Final    RVDD 03/14/2020 213.00  cm Final    Left Ventricle Relative Wall Thick* 03/14/2020 0.68  cm Final    AV mean gradient 03/14/2020 2  mmHg Final    AV valve area 03/14/2020 3.22  cm2 Final    AV Velocity Ratio 03/14/2020 87.11   Final    AV index (prosthetic) 03/14/2020 0.97   Final    E/A ratio 03/14/2020 0.63   Final    Mean e' 03/14/2020 0.07  m/s Final    E wave decelartion time 03/14/2020 174.04  msec Final    LVOT diameter 03/14/2020 2.05  cm Final    LVOT area 03/14/2020 3.3  cm2 Final    LVOT peak pito 03/14/2020 95.82  m/s Final    LVOT peak VTI 03/14/2020 21.14  cm Final    Ao peak pito 03/14/2020 1.10  m/s Final    Ao VTI 03/14/2020 21.69  cm Final    LVOT stroke volume 03/14/2020 69.74  cm3 Final    AV peak gradient 03/14/2020 5  mmHg Final    TV rest pulmonary artery pressure 03/14/2020 28  mmHg Final    E/E' ratio 03/14/2020 9.14  m/s Final    MV Peak E Pito 03/14/2020 0.64  m/s Final    TR Max Pito 03/14/2020 2.52  m/s Final    MV Peak A Pito 03/14/2020 1.01  m/s Final    LV Mass Index 03/14/2020 65  g/m2 Final    Triscuspid Valve Regurgitation Pea* 03/14/2020 25  mmHg Final    POC Glucose 03/14/2020 150* 70 - 110 Final   Lab Visit on 02/27/2020   Component Date Value Ref Range Status    Sodium 02/27/2020 140  136 - 145 mmol/L Final    Potassium 02/27/2020 4.3  3.5  - 5.1 mmol/L Final    Chloride 02/27/2020 102  95 - 110 mmol/L Final    CO2 02/27/2020 30* 23 - 29 mmol/L Final    Glucose 02/27/2020 109  70 - 110 mg/dL Final    BUN, Bld 02/27/2020 26* 8 - 23 mg/dL Final    Creatinine 02/27/2020 1.9* 0.5 - 1.4 mg/dL Final    Calcium 02/27/2020 10.1  8.7 - 10.5 mg/dL Final    Total Protein 02/27/2020 7.1  6.0 - 8.4 g/dL Final    Albumin 02/27/2020 3.9  3.5 - 5.2 g/dL Final    Total Bilirubin 02/27/2020 0.6  0.1 - 1.0 mg/dL Final    Alkaline Phosphatase 02/27/2020 66  55 - 135 U/L Final    AST 02/27/2020 16  10 - 40 U/L Final    ALT 02/27/2020 13  10 - 44 U/L Final    Anion Gap 02/27/2020 8  8 - 16 mmol/L Final    eGFR if  02/27/2020 29.7* >60 mL/min/1.73 m^2 Final    eGFR if non African American 02/27/2020 25.8* >60 mL/min/1.73 m^2 Final    WBC 02/27/2020 6.67  3.90 - 12.70 K/uL Final    RBC 02/27/2020 4.40  4.00 - 5.40 M/uL Final    Hemoglobin 02/27/2020 11.8* 12.0 - 16.0 g/dL Final    Hematocrit 02/27/2020 37.2  37.0 - 48.5 % Final    Mean Corpuscular Volume 02/27/2020 85  82 - 98 fL Final    Mean Corpuscular Hemoglobin 02/27/2020 26.8* 27.0 - 31.0 pg Final    Mean Corpuscular Hemoglobin Conc 02/27/2020 31.7* 32.0 - 36.0 g/dL Final    RDW 02/27/2020 13.0  11.5 - 14.5 % Final    Platelets 02/27/2020 180  150 - 350 K/uL Final    MPV 02/27/2020 11.6  9.2 - 12.9 fL Final    Immature Granulocytes 02/27/2020 0.3  0.0 - 0.5 % Final    Gran # (ANC) 02/27/2020 3.9  1.8 - 7.7 K/uL Final    Immature Grans (Abs) 02/27/2020 0.02  0.00 - 0.04 K/uL Final    Lymph # 02/27/2020 1.9  1.0 - 4.8 K/uL Final    Mono # 02/27/2020 0.7  0.3 - 1.0 K/uL Final    Eos # 02/27/2020 0.1  0.0 - 0.5 K/uL Final    Baso # 02/27/2020 0.03  0.00 - 0.20 K/uL Final    nRBC 02/27/2020 0  0 /100 WBC Final    Gran% 02/27/2020 58.6  38.0 - 73.0 % Final    Lymph% 02/27/2020 28.6  18.0 - 48.0 % Final    Mono% 02/27/2020 10.3  4.0 - 15.0 % Final    Eosinophil% 02/27/2020  1.8  0.0 - 8.0 % Final    Basophil% 02/27/2020 0.4  0.0 - 1.9 % Final    Differential Method 02/27/2020 Automated   Final    Phosphorus 02/27/2020 2.9  2.7 - 4.5 mg/dL Final    PTH, Intact 02/27/2020 43.9  9.0 - 77.0 pg/mL Final    Iron 02/27/2020 57  30 - 160 ug/dL Final    Transferrin 02/27/2020 194* 200 - 375 mg/dL Final    TIBC 02/27/2020 272  250 - 450 ug/dL Final    Saturated Iron 02/27/2020 21  20 - 50 % Final    Ferritin 02/27/2020 83  20.0 - 300.0 ng/mL Final   Lab Visit on 12/11/2019   Component Date Value Ref Range Status    Hemoglobin A1C 12/11/2019 6.8* 4.5 - 6.2 % Final    Estimated Avg Glucose 12/11/2019 148* 68 - 131 mg/dL Final    Microalbum.,U,Random 12/11/2019 71.0  ug/mL Final    Creatinine, Random Ur 12/11/2019 222.0  15.0 - 325.0 mg/dL Final    Microalb Creat Ratio 12/11/2019 32.0* 0.0 - 30.0 ug/mg Final    Magnesium 12/11/2019 2.1  1.6 - 2.6 mg/dL Final    Glucose 12/11/2019 135* 70 - 110 mg/dL Final    Sodium 12/11/2019 142  136 - 145 mmol/L Final    Potassium 12/11/2019 4.7  3.5 - 5.1 mmol/L Final    Chloride 12/11/2019 102  95 - 110 mmol/L Final    CO2 12/11/2019 31* 23 - 29 mmol/L Final    BUN, Bld 12/11/2019 32* 8 - 23 mg/dL Final    Calcium 12/11/2019 10.3  8.7 - 10.5 mg/dL Final    Creatinine 12/11/2019 2.2* 0.5 - 1.4 mg/dL Final    Albumin 12/11/2019 4.0  3.5 - 5.2 g/dL Final    Phosphorus 12/11/2019 3.9  2.7 - 4.5 mg/dL Final    eGFR if  12/11/2019 24.9* >60 mL/min/1.73 m^2 Final    eGFR if non  12/11/2019 21.6* >60 mL/min/1.73 m^2 Final    Anion Gap 12/11/2019 9  8 - 16 mmol/L Final    Cholesterol 12/11/2019 178  120 - 199 mg/dL Final    Triglycerides 12/11/2019 42  30 - 150 mg/dL Final    HDL 12/11/2019 86* 40 - 75 mg/dL Final    LDL Cholesterol 12/11/2019 83.6  63.0 - 159.0 mg/dL Final    Hdl/Cholesterol Ratio 12/11/2019 48.3  20.0 - 50.0 % Final    Total Cholesterol/HDL Ratio 12/11/2019 2.1  2.0 - 5.0 Final     Non-HDL Cholesterol 12/11/2019 92  mg/dL Final       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/2018    Dr Lance Foy- Also Renal cysts bilateral    Hyperlipidemia     Hypertension     MVP (mitral valve prolapse)     Primary osteoarthritis of left knee     Restless leg     Schizophrenia, simple, chronic     Urinary, incontinence, stress female      Past Surgical History:   Procedure Laterality Date    FOOT SURGERY      HYSTERECTOMY      TUBAL LIGATION       Family History   Problem Relation Age of Onset    Sudden death Father     Cancer Mother     Hypertension Mother     Breast cancer Mother     Cancer Sister     Breast cancer Sister        Marital Status:   Alcohol History:  reports that she does not drink alcohol.  Tobacco History:  reports that she quit smoking about 27 years ago. Her smoking use included cigarettes. She has a 5.00 pack-year smoking history. She has never used smokeless tobacco.  Drug History:  reports that she does not use drugs.    Review of patient's allergies indicates:   Allergen Reactions    Lisinopril Nausea And Vomiting       Current Outpatient Medications:     albuterol (PROVENTIL/VENTOLIN HFA) 90 mcg/actuation inhaler, Inhale 2 puffs into the lungs every 6 (six) hours as needed for Wheezing. Rescue, Disp: 18 g, Rfl: 5    amantadine HCL (SYMMETREL) 50 mg/5 mL Soln, , Disp: , Rfl:     amLODIPine (NORVASC) 10 MG tablet, , Disp: , Rfl:     ammonium lactate 12 % Crea, Apply 1 application topically 2 (two) times daily as needed (dry skin)., Disp: 140 g, Rfl: 2    aspirin (ECOTRIN) 81 MG EC tablet, Take 81 mg by mouth once daily., Disp: , Rfl:      budesonide-formoterol 160-4.5 mcg (SYMBICORT) 160-4.5 mcg/actuation HFAA, Inhale 2 puffs into the lungs every 12 (twelve) hours. Controller, Disp: , Rfl:     calcitRIOL (ROCALTROL) 0.25 MCG Cap, Take 1 capsule (0.25 mcg total) by mouth every Mon, Wed, Fri., Disp: 24 capsule, Rfl: 2    calcium carbonate-vitamin D3 (CALCIUM 600 + D,3,) 600-125 mg-unit Tab, , Disp: , Rfl:     docusate sodium (COLACE) 100 MG capsule, Take 100 mg by mouth 2 (two) times daily., Disp: , Rfl:     donepeziL (ARICEPT) 5 MG tablet, Take 5 mg by mouth every evening., Disp: , Rfl:     escitalopram oxalate (LEXAPRO) 20 MG tablet, Take 20 mg by mouth once daily., Disp: , Rfl:     ferrous sulfate (FEOSOL) 325 mg (65 mg iron) Tab tablet, Take 325 mg by mouth once daily. , Disp: , Rfl:     flash glucose scanning reader ([employer] JAY 14 DAY READER) Misc, 1 Device by Misc.(Non-Drug; Combo Route) route once daily., Disp: 6 each, Rfl: 2    fluticasone propionate (FLONASE) 50 mcg/actuation nasal spray, 2 sprays (100 mcg total) by Each Nostril route once daily., Disp: 3 g, Rfl: 6    hydroCHLOROthiazide (MICROZIDE) 12.5 mg capsule, Take 12.5 mg by mouth once daily., Disp: , Rfl:     metoprolol succinate (TOPROL-XL) 50 MG 24 hr tablet, Take 50 mg by mouth 2 (two) times daily., Disp: , Rfl:     montelukast (SINGULAIR) 10 mg tablet, Take 1 tablet (10 mg total) by mouth every evening., Disp: 90 tablet, Rfl: 3    OLANZAPINE ORAL, Take 15 mg by mouth once daily., Disp: , Rfl:     pantoprazole (PROTONIX) 40 MG tablet, Take 1 tablet (40 mg total) by mouth once daily., Disp: 90 tablet, Rfl: 3    potassium chloride SA (K-DUR,KLOR-CON) 20 MEQ tablet, Take 1 tablet (20 mEq total) by mouth once daily., Disp: 90 tablet, Rfl: 3    pravastatin (PRAVACHOL) 40 MG tablet, Take 1 tablet (40 mg total) by mouth nightly., Disp: 90 tablet, Rfl: 3    SITagliptin (JANUVIA) 50 MG Tab, Take 100 mg by mouth once daily., Disp: , Rfl:     traZODone (DESYREL) 100 MG  "tablet, Take 2 tablets (200 mg total) by mouth nightly as needed., Disp: , Rfl:     flash glucose sensor ([employer] JAY 14 DAY SENSOR) Kit, 1 Device by Misc.(Non-Drug; Combo Route) route every 14 (fourteen) days. (Patient not taking: Reported on 5/29/2020), Disp: 6 kit, Rfl: 2    losartan (COZAAR) 50 MG tablet, Take 50 mg by mouth once daily., Disp: , Rfl:     Review of Systems   Constitutional: Positive for activity change and fatigue. Negative for unexpected weight change.   HENT: Positive for ear pain, postnasal drip, rhinorrhea and sinus pressure. Negative for hearing loss and trouble swallowing.    Eyes: Negative for discharge and visual disturbance.   Respiratory: Negative for chest tightness and wheezing.    Cardiovascular: Negative for chest pain, palpitations and leg swelling.   Gastrointestinal: Negative for blood in stool, constipation, diarrhea and vomiting.   Endocrine: Negative for polydipsia and polyuria.   Genitourinary: Negative for difficulty urinating, dysuria, hematuria and menstrual problem.   Musculoskeletal: Negative for arthralgias, joint swelling and neck pain.   Skin: Negative for color change and pallor.   Neurological: Positive for dizziness, weakness and headaches. Negative for speech difficulty.   Psychiatric/Behavioral: Positive for confusion. Negative for dysphoric mood.        Objective:      Vitals:    05/29/20 1058   BP: 126/70   Pulse: 77   Temp: 98 °F (36.7 °C)   SpO2: 98%   Weight: 64.9 kg (143 lb)   Height: 5' 7" (1.702 m)     Physical Exam   Constitutional: She is oriented to person, place, and time. She appears well-developed. She is cooperative. No distress.   HENT:   Head: Normocephalic and atraumatic.   Right Ear: Ear canal normal. A middle ear effusion is present.   Left Ear: Ear canal normal. A middle ear effusion is present.   Nose: Mucosal edema present. Right sinus exhibits no maxillary sinus tenderness and no frontal sinus tenderness. Left sinus exhibits no " maxillary sinus tenderness and no frontal sinus tenderness.   Mouth/Throat: Uvula is midline, oropharynx is clear and moist and mucous membranes are normal. No oropharyngeal exudate or posterior oropharyngeal erythema.   Eyes: Pupils are equal, round, and reactive to light. Conjunctivae, EOM and lids are normal. Right pupil is round and reactive. Left pupil is round and reactive.   Neck: Trachea normal and normal range of motion. Neck supple.   Cardiovascular: Normal rate, regular rhythm, S1 normal, S2 normal, normal heart sounds and intact distal pulses.   No murmur heard.  Pulmonary/Chest: Effort normal. No stridor. No respiratory distress. She has no decreased breath sounds. She has wheezes (Light wheezing noted throughout.). She has no rhonchi. She has no rales.   Abdominal: Soft. Bowel sounds are normal. There is no rigidity and no guarding.   Musculoskeletal: Normal range of motion.   Lymphadenopathy:     She has no cervical adenopathy.        Right cervical: No superficial cervical adenopathy present.       Left cervical: No superficial cervical adenopathy present.     She has no axillary adenopathy.   Neurological: She is alert and oriented to person, place, and time.   Skin: Skin is warm and dry. Capillary refill takes less than 2 seconds.   Psychiatric: She has a normal mood and affect. Cognition and memory are impaired.   Nursing note and vitals reviewed.      Assessment:       1. Hospital discharge follow-up    2. Dizziness    3. Syncope, unspecified syncope type    4. Benign essential hypertension    5. Type 2 diabetes mellitus without complication, without long-term current use of insulin    6. Iron deficiency anemia, unspecified iron deficiency anemia type         Plan:       Hospital discharge follow-up  Medications reconciled.  No new medications from hospital discharge.  Patient is not taking Symbicort as instructed and only taking as needed.  Patient has been instructed that Symbicort should be  used for daily maintenance and albuterol should only be used as a rescue.  Son has been informed as well.    Dizziness  Resolved  Fluid documented behind the tympanic membrane.  Patient has been encouraged to take her Flonase on a regular basis as she is only using as needed and not regularly.    Syncope, unspecified syncope type  Resolved    Benign essential hypertension  Stable.  Continue current medications.  Follow-up with cardiology as scheduled    Type 2 diabetes mellitus without complication, without long-term current use of insulin  Blood sugar log presented today indicates blood sugars at home have been between 120 and 200.  Continue current medication daily.    Iron deficiency anemia, unspecified iron deficiency anemia type  Patient is taking ferrous sulfate and ferrous gluconate.  Son has been instructed to only allow patient to take ferrous gluconate if she experiences GI side effects with the ferrous sulfate.  Only 1 iron supplement should be taken.    Follow up in about 4 weeks (around 6/26/2020) for diabetes.

## 2020-06-03 DIAGNOSIS — D50.9 IRON DEFICIENCY ANEMIA, UNSPECIFIED IRON DEFICIENCY ANEMIA TYPE: Primary | ICD-10-CM

## 2020-06-03 RX ORDER — FLUTICASONE FUROATE AND VILANTEROL 100; 25 UG/1; UG/1
1 POWDER RESPIRATORY (INHALATION) DAILY
COMMUNITY
End: 2020-06-03 | Stop reason: SDUPTHER

## 2020-06-03 RX ORDER — FLUTICASONE FUROATE AND VILANTEROL 100; 25 UG/1; UG/1
1 POWDER RESPIRATORY (INHALATION) DAILY
Qty: 3 EACH | Refills: 1 | Status: SHIPPED | OUTPATIENT
Start: 2020-06-03 | End: 2020-11-02

## 2020-06-03 NOTE — TELEPHONE ENCOUNTER
The pharmacy called & said the pt. would like them to rx Calcium w/Vit. D3 600/800 IU OTC. They want to make sure it is ok b/c pt. is on Calcitriol.

## 2020-06-04 RX ORDER — FERROUS SULFATE 325(65) MG
325 TABLET ORAL
Qty: 36 TABLET | Refills: 1 | Status: SHIPPED | OUTPATIENT
Start: 2020-06-05 | End: 2020-10-23

## 2020-07-02 ENCOUNTER — OFFICE VISIT (OUTPATIENT)
Dept: DERMATOLOGY | Facility: CLINIC | Age: 75
End: 2020-07-02
Payer: MEDICARE

## 2020-07-02 VITALS — HEIGHT: 67 IN | BODY MASS INDEX: 22.44 KG/M2 | WEIGHT: 143 LBS

## 2020-07-02 DIAGNOSIS — L85.3 XEROSIS OF SKIN: Primary | ICD-10-CM

## 2020-07-02 DIAGNOSIS — M79.3 LIPODERMATOSCLEROSIS, UNSPECIFIED LATERALITY: ICD-10-CM

## 2020-07-02 DIAGNOSIS — I87.8 VENOUS STASIS: ICD-10-CM

## 2020-07-02 PROCEDURE — 1126F PR PAIN SEVERITY QUANTIFIED, NO PAIN PRESENT: ICD-10-PCS | Mod: S$GLB,,, | Performed by: DERMATOLOGY

## 2020-07-02 PROCEDURE — 3008F PR BODY MASS INDEX (BMI) DOCUMENTED: ICD-10-PCS | Mod: CPTII,S$GLB,, | Performed by: DERMATOLOGY

## 2020-07-02 PROCEDURE — 1126F AMNT PAIN NOTED NONE PRSNT: CPT | Mod: S$GLB,,, | Performed by: DERMATOLOGY

## 2020-07-02 PROCEDURE — 1101F PT FALLS ASSESS-DOCD LE1/YR: CPT | Mod: CPTII,S$GLB,, | Performed by: DERMATOLOGY

## 2020-07-02 PROCEDURE — 99999 PR PBB SHADOW E&M-EST. PATIENT-LVL V: ICD-10-PCS | Mod: PBBFAC,,, | Performed by: DERMATOLOGY

## 2020-07-02 PROCEDURE — 3008F BODY MASS INDEX DOCD: CPT | Mod: CPTII,S$GLB,, | Performed by: DERMATOLOGY

## 2020-07-02 PROCEDURE — 1159F MED LIST DOCD IN RCRD: CPT | Mod: S$GLB,,, | Performed by: DERMATOLOGY

## 2020-07-02 PROCEDURE — 1159F PR MEDICATION LIST DOCUMENTED IN MEDICAL RECORD: ICD-10-PCS | Mod: S$GLB,,, | Performed by: DERMATOLOGY

## 2020-07-02 PROCEDURE — 1101F PR PT FALLS ASSESS DOC 0-1 FALLS W/OUT INJ PAST YR: ICD-10-PCS | Mod: CPTII,S$GLB,, | Performed by: DERMATOLOGY

## 2020-07-02 PROCEDURE — 99203 OFFICE O/P NEW LOW 30 MIN: CPT | Mod: S$GLB,,, | Performed by: DERMATOLOGY

## 2020-07-02 PROCEDURE — 99203 PR OFFICE/OUTPT VISIT, NEW, LEVL III, 30-44 MIN: ICD-10-PCS | Mod: S$GLB,,, | Performed by: DERMATOLOGY

## 2020-07-02 PROCEDURE — 99999 PR PBB SHADOW E&M-EST. PATIENT-LVL V: CPT | Mod: PBBFAC,,, | Performed by: DERMATOLOGY

## 2020-07-02 NOTE — LETTER
July 2, 2020      Eber Clay MD  [address] [facility]  [address]  Cardiology [place]  [facility] [address]           [facility]  [address] [place] [id number]  Phone: [phone]          Patient: Adriana Perez   MR Number: [id number]   YOB: 1945   Date of Visit: 7/2/2020       Dear Dr. Eber Clay:    Thank you for referring Adriana Perez to me for evaluation. Attached you will find relevant portions of my assessment and plan of care.    If you have questions, please do not hesitate to call me. I look forward to following Adriana Perez along with you.    Sincerely,    Yovany Porter MD    Enclosure  CC:  No Recipients    If you would like to receive this communication electronically, please contact [email or web address] or [phone] to request more information on [facility] access.    For providers and/or their staff who would like to refer a patient to [facility], please contact us through our one-stop-shop provider referral line, [facility], at [phone].    If you feel you have received this communication in error or would no longer like to receive these types of communications, please e-mail [email or web address]

## 2020-07-02 NOTE — PROGRESS NOTES
Subjective:       Patient ID:  Adriana Perez is a 74 y.o. female who presents for   Chief Complaint   Patient presents with    Dry Skin     Also co lower legs getting hard.  No ulcers.      New patient presents today with dry skin on the arms feet and legs, x 6 months, dry and sore, using ammonium cream daily. Also uses baby oil at night  Bathing Iris spring soap.     Review of Systems   Constitutional: Negative for chills and fatigue.   HENT: Negative for congestion and sore throat.    Respiratory: Negative for cough and shortness of breath.    Musculoskeletal: Positive for joint swelling and arthralgias.   Skin: Positive for dry skin. Negative for itching and rash.        Objective:    Physical Exam   Constitutional: She appears well-developed and well-nourished. No distress.   Eyes: No conjunctival no injection.   Cardiovascular: There is no local extremity swelling and no dependent edema.     Neurological: She is alert and oriented to person, place, and time. She is not disoriented.   Psychiatric: She has a normal mood and affect.   Skin:   Areas Examined (abnormalities noted in diagram):   RUE Inspected  LUE Inspection Performed  RLE Inspected  LLE Inspection Performed  Nails and Digits Inspection Performed                  Diagram Legend     Erythematous scaling macule/papule c/w actinic keratosis       Vascular papule c/w angioma      Pigmented verrucoid papule/plaque c/w seborrheic keratosis      Yellow umbilicated papule c/w sebaceous hyperplasia      Irregularly shaped tan macule c/w lentigo     1-2 mm smooth white papules consistent with Milia      Movable subcutaneous cyst with punctum c/w epidermal inclusion cyst      Subcutaneous movable cyst c/w pilar cyst      Firm pink to brown papule c/w dermatofibroma      Pedunculated fleshy papule(s) c/w skin tag(s)      Evenly pigmented macule c/w junctional nevus     Mildly variegated pigmented, slightly irregular-bordered macule c/w mildly atypical nevus       Flesh colored to evenly pigmented papule c/w intradermal nevus       Pink pearly papule/plaque c/w basal cell carcinoma      Erythematous hyperkeratotic cursted plaque c/w SCC      Surgical scar with no sign of skin cancer recurrence      Open and closed comedones      Inflammatory papules and pustules      Verrucoid papule consistent consistent with wart     Erythematous eczematous patches and plaques     Dystrophic onycholytic nail with subungual debris c/w onychomycosis     Umbilicated papule    Erythematous-base heme-crusted tan verrucoid plaque consistent with inflamed seborrheic keratosis     Erythematous Silvery Scaling Plaque c/w Psoriasis     See annotation      Assessment / Plan:        Xerosis of skin  Discussed with patient the etiology and pathogenesis of the disease or skin lesion(s) and possible treatments and aggravators.    Chronic nature of this condition discussed with patient.  Discussed with patient to use organic coconut oil or pure shea butter at least daily for moisturization for the body and organic jojoba oil at least daily for the face.  Good skin care regimen discussed including limiting to one bath or shower/day, using lukewarm water with mild soap and moisturizing cream to skin 1 - 2x/day. Brochure was provided and reviewed with patient.    Lipodermatosclerosis, unspecified laterality  -     Ambulatory referral/consult to Vascular Surgery; Future; Expected date: 08/02/2020  Discussed with patient the etiology and pathogenesis of the disease or skin lesion(s) and possible treatments and aggravators.    Chronic nature of this condition discussed with patient.  Reviewed with patient different treatment options and associated risks.  Discussed with patient need to wear compression stockings and to elevate legs regularly, especially with sitting.  Patient to consider elevating legs during sleep if no pain in the toes or numbness.  Patient can consider using ace wrap in lieu of stockings  but has to watch out for bluing of the toes or pain or numbness in the toes.  Patient may need evaluation with their primary or cardiologist for leg swelling.  Patient to ambulate regularly and exercise a few times a week if possible.  Patient to consider inversion table or lying down on the floor and propping up legs on a stool or cushions as a daily intervention.  Can start niacinamide 500 mg tid.  Watch for ulcers!  Consider arginine later?  Written instructions provided to the patient or guardian today.    1. Avoid hot water    2. Use Dove soap    3. Use organic coconut oil nightly    4. Elevate the legs at home    5. Start wearing compression stockings up to the knees ( can use 15-20mmhg)    6. See Vascular Surgery for hardening and swelling    7. Take oral vitamin: Niacinamide 500mg 3 times daily  (NOT Niacin)    Venous stasis  Discussed with patient need to wear compression stockings and to elevate legs regularly, especially with sitting.  Patient to consider elevating legs during sleep if no pain in the toes or numbness.  Patient can consider using ace wrap in lieu of stockings but has to watch out for bluing of the toes or pain or numbness in the toes.  Patient may need evaluation with their primary or cardiologist for leg swelling.  Patient to ambulate regularly and exercise a few times a week if possible.  Patient to consider inversion table or lying down on the floor and propping up legs on a stool or cushions as a daily intervention.             Follow up in about 3 months (around 10/2/2020).

## 2020-07-02 NOTE — PATIENT INSTRUCTIONS
1. Avoid hot water    2. Use Dove soap    3. Use organic coconut oil nightly    4. Elevate the legs at home    5. Start wearing compression stockings up to the knees ( can use 15-20mmhg)    6. See Vascular Surgery for hardening and swelling    7. Take oral vitamin: Niacinamide 500mg 3 times daily  (NOT Niacin)    Follow up 3 months

## 2020-07-07 ENCOUNTER — OFFICE VISIT (OUTPATIENT)
Dept: FAMILY MEDICINE | Facility: CLINIC | Age: 75
End: 2020-07-07
Payer: MEDICARE

## 2020-07-07 VITALS
SYSTOLIC BLOOD PRESSURE: 124 MMHG | HEIGHT: 67 IN | DIASTOLIC BLOOD PRESSURE: 67 MMHG | HEART RATE: 77 BPM | BODY MASS INDEX: 22.6 KG/M2 | WEIGHT: 144 LBS

## 2020-07-07 DIAGNOSIS — E78.2 MULTIPLE-TYPE HYPERLIPIDEMIA: ICD-10-CM

## 2020-07-07 DIAGNOSIS — E11.9 TYPE 2 DIABETES MELLITUS WITHOUT COMPLICATION, WITHOUT LONG-TERM CURRENT USE OF INSULIN: Primary | ICD-10-CM

## 2020-07-07 DIAGNOSIS — I10 BENIGN ESSENTIAL HYPERTENSION: ICD-10-CM

## 2020-07-07 DIAGNOSIS — N18.30 CHRONIC KIDNEY DISEASE, STAGE 3 (MODERATE): ICD-10-CM

## 2020-07-07 DIAGNOSIS — R63.4 UNINTENTIONAL WEIGHT LOSS: ICD-10-CM

## 2020-07-07 DIAGNOSIS — R26.81 UNSTEADY GAIT: ICD-10-CM

## 2020-07-07 PROCEDURE — 3008F PR BODY MASS INDEX (BMI) DOCUMENTED: ICD-10-PCS | Mod: S$GLB,,, | Performed by: INTERNAL MEDICINE

## 2020-07-07 PROCEDURE — 3074F SYST BP LT 130 MM HG: CPT | Mod: S$GLB,,, | Performed by: INTERNAL MEDICINE

## 2020-07-07 PROCEDURE — 3044F PR MOST RECENT HEMOGLOBIN A1C LEVEL <7.0%: ICD-10-PCS | Mod: S$GLB,,, | Performed by: INTERNAL MEDICINE

## 2020-07-07 PROCEDURE — 1159F PR MEDICATION LIST DOCUMENTED IN MEDICAL RECORD: ICD-10-PCS | Mod: S$GLB,,, | Performed by: INTERNAL MEDICINE

## 2020-07-07 PROCEDURE — 1101F PR PT FALLS ASSESS DOC 0-1 FALLS W/OUT INJ PAST YR: ICD-10-PCS | Mod: S$GLB,,, | Performed by: INTERNAL MEDICINE

## 2020-07-07 PROCEDURE — 3044F HG A1C LEVEL LT 7.0%: CPT | Mod: S$GLB,,, | Performed by: INTERNAL MEDICINE

## 2020-07-07 PROCEDURE — 3008F BODY MASS INDEX DOCD: CPT | Mod: S$GLB,,, | Performed by: INTERNAL MEDICINE

## 2020-07-07 PROCEDURE — 99214 PR OFFICE/OUTPT VISIT, EST, LEVL IV, 30-39 MIN: ICD-10-PCS | Mod: S$GLB,,, | Performed by: INTERNAL MEDICINE

## 2020-07-07 PROCEDURE — 1126F PR PAIN SEVERITY QUANTIFIED, NO PAIN PRESENT: ICD-10-PCS | Mod: S$GLB,,, | Performed by: INTERNAL MEDICINE

## 2020-07-07 PROCEDURE — 99214 OFFICE O/P EST MOD 30 MIN: CPT | Mod: S$GLB,,, | Performed by: INTERNAL MEDICINE

## 2020-07-07 PROCEDURE — 1159F MED LIST DOCD IN RCRD: CPT | Mod: S$GLB,,, | Performed by: INTERNAL MEDICINE

## 2020-07-07 PROCEDURE — 3078F DIAST BP <80 MM HG: CPT | Mod: S$GLB,,, | Performed by: INTERNAL MEDICINE

## 2020-07-07 PROCEDURE — 3074F PR MOST RECENT SYSTOLIC BLOOD PRESSURE < 130 MM HG: ICD-10-PCS | Mod: S$GLB,,, | Performed by: INTERNAL MEDICINE

## 2020-07-07 PROCEDURE — 1126F AMNT PAIN NOTED NONE PRSNT: CPT | Mod: S$GLB,,, | Performed by: INTERNAL MEDICINE

## 2020-07-07 PROCEDURE — 3078F PR MOST RECENT DIASTOLIC BLOOD PRESSURE < 80 MM HG: ICD-10-PCS | Mod: S$GLB,,, | Performed by: INTERNAL MEDICINE

## 2020-07-07 PROCEDURE — 1101F PT FALLS ASSESS-DOCD LE1/YR: CPT | Mod: S$GLB,,, | Performed by: INTERNAL MEDICINE

## 2020-07-07 NOTE — PATIENT INSTRUCTIONS
Diabetes: Activity Tips    Being more active can help you manage your diabetes. The tips on this sheet can help you get the most from your exercise. They can also help you stay safe.  Staying Active  Its important for adults to spend less time sitting and being inactive. This is especially true if you have type 2 diabetes. When you are sitting for long periods of time, get up for short sessions of light activity every 30 minutes.  You should aim for at least 150 minutes a week of exercise or physical activity. Dont let more than 2 days go by without being active.  Benefit from briskness  Brisk activity gets your heart beating faster. This can help you increase your fitness, lose extra weight, and manage your blood sugar level. Try brisk walking. Or, if you have foot or leg problems, you can try swimming or bike riding. You can break up your exercise into chunks throughout the day. Work up to at least 30 minutes of steady, brisk exercise on most days.  Warm up and cool down  Warming up and cooling down reduce your risk of injury. They also help limit muscle soreness. Do a mild version of your activity for 5 minutes before and after your routine. You can also learn stretches that will help keep your muscles loose. Your healthcare provider may show you good ways to warm up and stretch.  Do the talk-sing test  The talk-sing test is a simple way to tell how hard youre exercising. If you can talk while exercising, youre in a safe range. If youre out of breath, slow down. If you can carry a tune, its time to  the pace. Walk up a hill. Increase the resistance on your stationary bike. Or swim faster.  What about eating?  You may be told to plan your exercise for 1 to 2 hours after a meal. In most cases, you dont need to eat while being active. If you take insulin or medicine that can cause low blood sugar, test your blood sugar before exercising. And carry a fast-acting sugar that will raise your blood sugar  level quickly. This includes glucose tablets or hard candy. Use it if you feel low blood sugar symptoms.  Safety tips  These tips can help you stay safe as you become fit:  · Exercise with a friend or carry a cell phone if you have one.  · Carry or wear identification, such as a necklace or bracelet, that says you have diabetes.  · Use the proper footwear and safety equipment for your activity.  · Drink water before, during, and after exercise.  · Dress properly for the weather.  · Dont exercise in very hot or very cold weather.  · Dont exercise if you are sick.  · If you are instructed to do so, test your blood sugar before and after you exercise. Have a small carbohydrate snack if your blood sugar is low before you start exercising.   When to stop exercising and call your healthcare provider  Stop exercising and call your healthcare provider right away if you notice any of the following:  · Pain, pressure, tightness, or heaviness in the chest  · Pain or heaviness in the neck, shoulders, back, arms, legs, or feet  · Unusual shortness of breath  · Dizziness or lightheadedness  · Unusually rapid or slow pulse  · Increased joint or muscle pain  · Nausea or vomiting  Date Last Reviewed: 5/1/2016  © 0721-4667 [employer]. [address] [place] [address], [place] [address]. All rights reserved. This information is not intended as a substitute for professional medical care. Always follow your healthcare professional's instructions.

## 2020-07-07 NOTE — PROGRESS NOTES
Subjective:       Patient ID: Adriana Perez is a 74 y.o. female.    Chief Complaint: No chief complaint on file.    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    Dr Lance Foy- Also Renal cysts bilateral    Hyperlipidemia     Hypertension     MVP (mitral valve prolapse)     Primary osteoarthritis of left knee     Restless leg     Schizophrenia, simple, chronic     Urinary, incontinence, stress female      Social History     Socioeconomic History    Marital status:      Spouse name: Willy Perez    Number of children: 2    Years of education: Not on file    Highest education level: Not on file   Occupational History    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     Comment: [employer]   Social Needs    Financial resource strain: Not hard at all    Food insecurity     Worry: Never true     Inability: Never true    Transportation needs     Medical: No     Non-medical: No   Tobacco Use    Smoking status: Former Smoker     Packs/day: 1.00     Years: 5.00     Pack years: 5.00     Types: Cigarettes     Quit date: 1992     Years since quittin.8    Smokeless tobacco: Never Used   Substance and Sexual Activity    Alcohol use: No     Frequency: Never     Drinks per session: Patient refused     Binge frequency: Never    Drug use: No    Sexual activity: Not Currently     Partners: Male   Lifestyle    Physical activity     Days per week: 0 days     Minutes per session: Not on file    Stress: Very much   Relationships    Social connections     Talks on phone: More than three times a week     Gets together: More than three times a week     Attends Mandaen  service: Not on file     Active member of club or organization: Yes     Attends meetings of clubs or organizations: Never     Relationship status:    Other Topics Concern    Not on file   Social History Narrative    Not on file     Past Surgical History:   Procedure Laterality Date    FOOT SURGERY      HYSTERECTOMY      TUBAL LIGATION       Family History   Problem Relation Age of Onset    Sudden death Father     Cancer Mother     Hypertension Mother     Breast cancer Mother     Cancer Sister     Breast cancer Sister        Review of Systems      Objective:      There were no vitals taken for this visit. There is no height or weight on file to calculate BMI.  Physical Exam      Assessment:       No diagnosis found.       Admission on 05/23/2020, Discharged on 05/23/2020   Component Date Value Ref Range Status    WBC 05/23/2020 7.75  3.90 - 12.70 K/uL Final    RBC 05/23/2020 4.26  4.00 - 5.40 M/uL Final    Hemoglobin 05/23/2020 11.4* 12.0 - 16.0 g/dL Final    Hematocrit 05/23/2020 36.2* 37.0 - 48.5 % Final    Mean Corpuscular Volume 05/23/2020 85  82 - 98 fL Final    Mean Corpuscular Hemoglobin 05/23/2020 26.8* 27.0 - 31.0 pg Final    Mean Corpuscular Hemoglobin Conc 05/23/2020 31.5* 32.0 - 36.0 g/dL Final    RDW 05/23/2020 13.4  11.5 - 14.5 % Final    Platelets 05/23/2020 182  150 - 350 K/uL Final    MPV 05/23/2020 10.7  9.2 - 12.9 fL Final    Immature Granulocytes 05/23/2020 0.3  0.0 - 0.5 % Final    Gran # (ANC) 05/23/2020 4.8  1.8 - 7.7 K/uL Final    Immature Grans (Abs) 05/23/2020 0.02  0.00 - 0.04 K/uL Final    Lymph # 05/23/2020 1.8  1.0 - 4.8 K/uL Final    Mono # 05/23/2020 1.0  0.3 - 1.0 K/uL Final    Eos # 05/23/2020 0.1  0.0 - 0.5 K/uL Final    Baso # 05/23/2020 0.04  0.00 - 0.20 K/uL Final    nRBC 05/23/2020 0  0 /100 WBC Final    Gran% 05/23/2020 62.3  38.0 - 73.0 % Final    Lymph% 05/23/2020 22.6  18.0 - 48.0 % Final    Mono% 05/23/2020 12.6  4.0 - 15.0 % Final     Eosinophil% 05/23/2020 1.7  0.0 - 8.0 % Final    Basophil% 05/23/2020 0.5  0.0 - 1.9 % Final    Differential Method 05/23/2020 Automated   Final    Sodium 05/23/2020 138  136 - 145 mmol/L Final    Potassium 05/23/2020 4.2  3.5 - 5.1 mmol/L Final    Chloride 05/23/2020 100  95 - 110 mmol/L Final    CO2 05/23/2020 30* 23 - 29 mmol/L Final    Glucose 05/23/2020 130* 70 - 110 mg/dL Final    BUN, Bld 05/23/2020 35* 8 - 23 mg/dL Final    Creatinine 05/23/2020 2.2* 0.5 - 1.4 mg/dL Final    Calcium 05/23/2020 10.2  8.7 - 10.5 mg/dL Final    Total Protein 05/23/2020 7.4  6.0 - 8.4 g/dL Final    Albumin 05/23/2020 4.0  3.5 - 5.2 g/dL Final    Total Bilirubin 05/23/2020 0.7  0.1 - 1.0 mg/dL Final    Alkaline Phosphatase 05/23/2020 72  55 - 135 U/L Final    AST 05/23/2020 12  10 - 40 U/L Final    ALT 05/23/2020 14  10 - 44 U/L Final    Anion Gap 05/23/2020 8  8 - 16 mmol/L Final    eGFR if African American 05/23/2020 24.9* >60 mL/min/1.73 m^2 Final    eGFR if non African American 05/23/2020 21.6* >60 mL/min/1.73 m^2 Final    Troponin I 05/23/2020 <0.030  <=0.040 ng/mL Final    BNP 05/23/2020 27  0 - 99 pg/mL Final    CPK 05/23/2020 58  20 - 180 U/L Final    CPK MB 05/23/2020 2.7  0.1 - 6.5 ng/mL Final    TSH 05/23/2020 2.400  0.340 - 5.600 uIU/mL Final    Specimen UA 05/23/2020 Urine, Catheterized   Final    Color, UA 05/23/2020 Yellow  Yellow, Straw, Yudelka Final    Appearance, UA 05/23/2020 Clear  Clear Final    pH, UA 05/23/2020 7.0  5.0 - 8.0 Final    Specific [place]pin, UA 05/23/2020 1.015  1.005 - 1.030 Final    Protein, UA 05/23/2020 Negative  Negative Final    Glucose, UA 05/23/2020 Negative  Negative Final    Ketones, UA 05/23/2020 Negative  Negative Final    Bilirubin (UA) 05/23/2020 Negative  Negative Final    Occult Blood UA 05/23/2020 Negative  Negative Final    Nitrite, UA 05/23/2020 Negative  Negative Final    Urobilinogen, UA 05/23/2020 Negative  Negative EU/dL Final     Leukocytes, UA 05/23/2020 Negative  Negative Final         Plan:           There are no diagnoses linked to this encounter.        Current Outpatient Medications:     albuterol (PROVENTIL/VENTOLIN HFA) 90 mcg/actuation inhaler, Inhale 2 puffs into the lungs every 6 (six) hours as needed for Wheezing. Rescue, Disp: 18 g, Rfl: 5    amantadine HCL (SYMMETREL) 50 mg/5 mL Soln, , Disp: , Rfl:     amLODIPine (NORVASC) 10 MG tablet, , Disp: , Rfl:     ammonium lactate 12 % Crea, Apply 1 application topically 2 (two) times daily as needed (dry skin)., Disp: 140 g, Rfl: 1    aspirin (ECOTRIN) 81 MG EC tablet, Take 81 mg by mouth once daily., Disp: , Rfl:     calcitRIOL (ROCALTROL) 0.25 MCG Cap, Take 1 capsule (0.25 mcg total) by mouth every Mon, Wed, Fri., Disp: 24 capsule, Rfl: 2    calcium carbonate-vitamin D3 (CALCIUM 600 + D,3,) 600-125 mg-unit Tab, , Disp: , Rfl:     docusate sodium (COLACE) 100 MG capsule, Take 100 mg by mouth 2 (two) times daily., Disp: , Rfl:     donepeziL (ARICEPT) 5 MG tablet, Take 5 mg by mouth every evening., Disp: , Rfl:     escitalopram oxalate (LEXAPRO) 20 MG tablet, Take 20 mg by mouth once daily., Disp: , Rfl:     ferrous gluconate (FERGON) 270 mg (27 mg iron) Tab, Take 1 tablet by mouth every Mon, Wed, Fri., Disp: 36 tablet, Rfl: 3    ferrous sulfate (FEOSOL) 325 mg (65 mg iron) Tab tablet, Take 1 tablet (325 mg total) by mouth every Mon, Wed, Fri., Disp: 36 tablet, Rfl: 1    flash glucose scanning reader (FREESTYLE JAY 14 DAY READER) Misc, 1 Device by Misc.(Non-Drug; Combo Route) route once daily., Disp: 6 each, Rfl: 2    flash glucose sensor (FREESTYLE JAY 14 DAY SENSOR) Kit, 1 Device by Misc.(Non-Drug; Combo Route) route every 14 (fourteen) days., Disp: 6 kit, Rfl: 2    fluticasone furoate-vilanteroL (BREO ELLIPTA) 100-25 mcg/dose diskus inhaler, Inhale 1 puff into the lungs once daily. Controller, Disp: 3 each, Rfl: 1    fluticasone propionate (FLONASE) 50  mcg/actuation nasal spray, 2 sprays (100 mcg total) by Each Nostril route once daily., Disp: 3 g, Rfl: 6    hydroCHLOROthiazide (MICROZIDE) 12.5 mg capsule, Take 12.5 mg by mouth once daily., Disp: , Rfl:     losartan (COZAAR) 50 MG tablet, Take 50 mg by mouth once daily., Disp: , Rfl:     metoprolol succinate (TOPROL-XL) 50 MG 24 hr tablet, Take 50 mg by mouth 2 (two) times daily., Disp: , Rfl:     montelukast (SINGULAIR) 10 mg tablet, Take 1 tablet (10 mg total) by mouth every evening., Disp: 90 tablet, Rfl: 3    OLANZAPINE ORAL, Take 15 mg by mouth once daily., Disp: , Rfl:     pantoprazole (PROTONIX) 40 MG tablet, Take 1 tablet (40 mg total) by mouth once daily., Disp: 90 tablet, Rfl: 3    potassium chloride SA (K-DUR,KLOR-CON) 20 MEQ tablet, Take 1 tablet (20 mEq total) by mouth once daily., Disp: 90 tablet, Rfl: 3    pravastatin (PRAVACHOL) 40 MG tablet, Take 1 tablet (40 mg total) by mouth nightly., Disp: 90 tablet, Rfl: 3    SITagliptin (JANUVIA) 50 MG Tab, Take 100 mg by mouth once daily., Disp: , Rfl:     traZODone (DESYREL) 100 MG tablet, Take 2 tablets (200 mg total) by mouth nightly as needed., Disp: , Rfl:

## 2020-07-07 NOTE — PROGRESS NOTES
Subjective:       Patient ID: Adriana Perez is a 74 y.o. female.    Chief Complaint: Diabetes    Ms Perez is a 74-year-old female who comes for follow-up.  Underlying issues of diabetes mellitus type 2, hypertension, dyslipidemia, cognitive issues and history of behavioral disorder has been noted.      Currently family members had requested her Mauro device for her.  For frequent monitoring of diabetes.  Please note that he is not currently taking any medications for diabetes.  Her blood sugars seem to be all over the place going as high as 267 and as low as 102.    Blood pressures at home seem to fluctuate in 150s and sometimes 110.    Memory is modest.  Does not remember the details of her medications and reason for prior visits.    Does not recall that she had significant weight loss.  Thankfully it is stable at this point.  I suspect might have been the side effects of some medications or some psychosocial issues.    No recent falls.  Gait is slow and cautious.  She talks about some skin problems for which she has to CS specialist.  She does not recall the details.  She had recently seen dermatologist.      Diabetes  She presents for her follow-up diabetic visit. She has type 2 diabetes mellitus. Her disease course has been stable. Hypoglycemia symptoms include confusion and headaches. Associated symptoms include weight loss (stable now). Pertinent negatives for diabetes include no chest pain, no polydipsia, no polyuria and no weakness. Risk factors for coronary artery disease include post-menopausal, sedentary lifestyle, hypertension, diabetes mellitus and dyslipidemia. Current diabetic treatment includes oral agent (monotherapy) (Patient is taking Januvia.). She is compliant with treatment some of the time. Meal planning includes avoidance of concentrated sweets. Her home blood glucose trend is fluctuating dramatically. Her breakfast blood glucose range is generally 140-180 mg/dl. An ACE  inhibitor/angiotensin II receptor blocker is being taken. Eye exam is current.   Hypertension  This is a chronic problem. The current episode started more than 1 year ago. The problem is controlled. Associated symptoms include headaches and malaise/fatigue. Pertinent negatives include no chest pain, neck pain, palpitations or shortness of breath. Risk factors for coronary artery disease include sedentary lifestyle, diabetes mellitus and dyslipidemia. Past treatments include angiotensin blockers. The current treatment provides moderate improvement. Compliance problems include psychosocial issues.        Past Medical History:   Diagnosis Date    Acid reflux     Allergy     lisinopril    Anxiety     Bilateral renal cysts 2018    Renal USG by Dr Foy    CKD (chronic kidney disease), stage III     Depression     Diabetes mellitus     Diabetes mellitus, type 2     Disorder of kidney and ureter     Echogenic kidneys on renal ultrasound 2018    Dr Lance Foy- Also Renal cysts bilateral    Hyperlipidemia     Hypertension     MVP (mitral valve prolapse)     Primary osteoarthritis of left knee     Restless leg     Schizophrenia, simple, chronic     Urinary, incontinence, stress female      Social History     Socioeconomic History    Marital status:      Spouse name: Willy Perez    Number of children: 2    Years of education: Not on file    Highest education level: Not on file   Occupational History    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   Social Needs    Financial resource strain: Not hard at all    Food insecurity     Worry: Never true     Inability: Never true    Transportation needs     Medical: No     Non-medical: No   Tobacco Use    Smoking status: Former Smoker     Packs/day: 1.00     Years: 5.00     Pack years: 5.00     Types: Cigarettes     Quit date: 1992     Years since quittin.8    Smokeless tobacco: Never Used    Substance and Sexual Activity    Alcohol use: No     Frequency: Never     Drinks per session: Patient refused     Binge frequency: Never    Drug use: No    Sexual activity: Not Currently     Partners: Male   Lifestyle    Physical activity     Days per week: 0 days     Minutes per session: Not on file    Stress: Very much   Relationships    Social connections     Talks on phone: More than three times a week     Gets together: More than three times a week     Attends Advent service: Not on file     Active member of club or organization: Yes     Attends meetings of clubs or organizations: Never     Relationship status:    Other Topics Concern    Not on file   Social History Narrative    Not on file     Past Surgical History:   Procedure Laterality Date    FOOT SURGERY      HYSTERECTOMY      TUBAL LIGATION       Family History   Problem Relation Age of Onset    Sudden death Father     Cancer Mother     Hypertension Mother     Breast cancer Mother     Cancer Sister     Breast cancer Sister        Review of Systems   Constitutional: Positive for malaise/fatigue and weight loss (stable now). Negative for activity change, chills, fever and unexpected weight change.   HENT: Positive for hearing loss. Negative for rhinorrhea and trouble swallowing.    Eyes: Negative for pain, discharge and visual disturbance.   Respiratory: Negative for chest tightness, shortness of breath and wheezing.    Cardiovascular: Negative for chest pain and palpitations.   Gastrointestinal: Negative for abdominal distention, abdominal pain, blood in stool, constipation, diarrhea and vomiting.   Endocrine: Negative for polydipsia and polyuria.   Genitourinary: Negative for difficulty urinating, dysuria, hematuria and menstrual problem.   Musculoskeletal: Positive for joint swelling. Negative for arthralgias and neck pain.   Neurological: Positive for headaches. Negative for weakness.   Psychiatric/Behavioral: Positive for  "behavioral problems and confusion.        History of schizophrenia or schzoid disorder.         Objective:      Blood pressure 124/67, pulse 77, height 5' 7" (1.702 m), weight 65.3 kg (144 lb). Body mass index is 22.55 kg/m².  Physical Exam  Vitals signs and nursing note reviewed.   Constitutional:       General: She is not in acute distress.     Appearance: She is well-developed.   HENT:      Head: Normocephalic and atraumatic.      Right Ear: Decreased hearing noted.      Left Ear: Decreased hearing noted.   Eyes:      General: Lids are normal. Lids are everted, no foreign bodies appreciated.      Conjunctiva/sclera: Conjunctivae normal.      Pupils:      Right eye: Pupil is round and reactive.      Left eye: Pupil is round and reactive.   Neck:      Musculoskeletal: Normal range of motion and neck supple.      Trachea: Trachea normal.   Cardiovascular:      Rate and Rhythm: Normal rate and regular rhythm.      Heart sounds: Normal heart sounds, S1 normal and S2 normal.   Pulmonary:      Breath sounds: Normal breath sounds.   Abdominal:      General: Bowel sounds are normal. There is no distension.      Palpations: Abdomen is soft. Abdomen is not rigid.      Tenderness: There is no abdominal tenderness. There is no guarding.      Comments: Cholelithiasis without cholecystitis   Musculoskeletal:         General: No tenderness or deformity.        Feet:    Feet:      Right foot:      Protective Sensation: 4 sites tested.      Skin integrity: No ulcer or blister.      Left foot:      Protective Sensation: 4 sites tested.      Skin integrity: No ulcer or blister.   Lymphadenopathy:      Cervical: No cervical adenopathy.   Skin:     General: Skin is warm and dry.      Coloration: Skin is not pale.      Findings: No erythema or rash.   Neurological:      Mental Status: She is alert.      Motor: Atrophy present.      Coordination: Coordination normal.      Gait: Gait abnormal (more stable).   Psychiatric:         Mood " and Affect: Affect is blunt.         Speech: Speech is delayed.         Behavior: Behavior is slowed. Behavior is not agitated. Behavior is cooperative.         Cognition and Memory: Memory is impaired.           Assessment:       1. Type 2 diabetes mellitus without complication, without long-term current use of insulin    2. Benign essential hypertension    3. Multiple-type hyperlipidemia    4. Chronic kidney disease, stage 3 (moderate)    5. Unsteady gait    6. Unintentional weight loss           Admission on 05/23/2020, Discharged on 05/23/2020   Component Date Value Ref Range Status    WBC 05/23/2020 7.75  3.90 - 12.70 K/uL Final    RBC 05/23/2020 4.26  4.00 - 5.40 M/uL Final    Hemoglobin 05/23/2020 11.4* 12.0 - 16.0 g/dL Final    Hematocrit 05/23/2020 36.2* 37.0 - 48.5 % Final    Mean Corpuscular Volume 05/23/2020 85  82 - 98 fL Final    Mean Corpuscular Hemoglobin 05/23/2020 26.8* 27.0 - 31.0 pg Final    Mean Corpuscular Hemoglobin Conc 05/23/2020 31.5* 32.0 - 36.0 g/dL Final    RDW 05/23/2020 13.4  11.5 - 14.5 % Final    Platelets 05/23/2020 182  150 - 350 K/uL Final    MPV 05/23/2020 10.7  9.2 - 12.9 fL Final    Immature Granulocytes 05/23/2020 0.3  0.0 - 0.5 % Final    Gran # (ANC) 05/23/2020 4.8  1.8 - 7.7 K/uL Final    Immature Grans (Abs) 05/23/2020 0.02  0.00 - 0.04 K/uL Final    Lymph # 05/23/2020 1.8  1.0 - 4.8 K/uL Final    Mono # 05/23/2020 1.0  0.3 - 1.0 K/uL Final    Eos # 05/23/2020 0.1  0.0 - 0.5 K/uL Final    Baso # 05/23/2020 0.04  0.00 - 0.20 K/uL Final    nRBC 05/23/2020 0  0 /100 WBC Final    Gran% 05/23/2020 62.3  38.0 - 73.0 % Final    Lymph% 05/23/2020 22.6  18.0 - 48.0 % Final    Mono% 05/23/2020 12.6  4.0 - 15.0 % Final    Eosinophil% 05/23/2020 1.7  0.0 - 8.0 % Final    Basophil% 05/23/2020 0.5  0.0 - 1.9 % Final    Differential Method 05/23/2020 Automated   Final    Sodium 05/23/2020 138  136 - 145 mmol/L Final    Potassium 05/23/2020 4.2  3.5 - 5.1 mmol/L  Final    Chloride 05/23/2020 100  95 - 110 mmol/L Final    CO2 05/23/2020 30* 23 - 29 mmol/L Final    Glucose 05/23/2020 130* 70 - 110 mg/dL Final    BUN, Bld 05/23/2020 35* 8 - 23 mg/dL Final    Creatinine 05/23/2020 2.2* 0.5 - 1.4 mg/dL Final    Calcium 05/23/2020 10.2  8.7 - 10.5 mg/dL Final    Total Protein 05/23/2020 7.4  6.0 - 8.4 g/dL Final    Albumin 05/23/2020 4.0  3.5 - 5.2 g/dL Final    Total Bilirubin 05/23/2020 0.7  0.1 - 1.0 mg/dL Final    Alkaline Phosphatase 05/23/2020 72  55 - 135 U/L Final    AST 05/23/2020 12  10 - 40 U/L Final    ALT 05/23/2020 14  10 - 44 U/L Final    Anion Gap 05/23/2020 8  8 - 16 mmol/L Final    eGFR if African American 05/23/2020 24.9* >60 mL/min/1.73 m^2 Final    eGFR if non African American 05/23/2020 21.6* >60 mL/min/1.73 m^2 Final    Troponin I 05/23/2020 <0.030  <=0.040 ng/mL Final    BNP 05/23/2020 27  0 - 99 pg/mL Final    CPK 05/23/2020 58  20 - 180 U/L Final    CPK MB 05/23/2020 2.7  0.1 - 6.5 ng/mL Final    TSH 05/23/2020 2.400  0.340 - 5.600 uIU/mL Final    Specimen UA 05/23/2020 Urine, Catheterized   Final    Color, UA 05/23/2020 Yellow  Yellow, Straw, Yudelka Final    Appearance, UA 05/23/2020 Clear  Clear Final    pH, UA 05/23/2020 7.0  5.0 - 8.0 Final    Specific [place], UA 05/23/2020 1.015  1.005 - 1.030 Final    Protein, UA 05/23/2020 Negative  Negative Final    Glucose, UA 05/23/2020 Negative  Negative Final    Ketones, UA 05/23/2020 Negative  Negative Final    Bilirubin (UA) 05/23/2020 Negative  Negative Final    Occult Blood UA 05/23/2020 Negative  Negative Final    Nitrite, UA 05/23/2020 Negative  Negative Final    Urobilinogen, UA 05/23/2020 Negative  Negative EU/dL Final    Leukocytes, UA 05/23/2020 Negative  Negative Final         Plan:           Type 2 diabetes mellitus without complication, without long-term current use of insulin  -     Hemoglobin A1C; Future; Expected date: 07/07/2020    Benign essential  hypertension    Multiple-type hyperlipidemia    Chronic kidney disease, stage 3 (moderate)  -     Basic metabolic panel; Future; Expected date: 07/07/2020  -     CBC auto differential; Future; Expected date: 07/07/2020    Unsteady gait    Unintentional weight loss      Patient's multiple medical conditions have been noted.    Diabetes seems to be difficult to control with more interest in checking the blood sugars rather than controlling it.  Currently patient is taking Januvia.    Blood pressures are under control.    Need to repeat labs for chronic kidney disease.  Last creatinine was 2.2.    Weight loss is stable at this point.  Appetite is okay.  Cause of weight loss was uncertain.    Diabetes strict control will be difficult.  Will check hemoglobin A1c.  Metformin is not an option.  Glipizide may have propensity for hypoglycemia and weight gain.  It is unclear if she will be able to manage her insulin.    She is doing okay on her medications at this point.    Follow-up in 3 months.  Check labs.    Please utilize precautions for current COVID-19 pandemic.  Try to avoid crowds or close contact with multiple people.  Minimize outside interaction.  Wash hands with soap for  frequently upon contact.Use face mask or cover.  Advised Ms. Perez to monitor Blood sugars at home and record them.  Exercise, watch diet and loose weight.  keep a close eye on feet and keep them clean. Annual eye examination. Annual influenza vaccine.  Monitor HgbA1c every 3 to 6 months. Monitor urine microalbumin every year.keep LDL less than 100. Monitor blood pressure and target blood pressure 120/70.      Patient is doing okay on the medications.    I will see her back in a couple of months time.    Spent martín 25 minutes with patient which involved review of pts medical conditions, labs, medications and with 50% of time face-to-face discussion about medical problems, management and any applicable changes.                Current  Outpatient Medications:     albuterol (PROVENTIL/VENTOLIN HFA) 90 mcg/actuation inhaler, Inhale 2 puffs into the lungs every 6 (six) hours as needed for Wheezing. Rescue, Disp: 18 g, Rfl: 5    amantadine HCL (SYMMETREL) 50 mg/5 mL Soln, , Disp: , Rfl:     ammonium lactate 12 % Crea, Apply 1 application topically 2 (two) times daily as needed (dry skin)., Disp: 140 g, Rfl: 1    aspirin (ECOTRIN) 81 MG EC tablet, Take 81 mg by mouth once daily., Disp: , Rfl:     calcitRIOL (ROCALTROL) 0.25 MCG Cap, Take 1 capsule (0.25 mcg total) by mouth every Mon, Wed, Fri., Disp: 24 capsule, Rfl: 2    calcium carbonate-vitamin D3 (CALCIUM 600 + D,3,) 600-125 mg-unit Tab, , Disp: , Rfl:     docusate sodium (COLACE) 100 MG capsule, Take 100 mg by mouth 2 (two) times daily., Disp: , Rfl:     donepeziL (ARICEPT) 5 MG tablet, Take 5 mg by mouth every evening., Disp: , Rfl:     escitalopram oxalate (LEXAPRO) 20 MG tablet, Take 20 mg by mouth once daily., Disp: , Rfl:     ferrous sulfate (FEOSOL) 325 mg (65 mg iron) Tab tablet, Take 1 tablet (325 mg total) by mouth every Mon, Wed, Fri., Disp: 36 tablet, Rfl: 1    flash glucose scanning reader (FREESTYLE JAY 14 DAY READER) Misc, 1 Device by Misc.(Non-Drug; Combo Route) route once daily., Disp: 6 each, Rfl: 2    flash glucose sensor (FREESTYLE JAY 14 DAY SENSOR) Kit, 1 Device by Misc.(Non-Drug; Combo Route) route every 14 (fourteen) days., Disp: 6 kit, Rfl: 2    fluticasone furoate-vilanteroL (BREO ELLIPTA) 100-25 mcg/dose diskus inhaler, Inhale 1 puff into the lungs once daily. Controller, Disp: 3 each, Rfl: 1    fluticasone propionate (FLONASE) 50 mcg/actuation nasal spray, 2 sprays (100 mcg total) by Each Nostril route once daily., Disp: 3 g, Rfl: 6    losartan (COZAAR) 50 MG tablet, Take 50 mg by mouth once daily., Disp: , Rfl:     metoprolol succinate (TOPROL-XL) 50 MG 24 hr tablet, Take 50 mg by mouth 2 (two) times daily., Disp: , Rfl:     montelukast (SINGULAIR)  10 mg tablet, Take 1 tablet (10 mg total) by mouth every evening., Disp: 90 tablet, Rfl: 3    OLANZAPINE ORAL, Take 15 mg by mouth once daily., Disp: , Rfl:     pantoprazole (PROTONIX) 40 MG tablet, Take 1 tablet (40 mg total) by mouth once daily., Disp: 90 tablet, Rfl: 3    potassium chloride SA (K-DUR,KLOR-CON) 20 MEQ tablet, Take 1 tablet (20 mEq total) by mouth once daily., Disp: 90 tablet, Rfl: 3    pravastatin (PRAVACHOL) 40 MG tablet, Take 1 tablet (40 mg total) by mouth nightly., Disp: 90 tablet, Rfl: 3    SITagliptin (JANUVIA) 50 MG Tab, Take 100 mg by mouth once daily., Disp: , Rfl:     traZODone (DESYREL) 100 MG tablet, Take 2 tablets (200 mg total) by mouth nightly as needed., Disp: , Rfl:

## 2020-07-08 ENCOUNTER — LAB VISIT (OUTPATIENT)
Dept: LAB | Facility: HOSPITAL | Age: 75
End: 2020-07-08
Attending: INTERNAL MEDICINE
Payer: MEDICARE

## 2020-07-08 DIAGNOSIS — N18.30 CHRONIC KIDNEY DISEASE, STAGE 3 (MODERATE): ICD-10-CM

## 2020-07-08 DIAGNOSIS — E11.9 TYPE 2 DIABETES MELLITUS WITHOUT COMPLICATION, WITHOUT LONG-TERM CURRENT USE OF INSULIN: ICD-10-CM

## 2020-07-08 LAB
ANION GAP SERPL CALC-SCNC: 10 MMOL/L (ref 8–16)
BASOPHILS # BLD AUTO: 0.04 K/UL (ref 0–0.2)
BASOPHILS NFR BLD: 0.6 % (ref 0–1.9)
BUN SERPL-MCNC: 37 MG/DL (ref 8–23)
CALCIUM SERPL-MCNC: 10.4 MG/DL (ref 8.7–10.5)
CHLORIDE SERPL-SCNC: 98 MMOL/L (ref 95–110)
CO2 SERPL-SCNC: 31 MMOL/L (ref 23–29)
CREAT SERPL-MCNC: 2.1 MG/DL (ref 0.5–1.4)
DIFFERENTIAL METHOD: ABNORMAL
EOSINOPHIL # BLD AUTO: 0.1 K/UL (ref 0–0.5)
EOSINOPHIL NFR BLD: 1.6 % (ref 0–8)
ERYTHROCYTE [DISTWIDTH] IN BLOOD BY AUTOMATED COUNT: 13.5 % (ref 11.5–14.5)
EST. GFR  (AFRICAN AMERICAN): 26.3 ML/MIN/1.73 M^2
EST. GFR  (NON AFRICAN AMERICAN): 22.8 ML/MIN/1.73 M^2
ESTIMATED AVG GLUCOSE: 180 MG/DL (ref 68–131)
GLUCOSE SERPL-MCNC: 162 MG/DL (ref 70–110)
HBA1C MFR BLD HPLC: 7.9 % (ref 4.5–6.2)
HCT VFR BLD AUTO: 36.7 % (ref 37–48.5)
HGB BLD-MCNC: 11.8 G/DL (ref 12–16)
IMM GRANULOCYTES # BLD AUTO: 0.03 K/UL (ref 0–0.04)
IMM GRANULOCYTES NFR BLD AUTO: 0.4 % (ref 0–0.5)
LYMPHOCYTES # BLD AUTO: 1.7 K/UL (ref 1–4.8)
LYMPHOCYTES NFR BLD: 23.7 % (ref 18–48)
MCH RBC QN AUTO: 27 PG (ref 27–31)
MCHC RBC AUTO-ENTMCNC: 32.2 G/DL (ref 32–36)
MCV RBC AUTO: 84 FL (ref 82–98)
MONOCYTES # BLD AUTO: 0.7 K/UL (ref 0.3–1)
MONOCYTES NFR BLD: 9.9 % (ref 4–15)
NEUTROPHILS # BLD AUTO: 4.5 K/UL (ref 1.8–7.7)
NEUTROPHILS NFR BLD: 63.8 % (ref 38–73)
NRBC BLD-RTO: 0 /100 WBC
PLATELET # BLD AUTO: 211 K/UL (ref 150–350)
PMV BLD AUTO: 11.5 FL (ref 9.2–12.9)
POTASSIUM SERPL-SCNC: 4.1 MMOL/L (ref 3.5–5.1)
RBC # BLD AUTO: 4.37 M/UL (ref 4–5.4)
SODIUM SERPL-SCNC: 139 MMOL/L (ref 136–145)
WBC # BLD AUTO: 7.04 K/UL (ref 3.9–12.7)

## 2020-07-08 PROCEDURE — 83036 HEMOGLOBIN GLYCOSYLATED A1C: CPT

## 2020-07-08 PROCEDURE — 36415 COLL VENOUS BLD VENIPUNCTURE: CPT

## 2020-07-08 PROCEDURE — 80048 BASIC METABOLIC PNL TOTAL CA: CPT

## 2020-07-08 PROCEDURE — 85025 COMPLETE CBC W/AUTO DIFF WBC: CPT

## 2020-07-13 ENCOUNTER — OFFICE VISIT (OUTPATIENT)
Dept: PODIATRY | Facility: CLINIC | Age: 75
End: 2020-07-13
Payer: MEDICARE

## 2020-07-13 VITALS
HEART RATE: 70 BPM | SYSTOLIC BLOOD PRESSURE: 124 MMHG | WEIGHT: 138 LBS | HEIGHT: 67 IN | TEMPERATURE: 98 F | DIASTOLIC BLOOD PRESSURE: 68 MMHG | BODY MASS INDEX: 21.66 KG/M2 | RESPIRATION RATE: 16 BRPM

## 2020-07-13 DIAGNOSIS — L60.2 ONYCHOGRYPHOSIS: ICD-10-CM

## 2020-07-13 DIAGNOSIS — E11.42 DIABETIC POLYNEUROPATHY ASSOCIATED WITH TYPE 2 DIABETES MELLITUS: Primary | ICD-10-CM

## 2020-07-13 PROCEDURE — 11721 DEBRIDE NAIL 6 OR MORE: CPT | Mod: Q8,S$GLB,, | Performed by: PODIATRIST

## 2020-07-13 PROCEDURE — 11721 PR DEBRIDEMENT OF NAILS, 6 OR MORE: ICD-10-PCS | Mod: Q8,S$GLB,, | Performed by: PODIATRIST

## 2020-07-13 NOTE — PROGRESS NOTES
[address] [facility] [address]  [place] [address]  Phone: [phone]   [phone]    Patient's PCP:Aiden Koch MD  Referring Provider: No ref. provider found    Subjective:      Chief Complaint:: No chief complaint on file.    HPI  Adriana Perez is a 74 y.o. female who presents for routine diabetic nail trimming (acode) for a complaint of elongated toenails. Treatment to date have included periodic debridement. Patient also states she has pain to center of ball of left foot. She states it feels tight when flexing Patients rates pain 8/10 on pain scale.     Systemic Doctor: Aiden Koch MD  Date Last Seen: December 2019  Blood Sugar: 88  Hemoglobin A1c: 6.8    Vitals:    07/13/20 1603   BP: 124/68   Pulse: 70   Resp: 16   Temp: 97.9 °F (36.6 °C)     Shoe Size:     Past Surgical History:   Procedure Laterality Date    FOOT SURGERY      HYSTERECTOMY      TUBAL LIGATION       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/2018    Dr Lance Foy- Also Renal cysts bilateral    Hyperlipidemia     Hypertension     MVP (mitral valve prolapse)     Primary osteoarthritis of left knee     Restless leg     Schizophrenia, simple, chronic     Urinary, incontinence, stress female      Family History   Problem Relation Age of Onset    Sudden death Father     Cancer Mother     Hypertension Mother     Breast cancer Mother     Cancer Sister     Breast cancer Sister         Social History:   Marital Status:   Alcohol History:  reports no history of alcohol use.  Tobacco History:  reports that she quit smoking about 27 years ago. Her smoking use included cigarettes. She has a 5.00 pack-year smoking  history. She has never used smokeless tobacco.  Drug History:  reports no history of drug use.    Review of patient's allergies indicates:   Allergen Reactions    Lisinopril Nausea And Vomiting       Current Outpatient Medications   Medication Sig Dispense Refill    albuterol (PROVENTIL/VENTOLIN HFA) 90 mcg/actuation inhaler Inhale 2 puffs into the lungs every 6 (six) hours as needed for Wheezing. Rescue 18 g 5    amantadine HCL (SYMMETREL) 50 mg/5 mL Soln       ammonium lactate 12 % Crea Apply 1 application topically 2 (two) times daily as needed (dry skin). 280 g 0    aspirin (ECOTRIN) 81 MG EC tablet Take 81 mg by mouth once daily.      calcitRIOL (ROCALTROL) 0.25 MCG Cap Take 1 capsule (0.25 mcg total) by mouth every Mon, Wed, Fri. 24 capsule 2    calcium carbonate-vitamin D3 (CALCIUM 600 + D,3,) 600-125 mg-unit Tab       docusate sodium (COLACE) 100 MG capsule Take 100 mg by mouth 2 (two) times daily.      donepeziL (ARICEPT) 5 MG tablet Take 5 mg by mouth every evening.      escitalopram oxalate (LEXAPRO) 20 MG tablet Take 20 mg by mouth once daily.      ferrous sulfate (FEOSOL) 325 mg (65 mg iron) Tab tablet Take 1 tablet (325 mg total) by mouth every Mon, Wed, Fri. 36 tablet 1    flash glucose scanning reader (FREESTYLE JAY 14 DAY READER) Misc 1 Device by Misc.(Non-Drug; Combo Route) route once daily. 6 each 2    flash glucose sensor (FREESTYLE JAY 14 DAY SENSOR) Kit 1 Device by Misc.(Non-Drug; Combo Route) route every 14 (fourteen) days. 6 kit 2    fluticasone furoate-vilanteroL (BREO ELLIPTA) 100-25 mcg/dose diskus inhaler Inhale 1 puff into the lungs once daily. Controller 3 each 1    fluticasone propionate (FLONASE) 50 mcg/actuation nasal spray 2 sprays (100 mcg total) by Each Nostril route once daily. 3 g 6    losartan (COZAAR) 50 MG tablet Take 50 mg by mouth once daily.      metoprolol succinate (TOPROL-XL) 50 MG 24 hr tablet Take 50 mg by mouth 2 (two) times daily.       montelukast (SINGULAIR) 10 mg tablet Take 1 tablet (10 mg total) by mouth every evening. 90 tablet 3    OLANZAPINE ORAL Take 15 mg by mouth once daily.      pantoprazole (PROTONIX) 40 MG tablet Take 1 tablet (40 mg total) by mouth once daily. 90 tablet 3    potassium chloride SA (K-DUR,KLOR-CON) 20 MEQ tablet Take 1 tablet (20 mEq total) by mouth once daily. 90 tablet 3    pravastatin (PRAVACHOL) 40 MG tablet Take 1 tablet (40 mg total) by mouth nightly. 90 tablet 3    SITagliptin (JANUVIA) 50 MG Tab Take 100 mg by mouth once daily.      traZODone (DESYREL) 100 MG tablet Take 2 tablets (200 mg total) by mouth nightly as needed.       No current facility-administered medications for this visit.        Review of Systems      Objective:        Physical Exam:   Foot Exam    General  General Appearance: appears stated age and healthy   Orientation: alert and oriented to person, place, and time   Affect: appropriate   Gait: unimpaired       Right Foot/Ankle     Inspection and Palpation  Skin Exam: dry skin, skin changes (Atrophy of skin with no hair growth) and abnormal color (Venous stasis dermatitis); (Fungal toenails all toes)    Neurovascular  Dorsalis pedis: doppler  Posterior tibial: doppler  Saphenous nerve sensation: diminished  Tibial nerve sensation: diminished  Superficial peroneal nerve sensation: diminished  Deep peroneal nerve sensation: diminished  Sural nerve sensation: diminished      Left Foot/Ankle      Inspection and Palpation  Skin Exam: dry skin, skin changes (Atrophy of the skin with no hair growth) and abnormal color (Venous stasis dermatitis); (Fungal toenails all toes)    Neurovascular  Dorsalis pedis: doppler  Posterior tibial: doppler  Saphenous nerve sensation: diminished  Tibial nerve sensation: diminished  Superficial peroneal nerve sensation: diminished  Deep peroneal nerve sensation: diminished  Sural nerve sensation: diminished          Physical Exam   Cardiovascular:   Pulses:        Dorsalis pedis pulses are doppler on the right side and doppler on the left side.        Posterior tibial pulses are doppler on the right side and doppler on the left side.   Feet:   Right Foot:   Skin Integrity: Positive for dry skin.   Left Foot:   Skin Integrity: Positive for dry skin.       Imaging:            Assessment:       1. Diabetic polyneuropathy associated with type 2 diabetes mellitus    2. Onychogryphosis      Plan:   Diabetic polyneuropathy associated with type 2 diabetes mellitus    Onychogryphosis     1.  Today evaluate the patient and because of my findings of lack of vascular status and neurological deficit no hair growth skin color changes the patient is a high risk and is AQ 8.  2.Utilizing sterile nail nippers and electric , I aggressively debrided nails 1-5 bilaterally down to nail beds to thin the nail plates. Pt. tolerated well. No blood was drawn.  No follow-ups on file.    Procedures - None    Counseling:     I provided patient education verbally regarding:   Patient diagnosis, treatment options, as well as alternatives, risks, and benefits.     This note was created using Dragon voice recognition software that occasionally misinterpreted phrases or words.                 Answers for HPI/ROS submitted by the patient on 7/13/2020   Chronicity: recurrent  Onset: more than 1 year ago  Frequency: intermittently  Progression since onset: unchanged  Aggravating factors: nothing  Treatments tried: nothing  Improvement on treatment: no relief

## 2020-08-11 ENCOUNTER — HOSPITAL ENCOUNTER (EMERGENCY)
Facility: HOSPITAL | Age: 75
Discharge: PSYCHIATRIC OR SUBSTANCE USE FACILITY | End: 2020-08-11
Attending: EMERGENCY MEDICINE
Payer: MEDICARE

## 2020-08-11 VITALS
HEART RATE: 80 BPM | RESPIRATION RATE: 18 BRPM | OXYGEN SATURATION: 100 % | TEMPERATURE: 98 F | DIASTOLIC BLOOD PRESSURE: 75 MMHG | BODY MASS INDEX: 22.44 KG/M2 | HEIGHT: 67 IN | WEIGHT: 143 LBS | SYSTOLIC BLOOD PRESSURE: 173 MMHG

## 2020-08-11 DIAGNOSIS — R44.3 HALLUCINATIONS: ICD-10-CM

## 2020-08-11 DIAGNOSIS — F23 ACUTE PSYCHOSIS: Primary | ICD-10-CM

## 2020-08-11 LAB
ALBUMIN SERPL BCP-MCNC: 4.8 G/DL (ref 3.5–5.2)
ALP SERPL-CCNC: 70 U/L (ref 55–135)
ALT SERPL W/O P-5'-P-CCNC: 17 U/L (ref 10–44)
AMPHET+METHAMPHET UR QL: NEGATIVE
ANION GAP SERPL CALC-SCNC: 13 MMOL/L (ref 8–16)
APAP SERPL-MCNC: <10 UG/ML (ref 10–20)
AST SERPL-CCNC: 21 U/L (ref 10–40)
BARBITURATES UR QL SCN>200 NG/ML: NEGATIVE
BASOPHILS # BLD AUTO: 0.03 K/UL (ref 0–0.2)
BASOPHILS NFR BLD: 0.3 % (ref 0–1.9)
BENZODIAZ UR QL SCN>200 NG/ML: NEGATIVE
BILIRUB SERPL-MCNC: 0.9 MG/DL (ref 0.1–1)
BILIRUB UR QL STRIP: NEGATIVE
BUN SERPL-MCNC: 26 MG/DL (ref 8–23)
BZE UR QL SCN: NEGATIVE
CALCIUM SERPL-MCNC: 11 MG/DL (ref 8.7–10.5)
CANNABINOIDS UR QL SCN: NEGATIVE
CHLORIDE SERPL-SCNC: 102 MMOL/L (ref 95–110)
CLARITY UR: CLEAR
CO2 SERPL-SCNC: 24 MMOL/L (ref 23–29)
COLOR UR: YELLOW
CREAT SERPL-MCNC: 1.8 MG/DL (ref 0.5–1.4)
CREAT UR-MCNC: 72 MG/DL (ref 15–325)
DIFFERENTIAL METHOD: ABNORMAL
EOSINOPHIL # BLD AUTO: 0.1 K/UL (ref 0–0.5)
EOSINOPHIL NFR BLD: 1.5 % (ref 0–8)
ERYTHROCYTE [DISTWIDTH] IN BLOOD BY AUTOMATED COUNT: 13.9 % (ref 11.5–14.5)
EST. GFR  (AFRICAN AMERICAN): 31.7 ML/MIN/1.73 M^2
EST. GFR  (NON AFRICAN AMERICAN): 27.5 ML/MIN/1.73 M^2
ETHANOL SERPL-MCNC: <5 MG/DL
GLUCOSE SERPL-MCNC: 145 MG/DL (ref 70–110)
GLUCOSE UR QL STRIP: NEGATIVE
HCT VFR BLD AUTO: 39.4 % (ref 37–48.5)
HGB BLD-MCNC: 12.5 G/DL (ref 12–16)
HGB UR QL STRIP: NEGATIVE
IMM GRANULOCYTES # BLD AUTO: 0.02 K/UL (ref 0–0.04)
IMM GRANULOCYTES NFR BLD AUTO: 0.2 % (ref 0–0.5)
KETONES UR QL STRIP: NEGATIVE
LEUKOCYTE ESTERASE UR QL STRIP: NEGATIVE
LYMPHOCYTES # BLD AUTO: 2 K/UL (ref 1–4.8)
LYMPHOCYTES NFR BLD: 21 % (ref 18–48)
MCH RBC QN AUTO: 26.4 PG (ref 27–31)
MCHC RBC AUTO-ENTMCNC: 31.7 G/DL (ref 32–36)
MCV RBC AUTO: 83 FL (ref 82–98)
MONOCYTES # BLD AUTO: 0.8 K/UL (ref 0.3–1)
MONOCYTES NFR BLD: 8.7 % (ref 4–15)
NEUTROPHILS # BLD AUTO: 6.4 K/UL (ref 1.8–7.7)
NEUTROPHILS NFR BLD: 68.3 % (ref 38–73)
NITRITE UR QL STRIP: NEGATIVE
NRBC BLD-RTO: 0 /100 WBC
OPIATES UR QL SCN: NEGATIVE
PCP UR QL SCN>25 NG/ML: NEGATIVE
PH UR STRIP: 7 [PH] (ref 5–8)
PLATELET # BLD AUTO: 200 K/UL (ref 150–350)
PMV BLD AUTO: 9.7 FL (ref 9.2–12.9)
POTASSIUM SERPL-SCNC: 4.1 MMOL/L (ref 3.5–5.1)
PROT SERPL-MCNC: 8.6 G/DL (ref 6–8.4)
PROT UR QL STRIP: ABNORMAL
RBC # BLD AUTO: 4.73 M/UL (ref 4–5.4)
SALICYLATES SERPL-MCNC: <4 MG/DL (ref 15–30)
SARS-COV-2 RDRP RESP QL NAA+PROBE: NEGATIVE
SODIUM SERPL-SCNC: 139 MMOL/L (ref 136–145)
SP GR UR STRIP: 1.01 (ref 1–1.03)
TOXICOLOGY INFORMATION: NORMAL
TSH SERPL DL<=0.005 MIU/L-ACNC: 2.33 UIU/ML (ref 0.34–5.6)
URN SPEC COLLECT METH UR: ABNORMAL
UROBILINOGEN UR STRIP-ACNC: NEGATIVE EU/DL
WBC # BLD AUTO: 9.4 K/UL (ref 3.9–12.7)

## 2020-08-11 PROCEDURE — 85025 COMPLETE CBC W/AUTO DIFF WBC: CPT

## 2020-08-11 PROCEDURE — 80320 DRUG SCREEN QUANTALCOHOLS: CPT

## 2020-08-11 PROCEDURE — 80307 DRUG TEST PRSMV CHEM ANLYZR: CPT

## 2020-08-11 PROCEDURE — U0002 COVID-19 LAB TEST NON-CDC: HCPCS

## 2020-08-11 PROCEDURE — 99285 EMERGENCY DEPT VISIT HI MDM: CPT | Mod: CS

## 2020-08-11 PROCEDURE — 36415 COLL VENOUS BLD VENIPUNCTURE: CPT

## 2020-08-11 PROCEDURE — 81003 URINALYSIS AUTO W/O SCOPE: CPT

## 2020-08-11 PROCEDURE — 84443 ASSAY THYROID STIM HORMONE: CPT

## 2020-08-11 PROCEDURE — 80053 COMPREHEN METABOLIC PANEL: CPT

## 2020-08-11 NOTE — ED PROVIDER NOTES
Encounter Date: 8/11/2020       History     Chief Complaint   Patient presents with    Hallucinations     PER SISTER, SEEING THINGS THAT ARE NOT THERE     Patient here reported hallucinations brought in by her sister for evaluation of worsening delusions and hallucinations patient does have a longstanding history of schizophrenia usually well maintained on her medications she sees Dr. Syed got regularly sister states that over last few weeks patient has been seeing bugs on the wall she has been seen people in the house who are frightening to her states that they occasionally steal things from her and wear her clothes patient is becoming increasingly paranoid as well she lives home with her a 85 year-old  has accused him of kissing some of the people that are not in the house and has become agitated and violent with him over these hallucinations patient freely admits to seeing these people in the house she appears to be seeing people in the emergency department that are not here she also has been seeing bugs in the emergency department as well she tells me they are wearing black pants        Review of patient's allergies indicates:   Allergen Reactions    Lisinopril Nausea And Vomiting     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/2018    Dr Lance Foy- Also Renal cysts bilateral    Hyperlipidemia     Hypertension     MVP (mitral valve prolapse)     Primary osteoarthritis of left knee     Restless leg     Schizophrenia, simple, chronic     Urinary, incontinence, stress female      Past Surgical History:   Procedure Laterality Date    FOOT SURGERY      HYSTERECTOMY      TUBAL LIGATION        Family History   Problem Relation Age of Onset    Sudden death Father     Cancer Mother     Hypertension Mother     Breast cancer Mother     Cancer Sister     Breast cancer Sister      Social History     Tobacco Use    Smoking status: Former Smoker     Packs/day: 1.00     Years: 5.00     Pack years: 5.00     Types: Cigarettes     Quit date: 1992     Years since quittin.9    Smokeless tobacco: Never Used   Substance Use Topics    Alcohol use: No     Frequency: Never     Drinks per session: Patient refused     Binge frequency: Never    Drug use: No     Review of Systems   Constitutional: Negative for chills and fever.   HENT: Negative for congestion, ear pain and sore throat.    Eyes: Negative for pain.   Respiratory: Negative for cough and shortness of breath.    Cardiovascular: Negative for chest pain and leg swelling.   Gastrointestinal: Negative for abdominal pain, constipation, diarrhea, nausea and vomiting.   Genitourinary: Negative for dysuria, frequency and hematuria.   Musculoskeletal: Negative.    Skin: Negative for rash.   Allergic/Immunologic: Negative for immunocompromised state.   Neurological: Negative for weakness and headaches.   Hematological: Negative for adenopathy.   Psychiatric/Behavioral: Positive for decreased concentration, dysphoric mood, hallucinations and sleep disturbance. The patient is nervous/anxious.        Physical Exam     Initial Vitals [20 1524]   BP Pulse Resp Temp SpO2   (!) 215/83 88 18 98 °F (36.7 °C) 100 %      MAP       --         Physical Exam    Constitutional: She appears well-developed and well-nourished. No distress.   HENT:   Head: Normocephalic and atraumatic.   Right Ear: External ear normal.   Left Ear: External ear normal.   Mouth/Throat: Oropharynx is clear and moist.   Eyes: Conjunctivae and EOM are normal. Pupils are equal, round, and reactive to light.   Neck: Normal range of motion. Neck supple.   Cardiovascular: Normal rate,  regular rhythm, normal heart sounds and intact distal pulses.   Pulmonary/Chest: Breath sounds normal. No respiratory distress.   Abdominal: Soft. Bowel sounds are normal. There is no abdominal tenderness.   Musculoskeletal: Normal range of motion. No edema.   Neurological: She is alert and oriented to person, place, and time. GCS score is 15. GCS eye subscore is 4. GCS verbal subscore is 5. GCS motor subscore is 6.   Skin: Skin is warm and dry. Capillary refill takes less than 2 seconds. No rash noted.   Psychiatric: She has a normal mood and affect. Her speech is normal and behavior is normal. She is actively hallucinating. Thought content is paranoid and delusional. Cognition and memory are normal. She expresses inappropriate judgment. She expresses no homicidal and no suicidal ideation. She is attentive.         ED Course   Procedures  Labs Reviewed   CBC W/ AUTO DIFFERENTIAL - Abnormal; Notable for the following components:       Result Value    Mean Corpuscular Hemoglobin 26.4 (*)     Mean Corpuscular Hemoglobin Conc 31.7 (*)     All other components within normal limits   COMPREHENSIVE METABOLIC PANEL - Abnormal; Notable for the following components:    Glucose 145 (*)     BUN, Bld 26 (*)     Creatinine 1.8 (*)     Calcium 11.0 (*)     Total Protein 8.6 (*)     eGFR if  31.7 (*)     eGFR if non  27.5 (*)     All other components within normal limits   URINALYSIS, REFLEX TO URINE CULTURE - Abnormal; Notable for the following components:    Protein, UA Trace (*)     All other components within normal limits    Narrative:     Specimen Source->Urine   SALICYLATE LEVEL - Abnormal; Notable for the following components:    Salicylate Lvl <4.0 (*)     All other components within normal limits   TSH   DRUG SCREEN PANEL, URINE EMERGENCY    Narrative:     Specimen Source->Urine   ALCOHOL,MEDICAL (ETHANOL)   ACETAMINOPHEN LEVEL   SARS-COV-2 RNA AMPLIFICATION, QUAL          Imaging Results     None          Medical Decision Making:   ED Management:  Pec completed patient medically clear and stable for inpatient psychiatric treatment                   ED Course as of Aug 11 1852   Tue Aug 11, 2020   1603 73 y/o black female presents to the ER stating that people came over to her house last night and put on her clothes, were moving her bed around, were trying to scare her.  States that there were 4 people in her house.  She states that she started praying about it.  This AM when she awoke she found 4 adults and 2 children dancing around in her house.  She says that they packed her clothing in a suit case.  Sister states that this didn't happen.  Pt admits to hx of schizophrenia and sees Dr Ham.  States that she sees bugs crawling on people.  Sister says that this happens every 10 yrs or so.  Gotten worse over the past few weeks.      [LG]      ED Course User Index  [LG] Gasper Amado III, NP                Clinical Impression:       ICD-10-CM ICD-9-CM   1. Acute psychosis  F23 298.9   2. Hallucinations  R44.3 780.1                                Adolfo Orellana MD  08/11/20 7190

## 2020-08-12 NOTE — ED NOTES
At 1945- Pt remains in purple gown, resting in stretcher comfortably. No signs of distress noted. Sitter remains at bedside in direct visual contact, charting per protocol every 15 minutes. No equipment or belongings are in the patients room. Pt aware of plan of care. Will continue to monitor.

## 2020-08-12 NOTE — ED NOTES
At 1845- Received report from JERROD Valero. Assume care of pt. Pt resting in stretcher, NAD noted. Pt remains in purple gown, sitter remains in direct line of vision. Charting per protocol. Pt oriented to person and time only.

## 2020-08-12 NOTE — ED NOTES
At 2045- Pt stable, no needs expressed, sitter in view of patient. Pt updated on acceptance to [facility]. Pt informed ETA of transport 1-2 hours. Pt verbalized understanding. Denies questions/concerns.

## 2020-08-12 NOTE — ED NOTES
Pt transported to [facility] by [facility]. Pt escorted out of hospital by ED staff, security x2 and [facility] personnel. Pt belongings and original PEC given to [facility] personnel. NAD noted. Pt stable for transport.

## 2020-08-17 RX ORDER — AMMONIUM LACTATE 12 G/100G
1 CREAM TOPICAL 2 TIMES DAILY PRN
Qty: 280 G | Refills: 0 | Status: SHIPPED | OUTPATIENT
Start: 2020-08-17 | End: 2020-09-18 | Stop reason: SDUPTHER

## 2020-08-24 ENCOUNTER — TELEPHONE (OUTPATIENT)
Dept: FAMILY MEDICINE | Facility: CLINIC | Age: 75
End: 2020-08-24

## 2020-08-24 DIAGNOSIS — U07.1 COVID-19 VIRUS INFECTION: ICD-10-CM

## 2020-08-24 DIAGNOSIS — R53.1 WEAKNESS: Primary | ICD-10-CM

## 2020-08-24 NOTE — TELEPHONE ENCOUNTER
Son called  Pt is positive for covid  8/18/20  No symptoms   So after her 14 days  he wants  order for weakness and  She was in [facility] hosp for [facility] stay    Send   to [facility]

## 2020-08-27 ENCOUNTER — TELEPHONE (OUTPATIENT)
Dept: FAMILY MEDICINE | Facility: CLINIC | Age: 75
End: 2020-08-27

## 2020-08-27 NOTE — TELEPHONE ENCOUNTER
Lynnette, nurse from , said Hai, pt.'s son called her. Pt. has COVID but has been asymptomatic. Pt. had a fever of 101 this AM. Hai gave the pt. Tylenol & her temp. went down to 98.6. She is not eating or taking meds.  can't go visit her until she is symptom free x3 days.  wanted to make you aware of the situation. Her son's number is:[phone] if you need to contact him.

## 2020-09-01 ENCOUNTER — TELEPHONE (OUTPATIENT)
Dept: FAMILY MEDICINE | Facility: CLINIC | Age: 75
End: 2020-09-01

## 2020-09-01 DIAGNOSIS — U07.1 COVID-19 VIRUS INFECTION: Primary | ICD-10-CM

## 2020-09-01 DIAGNOSIS — R26.2 DIFFICULTY WALKING: ICD-10-CM

## 2020-09-01 DIAGNOSIS — R53.1 WEAKNESS: ICD-10-CM

## 2020-09-01 NOTE — TELEPHONE ENCOUNTER
HH wants wheelchair order  She has covid and is very weak     Order for wheelchair has been printed.

## 2020-09-09 ENCOUNTER — OFFICE VISIT (OUTPATIENT)
Dept: FAMILY MEDICINE | Facility: CLINIC | Age: 75
End: 2020-09-09
Payer: MEDICARE

## 2020-09-09 VITALS — DIASTOLIC BLOOD PRESSURE: 73 MMHG | SYSTOLIC BLOOD PRESSURE: 135 MMHG | HEIGHT: 67 IN | BODY MASS INDEX: 22.41 KG/M2

## 2020-09-09 DIAGNOSIS — E11.9 TYPE 2 DIABETES MELLITUS WITHOUT COMPLICATION, WITHOUT LONG-TERM CURRENT USE OF INSULIN: Primary | ICD-10-CM

## 2020-09-09 DIAGNOSIS — E78.2 MULTIPLE-TYPE HYPERLIPIDEMIA: ICD-10-CM

## 2020-09-09 DIAGNOSIS — N18.30 CHRONIC KIDNEY DISEASE, STAGE 3 (MODERATE): ICD-10-CM

## 2020-09-09 DIAGNOSIS — I10 BENIGN ESSENTIAL HYPERTENSION: ICD-10-CM

## 2020-09-09 PROCEDURE — 3078F DIAST BP <80 MM HG: CPT | Mod: 95,,, | Performed by: INTERNAL MEDICINE

## 2020-09-09 PROCEDURE — 1101F PR PT FALLS ASSESS DOC 0-1 FALLS W/OUT INJ PAST YR: ICD-10-PCS | Mod: 95,,, | Performed by: INTERNAL MEDICINE

## 2020-09-09 PROCEDURE — 3075F PR MOST RECENT SYSTOLIC BLOOD PRESS GE 130-139MM HG: ICD-10-PCS | Mod: 95,,, | Performed by: INTERNAL MEDICINE

## 2020-09-09 PROCEDURE — 1159F MED LIST DOCD IN RCRD: CPT | Mod: 95,,, | Performed by: INTERNAL MEDICINE

## 2020-09-09 PROCEDURE — 99214 PR OFFICE/OUTPT VISIT, EST, LEVL IV, 30-39 MIN: ICD-10-PCS | Mod: 95,,, | Performed by: INTERNAL MEDICINE

## 2020-09-09 PROCEDURE — 3075F SYST BP GE 130 - 139MM HG: CPT | Mod: 95,,, | Performed by: INTERNAL MEDICINE

## 2020-09-09 PROCEDURE — 1101F PT FALLS ASSESS-DOCD LE1/YR: CPT | Mod: 95,,, | Performed by: INTERNAL MEDICINE

## 2020-09-09 PROCEDURE — 99214 OFFICE O/P EST MOD 30 MIN: CPT | Mod: 95,,, | Performed by: INTERNAL MEDICINE

## 2020-09-09 PROCEDURE — 3051F HG A1C>EQUAL 7.0%<8.0%: CPT | Mod: 95,,, | Performed by: INTERNAL MEDICINE

## 2020-09-09 PROCEDURE — 1159F PR MEDICATION LIST DOCUMENTED IN MEDICAL RECORD: ICD-10-PCS | Mod: 95,,, | Performed by: INTERNAL MEDICINE

## 2020-09-09 PROCEDURE — 3078F PR MOST RECENT DIASTOLIC BLOOD PRESSURE < 80 MM HG: ICD-10-PCS | Mod: 95,,, | Performed by: INTERNAL MEDICINE

## 2020-09-09 PROCEDURE — 3051F PR MOST RECENT HEMOGLOBIN A1C LEVEL 7.0 - < 8.0%: ICD-10-PCS | Mod: 95,,, | Performed by: INTERNAL MEDICINE

## 2020-09-09 NOTE — PROGRESS NOTES
Subjective:      Chief Complaint   Patient presents with    Hyperlipidemia    Hypertension    Diabetes    Chronic Kidney Disease    At risk for fall/unsteady gait    Behavioral issues       The chief complaint leading to consultation is:  Diabetes mellitus, chronic kidney disease, hypertension, gastroesophageal reflux, neuropathy, generalized weakness, difficulty walking, mental changes  The patient location is:  Home  Visit type: Virtual visit with synchronous audio/video or audio only  This was a video visit in lieu of in-person visit due to the coronavirus emergency. Patient acknowledged and consented to the video visit encounter.     This is a virtual consultation for patient miss sinan Perez who is a 74-year-old  female.  The reason for virtual consultation is currently prevailing COVID-19 pandemic.    Underlying medical issues include type 2 diabetes mellitus, hypertension, chronic kidney disease, dyslipidemia, mental disorder with underlying schizoaffective versus schizophrenia condition.    She has gait instability and difficulty walking.    On 11th of August she was hospitalized to mental health facility from [facility] emergency department for hallucinations and abnormal behavior.  She was discharged on 18th of August.    The following medications were discontinued.  Amantadine, calcitriol, ferrous sulfate and hydrochlorothiazide.    I suspect she was dehydrated at that point.    New medications that were added included hydralazine for blood pressure and gabapentin for neuropathy.    She was also discharged on Zyprexa 20 mg and she would be extremely drowsy.  This was reduced to 10 mg.  She continues on escitalopram 20 mg and donepezil 5 mg per day.    Unfortunately she continues to be bed-bound and needs significant help for ambulation.  [facility] is following her and is also giving her some physical therapy.    Tomorrow she has a follow-up appointment with  Dr. Patterson, psychiatry through [facility]    Hyperlipidemia  This is a chronic problem. The current episode started more than 1 year ago. The problem is controlled. Exacerbating diseases include chronic renal disease. She has no history of obesity. Pertinent negatives include no chest pain or shortness of breath. Current antihyperlipidemic treatment includes statins. The current treatment provides moderate improvement of lipids. Compliance problems include psychosocial issues.    Hypertension  This is a chronic problem. The problem is controlled. Pertinent negatives include no chest pain, headaches, neck pain, palpitations or shortness of breath. Risk factors for coronary artery disease include post-menopausal state, dyslipidemia and diabetes mellitus. The current treatment provides moderate improvement. Compliance problems include psychosocial issues.  Identifiable causes of hypertension include chronic renal disease.   Diabetes  She presents for her follow-up diabetic visit. She has type 2 diabetes mellitus. Her disease course has been fluctuating. Pertinent negatives for hypoglycemia include no confusion or headaches. Associated symptoms include weakness. Pertinent negatives for diabetes include no chest pain, no polydipsia and no polyuria. Risk factors for coronary artery disease include sedentary lifestyle, hypertension, dyslipidemia and diabetes mellitus. Current diabetic treatment includes oral agent (monotherapy) (Januvia). She is compliant with treatment most of the time. Weight trend: Unknown. Meal planning includes avoidance of concentrated sweets. Her home blood glucose trend is fluctuating minimally. Her breakfast blood glucose range is generally 140-180 mg/dl. An ACE inhibitor/angiotensin II receptor blocker is being taken.     .Physical Exam   Constitutional:   This is a virtual consult with limited physical exam capabilities.  Patient was found to be on the bed.  She appeared to be slightly puffy.     Neurological: She is alert.   Skin: She is not diaphoretic.     Bed bound  Past Surgical History:   Procedure Laterality Date    FOOT SURGERY      HYSTERECTOMY      TUBAL LIGATION       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/2018    Dr Lance Foy- Also Renal cysts bilateral    Hyperlipidemia     Hypertension     MVP (mitral valve prolapse)     Primary osteoarthritis of left knee     Restless leg     Schizophrenia, simple, chronic     Urinary, incontinence, stress female      Family History   Problem Relation Age of Onset    Sudden death Father     Cancer Mother     Hypertension Mother     Breast cancer Mother     Cancer Sister     Breast cancer Sister         Social History:   Marital Status:   Alcohol History:  reports no history of alcohol use.  Tobacco History:  reports that she quit smoking about 28 years ago. Her smoking use included cigarettes. She has a 5.00 pack-year smoking history. She has never used smokeless tobacco.  Drug History:  reports no history of drug use.    Review of patient's allergies indicates:   Allergen Reactions    Lisinopril Nausea And Vomiting       Current Outpatient Medications   Medication Sig Dispense Refill    albuterol (PROVENTIL/VENTOLIN HFA) 90 mcg/actuation inhaler Inhale 2 puffs into the lungs every 6 (six) hours as needed for Wheezing. Rescue 18 g 5    ammonium lactate 12 % Crea Apply 1 application topically 2 (two) times daily as needed (dry skin). 280 g 0    aspirin (ECOTRIN) 81 MG EC tablet Take 81 mg by mouth once daily.      calcitRIOL (ROCALTROL) 0.25 MCG Cap Take 1 capsule (0.25 mcg total) by mouth every Mon, Wed, Fri. 24  capsule 2    calcium carbonate-vitamin D3 (CALCIUM 600 + D,3,) 600-125 mg-unit Tab       docusate sodium (COLACE) 100 MG capsule Take 100 mg by mouth 2 (two) times daily.      donepeziL (ARICEPT) 5 MG tablet Take 5 mg by mouth every evening.      escitalopram oxalate (LEXAPRO) 20 MG tablet Take 20 mg by mouth once daily.      ferrous sulfate (FEOSOL) 325 mg (65 mg iron) Tab tablet Take 1 tablet (325 mg total) by mouth every Mon, Wed, Fri. 36 tablet 1    flash glucose scanning reader (FREESTYLE JAY 14 DAY READER) Misc 1 Device by Misc.(Non-Drug; Combo Route) route once daily. 6 each 2    flash glucose sensor (FREESTYLE JAY 14 DAY SENSOR) Kit 1 Device by Misc.(Non-Drug; Combo Route) route every 14 (fourteen) days. 6 kit 2    fluticasone furoate-vilanteroL (BREO ELLIPTA) 100-25 mcg/dose diskus inhaler Inhale 1 puff into the lungs once daily. Controller 3 each 1    fluticasone propionate (FLONASE) 50 mcg/actuation nasal spray 2 sprays (100 mcg total) by Each Nostril route once daily. 3 g 6    losartan (COZAAR) 50 MG tablet Take 50 mg by mouth once daily.      metoprolol succinate (TOPROL-XL) 50 MG 24 hr tablet Take 50 mg by mouth 2 (two) times daily.      montelukast (SINGULAIR) 10 mg tablet Take 1 tablet (10 mg total) by mouth every evening. 90 tablet 3    OLANZAPINE ORAL Take 15 mg by mouth once daily.      pantoprazole (PROTONIX) 40 MG tablet Take 1 tablet (40 mg total) by mouth once daily. 90 tablet 3    potassium chloride SA (K-DUR,KLOR-CON) 20 MEQ tablet Take 1 tablet (20 mEq total) by mouth once daily. 90 tablet 3    pravastatin (PRAVACHOL) 40 MG tablet Take 1 tablet (40 mg total) by mouth nightly. 90 tablet 3    SITagliptin (JANUVIA) 50 MG Tab Take 100 mg by mouth once daily.      traZODone (DESYREL) 100 MG tablet Take 2 tablets (200 mg total) by mouth nightly as needed.       No current facility-administered medications for this visit.        Review of Systems   Constitutional: Positive  "for activity change (Bed bound). Negative for chills, fever and unexpected weight change.   HENT: Negative for hearing loss, rhinorrhea and trouble swallowing.    Eyes: Negative for pain, discharge and visual disturbance.   Respiratory: Negative for chest tightness, shortness of breath and wheezing.    Cardiovascular: Negative for chest pain and palpitations.   Gastrointestinal: Negative for abdominal distention, abdominal pain, blood in stool, constipation, diarrhea and vomiting.   Endocrine: Negative for polydipsia and polyuria.        DM   Genitourinary: Negative for difficulty urinating, dysuria, hematuria and menstrual problem.   Musculoskeletal: Negative for arthralgias, joint swelling and neck pain.   Neurological: Positive for weakness. Negative for headaches.   Psychiatric/Behavioral: Positive for behavioral problems. Negative for confusion and dysphoric mood.        History of schizophrenia or schzoid disorder.         Objective:      Vitals:    09/09/20 1328   BP: 135/73   Height: 5' 7" (1.702 m)     Physical Exam:   Physical Exam   Constitutional:   This is a virtual consult with limited physical exam capabilities.  Patient was found to be on the bed.  She appeared to be slightly puffy.    Neurological: She is alert.   Skin: She is not diaphoretic.        Assessment:       1. Type 2 diabetes mellitus without complication, without long-term current use of insulin    2. Benign essential hypertension    3. Multiple-type hyperlipidemia    4. Chronic kidney disease, stage 3 (moderate)      Plan:   Type 2 diabetes mellitus without complication, without long-term current use of insulin  -     Hemoglobin A1C; Future; Expected date: 09/14/2020    Benign essential hypertension  -     Comprehensive metabolic panel; Future; Expected date: 09/14/2020    Multiple-type hyperlipidemia    Chronic kidney disease, stage 3 (moderate)  -     CBC auto differential; Future; Expected date: 09/14/2020  -     Phosphorus; Future; " Expected date: 09/09/2020  -     Magnesium; Future; Expected date: 09/09/2020     Events leading to decompensation of behavioral issues, hallucinations were noted with visit to the emergency department.  Subsequent stay at mental health facility also has been noted though the details are unclear to me as well as the son.    New medication changes have been noted and I have advised the son to update me accordingly.      Wheelchair order had been given.    I have reviewed every medication on the usage.    Fall precautions have been discussed.    Patient has a home health following.    New medications include hydralazine and gabapentin.  Doses need to be confirmed.    Home health orders for labs to be given for A1c, metabolic panel and blood counts.    Chronic kidney disease precautions have been discussed.  Avoidance of NSAIDs.  Avoid is dehydration.    Long-term prognosis continues to be guarded.  Patient's lack of adequate insight, cognitive decline, multiple medical issues, fall risks are all taking their toll.    [place] events like falls, decompensation of mental behavior, intercurrent infections will accelerate her decline  .        Follow up in about 6 weeks (around 10/21/2020) for Diabetes/HTN/Lipids.    Total time spent with patient: 25 mts      Each patient to whom he or she provides medical services by telemedicine is:  (1) informed of the relationship between the physician and patient and the respective role of any other health care provider with respect to management of the patient; and (2) notified that he or she may decline to receive medical services by telemedicine and may withdraw from such care at any time.    This note was created using [employer] voice recognition software that occasionally misinterprets phrases or words.     Current Outpatient Medications on File Prior to Visit   Medication Sig Dispense Refill    albuterol (PROVENTIL/VENTOLIN HFA) 90 mcg/actuation inhaler Inhale 2 puffs into the  lungs every 6 (six) hours as needed for Wheezing. Rescue 18 g 5    ammonium lactate 12 % Crea Apply 1 application topically 2 (two) times daily as needed (dry skin). 280 g 0    aspirin (ECOTRIN) 81 MG EC tablet Take 81 mg by mouth once daily.      calcitRIOL (ROCALTROL) 0.25 MCG Cap Take 1 capsule (0.25 mcg total) by mouth every Mon, Wed, Fri. 24 capsule 2    calcium carbonate-vitamin D3 (CALCIUM 600 + D,3,) 600-125 mg-unit Tab       docusate sodium (COLACE) 100 MG capsule Take 100 mg by mouth 2 (two) times daily.      donepeziL (ARICEPT) 5 MG tablet Take 5 mg by mouth every evening.      escitalopram oxalate (LEXAPRO) 20 MG tablet Take 20 mg by mouth once daily.      ferrous sulfate (FEOSOL) 325 mg (65 mg iron) Tab tablet Take 1 tablet (325 mg total) by mouth every Mon, Wed, Fri. 36 tablet 1    flash glucose scanning reader (FREESTYLE JAY 14 DAY READER) Misc 1 Device by Misc.(Non-Drug; Combo Route) route once daily. 6 each 2    flash glucose sensor (FREESTYLE JAY 14 DAY SENSOR) Kit 1 Device by Misc.(Non-Drug; Combo Route) route every 14 (fourteen) days. 6 kit 2    fluticasone furoate-vilanteroL (BREO ELLIPTA) 100-25 mcg/dose diskus inhaler Inhale 1 puff into the lungs once daily. Controller 3 each 1    fluticasone propionate (FLONASE) 50 mcg/actuation nasal spray 2 sprays (100 mcg total) by Each Nostril route once daily. 3 g 6    losartan (COZAAR) 50 MG tablet Take 50 mg by mouth once daily.      metoprolol succinate (TOPROL-XL) 50 MG 24 hr tablet Take 50 mg by mouth 2 (two) times daily.      montelukast (SINGULAIR) 10 mg tablet Take 1 tablet (10 mg total) by mouth every evening. 90 tablet 3    OLANZAPINE ORAL Take 15 mg by mouth once daily.      pantoprazole (PROTONIX) 40 MG tablet Take 1 tablet (40 mg total) by mouth once daily. 90 tablet 3    potassium chloride SA (K-DUR,KLOR-CON) 20 MEQ tablet Take 1 tablet (20 mEq total) by mouth once daily. 90 tablet 3    pravastatin (PRAVACHOL) 40 MG  tablet Take 1 tablet (40 mg total) by mouth nightly. 90 tablet 3    SITagliptin (JANUVIA) 50 MG Tab Take 100 mg by mouth once daily.      traZODone (DESYREL) 100 MG tablet Take 2 tablets (200 mg total) by mouth nightly as needed.       No current facility-administered medications on file prior to visit.

## 2020-09-09 NOTE — Clinical Note
Follow-up in 6 weeks for review of medical conditions including diabetes, hypertension, dyslipidemia-office visit if possible.

## 2020-09-10 ENCOUNTER — LAB VISIT (OUTPATIENT)
Dept: LAB | Facility: HOSPITAL | Age: 75
End: 2020-09-10
Attending: INTERNAL MEDICINE
Payer: MEDICARE

## 2020-09-10 DIAGNOSIS — E83.42 HYPOMAGNESEMIA: Primary | ICD-10-CM

## 2020-09-10 DIAGNOSIS — N18.30 CHRONIC KIDNEY DISEASE, STAGE 3 (MODERATE): ICD-10-CM

## 2020-09-10 DIAGNOSIS — I10 BENIGN ESSENTIAL HYPERTENSION: ICD-10-CM

## 2020-09-10 DIAGNOSIS — E11.9 TYPE 2 DIABETES MELLITUS WITHOUT COMPLICATION, WITHOUT LONG-TERM CURRENT USE OF INSULIN: ICD-10-CM

## 2020-09-10 LAB
ALBUMIN SERPL BCP-MCNC: 2.5 G/DL (ref 3.5–5.2)
ALP SERPL-CCNC: 69 U/L (ref 55–135)
ALT SERPL W/O P-5'-P-CCNC: 21 U/L (ref 10–44)
ANION GAP SERPL CALC-SCNC: 15 MMOL/L (ref 8–16)
AST SERPL-CCNC: 17 U/L (ref 10–40)
BASOPHILS # BLD AUTO: 0.02 K/UL (ref 0–0.2)
BASOPHILS NFR BLD: 0.3 % (ref 0–1.9)
BILIRUB SERPL-MCNC: 0.6 MG/DL (ref 0.1–1)
BUN SERPL-MCNC: 16 MG/DL (ref 8–23)
CALCIUM SERPL-MCNC: 9.5 MG/DL (ref 8.7–10.5)
CHLORIDE SERPL-SCNC: 100 MMOL/L (ref 95–110)
CO2 SERPL-SCNC: 21 MMOL/L (ref 23–29)
CREAT SERPL-MCNC: 1.6 MG/DL (ref 0.5–1.4)
DIFFERENTIAL METHOD: ABNORMAL
EOSINOPHIL # BLD AUTO: 0.1 K/UL (ref 0–0.5)
EOSINOPHIL NFR BLD: 1.4 % (ref 0–8)
ERYTHROCYTE [DISTWIDTH] IN BLOOD BY AUTOMATED COUNT: 13.9 % (ref 11.5–14.5)
EST. GFR  (AFRICAN AMERICAN): 36.6 ML/MIN/1.73 M^2
EST. GFR  (NON AFRICAN AMERICAN): 31.7 ML/MIN/1.73 M^2
ESTIMATED AVG GLUCOSE: 200 MG/DL (ref 68–131)
GLUCOSE SERPL-MCNC: 224 MG/DL (ref 70–110)
HBA1C MFR BLD HPLC: 8.6 % (ref 4.5–6.2)
HCT VFR BLD AUTO: 29.9 % (ref 37–48.5)
HGB BLD-MCNC: 9.5 G/DL (ref 12–16)
IMM GRANULOCYTES # BLD AUTO: 0.03 K/UL (ref 0–0.04)
IMM GRANULOCYTES NFR BLD AUTO: 0.5 % (ref 0–0.5)
LYMPHOCYTES # BLD AUTO: 1.2 K/UL (ref 1–4.8)
LYMPHOCYTES NFR BLD: 20.6 % (ref 18–48)
MAGNESIUM SERPL-MCNC: 1.4 MG/DL (ref 1.6–2.6)
MCH RBC QN AUTO: 26.1 PG (ref 27–31)
MCHC RBC AUTO-ENTMCNC: 31.8 G/DL (ref 32–36)
MCV RBC AUTO: 82 FL (ref 82–98)
MONOCYTES # BLD AUTO: 0.8 K/UL (ref 0.3–1)
MONOCYTES NFR BLD: 13.3 % (ref 4–15)
NEUTROPHILS # BLD AUTO: 3.8 K/UL (ref 1.8–7.7)
NEUTROPHILS NFR BLD: 63.9 % (ref 38–73)
NRBC BLD-RTO: 0 /100 WBC
PHOSPHATE SERPL-MCNC: 3.3 MG/DL (ref 2.7–4.5)
PLATELET # BLD AUTO: 341 K/UL (ref 150–350)
PMV BLD AUTO: 8.9 FL (ref 9.2–12.9)
POTASSIUM SERPL-SCNC: 3.8 MMOL/L (ref 3.5–5.1)
PROT SERPL-MCNC: 5.8 G/DL (ref 6–8.4)
RBC # BLD AUTO: 3.64 M/UL (ref 4–5.4)
SODIUM SERPL-SCNC: 136 MMOL/L (ref 136–145)
WBC # BLD AUTO: 5.87 K/UL (ref 3.9–12.7)

## 2020-09-10 PROCEDURE — 83735 ASSAY OF MAGNESIUM: CPT

## 2020-09-10 PROCEDURE — 83036 HEMOGLOBIN GLYCOSYLATED A1C: CPT

## 2020-09-10 PROCEDURE — 85025 COMPLETE CBC W/AUTO DIFF WBC: CPT

## 2020-09-10 PROCEDURE — 80053 COMPREHEN METABOLIC PANEL: CPT

## 2020-09-10 PROCEDURE — 84100 ASSAY OF PHOSPHORUS: CPT

## 2020-09-10 RX ORDER — LANOLIN ALCOHOL/MO/W.PET/CERES
400 CREAM (GRAM) TOPICAL
Qty: 36 TABLET | Refills: 2 | Status: SHIPPED | OUTPATIENT
Start: 2020-09-11 | End: 2021-03-31

## 2020-09-10 NOTE — PROGRESS NOTES
Please set up on other virtual appointment for patient to discuss worsening of hemoglobin A1c to 8.6, anemia, low magnesium.  Kidney test is better as compared to last time.  Virtual appointment in 1-2 weeks.  I am sending some magnesium supplements to be taken 3 times a week.  She should also keep her appointment with the kidney doctor for anemia as well as kidney test.

## 2020-09-14 ENCOUNTER — TELEPHONE (OUTPATIENT)
Dept: FAMILY MEDICINE | Facility: CLINIC | Age: 75
End: 2020-09-14

## 2020-09-14 DIAGNOSIS — I10 BENIGN ESSENTIAL HYPERTENSION: Primary | ICD-10-CM

## 2020-09-14 RX ORDER — HYDRALAZINE HYDROCHLORIDE 10 MG/1
10 TABLET, FILM COATED ORAL 3 TIMES DAILY
COMMUNITY
Start: 2020-08-22 | End: 2020-09-16 | Stop reason: SDUPTHER

## 2020-09-14 NOTE — TELEPHONE ENCOUNTER
Spoke with son Willy Perez Jr.results of his mom's labs were given. They have appt in office on 10/23. He was also notified that rx for magnesium was sent to [facility].    Pt  Son states when his mom was in [place] they gave her a rx for  Hydralazine 10mg tid, he is asking if this is something she should be taking because she is out.

## 2020-09-14 NOTE — TELEPHONE ENCOUNTER
Spoke with Mr. Perez, states his son takes care of all of his moms stuff, he is on a plane, so we will have to call him back.. pt needs to do virtual appt. In 2 weeks per Dr. Koch. Also has abnormal labs.

## 2020-09-16 ENCOUNTER — PATIENT MESSAGE (OUTPATIENT)
Dept: FAMILY MEDICINE | Facility: CLINIC | Age: 75
End: 2020-09-16

## 2020-09-16 RX ORDER — HYDRALAZINE HYDROCHLORIDE 10 MG/1
10 TABLET, FILM COATED ORAL 3 TIMES DAILY
Qty: 270 TABLET | Refills: 1 | Status: SHIPPED | OUTPATIENT
Start: 2020-09-16 | End: 2021-01-30

## 2020-09-16 NOTE — TELEPHONE ENCOUNTER
Pt is out of the hydralazine so you want to send a rx to the pharm or just wait till office viit to discuss?

## 2020-09-21 RX ORDER — AMMONIUM LACTATE 12 G/100G
1 CREAM TOPICAL 2 TIMES DAILY PRN
Qty: 280 G | Refills: 0 | Status: SHIPPED | OUTPATIENT
Start: 2020-09-21 | End: 2021-02-23

## 2020-09-24 ENCOUNTER — DOCUMENT SCAN (OUTPATIENT)
Dept: HOME HEALTH SERVICES | Facility: HOSPITAL | Age: 75
End: 2020-09-24

## 2020-09-30 ENCOUNTER — EXTERNAL HOME HEALTH (OUTPATIENT)
Dept: HOME HEALTH SERVICES | Facility: HOSPITAL | Age: 75
End: 2020-09-30

## 2020-10-08 ENCOUNTER — DOCUMENT SCAN (OUTPATIENT)
Dept: HOME HEALTH SERVICES | Facility: HOSPITAL | Age: 75
End: 2020-10-08

## 2020-10-15 ENCOUNTER — DOCUMENT SCAN (OUTPATIENT)
Dept: HOME HEALTH SERVICES | Facility: HOSPITAL | Age: 75
End: 2020-10-15

## 2020-10-23 ENCOUNTER — OFFICE VISIT (OUTPATIENT)
Dept: FAMILY MEDICINE | Facility: CLINIC | Age: 75
End: 2020-10-23
Payer: MEDICARE

## 2020-10-23 VITALS
HEIGHT: 67 IN | DIASTOLIC BLOOD PRESSURE: 80 MMHG | WEIGHT: 145.69 LBS | BODY MASS INDEX: 22.87 KG/M2 | SYSTOLIC BLOOD PRESSURE: 165 MMHG | HEART RATE: 89 BPM | TEMPERATURE: 98 F

## 2020-10-23 DIAGNOSIS — I10 BENIGN ESSENTIAL HYPERTENSION: Primary | Chronic | ICD-10-CM

## 2020-10-23 DIAGNOSIS — E78.2 MULTIPLE-TYPE HYPERLIPIDEMIA: ICD-10-CM

## 2020-10-23 DIAGNOSIS — U07.1 COVID-19 VIRUS INFECTION: ICD-10-CM

## 2020-10-23 DIAGNOSIS — R29.6 RECURRENT FALLS: ICD-10-CM

## 2020-10-23 DIAGNOSIS — R26.2 DIFFICULTY WALKING: ICD-10-CM

## 2020-10-23 DIAGNOSIS — E11.9 TYPE 2 DIABETES MELLITUS WITHOUT COMPLICATION, WITHOUT LONG-TERM CURRENT USE OF INSULIN: ICD-10-CM

## 2020-10-23 DIAGNOSIS — G20.A1 PARKINSONS: Chronic | ICD-10-CM

## 2020-10-23 DIAGNOSIS — Z23 NEED FOR INFLUENZA VACCINATION: ICD-10-CM

## 2020-10-23 DIAGNOSIS — N18.32 STAGE 3B CHRONIC KIDNEY DISEASE: ICD-10-CM

## 2020-10-23 PROCEDURE — 90662 IIV NO PRSV INCREASED AG IM: CPT | Mod: S$GLB,,, | Performed by: INTERNAL MEDICINE

## 2020-10-23 PROCEDURE — G0008 FLU VACCINE - QUADRIVALENT - HIGH DOSE (65+) PRESERVATIVE FREE IM: ICD-10-PCS | Mod: S$GLB,,, | Performed by: INTERNAL MEDICINE

## 2020-10-23 PROCEDURE — 3052F PR MOST RECENT HEMOGLOBIN A1C LEVEL 8.0 - < 9.0%: ICD-10-PCS | Mod: S$GLB,,, | Performed by: INTERNAL MEDICINE

## 2020-10-23 PROCEDURE — 90662 FLU VACCINE - QUADRIVALENT - HIGH DOSE (65+) PRESERVATIVE FREE IM: ICD-10-PCS | Mod: S$GLB,,, | Performed by: INTERNAL MEDICINE

## 2020-10-23 PROCEDURE — 3077F PR MOST RECENT SYSTOLIC BLOOD PRESSURE >= 140 MM HG: ICD-10-PCS | Mod: S$GLB,,, | Performed by: INTERNAL MEDICINE

## 2020-10-23 PROCEDURE — 3079F DIAST BP 80-89 MM HG: CPT | Mod: S$GLB,,, | Performed by: INTERNAL MEDICINE

## 2020-10-23 PROCEDURE — 3077F SYST BP >= 140 MM HG: CPT | Mod: S$GLB,,, | Performed by: INTERNAL MEDICINE

## 2020-10-23 PROCEDURE — 1159F MED LIST DOCD IN RCRD: CPT | Mod: S$GLB,,, | Performed by: INTERNAL MEDICINE

## 2020-10-23 PROCEDURE — 3079F PR MOST RECENT DIASTOLIC BLOOD PRESSURE 80-89 MM HG: ICD-10-PCS | Mod: S$GLB,,, | Performed by: INTERNAL MEDICINE

## 2020-10-23 PROCEDURE — 3008F BODY MASS INDEX DOCD: CPT | Mod: S$GLB,,, | Performed by: INTERNAL MEDICINE

## 2020-10-23 PROCEDURE — 1101F PR PT FALLS ASSESS DOC 0-1 FALLS W/OUT INJ PAST YR: ICD-10-PCS | Mod: S$GLB,,, | Performed by: INTERNAL MEDICINE

## 2020-10-23 PROCEDURE — G0008 ADMIN INFLUENZA VIRUS VAC: HCPCS | Mod: S$GLB,,, | Performed by: INTERNAL MEDICINE

## 2020-10-23 PROCEDURE — 1159F PR MEDICATION LIST DOCUMENTED IN MEDICAL RECORD: ICD-10-PCS | Mod: S$GLB,,, | Performed by: INTERNAL MEDICINE

## 2020-10-23 PROCEDURE — 99214 PR OFFICE/OUTPT VISIT, EST, LEVL IV, 30-39 MIN: ICD-10-PCS | Mod: 25,S$GLB,, | Performed by: INTERNAL MEDICINE

## 2020-10-23 PROCEDURE — 1101F PT FALLS ASSESS-DOCD LE1/YR: CPT | Mod: S$GLB,,, | Performed by: INTERNAL MEDICINE

## 2020-10-23 PROCEDURE — 99214 OFFICE O/P EST MOD 30 MIN: CPT | Mod: 25,S$GLB,, | Performed by: INTERNAL MEDICINE

## 2020-10-23 PROCEDURE — 3008F PR BODY MASS INDEX (BMI) DOCUMENTED: ICD-10-PCS | Mod: S$GLB,,, | Performed by: INTERNAL MEDICINE

## 2020-10-23 PROCEDURE — 3052F HG A1C>EQUAL 8.0%<EQUAL 9.0%: CPT | Mod: S$GLB,,, | Performed by: INTERNAL MEDICINE

## 2020-10-23 NOTE — PATIENT INSTRUCTIONS
Preventing Falls in the Home  An adult or child can fall for many reasons. If you are an older adult, you may fall because your reaction time slows down. Your muscles and joints may get stiff, weak, or less flexible because of illness, medicines, or a physical condition. These things can also make a child more likely to fall or be injured in a fall.  Other health problems that make falls more likely include:  · Arthritis  · Dizziness or lightheadedness when you get out of bed (orthostatic hypotension)  · History of a stroke  · Dizziness  · Anemia  · Certain medicines taken for mental illness  · Problems with balance or gait  · History of falls with or without an injury  · Changes in vision (vision impairment)  · Changes in thinking skills and memory (cognitive impairment)  Injuries from a fall can include broken bones, dislocated joints, and cuts. When these injuries are serious enough, they can make it impossible for you or a child who is injured in a fall to live on his or her own.  Prevention tips  To help prevent falls and fall-related injuries, follow the tips below.   Floors  Make floors safer by doing the following:   · Put nonskid pads under area rugs.  · Remove throw rugs.  · Replace worn floor coverings.  · Tack carpets firmly to each step on carpeted stairs. Put nonskid strips on the edges of uncarpeted stairs.  · Keep floors and stairs free of clutter and cords.  · Arrange furniture so there are clear pathways.  · Clean up any spills right away.  · Wear shoes that fit.  Bathrooms    Make bathrooms safer by doing the following:   · Install grab bars in the tub or shower.  · Apply nonskid strips or put a nonskid rubber mat in the tub or shower.  · Sit on a bath chair to bathe.  · Use bathmats with nonskid backing.  Lighting and the environment  Improve lighting in your home by doing the following:   · Keep a flashlight in each room. Or put a lamp next to the bed within easy reach.  · Put nightlights in  the bedrooms, hallways, kitchen, and bathrooms.  · Make sure all stairways have good lighting.  · Take your time when going up and down stairs.  · Put handrails on both sides of stairs and in walkways for more support. To prevent injury to your wrist or arm, dont use handrails to pull yourself up.  · Install grab bars to pull yourself up.  · Move or rearrange items that you use often. This will make them easier to find or reach.  · Look at your home to find any safety hazards. Especially look at doorways, walkways, and the driveway. Remove or repair any safety problems that you find.  Date Last Reviewed: 8/1/2016  © 3959-4086 [employer]. [address] [place] [address], [place] [address]. All rights reserved. This information is not intended as a substitute for professional medical care. Always follow your healthcare professional's instructions.

## 2020-10-23 NOTE — PROGRESS NOTES
"Subjective:       Patient ID: Adriana Perez is a 74 y.o. female.    Chief Complaint: Diabetes, Hypertension, Dyslipidemia, Gait instability, Chronic Kidney Disease, Parkinson's, and Behavioral disorder    Patient is a 75 YO AA female who comes for follow-up.  She is accompanied with her Son.  Underlying medical issues of stage 3 kidney disease, type 2 diabetes mellitus, hypertension, dyslipidemia and cognitive decline have been noted.    She was COVID positive also in between.  This was secondary to being transferred in and out of hospitals and psychiatric facilities.  She was not treated with medications and she was mostly asymptomatic.    Overall she is doing okay.  Given her medical issues.  She has a list of her medical issues and concerns and updates written on a piece of paper which has been scanned in the review of system section.    Her shakes and tremors have started again.  She used to be on amantadine as prescribed by Dr. Rayo Sharif in past.  However due to current situations this was discontinued temporarily.    Blood sugars are stable and at home blood pressures are okay.    She also follows with podiatrist for her nails to be trimmed.    Intermittently she has episodes of dizziness and lightheadedness.  But this last for a short period Of time.    She complains of feeling "cold" in the eyes.  I am not sure what is going on with the eyes.    Intermittently she has some constipation and she takes a stool softener which mitigates this problem.  She might have some gas and heartburn occasionally for which she takes Pepto-Bismol with relief.  For constipation she takes a stool softener which gives her relief.      She has not bought her blood sugar records today.  Degree of control of diabetes is uncertain but pt. does not recall high blood sugars Recently.  Similarly blood pressure documentation has not been adequate at home.    Patient continues to gradually decline overall in multiple domains " including self preservation and understanding.  Arthritis continues to plague her shoulders with limited movements.  She also has bilateral knee pains.  More on the right side.  She recently had who felt that she might not be a candidate for surgery given her multiple comorbidities.    History of falls have been noted though no new fall recently.  Recent labs had shown further elevation of creatinine to 2.2.    Diabetes  She presents for her follow-up diabetic visit. She has type 2 diabetes mellitus. Her disease course has been stable. Hypoglycemia symptoms include dizziness and tremors. Pertinent negatives for hypoglycemia include no confusion, headaches, mood changes, nervousness/anxiousness, pallor, seizures or speech difficulty. Associated symptoms include fatigue and weakness. Pertinent negatives for diabetes include no chest pain, no foot ulcerations, no polydipsia, no polyphagia, no polyuria and no visual change. Pertinent negatives for hypoglycemia complications include no blackouts. Symptoms are stable. Diabetic complications include nephropathy. Risk factors for coronary artery disease include sedentary lifestyle, hypertension, diabetes mellitus and dyslipidemia. Meal planning includes avoidance of concentrated sweets. She has not had a previous visit with a dietitian. She never participates in exercise. Her home blood glucose trend is fluctuating minimally. An ACE inhibitor/angiotensin II receptor blocker is being taken. She does not see a podiatrist.  Hypertension  This is a chronic problem. The current episode started more than 1 year ago. The problem has been waxing and waning since onset. Associated symptoms include malaise/fatigue. Pertinent negatives include no chest pain, headaches, neck pain, palpitations or shortness of breath. Risk factors for coronary artery disease include post-menopausal state, sedentary lifestyle, diabetes mellitus and dyslipidemia. Past treatments include beta blockers. The  current treatment provides moderate improvement. Compliance problems include psychosocial issues.    Hyperlipidemia  This is a chronic problem. The current episode started more than 1 year ago. The problem is controlled. She has no history of obesity. Pertinent negatives include no chest pain or shortness of breath. Current antihyperlipidemic treatment includes statins. The current treatment provides moderate improvement of lipids. Compliance problems include psychosocial issues.  Risk factors for coronary artery disease include a sedentary lifestyle, hypertension, diabetes mellitus and dyslipidemia.       Past Medical History:   Diagnosis Date    Acid reflux     Allergy     lisinopril    Anxiety     Bilateral renal cysts 2018    Renal USG by Dr Foy    CKD (chronic kidney disease), stage III     Depression     Diabetes mellitus     Diabetes mellitus, type 2     Disorder of kidney and ureter     Echogenic kidneys on renal ultrasound 2018    Dr Lance Foy- Also Renal cysts bilateral    Hyperlipidemia     Hypertension     MVP (mitral valve prolapse)     Primary osteoarthritis of left knee     Restless leg     Schizophrenia, simple, chronic     Urinary, incontinence, stress female      Social History     Socioeconomic History    Marital status:      Spouse name: Willy Perez    Number of children: 2    Years of education: Not on file    Highest education level: Not on file   Occupational History    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   Social Needs    Financial resource strain: Not hard at all    Food insecurity     Worry: Never true     Inability: Never true    Transportation needs     Medical: No     Non-medical: No   Tobacco Use    Smoking status: Former Smoker     Packs/day: 1.00     Years: 5.00     Pack years: 5.00     Types: Cigarettes     Quit date: 1992     Years since quittin.1    Smokeless tobacco: Never Used    Substance and Sexual Activity    Alcohol use: No     Frequency: Never     Drinks per session: Patient refused     Binge frequency: Never    Drug use: No    Sexual activity: Not Currently     Partners: Male   Lifestyle    Physical activity     Days per week: 0 days     Minutes per session: Not on file    Stress: Very much   Relationships    Social connections     Talks on phone: More than three times a week     Gets together: More than three times a week     Attends Confucianist service: Not on file     Active member of club or organization: Yes     Attends meetings of clubs or organizations: Never     Relationship status:    Other Topics Concern    Not on file   Social History Narrative    Not on file     Past Surgical History:   Procedure Laterality Date    FOOT SURGERY      HYSTERECTOMY      TUBAL LIGATION       Family History   Problem Relation Age of Onset    Sudden death Father     Cancer Mother     Hypertension Mother     Breast cancer Mother     Cancer Sister     Breast cancer Sister        Review of Systems   Constitutional: Positive for fatigue, malaise/fatigue and unexpected weight change (Gained 3 lbs.gained 8 lbs total). Negative for activity change and chills.   HENT: Negative for congestion, hearing loss, postnasal drip, rhinorrhea, sinus pressure and trouble swallowing.    Eyes: Negative for pain, discharge and visual disturbance.   Respiratory: Negative for cough, chest tightness, shortness of breath and wheezing.         Patient has been recently diagnosed with asthmatic bronchitis and has been followed with pulmonary. Her symptoms of cough have abated at this point. She is using Symbicort inhaler and Singulair.   Cardiovascular: Negative for chest pain, palpitations and leg swelling.        HTN. Lipids   Gastrointestinal: Negative for abdominal distention, anal bleeding, blood in stool, constipation, diarrhea and vomiting.   Endocrine: Negative for polydipsia, polyphagia and  "polyuria.        Diabetes mellitus type 2. Chronic kidney disease stage III.   Genitourinary: Negative for difficulty urinating, dysuria, frequency, hematuria and menstrual problem.        Chronic kidney disease stage 3.  Underlying diabetes peak standing hypertension.   Musculoskeletal: Positive for gait problem (stable). Negative for arthralgias, joint swelling and neck pain.        Recent fall   Skin: Negative for color change, pallor and rash.   Allergic/Immunologic: Negative for environmental allergies, food allergies and immunocompromised state.   Neurological: Positive for dizziness, tremors and weakness. Negative for seizures, syncope, facial asymmetry, speech difficulty, light-headedness and headaches.        Fall.  She was seen by neurologist and there is a potential diagnosis of Parkinson's.  I do not have access to the neurologist notes.  She was put on amantadine with some relief.  Recently due to COVID-19 this was discontinued temporarily.  I have advised that they can resume this again and follow-up with Neurology.   Hematological: Negative for adenopathy. Does not bruise/bleed easily.   Psychiatric/Behavioral: Positive for behavioral problems. Negative for agitation, confusion and dysphoric mood. The patient is not nervous/anxious.         Patient's has history of schizoaffective disorder. This seems to be stable. Some confusion off late. History of tendency to falling down. Cognitive issues.  Recently few visits to the psychiatric facilities.             Objective:      Blood pressure (!) 165/80, pulse 89, temperature 97.9 °F (36.6 °C), temperature source Temporal, height 5' 7" (1.702 m), weight 66.1 kg (145 lb 11.2 oz). Body mass index is 22.82 kg/m².  Physical Exam  Vitals signs and nursing note reviewed.   Constitutional:       General: She is not in acute distress.     Appearance: She is well-developed.   HENT:      Head: Normocephalic and atraumatic.      Right Ear: Decreased hearing noted.     "  Left Ear: Decreased hearing noted.   Eyes:      General: Lids are normal. Lids are everted, no foreign bodies appreciated.      Conjunctiva/sclera: Conjunctivae normal.      Pupils:      Right eye: Pupil is round and reactive.      Left eye: Pupil is round and reactive.   Neck:      Musculoskeletal: Normal range of motion and neck supple.      Trachea: Trachea normal.   Cardiovascular:      Rate and Rhythm: Normal rate and regular rhythm.      Pulses:           Dorsalis pedis pulses are 0 on the right side and 0 on the left side.      Heart sounds: Normal heart sounds, S1 normal and S2 normal.   Pulmonary:      Breath sounds: Normal breath sounds.   Abdominal:      General: Bowel sounds are normal. There is no distension.      Palpations: Abdomen is soft. Abdomen is not rigid.      Tenderness: There is no abdominal tenderness. There is no guarding.      Comments: Cholelithiasis without cholecystitis   Musculoskeletal:         General: No tenderness or deformity.      Right foot: Normal range of motion. No deformity.      Left foot: Normal range of motion. No deformity.      Comments: Patient has somewhat unsteady gait. She tends to stoop forward while walking. Minimal hypertonia. Nothing remarkable. Minimal resting tremors. Her facial expression is flat.   Feet:      Right foot:      Protective Sensation: 5 sites tested. 4 sites sensed.      Skin integrity: No ulcer, blister or skin breakdown.      Toenail Condition: Right toenails are long.      Left foot:      Protective Sensation: 5 sites tested. 4 sites sensed.      Skin integrity: No ulcer, blister or skin breakdown.      Toenail Condition: Left toenails are long.      Comments: Couple of toenails in each foot or longer.  The toenails from the great to have been removed and past.  Lymphadenopathy:      Cervical: No cervical adenopathy.   Skin:     General: Skin is warm and dry.      Coloration: Skin is not pale.      Findings: No erythema or rash.    Neurological:      Mental Status: She is alert.      Motor: Tremor and atrophy present.      Coordination: Coordination normal.      Gait: Gait abnormal (more stable).      Comments: Resting tremor is noted in the right hand which is pill rolling.  Same type of tremor has been noted in the left hand also..   Psychiatric:         Mood and Affect: Affect is blunt.         Speech: Speech is delayed.         Behavior: Behavior is slowed. Behavior is not agitated. Behavior is cooperative.         Cognition and Memory: Memory is impaired.           Assessment:       1. Benign essential hypertension    2. Type 2 diabetes mellitus without complication, without long-term current use of insulin    3. Multiple-type hyperlipidemia    4. COVID-19 virus infection    5. Difficulty walking    6. Recurrent falls    7. Parkinsons    8. Stage 3b chronic kidney disease    9. Need for influenza vaccination           Lab Visit on 09/10/2020   Component Date Value Ref Range Status    WBC 09/10/2020 5.87  3.90 - 12.70 K/uL Final    RBC 09/10/2020 3.64* 4.00 - 5.40 M/uL Final    Hemoglobin 09/10/2020 9.5* 12.0 - 16.0 g/dL Final    Hematocrit 09/10/2020 29.9* 37.0 - 48.5 % Final    Mean Corpuscular Volume 09/10/2020 82  82 - 98 fL Final    Mean Corpuscular Hemoglobin 09/10/2020 26.1* 27.0 - 31.0 pg Final    Mean Corpuscular Hemoglobin Conc 09/10/2020 31.8* 32.0 - 36.0 g/dL Final    RDW 09/10/2020 13.9  11.5 - 14.5 % Final    Platelets 09/10/2020 341  150 - 350 K/uL Final    MPV 09/10/2020 8.9* 9.2 - 12.9 fL Final    Immature Granulocytes 09/10/2020 0.5  0.0 - 0.5 % Final    Gran # (ANC) 09/10/2020 3.8  1.8 - 7.7 K/uL Final    Immature Grans (Abs) 09/10/2020 0.03  0.00 - 0.04 K/uL Final    Lymph # 09/10/2020 1.2  1.0 - 4.8 K/uL Final    Mono # 09/10/2020 0.8  0.3 - 1.0 K/uL Final    Eos # 09/10/2020 0.1  0.0 - 0.5 K/uL Final    Baso # 09/10/2020 0.02  0.00 - 0.20 K/uL Final    nRBC 09/10/2020 0  0 /100 WBC Final    Gran%  09/10/2020 63.9  38.0 - 73.0 % Final    Lymph% 09/10/2020 20.6  18.0 - 48.0 % Final    Mono% 09/10/2020 13.3  4.0 - 15.0 % Final    Eosinophil% 09/10/2020 1.4  0.0 - 8.0 % Final    Basophil% 09/10/2020 0.3  0.0 - 1.9 % Final    Differential Method 09/10/2020 Automated   Final    Sodium 09/10/2020 136  136 - 145 mmol/L Final    Potassium 09/10/2020 3.8  3.5 - 5.1 mmol/L Final    Chloride 09/10/2020 100  95 - 110 mmol/L Final    CO2 09/10/2020 21* 23 - 29 mmol/L Final    Glucose 09/10/2020 224* 70 - 110 mg/dL Final    BUN, Bld 09/10/2020 16  8 - 23 mg/dL Final    Creatinine 09/10/2020 1.6* 0.5 - 1.4 mg/dL Final    Calcium 09/10/2020 9.5  8.7 - 10.5 mg/dL Final    Total Protein 09/10/2020 5.8* 6.0 - 8.4 g/dL Final    Albumin 09/10/2020 2.5* 3.5 - 5.2 g/dL Final    Total Bilirubin 09/10/2020 0.6  0.1 - 1.0 mg/dL Final    Alkaline Phosphatase 09/10/2020 69  55 - 135 U/L Final    AST 09/10/2020 17  10 - 40 U/L Final    ALT 09/10/2020 21  10 - 44 U/L Final    Anion Gap 09/10/2020 15  8 - 16 mmol/L Final    eGFR if  09/10/2020 36.6* >60 mL/min/1.73 m^2 Final    eGFR if non African American 09/10/2020 31.7* >60 mL/min/1.73 m^2 Final    Hemoglobin A1C 09/10/2020 8.6* 4.5 - 6.2 % Final    Estimated Avg Glucose 09/10/2020 200* 68 - 131 mg/dL Final    Phosphorus 09/10/2020 3.3  2.7 - 4.5 mg/dL Final    Magnesium 09/10/2020 1.4* 1.6 - 2.6 mg/dL Final   Admission on 08/11/2020, Discharged on 08/11/2020   Component Date Value Ref Range Status    WBC 08/11/2020 9.40  3.90 - 12.70 K/uL Final    RBC 08/11/2020 4.73  4.00 - 5.40 M/uL Final    Hemoglobin 08/11/2020 12.5  12.0 - 16.0 g/dL Final    Hematocrit 08/11/2020 39.4  37.0 - 48.5 % Final    Mean Corpuscular Volume 08/11/2020 83  82 - 98 fL Final    Mean Corpuscular Hemoglobin 08/11/2020 26.4* 27.0 - 31.0 pg Final    Mean Corpuscular Hemoglobin Conc 08/11/2020 31.7* 32.0 - 36.0 g/dL Final    RDW 08/11/2020 13.9  11.5 - 14.5 % Final     Platelets 08/11/2020 200  150 - 350 K/uL Final    MPV 08/11/2020 9.7  9.2 - 12.9 fL Final    Immature Granulocytes 08/11/2020 0.2  0.0 - 0.5 % Final    Gran # (ANC) 08/11/2020 6.4  1.8 - 7.7 K/uL Final    Immature Grans (Abs) 08/11/2020 0.02  0.00 - 0.04 K/uL Final    Lymph # 08/11/2020 2.0  1.0 - 4.8 K/uL Final    Mono # 08/11/2020 0.8  0.3 - 1.0 K/uL Final    Eos # 08/11/2020 0.1  0.0 - 0.5 K/uL Final    Baso # 08/11/2020 0.03  0.00 - 0.20 K/uL Final    nRBC 08/11/2020 0  0 /100 WBC Final    Gran% 08/11/2020 68.3  38.0 - 73.0 % Final    Lymph% 08/11/2020 21.0  18.0 - 48.0 % Final    Mono% 08/11/2020 8.7  4.0 - 15.0 % Final    Eosinophil% 08/11/2020 1.5  0.0 - 8.0 % Final    Basophil% 08/11/2020 0.3  0.0 - 1.9 % Final    Differential Method 08/11/2020 Automated   Final    Sodium 08/11/2020 139  136 - 145 mmol/L Final    Potassium 08/11/2020 4.1  3.5 - 5.1 mmol/L Final    Chloride 08/11/2020 102  95 - 110 mmol/L Final    CO2 08/11/2020 24  23 - 29 mmol/L Final    Glucose 08/11/2020 145* 70 - 110 mg/dL Final    BUN, Bld 08/11/2020 26* 8 - 23 mg/dL Final    Creatinine 08/11/2020 1.8* 0.5 - 1.4 mg/dL Final    Calcium 08/11/2020 11.0* 8.7 - 10.5 mg/dL Final    Total Protein 08/11/2020 8.6* 6.0 - 8.4 g/dL Final    Albumin 08/11/2020 4.8  3.5 - 5.2 g/dL Final    Total Bilirubin 08/11/2020 0.9  0.1 - 1.0 mg/dL Final    Alkaline Phosphatase 08/11/2020 70  55 - 135 U/L Final    AST 08/11/2020 21  10 - 40 U/L Final    ALT 08/11/2020 17  10 - 44 U/L Final    Anion Gap 08/11/2020 13  8 - 16 mmol/L Final    eGFR if  08/11/2020 31.7* >60 mL/min/1.73 m^2 Final    eGFR if non African American 08/11/2020 27.5* >60 mL/min/1.73 m^2 Final    TSH 08/11/2020 2.330  0.340 - 5.600 uIU/mL Final    Specimen UA 08/11/2020 Urine, Clean Catch   Final    Color, UA 08/11/2020 Yellow  Yellow, Straw, Yudelka Final    Appearance, UA 08/11/2020 Clear  Clear Final    pH, UA 08/11/2020 7.0  5.0 -  8.0 Final    Specific [place], UA 08/11/2020 1.015  1.005 - 1.030 Final    Protein, UA 08/11/2020 Trace* Negative Final    Glucose, UA 08/11/2020 Negative  Negative Final    Ketones, UA 08/11/2020 Negative  Negative Final    Bilirubin (UA) 08/11/2020 Negative  Negative Final    Occult Blood UA 08/11/2020 Negative  Negative Final    Nitrite, UA 08/11/2020 Negative  Negative Final    Urobilinogen, UA 08/11/2020 Negative  Negative EU/dL Final    Leukocytes, UA 08/11/2020 Negative  Negative Final    Benzodiazepines 08/11/2020 Negative   Final    Cocaine (Metab.) 08/11/2020 Negative   Final    Opiate Scrn, Ur 08/11/2020 Negative   Final    Barbiturate Screen, Ur 08/11/2020 Negative   Final    Amphetamine Screen, Ur 08/11/2020 Negative   Final    THC 08/11/2020 Negative   Final    Phencyclidine 08/11/2020 Negative   Final    Creatinine, Random Ur 08/11/2020 72.0  15.0 - 325.0 mg/dL Final    Toxicology Information 08/11/2020 SEE COMMENT   Final    Alcohol, Medical, Serum 08/11/2020 <5  <10 mg/dL Final    Acetaminophen (Tylenol), Serum 08/11/2020 <10.0  10.0 - 20.0 ug/mL Final    Salicylate Lvl 08/11/2020 <4.0* 15.0 - 30.0 mg/dL Final    SARS-CoV-2 RNA, Amplification, Qual 08/11/2020 Negative  Negative Final         Plan:           Benign essential hypertension  Comments:  Blood pressures are borderline control.    Type 2 diabetes mellitus without complication, without long-term current use of insulin    Multiple-type hyperlipidemia    COVID-19 virus infection  Comments:  This was detected during routine screening in between hospital admissions.  Patient was essentially not very symptomatic.    Difficulty walking  Comments:  Weakness in the extremities and difficulty balancing.  Possibly due to generalized deconditioning and Parkinson's also.    Recurrent falls    Parkinsons  Comments:  The status of this diagnosis needs to be confirmed.  Patient is again having tremors and is off amantadine.  I have  authorized to resume.    Stage 3b chronic kidney disease  Comments:  Continue to avoid nephrotoxic medications like NSAIDs or contrast dyes.    Need for influenza vaccination  -     Influenza - Quadrivalent - High Dose (65+) (PF) (IM)     Patient's imbalance has been noted and I can also see some resting tremors in the right hand.  She can restart her amantadine  which was prescribed by her neurologist and I would recommend keep a follow-up with the neurologist also concerning the same.    I just wanted be sure that the amantadine was not stop secondary to confusion, altered mental status or other reasons.  I did speak with patient's son again on the phone and he advised me that this was stopped because patient had COVID and was feeling somewhat more sleepy.  I have advised her to reconfirm on this issue with the neurologist and set up a follow-up appointment.    She still has some cough and will keep an eye on it.  Salt water gargles couple of times a day.  She had a affliction with COVID-19 virus though she was not very symptomatic.    She does have arthritic pains and will take it accordingly.  If the pain gets significant with swelling will consider refer to Orthopedics for a cortisone shot or injection or likewise.  I am not sure about the her feeling cold in the eyes every morning.  Perhaps she should keep her follow-up appointment down the road.  I compliment her were doing exercise and walking at home.    Today she will be updated on flu vaccination also.      I do not see any significant diuretics or NSAIDs in her regimen.  No recent treatment with antibiotics.    Arthritis, balance and ambulation seems to be somewhat stable even though she has significant arthritis in her shoulders and knees.  Fall risk continues and appropriate precautions have been recommended to the patient and patient's granddaughter.    Advised Ms. Perez about age and season appropriate immunizations/ cancer screenings.  Also  seasonal influenza vaccine, update on tetanus diphtheria vaccination every 10 years.      Patient has been advised to watch diet and exercise. Avoid fried and fatty food. Compliance to medications and follow up urged.    Advised Ms. Perez to monitor Blood sugars at home and record them.  Exercise, watch diet and loose weight.  keep a close eye on feet and keep them clean. Annual eye examination. Annual influenza vaccine.  Monitor HgbA1c every 3 to 6 months. Monitor urine microalbumin every year.keep LDL less than 100. Monitor blood pressure and target blood pressure 120/70.    Follow-up in 4 months.      Spent martín 25 minutes with patient which involved review of pts medical conditions, labs, medications and with 50% of time face-to-face discussion about medical problems, management and any applicable changes.          Current Outpatient Medications:     albuterol (PROVENTIL/VENTOLIN HFA) 90 mcg/actuation inhaler, Inhale 2 puffs into the lungs every 6 (six) hours as needed for Wheezing. Rescue, Disp: 18 g, Rfl: 5    ammonium lactate 12 % Crea, Apply 1 application topically 2 (two) times daily as needed (dry skin)., Disp: 280 g, Rfl: 0    aspirin (ECOTRIN) 81 MG EC tablet, Take 81 mg by mouth once daily., Disp: , Rfl:     calcium carbonate-vitamin D3 (CALCIUM 600 + D,3,) 600-125 mg-unit Tab, , Disp: , Rfl:     docusate sodium (COLACE) 100 MG capsule, Take 100 mg by mouth 2 (two) times daily., Disp: , Rfl:     donepeziL (ARICEPT) 5 MG tablet, Take 5 mg by mouth every evening., Disp: , Rfl:     escitalopram oxalate (LEXAPRO) 20 MG tablet, Take 20 mg by mouth once daily., Disp: , Rfl:     flash glucose scanning reader (FREESTYLE JAY 14 DAY READER) Misc, 1 Device by Misc.(Non-Drug; Combo Route) route once daily., Disp: 6 each, Rfl: 2    flash glucose sensor (FREESTYLE JAY 14 DAY SENSOR) Kit, 1 Device by Misc.(Non-Drug; Combo Route) route every 14 (fourteen) days., Disp: 6 kit, Rfl: 2    fluticasone  furoate-vilanteroL (BREO ELLIPTA) 100-25 mcg/dose diskus inhaler, Inhale 1 puff into the lungs once daily. Controller, Disp: 3 each, Rfl: 1    fluticasone propionate (FLONASE) 50 mcg/actuation nasal spray, 2 sprays (100 mcg total) by Each Nostril route once daily., Disp: 3 g, Rfl: 6    hydrALAZINE (APRESOLINE) 10 MG tablet, Take 1 tablet (10 mg total) by mouth 3 (three) times daily., Disp: 270 tablet, Rfl: 1    losartan (COZAAR) 50 MG tablet, Take 50 mg by mouth once daily., Disp: , Rfl:     magnesium oxide (MAG-OX) 400 mg (241.3 mg magnesium) tablet, Take 1 tablet (400 mg total) by mouth every Mon, Wed, Fri., Disp: 36 tablet, Rfl: 2    metoprolol succinate (TOPROL-XL) 50 MG 24 hr tablet, Take 50 mg by mouth 2 (two) times daily., Disp: , Rfl:     montelukast (SINGULAIR) 10 mg tablet, Take 1 tablet (10 mg total) by mouth every evening., Disp: 90 tablet, Rfl: 3    OLANZAPINE ORAL, Take 10 mg by mouth once daily. , Disp: , Rfl:     pantoprazole (PROTONIX) 40 MG tablet, Take 1 tablet (40 mg total) by mouth once daily., Disp: 90 tablet, Rfl: 3    potassium chloride SA (K-DUR,KLOR-CON) 20 MEQ tablet, Take 1 tablet (20 mEq total) by mouth once daily., Disp: 90 tablet, Rfl: 3    pravastatin (PRAVACHOL) 40 MG tablet, Take 1 tablet (40 mg total) by mouth nightly., Disp: 90 tablet, Rfl: 3    SITagliptin (JANUVIA) 50 MG Tab, Take 100 mg by mouth once daily., Disp: , Rfl:     traZODone (DESYREL) 100 MG tablet, Take 2 tablets (200 mg total) by mouth nightly as needed. (Patient taking differently: Take 100 mg by mouth nightly as needed. ), Disp: , Rfl:

## 2020-10-29 ENCOUNTER — DOCUMENT SCAN (OUTPATIENT)
Dept: HOME HEALTH SERVICES | Facility: HOSPITAL | Age: 75
End: 2020-10-29

## 2020-11-03 ENCOUNTER — OFFICE VISIT (OUTPATIENT)
Dept: PODIATRY | Facility: CLINIC | Age: 75
End: 2020-11-03
Payer: MEDICARE

## 2020-11-03 VITALS — RESPIRATION RATE: 16 BRPM | TEMPERATURE: 97 F | BODY MASS INDEX: 22.6 KG/M2 | WEIGHT: 144 LBS | HEIGHT: 67 IN

## 2020-11-03 DIAGNOSIS — E11.42 DIABETIC POLYNEUROPATHY ASSOCIATED WITH TYPE 2 DIABETES MELLITUS: Primary | ICD-10-CM

## 2020-11-03 DIAGNOSIS — I87.2 VENOUS STASIS DERMATITIS OF BOTH LOWER EXTREMITIES: ICD-10-CM

## 2020-11-03 DIAGNOSIS — L60.2 ONYCHOGRYPHOSIS: ICD-10-CM

## 2020-11-03 PROCEDURE — 99499 UNLISTED E&M SERVICE: CPT | Mod: S$GLB,,, | Performed by: PODIATRIST

## 2020-11-03 PROCEDURE — 11721 PR DEBRIDEMENT OF NAILS, 6 OR MORE: ICD-10-PCS | Mod: Q8,S$GLB,, | Performed by: PODIATRIST

## 2020-11-03 PROCEDURE — 11721 DEBRIDE NAIL 6 OR MORE: CPT | Mod: Q8,S$GLB,, | Performed by: PODIATRIST

## 2020-11-03 PROCEDURE — 99499 NO LOS: ICD-10-PCS | Mod: S$GLB,,, | Performed by: PODIATRIST

## 2020-11-03 RX ORDER — AMLODIPINE BESYLATE 10 MG/1
TABLET ORAL
COMMUNITY
Start: 2020-10-29 | End: 2021-04-19

## 2020-11-03 RX ORDER — CEFDINIR 300 MG/1
CAPSULE ORAL
COMMUNITY
Start: 2020-08-22 | End: 2021-02-23

## 2020-11-03 RX ORDER — NIACINAMIDE 500 MG
TABLET ORAL
COMMUNITY
End: 2021-02-23

## 2020-11-03 RX ORDER — DONEPEZIL HYDROCHLORIDE 10 MG/1
5 TABLET, FILM COATED ORAL
COMMUNITY
Start: 2020-10-29

## 2020-11-03 RX ORDER — HYDROCHLOROTHIAZIDE 12.5 MG/1
CAPSULE ORAL
COMMUNITY
Start: 2020-08-31 | End: 2021-02-23

## 2020-11-03 RX ORDER — GABAPENTIN 100 MG/1
CAPSULE ORAL
COMMUNITY
Start: 2020-08-22 | End: 2021-02-23

## 2020-11-03 NOTE — PROGRESS NOTES
[address] [facility] [address]  [place] [address]  Phone: [phone]   [phone]    Patient's PCP:Aiden Koch MD  Referring Provider: No ref. provider found    Subjective:      Chief Complaint:: Nail Care (q8 nail trimming)    HPI  Adriana Perez is a 74 y.o. female who presents outine diabetic nail trimming (q8) for a complaint of elongated toenails. Treatment to date have included periodic debridement. Patients rates pain 2/10 on pain scale.    Systemic Doctor:Aiden Koch MD   Date Last Seen: 10/23/2020  Blood Sugar: 176  Hemoglobin A1c: 8.6    Vitals:    11/03/20 0912   Resp: 16   Temp: 97.2 °F (36.2 °C)     Shoe Size:     Past Surgical History:   Procedure Laterality Date    FOOT SURGERY      HYSTERECTOMY      TUBAL LIGATION       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/2018    Dr Lance Foy- Also Renal cysts bilateral    Hyperlipidemia     Hypertension     MVP (mitral valve prolapse)     Primary osteoarthritis of left knee     Restless leg     Schizophrenia, simple, chronic     Urinary, incontinence, stress female      Family History   Problem Relation Age of Onset    Sudden death Father     Cancer Mother     Hypertension Mother     Breast cancer Mother     Cancer Sister     Breast cancer Sister         Social History:   Marital Status:   Alcohol History:  reports no history of alcohol use.  Tobacco History:  reports that she quit smoking about 28 years ago. Her smoking use included cigarettes. She has a 5.00 pack-year smoking history. She has never used smokeless tobacco.  Drug History:  reports no history of drug use.    Review of patient's allergies  indicates:  No Known Allergies    Current Outpatient Medications   Medication Sig Dispense Refill    albuterol (PROVENTIL/VENTOLIN HFA) 90 mcg/actuation inhaler Inhale 2 puffs into the lungs every 6 (six) hours as needed for Wheezing. Rescue 18 g 5    amLODIPine (NORVASC) 10 MG tablet       ammonium lactate 12 % Crea Apply 1 application topically 2 (two) times daily as needed (dry skin). 280 g 0    aspirin (ECOTRIN) 81 MG EC tablet Take 81 mg by mouth once daily.      BREO ELLIPTA 100-25 mcg/dose diskus inhaler Inhale 1 puff by mouth daily. 1 each 5    calcium carbonate-vitamin D3 (CALCIUM 600 + D,3,) 600-125 mg-unit Tab       cefdinir (OMNICEF) 300 MG capsule TAKE 1 CAPSULE BY MOUTH EVERY 12 HOURS FOR 11 DAYS      docusate sodium (COLACE) 100 MG capsule Take 100 mg by mouth 2 (two) times daily.      donepeziL (ARICEPT) 10 MG tablet       escitalopram oxalate (LEXAPRO) 20 MG tablet Take 20 mg by mouth once daily.      flash glucose scanning reader (FREESTYLE JAY 14 DAY READER) Misc 1 Device by Misc.(Non-Drug; Combo Route) route once daily. 6 each 2    flash glucose sensor (FREESTYLE JAY 14 DAY SENSOR) Kit 1 Device by Misc.(Non-Drug; Combo Route) route every 14 (fourteen) days. 6 kit 2    fluticasone propionate (FLONASE) 50 mcg/actuation nasal spray 2 sprays (100 mcg total) by Each Nostril route once daily. 3 g 6    gabapentin (NEURONTIN) 100 MG capsule TAKE 1 CAPSULE BY MOUTH ONCE DAILY AT BEDTIME      hydrALAZINE (APRESOLINE) 10 MG tablet Take 1 tablet (10 mg total) by mouth 3 (three) times daily. 270 tablet 1    hydroCHLOROthiazide (MICROZIDE) 12.5 mg capsule       losartan (COZAAR) 50 MG tablet Take 50 mg by mouth once daily.      magnesium oxide (MAG-OX) 400 mg (241.3 mg magnesium) tablet Take 1 tablet (400 mg total) by mouth every Mon, Wed, Fri. 36 tablet 2    metoprolol succinate (TOPROL-XL) 50 MG 24 hr tablet Take 50 mg by mouth 2 (two) times daily.      montelukast (SINGULAIR) 10 mg  tablet Take 1 tablet (10 mg total) by mouth every evening. 90 tablet 3    niacinamide 500 mg Tab       OLANZAPINE ORAL Take 10 mg by mouth once daily.       pantoprazole (PROTONIX) 40 MG tablet Take 1 tablet (40 mg total) by mouth once daily. 90 tablet 3    potassium chloride SA (K-DUR,KLOR-CON) 20 MEQ tablet Take 1 tablet (20 mEq total) by mouth once daily. 90 tablet 3    pravastatin (PRAVACHOL) 40 MG tablet Take 1 tablet (40 mg total) by mouth nightly. 90 tablet 3    SITagliptin (JANUVIA) 50 MG Tab Take 100 mg by mouth once daily.      traZODone (DESYREL) 100 MG tablet Take 2 tablets (200 mg total) by mouth nightly as needed. (Patient taking differently: Take 100 mg by mouth nightly as needed. )       No current facility-administered medications for this visit.        Review of Systems      Objective:        Physical Exam:   Foot Exam    General  General Appearance: appears stated age and healthy   Orientation: alert and oriented to person, place, and time   Affect: appropriate   Gait: unimpaired       Right Foot/Ankle     Inspection and Palpation  Skin Exam: tinea and abnormal color (Venous stasis dermatitis); (Thick deformed discolored toenails toes 2, 3, 4,)    Neurovascular  Dorsalis pedis: absent  Posterior tibial: 1+  Saphenous nerve sensation: diminished  Tibial nerve sensation: diminished  Superficial peroneal nerve sensation: diminished  Deep peroneal nerve sensation: diminished  Sural nerve sensation: diminished      Left Foot/Ankle      Inspection and Palpation  Skin Exam: tinea and abnormal color (Venous stasis dermatitis); (Thick deformed toenails on toes 2, 3, 4, 5)    Neurovascular  Dorsalis pedis: absent  Posterior tibial: 1+  Saphenous nerve sensation: diminished  Tibial nerve sensation: diminished  Superficial peroneal nerve sensation: diminished  Deep peroneal nerve sensation: diminished  Sural nerve sensation: diminished          Physical Exam   Cardiovascular:   Pulses:       Dorsalis  pedis pulses are absent on the right side and absent on the left side.        Posterior tibial pulses are 1+ on the right side and 1+ on the left side.        This patient has documented high risk feet requiring routine maintenance and has the following qualifying factors:    Absent dorsalis pedis pulse bilateral and Hair growth is absent, nail changes with thickening, Pigmentary changes, Skin texture is thin and Skin color is rubor      Imaging:            Assessment:       1. Diabetic polyneuropathy associated with type 2 diabetes mellitus    2. Onychogryphosis    3. Venous stasis dermatitis of both lower extremities      Plan:   Diabetic polyneuropathy associated with type 2 diabetes mellitus    Onychogryphosis    Venous stasis dermatitis of both lower extremities     1.  Today I evaluated patient today she is to consider be high risk year neuropathy and decreased pulses the feet.  2.Utilizing sterile nail nippers and electric , I aggressively debrided nails 1-5 bilaterally down to nail beds to thin the nail plates. Pt. tolerated well. No blood was drawn.  3.  Return as needed  No follow-ups on file.    Procedures  No notes on file       Counseling:     I provided patient education verbally regarding:   Patient diagnosis, treatment options, as well as alternatives, risks, and benefits.     This note was created using Dragon voice recognition software that occasionally misinterpreted phrases or words.

## 2020-11-18 ENCOUNTER — OFFICE VISIT (OUTPATIENT)
Dept: ORTHOPEDICS | Facility: CLINIC | Age: 75
End: 2020-11-18
Payer: MEDICARE

## 2020-11-18 ENCOUNTER — HOSPITAL ENCOUNTER (OUTPATIENT)
Dept: RADIOLOGY | Facility: HOSPITAL | Age: 75
Discharge: HOME | End: 2020-11-18
Attending: ORTHOPAEDIC SURGERY
Payer: MEDICARE

## 2020-11-18 VITALS — BODY MASS INDEX: 22.6 KG/M2 | RESPIRATION RATE: 18 BRPM | WEIGHT: 144 LBS | HEIGHT: 67 IN

## 2020-11-18 DIAGNOSIS — M25.561 PAIN IN BOTH KNEES, UNSPECIFIED CHRONICITY: ICD-10-CM

## 2020-11-18 DIAGNOSIS — M25.562 PAIN IN BOTH KNEES, UNSPECIFIED CHRONICITY: Primary | ICD-10-CM

## 2020-11-18 DIAGNOSIS — M25.561 PAIN IN BOTH KNEES, UNSPECIFIED CHRONICITY: Primary | ICD-10-CM

## 2020-11-18 DIAGNOSIS — M25.562 ACUTE PAIN OF LEFT KNEE: Primary | ICD-10-CM

## 2020-11-18 DIAGNOSIS — M17.11 PRIMARY OSTEOARTHRITIS OF RIGHT KNEE: ICD-10-CM

## 2020-11-18 DIAGNOSIS — M25.562 PAIN IN BOTH KNEES, UNSPECIFIED CHRONICITY: ICD-10-CM

## 2020-11-18 PROCEDURE — 99213 OFFICE O/P EST LOW 20 MIN: CPT | Mod: 25,S$GLB,, | Performed by: ORTHOPAEDIC SURGERY

## 2020-11-18 PROCEDURE — 99999 PR PBB SHADOW E&M-EST. PATIENT-LVL IV: CPT | Mod: PBBFAC,,, | Performed by: ORTHOPAEDIC SURGERY

## 2020-11-18 PROCEDURE — 1125F AMNT PAIN NOTED PAIN PRSNT: CPT | Mod: S$GLB,,, | Performed by: ORTHOPAEDIC SURGERY

## 2020-11-18 PROCEDURE — 3008F BODY MASS INDEX DOCD: CPT | Mod: CPTII,S$GLB,, | Performed by: ORTHOPAEDIC SURGERY

## 2020-11-18 PROCEDURE — 1159F PR MEDICATION LIST DOCUMENTED IN MEDICAL RECORD: ICD-10-PCS | Mod: S$GLB,,, | Performed by: ORTHOPAEDIC SURGERY

## 2020-11-18 PROCEDURE — 99999 PR PBB SHADOW E&M-EST. PATIENT-LVL IV: ICD-10-PCS | Mod: PBBFAC,,, | Performed by: ORTHOPAEDIC SURGERY

## 2020-11-18 PROCEDURE — 1101F PT FALLS ASSESS-DOCD LE1/YR: CPT | Mod: CPTII,S$GLB,, | Performed by: ORTHOPAEDIC SURGERY

## 2020-11-18 PROCEDURE — 1159F MED LIST DOCD IN RCRD: CPT | Mod: S$GLB,,, | Performed by: ORTHOPAEDIC SURGERY

## 2020-11-18 PROCEDURE — 73562 X-RAY EXAM OF KNEE 3: CPT | Mod: 26,50,, | Performed by: RADIOLOGY

## 2020-11-18 PROCEDURE — 3008F PR BODY MASS INDEX (BMI) DOCUMENTED: ICD-10-PCS | Mod: CPTII,S$GLB,, | Performed by: ORTHOPAEDIC SURGERY

## 2020-11-18 PROCEDURE — 99213 PR OFFICE/OUTPT VISIT, EST, LEVL III, 20-29 MIN: ICD-10-PCS | Mod: 25,S$GLB,, | Performed by: ORTHOPAEDIC SURGERY

## 2020-11-18 PROCEDURE — 3288F FALL RISK ASSESSMENT DOCD: CPT | Mod: CPTII,S$GLB,, | Performed by: ORTHOPAEDIC SURGERY

## 2020-11-18 PROCEDURE — 20610 DRAIN/INJ JOINT/BURSA W/O US: CPT | Mod: RT,S$GLB,, | Performed by: ORTHOPAEDIC SURGERY

## 2020-11-18 PROCEDURE — 1125F PR PAIN SEVERITY QUANTIFIED, PAIN PRESENT: ICD-10-PCS | Mod: S$GLB,,, | Performed by: ORTHOPAEDIC SURGERY

## 2020-11-18 PROCEDURE — 3288F PR FALLS RISK ASSESSMENT DOCUMENTED: ICD-10-PCS | Mod: CPTII,S$GLB,, | Performed by: ORTHOPAEDIC SURGERY

## 2020-11-18 PROCEDURE — 20610 LARGE JOINT ASPIRATION/INJECTION: R KNEE: ICD-10-PCS | Mod: RT,S$GLB,, | Performed by: ORTHOPAEDIC SURGERY

## 2020-11-18 PROCEDURE — 73562 XR KNEE ORTHO BILAT: ICD-10-PCS | Mod: 26,50,, | Performed by: RADIOLOGY

## 2020-11-18 PROCEDURE — 73562 X-RAY EXAM OF KNEE 3: CPT | Mod: TC,50,PN

## 2020-11-18 PROCEDURE — 1101F PR PT FALLS ASSESS DOC 0-1 FALLS W/OUT INJ PAST YR: ICD-10-PCS | Mod: CPTII,S$GLB,, | Performed by: ORTHOPAEDIC SURGERY

## 2020-11-18 RX ORDER — TRIAMCINOLONE ACETONIDE 40 MG/ML
60 INJECTION, SUSPENSION INTRA-ARTICULAR; INTRAMUSCULAR
Status: DISCONTINUED | OUTPATIENT
Start: 2020-11-18 | End: 2020-11-18 | Stop reason: HOSPADM

## 2020-11-18 RX ADMIN — TRIAMCINOLONE ACETONIDE 60 MG: 40 INJECTION, SUSPENSION INTRA-ARTICULAR; INTRAMUSCULAR at 01:11

## 2020-11-18 NOTE — PROGRESS NOTES
11/18/2020    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/2018    Dr Lance Foy- Also Renal cysts bilateral    Hyperlipidemia     Hypertension     MVP (mitral valve prolapse)     Primary osteoarthritis of left knee     Restless leg     Schizophrenia, simple, chronic     Urinary, incontinence, stress female        Past Surgical History:   Procedure Laterality Date    FOOT SURGERY      HYSTERECTOMY      TUBAL LIGATION         Current Outpatient Medications   Medication Sig    albuterol (PROVENTIL/VENTOLIN HFA) 90 mcg/actuation inhaler Inhale 2 puffs into the lungs every 6 (six) hours as needed for Wheezing. Rescue    amLODIPine (NORVASC) 10 MG tablet     ammonium lactate 12 % Crea Apply 1 application topically 2 (two) times daily as needed (dry skin).    aspirin (ECOTRIN) 81 MG EC tablet Take 81 mg by mouth once daily.    BREO ELLIPTA 100-25 mcg/dose diskus inhaler Inhale 1 puff by mouth daily.    calcium carbonate-vitamin D3 (CALCIUM 600 + D,3,) 600-125 mg-unit Tab     cefdinir (OMNICEF) 300 MG capsule TAKE 1 CAPSULE BY MOUTH EVERY 12 HOURS FOR 11 DAYS    docusate sodium (COLACE) 100 MG capsule Take 100 mg by mouth 2 (two) times daily.    donepeziL (ARICEPT) 10 MG tablet     escitalopram oxalate (LEXAPRO) 20 MG tablet Take 20 mg by mouth once daily.    flash glucose scanning reader (FREESTYLE JAY 14 DAY READER) Misc 1 Device by Misc.(Non-Drug; Combo Route) route once daily.    flash glucose sensor (FREESTYLE JAY 14 DAY SENSOR) Kit 1 Device by Misc.(Non-Drug; Combo Route) route every 14 (fourteen) days.    gabapentin (NEURONTIN) 100 MG capsule TAKE 1 CAPSULE BY MOUTH ONCE DAILY AT  BEDTIME    hydrALAZINE (APRESOLINE) 10 MG tablet Take 1 tablet (10 mg total) by mouth 3 (three) times daily.    hydroCHLOROthiazide (MICROZIDE) 12.5 mg capsule     losartan (COZAAR) 50 MG tablet Take 50 mg by mouth once daily.    magnesium oxide (MAG-OX) 400 mg (241.3 mg magnesium) tablet Take 1 tablet (400 mg total) by mouth every Mon, Wed, Fri.    metoprolol succinate (TOPROL-XL) 50 MG 24 hr tablet Take 50 mg by mouth 2 (two) times daily.    montelukast (SINGULAIR) 10 mg tablet Take 1 tablet (10 mg total) by mouth every evening.    niacinamide 500 mg Tab     OLANZAPINE ORAL Take 10 mg by mouth once daily.     pantoprazole (PROTONIX) 40 MG tablet Take 1 tablet (40 mg total) by mouth once daily.    potassium chloride SA (K-DUR,KLOR-CON) 20 MEQ tablet Take 1 tablet (20 mEq total) by mouth once daily.    pravastatin (PRAVACHOL) 40 MG tablet Take 1 tablet (40 mg total) by mouth nightly.    SITagliptin (JANUVIA) 50 MG Tab Take 100 mg by mouth once daily.    traZODone (DESYREL) 100 MG tablet Take 2 tablets (200 mg total) by mouth nightly as needed. (Patient taking differently: Take 100 mg by mouth nightly as needed. )    fluticasone propionate (FLONASE) 50 mcg/actuation nasal spray 2 sprays (100 mcg total) by Each Nostril route once daily.     No current facility-administered medications for this visit.        Review of patient's allergies indicates:  No Known Allergies    Family History   Problem Relation Age of Onset    Sudden death Father     Cancer Mother     Hypertension Mother     Breast cancer Mother     Cancer Sister     Breast cancer Sister        Social History     Socioeconomic History    Marital status:      Spouse name: Willy Perez    Number of children: 2    Years of education: Not on file    Highest education level: Not on file   Occupational History    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     Comment: [employer]   Social Needs    Financial resource  strain: Not hard at all    Food insecurity     Worry: Never true     Inability: Never true    Transportation needs     Medical: No     Non-medical: No   Tobacco Use    Smoking status: Former Smoker     Packs/day: 1.00     Years: 5.00     Pack years: 5.00     Types: Cigarettes     Quit date: 1992     Years since quittin.2    Smokeless tobacco: Never Used   Substance and Sexual Activity    Alcohol use: No     Frequency: Never     Drinks per session: Patient refused     Binge frequency: Never    Drug use: No    Sexual activity: Not Currently     Partners: Male   Lifestyle    Physical activity     Days per week: 0 days     Minutes per session: Not on file    Stress: Very much   Relationships    Social connections     Talks on phone: More than three times a week     Gets together: More than three times a week     Attends Shinto service: Not on file     Active member of club or organization: Yes     Attends meetings of clubs or organizations: Never     Relationship status:    Other Topics Concern    Not on file   Social History Narrative    Not on file       Chief Complaint:   Chief Complaint   Patient presents with    Left Knee - Pain     Left knee pain for 1 week. No falls or injuries. Started doing home PT. Daughter noticed a limp, hurts to walk and touch knee. Pain 8/10 today.         History of present illness:    This is a 74 y.o. year old female who complains of patient has about a 1 week history of bilateral knee pain with the right worse than the left patient denies any history of any injury pain level is 8/10    Review of Systems:    Constitution: Denies chills, fever, and sweats.  HENT: Denies headaches or blurry vision.  Cardiovascular: Denies chest pain or irregular heart beat.  Respiratory: Denies cough or shortness of breath.  Gastrointestinal: Denies abdominal pain, nausea, or vomiting.  Musculoskeletal:  Denies muscle cramps.  Neurological: Denies dizziness or focal  "weakness.  Psychiatric/Behavioral: Normal mental status.  Hematologic/Lymphatic: Denies bleeding problem or easy bruising/bleeding.  Skin: Denies rash or suspicious lesions.    Examination:    Vital Signs:    Vitals:    11/18/20 1424   Resp: 18   Weight: 65.3 kg (144 lb)   Height: 5' 7" (1.702 m)   PainSc:   8       Body mass index is 22.55 kg/m².    This a well-developed, well nourished patient in no acute distress.    Alert and oriented x 3 and cooperative to examination.       Physical Exam:  Left knee-patient has a slight effusion to the left knee she has a varus alignment with minimal discomfort        Right knee-right knee has a slight valgus alignment she has a moderate effusion no warmth the cellulitis she has pain and is unable to put much weight on the right leg    Imaging:  X-ray of her right knee show severe osteoarthritis with lateral compartmental arthritis the left knee has severe osteoarthritis with severe medial compartmental arthritis       Assessment:  Osteoarthritis of both knees the right greater than the left    Plan:  We will go ahead and aspirate the right knee injected with Kenalog      DISCLAIMER: This note may have been dictated using voice recognition software and may contain grammatical errors.     NOTE: Consult report sent to referring provider via EP[employer] EMR.    "

## 2020-11-18 NOTE — PROCEDURES
Large Joint Aspiration/Injection: R knee    Date/Time: 11/18/2020 1:45 PM  Performed by: Yash Wing MD  Authorized by: Yash Wing MD     Indications:  Pain and arthritis  Local anesthetic:  Lidocaine 1% without epinephrine    Details:  Needle Size:  18 G  Approach:  Lateral  Location:  Knee  Site:  R knee  Medications:  60 mg triamcinolone acetonide 40 mg/mL

## 2020-12-09 ENCOUNTER — OFFICE VISIT (OUTPATIENT)
Dept: ORTHOPEDICS | Facility: CLINIC | Age: 75
End: 2020-12-09
Payer: MEDICARE

## 2020-12-09 VITALS — HEIGHT: 67 IN | RESPIRATION RATE: 16 BRPM | BODY MASS INDEX: 22.6 KG/M2 | WEIGHT: 144 LBS

## 2020-12-09 DIAGNOSIS — M17.11 PRIMARY OSTEOARTHRITIS OF RIGHT KNEE: Primary | ICD-10-CM

## 2020-12-09 DIAGNOSIS — M17.12 PRIMARY OSTEOARTHRITIS OF LEFT KNEE: ICD-10-CM

## 2020-12-09 PROCEDURE — 3008F PR BODY MASS INDEX (BMI) DOCUMENTED: ICD-10-PCS | Mod: CPTII,S$GLB,, | Performed by: ORTHOPAEDIC SURGERY

## 2020-12-09 PROCEDURE — 3288F FALL RISK ASSESSMENT DOCD: CPT | Mod: CPTII,S$GLB,, | Performed by: ORTHOPAEDIC SURGERY

## 2020-12-09 PROCEDURE — 3288F PR FALLS RISK ASSESSMENT DOCUMENTED: ICD-10-PCS | Mod: CPTII,S$GLB,, | Performed by: ORTHOPAEDIC SURGERY

## 2020-12-09 PROCEDURE — 99213 PR OFFICE/OUTPT VISIT, EST, LEVL III, 20-29 MIN: ICD-10-PCS | Mod: S$GLB,,, | Performed by: ORTHOPAEDIC SURGERY

## 2020-12-09 PROCEDURE — 1125F PR PAIN SEVERITY QUANTIFIED, PAIN PRESENT: ICD-10-PCS | Mod: S$GLB,,, | Performed by: ORTHOPAEDIC SURGERY

## 2020-12-09 PROCEDURE — 3008F BODY MASS INDEX DOCD: CPT | Mod: CPTII,S$GLB,, | Performed by: ORTHOPAEDIC SURGERY

## 2020-12-09 PROCEDURE — 99999 PR PBB SHADOW E&M-EST. PATIENT-LVL IV: CPT | Mod: PBBFAC,,, | Performed by: ORTHOPAEDIC SURGERY

## 2020-12-09 PROCEDURE — 99999 PR PBB SHADOW E&M-EST. PATIENT-LVL IV: ICD-10-PCS | Mod: PBBFAC,,, | Performed by: ORTHOPAEDIC SURGERY

## 2020-12-09 PROCEDURE — 1125F AMNT PAIN NOTED PAIN PRSNT: CPT | Mod: S$GLB,,, | Performed by: ORTHOPAEDIC SURGERY

## 2020-12-09 PROCEDURE — 1159F MED LIST DOCD IN RCRD: CPT | Mod: S$GLB,,, | Performed by: ORTHOPAEDIC SURGERY

## 2020-12-09 PROCEDURE — 1101F PR PT FALLS ASSESS DOC 0-1 FALLS W/OUT INJ PAST YR: ICD-10-PCS | Mod: CPTII,S$GLB,, | Performed by: ORTHOPAEDIC SURGERY

## 2020-12-09 PROCEDURE — 1101F PT FALLS ASSESS-DOCD LE1/YR: CPT | Mod: CPTII,S$GLB,, | Performed by: ORTHOPAEDIC SURGERY

## 2020-12-09 PROCEDURE — 1159F PR MEDICATION LIST DOCUMENTED IN MEDICAL RECORD: ICD-10-PCS | Mod: S$GLB,,, | Performed by: ORTHOPAEDIC SURGERY

## 2020-12-09 PROCEDURE — 99213 OFFICE O/P EST LOW 20 MIN: CPT | Mod: S$GLB,,, | Performed by: ORTHOPAEDIC SURGERY

## 2020-12-09 NOTE — PROGRESS NOTES
12/9/2020    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/2018    Dr Lance Foy- Also Renal cysts bilateral    Hyperlipidemia     Hypertension     MVP (mitral valve prolapse)     Primary osteoarthritis of left knee     Restless leg     Schizophrenia, simple, chronic     Urinary, incontinence, stress female        Past Surgical History:   Procedure Laterality Date    FOOT SURGERY      HYSTERECTOMY      TUBAL LIGATION         Current Outpatient Medications   Medication Sig    albuterol (PROVENTIL/VENTOLIN HFA) 90 mcg/actuation inhaler Inhale 2 puffs into the lungs every 6 (six) hours as needed for Wheezing. Rescue    amLODIPine (NORVASC) 10 MG tablet     ammonium lactate 12 % Crea Apply 1 application topically 2 (two) times daily as needed (dry skin).    aspirin (ECOTRIN) 81 MG EC tablet Take 81 mg by mouth once daily.    BREO ELLIPTA 100-25 mcg/dose diskus inhaler Inhale 1 puff by mouth daily.    calcium carbonate-vitamin D3 (CALCIUM 600 + D,3,) 600-125 mg-unit Tab     cefdinir (OMNICEF) 300 MG capsule TAKE 1 CAPSULE BY MOUTH EVERY 12 HOURS FOR 11 DAYS    docusate sodium (COLACE) 100 MG capsule Take 100 mg by mouth 2 (two) times daily.    donepeziL (ARICEPT) 10 MG tablet     escitalopram oxalate (LEXAPRO) 20 MG tablet Take 20 mg by mouth once daily.    flash glucose scanning reader (FREESTYLE JAY 14 DAY READER) Misc 1 Device by Misc.(Non-Drug; Combo Route) route once daily.    flash glucose sensor (FREESTYLE JAY 14 DAY SENSOR) Kit 1 Device by Misc.(Non-Drug; Combo Route) route every 14 (fourteen) days.    fluticasone propionate (FLONASE) 50 mcg/actuation nasal spray 2 sprays (100 mcg  total) by Each Nostril route once daily.    gabapentin (NEURONTIN) 100 MG capsule TAKE 1 CAPSULE BY MOUTH ONCE DAILY AT BEDTIME    hydrALAZINE (APRESOLINE) 10 MG tablet Take 1 tablet (10 mg total) by mouth 3 (three) times daily.    hydroCHLOROthiazide (MICROZIDE) 12.5 mg capsule     losartan (COZAAR) 50 MG tablet Take 50 mg by mouth once daily.    magnesium oxide (MAG-OX) 400 mg (241.3 mg magnesium) tablet Take 1 tablet (400 mg total) by mouth every Mon, Wed, Fri.    metoprolol succinate (TOPROL-XL) 50 MG 24 hr tablet Take 50 mg by mouth 2 (two) times daily.    montelukast (SINGULAIR) 10 mg tablet Take 1 tablet (10 mg total) by mouth every evening.    niacinamide 500 mg Tab     OLANZAPINE ORAL Take 10 mg by mouth once daily.     pantoprazole (PROTONIX) 40 MG tablet Take 1 tablet (40 mg total) by mouth once daily.    potassium chloride SA (K-DUR,KLOR-CON) 20 MEQ tablet Take 1 tablet (20 mEq total) by mouth once daily.    pravastatin (PRAVACHOL) 40 MG tablet Take 1 tablet (40 mg total) by mouth nightly.    SITagliptin (JANUVIA) 50 MG Tab Take 100 mg by mouth once daily.    traZODone (DESYREL) 100 MG tablet Take 2 tablets (200 mg total) by mouth nightly as needed. (Patient taking differently: Take 100 mg by mouth nightly as needed. )     No current facility-administered medications for this visit.        Review of patient's allergies indicates:  No Known Allergies    Family History   Problem Relation Age of Onset    Sudden death Father     Cancer Mother     Hypertension Mother     Breast cancer Mother     Cancer Sister     Breast cancer Sister        Social History     Socioeconomic History    Marital status:      Spouse name: Willy Perez    Number of children: 2    Years of education: Not on file    Highest education level: Not on file   Occupational History    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     Comment: [employer]   Social Needs    Financial resource strain:  Not hard at all    Food insecurity     Worry: Never true     Inability: Never true    Transportation needs     Medical: No     Non-medical: No   Tobacco Use    Smoking status: Former Smoker     Packs/day: 1.00     Years: 5.00     Pack years: 5.00     Types: Cigarettes     Quit date: 1992     Years since quittin.2    Smokeless tobacco: Never Used   Substance and Sexual Activity    Alcohol use: No     Frequency: Never     Drinks per session: Patient refused     Binge frequency: Never    Drug use: No    Sexual activity: Not Currently     Partners: Male   Lifestyle    Physical activity     Days per week: 0 days     Minutes per session: Not on file    Stress: Very much   Relationships    Social connections     Talks on phone: More than three times a week     Gets together: More than three times a week     Attends Oriental orthodox service: Not on file     Active member of club or organization: Yes     Attends meetings of clubs or organizations: Never     Relationship status:    Other Topics Concern    Not on file   Social History Narrative    Not on file       Chief Complaint:   Chief Complaint   Patient presents with    Right Knee - Follow-up, Osteoarthritis     OA of r knee. Aspirated right knee & injected w/ Kenalog on 20. PL 7/10 today. States aspiration and injection were effective in helping to relieve her knee pain. Ambulating with walker. Pain & stiffness worse in the morning. Right > left.     Left Knee - Follow-up, Osteoarthritis         History of present illness:    This is a 75 y.o. year old female who complains of  Patient is a history of bilateral osteoarthritis of the knees the right knee is greater than the left she has had injections of Kenalog recently had some improvement but now her pain level is 7/10 complains of pain and stiffness in the morning with ambulation    Review of Systems:    Constitution: Denies chills, fever, and sweats.  HENT: Denies headaches or blurry  "vision.  Cardiovascular: Denies chest pain or irregular heart beat.  Respiratory: Denies cough or shortness of breath.  Gastrointestinal: Denies abdominal pain, nausea, or vomiting.  Musculoskeletal:  Denies muscle cramps.  Neurological: Denies dizziness or focal weakness.  Psychiatric/Behavioral: Normal mental status.  Hematologic/Lymphatic: Denies bleeding problem or easy bruising/bleeding.  Skin: Denies rash or suspicious lesions.    Examination:    Vital Signs:    Vitals:    12/09/20 1328   Resp: 16   Weight: 65.3 kg (144 lb)   Height: 5' 7" (1.702 m)   PainSc:   7   PainLoc: Knee       Body mass index is 22.55 kg/m².    This a well-developed, well nourished patient in no acute distress.    Alert and oriented x 3 and cooperative to examination.       Physical Exam:  Right knee- patient is a slight effusion no warmth the cellulitis painful range of motion         left knee- patient has a slight effusion no warmth the cellulitis and painful range of motion    Imaging:  Previous x-rays of both left and right knee show severe osteoarthritis with bone-on-bone involvement       Assessment: Primary osteoarthritis of right knee    Primary osteoarthritis of left knee        Plan:   The patient states that she is unable to have total knee replacement due to her medical condition she is considering Orthovisc injections to both knees      DISCLAIMER: This note may have been dictated using voice recognition software and may contain grammatical errors.     NOTE: Consult report sent to referring provider via EP[employer] EMR.    "

## 2020-12-18 ENCOUNTER — TELEPHONE (OUTPATIENT)
Dept: FAMILY MEDICINE | Facility: CLINIC | Age: 75
End: 2020-12-18

## 2020-12-18 NOTE — TELEPHONE ENCOUNTER
Pt.'s in-home nurse called & said pt. had a nose bleed this AM. She wanted to know if pt. can stop Aspirin until they can figure out what the cause of her nose bleed is.    I got a message from the home nurse that Ms Wright was bleeding form the nose.  She can  hold aspirin till bleeding stops.  Tried to call back to home health nurse with no response.

## 2020-12-20 ENCOUNTER — PATIENT MESSAGE (OUTPATIENT)
Dept: FAMILY MEDICINE | Facility: CLINIC | Age: 75
End: 2020-12-20

## 2020-12-23 ENCOUNTER — OFFICE VISIT (OUTPATIENT)
Dept: CARDIOLOGY | Facility: CLINIC | Age: 75
End: 2020-12-23
Payer: MEDICARE

## 2020-12-23 ENCOUNTER — TELEPHONE (OUTPATIENT)
Dept: CARDIOLOGY | Facility: CLINIC | Age: 75
End: 2020-12-23

## 2020-12-23 VITALS
WEIGHT: 149 LBS | SYSTOLIC BLOOD PRESSURE: 130 MMHG | BODY MASS INDEX: 23.39 KG/M2 | DIASTOLIC BLOOD PRESSURE: 82 MMHG | RESPIRATION RATE: 18 BRPM | HEIGHT: 67 IN | HEART RATE: 74 BPM | OXYGEN SATURATION: 97 %

## 2020-12-23 DIAGNOSIS — I10 BENIGN ESSENTIAL HYPERTENSION: Primary | ICD-10-CM

## 2020-12-23 DIAGNOSIS — E78.2 MIXED HYPERLIPIDEMIA: ICD-10-CM

## 2020-12-23 PROCEDURE — 3079F PR MOST RECENT DIASTOLIC BLOOD PRESSURE 80-89 MM HG: ICD-10-PCS | Mod: CPTII,S$GLB,, | Performed by: INTERNAL MEDICINE

## 2020-12-23 PROCEDURE — 3008F PR BODY MASS INDEX (BMI) DOCUMENTED: ICD-10-PCS | Mod: CPTII,S$GLB,, | Performed by: INTERNAL MEDICINE

## 2020-12-23 PROCEDURE — 3079F DIAST BP 80-89 MM HG: CPT | Mod: CPTII,S$GLB,, | Performed by: INTERNAL MEDICINE

## 2020-12-23 PROCEDURE — 1159F PR MEDICATION LIST DOCUMENTED IN MEDICAL RECORD: ICD-10-PCS | Mod: S$GLB,,, | Performed by: INTERNAL MEDICINE

## 2020-12-23 PROCEDURE — 1159F MED LIST DOCD IN RCRD: CPT | Mod: S$GLB,,, | Performed by: INTERNAL MEDICINE

## 2020-12-23 PROCEDURE — 3288F PR FALLS RISK ASSESSMENT DOCUMENTED: ICD-10-PCS | Mod: CPTII,S$GLB,, | Performed by: INTERNAL MEDICINE

## 2020-12-23 PROCEDURE — 99212 PR OFFICE/OUTPT VISIT, EST, LEVL II, 10-19 MIN: ICD-10-PCS | Mod: S$GLB,,, | Performed by: INTERNAL MEDICINE

## 2020-12-23 PROCEDURE — 3288F FALL RISK ASSESSMENT DOCD: CPT | Mod: CPTII,S$GLB,, | Performed by: INTERNAL MEDICINE

## 2020-12-23 PROCEDURE — 1101F PR PT FALLS ASSESS DOC 0-1 FALLS W/OUT INJ PAST YR: ICD-10-PCS | Mod: CPTII,S$GLB,, | Performed by: INTERNAL MEDICINE

## 2020-12-23 PROCEDURE — 99212 OFFICE O/P EST SF 10 MIN: CPT | Mod: S$GLB,,, | Performed by: INTERNAL MEDICINE

## 2020-12-23 PROCEDURE — 3008F BODY MASS INDEX DOCD: CPT | Mod: CPTII,S$GLB,, | Performed by: INTERNAL MEDICINE

## 2020-12-23 PROCEDURE — 1101F PT FALLS ASSESS-DOCD LE1/YR: CPT | Mod: CPTII,S$GLB,, | Performed by: INTERNAL MEDICINE

## 2020-12-23 PROCEDURE — 3075F SYST BP GE 130 - 139MM HG: CPT | Mod: CPTII,S$GLB,, | Performed by: INTERNAL MEDICINE

## 2020-12-23 PROCEDURE — 3075F PR MOST RECENT SYSTOLIC BLOOD PRESS GE 130-139MM HG: ICD-10-PCS | Mod: CPTII,S$GLB,, | Performed by: INTERNAL MEDICINE

## 2020-12-23 NOTE — TELEPHONE ENCOUNTER
----- Message from Delmy Holley Patient Care Assistant sent at 12/23/2020 11:04 AM CST -----  Regarding: speak to nurse  Contact: ora caregiver [phone]    Type: Needs Medical Advice  Who Called:  ora   Symptoms (please be specific):  na  How long has patient had these symptoms:  na  Pharmacy name and phone #:  [facility]  Best Call Back Number: [phone]    Additional Information: ora is caregiver  and here is the list of medications patient is on , please call caregiver before 12:00 today , she will be out of town until Monday .     Recent medications :    magnesium oxide (MAG-OX) 400 mg (241.3 mg magnesium) tablet  traZODone (DESYREL) 100 MG tablet  pravastatin (PRAVACHOL) 40 MG tablet  donepeziL (ARICEPT) 10 MG tablet  amLODIPine (NORVASC) 10 MG tablet  losartan (COZAAR) 50 MG tablet  SITagliptin (JANUVIA) 50 MG Tab  metoprolol succinate (TOPROL-XL) 50 MG 24 hr tablet  hydrALAZINE (APRESOLINE) 10 MG tablet  pantoprazole (PROTONIX) 40 MG tablet  potassium chloride SA (K-DUR,KLOR-CON) 20 MEQ tablet  escitalopram oxalate (LEXAPRO) 20 MG tablet  montelukast (SINGULAIR) 10 mg tablet  OLANZAPINE ORAL  fluticasone propionate (FLONASE) 50 mcg/actuation nasal spray  Card/levo  25/100 mg ( started last week , not on list)  BREO ELLIPTA 100-25 mcg/dose diskus inhaler  ammonium lactate 12 % Cream

## 2020-12-23 NOTE — PROGRESS NOTES
Subjective:    Patient ID:  Adriana Perez is a 75 y.o. female who presents for   Follow-up (labs in chart from September, no test, meds tud and reviewed, )    HPIwas very sick with Covid. Now being evaluated for Parkinsons.  Denies any POWELL, no angina, no palpitations.      Review of patient's allergies indicates:  No Known Allergies    Past Medical History:   Diagnosis Date    Acid reflux     Allergy     lisinopril    Anxiety     Bilateral renal cysts 2018    Renal USG by Dr Foy    CKD (chronic kidney disease), stage III     Depression     Diabetes mellitus     Diabetes mellitus, type 2     Disorder of kidney and ureter     Echogenic kidneys on renal ultrasound 2018    Dr Lance Foy- Also Renal cysts bilateral    Hyperlipidemia     Hypertension     MVP (mitral valve prolapse)     Primary osteoarthritis of left knee     Restless leg     Schizophrenia, simple, chronic     Urinary, incontinence, stress female      Past Surgical History:   Procedure Laterality Date    FOOT SURGERY      HYSTERECTOMY      TUBAL LIGATION       Social History     Tobacco Use    Smoking status: Former Smoker     Packs/day: 1.00     Years: 5.00     Pack years: 5.00     Types: Cigarettes     Quit date: 1992     Years since quittin.2    Smokeless tobacco: Never Used   Substance Use Topics    Alcohol use: No     Frequency: Never     Drinks per session: Patient refused     Binge frequency: Never    Drug use: No        Review of Systems     ROS        Objective:        Vitals:    20 0954   BP: 130/82   Pulse: 74   Resp: 18       Lab Results   Component Value Date    WBC 5.87 09/10/2020    HGB 9.5 (L) 09/10/2020    HCT 29.9 (L) 09/10/2020     09/10/2020    CHOL 178 2019    TRIG 42 2019    HDL 86 (H) 2019    ALT 21 09/10/2020    AST 17 09/10/2020     09/10/2020    K 3.8 09/10/2020     09/10/2020    CREATININE 1.6 (H) 09/10/2020    BUN 16 09/10/2020    CO2 21  (L) 09/10/2020    TSH 2.330 08/11/2020    INR 1.2 03/14/2020    HGBA1C 8.6 (H) 09/10/2020        ECHOCARDIOGRAM RESULTS  Results for orders placed during the hospital encounter of 03/14/20   Echo Color Flow Doppler? Yes    Narrative · The estimated PA systolic pressure is 28 mmHg.  · Normal left ventricular systolic function. The estimated ejection   fraction is 65%.  · Normal LV diastolic function.  · No wall motion abnormalities.  · Normal right ventricular systolic function.  · Mild mitral regurgitation.  · Mild tricuspid regurgitation.  · Normal central venous pressure (3 mmHg).          CURRENT/PREVIOUS VISIT EKG  Results for orders placed or performed during the hospital encounter of 05/23/20   EKG 12-lead    Collection Time: 05/23/20  6:39 PM    Narrative    Test Reason : R07.9,    Vent. Rate : 074 BPM     Atrial Rate : 074 BPM     P-R Int : 144 ms          QRS Dur : 076 ms      QT Int : 402 ms       P-R-T Axes : 072 020 048 degrees     QTc Int : 446 ms    Normal sinus rhythm  Normal ECG  When compared with ECG of 23-MAY-2020 16:44,  No significant change was found  Confirmed by Devyn Martin MD ([id number]) on 5/27/2020 8:59:35 PM    Referred By: [email or web address]   SELF           Confirmed By:Devyn Martin MD     Results for orders placed during the hospital encounter of 03/14/20   Echo Color Flow Doppler? Yes    Narrative · The estimated PA systolic pressure is 28 mmHg.  · Normal left ventricular systolic function. The estimated ejection   fraction is 65%.  · Normal LV diastolic function.  · No wall motion abnormalities.  · Normal right ventricular systolic function.  · Mild mitral regurgitation.  · Mild tricuspid regurgitation.  · Normal central venous pressure (3 mmHg).        No results found for this or any previous visit.    PHYSICAL EXAM    Physical Exam   Constitutional: She appears cachectic.   She has lost lot of weight.  Her sitter is telling me that she got extremely weak with COVID but now she is  recovering and eating better.   Cardiovascular: Normal rate, regular rhythm and normal heart sounds. Exam reveals no gallop.   No murmur heard.  Pulmonary/Chest: Effort normal and breath sounds normal.        Medication List with Changes/Refills   Current Medications    ALBUTEROL (PROVENTIL/VENTOLIN HFA) 90 MCG/ACTUATION INHALER    Inhale 2 puffs into the lungs every 6 (six) hours as needed for Wheezing. Rescue    AMLODIPINE (NORVASC) 10 MG TABLET        AMMONIUM LACTATE 12 % CREA    Apply 1 application topically 2 (two) times daily as needed (dry skin).    ASPIRIN (ECOTRIN) 81 MG EC TABLET    Take 81 mg by mouth once daily.    BREO ELLIPTA 100-25 MCG/DOSE DISKUS INHALER    Inhale 1 puff by mouth daily.    CALCIUM CARBONATE-VITAMIN D3 (CALCIUM 600 + D,3,) 600-125 MG-UNIT TAB        CEFDINIR (OMNICEF) 300 MG CAPSULE    TAKE 1 CAPSULE BY MOUTH EVERY 12 HOURS FOR 11 DAYS    DOCUSATE SODIUM (COLACE) 100 MG CAPSULE    Take 100 mg by mouth 2 (two) times daily.    DONEPEZIL (ARICEPT) 10 MG TABLET        ESCITALOPRAM OXALATE (LEXAPRO) 20 MG TABLET    Take 20 mg by mouth once daily.    FLASH GLUCOSE SCANNING READER (FREESTYLE JAY 14 DAY READER) MISC    1 Device by Misc.(Non-Drug; Combo Route) route once daily.    FLASH GLUCOSE SENSOR (FREESTYLE JAY 14 DAY SENSOR) KIT    1 Device by Misc.(Non-Drug; Combo Route) route every 14 (fourteen) days.    FLUTICASONE PROPIONATE (FLONASE) 50 MCG/ACTUATION NASAL SPRAY    2 sprays (100 mcg total) by Each Nostril route once daily.    GABAPENTIN (NEURONTIN) 100 MG CAPSULE    TAKE 1 CAPSULE BY MOUTH ONCE DAILY AT BEDTIME    HYDRALAZINE (APRESOLINE) 10 MG TABLET    Take 1 tablet (10 mg total) by mouth 3 (three) times daily.    HYDROCHLOROTHIAZIDE (MICROZIDE) 12.5 MG CAPSULE        LOSARTAN (COZAAR) 50 MG TABLET    Take 50 mg by mouth once daily.    MAGNESIUM OXIDE (MAG-OX) 400 MG (241.3 MG MAGNESIUM) TABLET    Take 1 tablet (400 mg total) by mouth every Mon, Wed, Fri.    METOPROLOL  SUCCINATE (TOPROL-XL) 50 MG 24 HR TABLET    Take 50 mg by mouth 2 (two) times daily.    MONTELUKAST (SINGULAIR) 10 MG TABLET    Take 1 tablet (10 mg total) by mouth every evening.    NIACINAMIDE 500 MG TAB        OLANZAPINE ORAL    Take 10 mg by mouth once daily.     PANTOPRAZOLE (PROTONIX) 40 MG TABLET    Take 1 tablet (40 mg total) by mouth once daily.    POTASSIUM CHLORIDE SA (K-DUR,KLOR-CON) 20 MEQ TABLET    Take 1 tablet (20 mEq total) by mouth once daily.    PRAVASTATIN (PRAVACHOL) 40 MG TABLET    Take 1 tablet (40 mg total) by mouth nightly.    SITAGLIPTIN (JANUVIA) 50 MG TAB    Take 100 mg by mouth once daily.    TRAZODONE (DESYREL) 100 MG TABLET    Take 2 tablets (200 mg total) by mouth nightly as needed.           Assessment:       1. Benign essential hypertension    2. Mixed hyperlipidemia         Plan:  I have reviewed echocardiogram done in March of this year she has normal left ventricular systolic function no major valvular abnormality.  Her blood pressure is well controlled.  I do see significant bilateral upper extremity tremor consistent with the Parkinson's disease.  She has always had a tendency to have high heart rate which is well controlled with the present dose of metoprolol 50 b.i.d.  No medication changes have been made follow-up will be in 4 months       Problem List Items Addressed This Visit        Cardiac/Vascular    Benign essential hypertension - Primary    Overview     long standing         Multiple-type hyperlipidemia    Overview     Pt taking pravastatin. tolerating well. 12.11.14                Follow up in about 4 months (around 4/23/2021).

## 2020-12-29 ENCOUNTER — OFFICE VISIT (OUTPATIENT)
Dept: ORTHOPEDICS | Facility: CLINIC | Age: 75
End: 2020-12-29
Payer: MEDICARE

## 2020-12-29 VITALS — HEIGHT: 67 IN | WEIGHT: 149 LBS | BODY MASS INDEX: 23.39 KG/M2 | RESPIRATION RATE: 16 BRPM

## 2020-12-29 DIAGNOSIS — M17.12 PRIMARY OSTEOARTHRITIS OF LEFT KNEE: ICD-10-CM

## 2020-12-29 DIAGNOSIS — M17.11 PRIMARY OSTEOARTHRITIS OF RIGHT KNEE: Primary | ICD-10-CM

## 2020-12-29 PROCEDURE — 20610 LARGE JOINT ASPIRATION/INJECTION: BILATERAL KNEE: ICD-10-PCS | Mod: 50,S$GLB,, | Performed by: ORTHOPAEDIC SURGERY

## 2020-12-29 PROCEDURE — 99999 PR PBB SHADOW E&M-EST. PATIENT-LVL III: ICD-10-PCS | Mod: PBBFAC,,, | Performed by: ORTHOPAEDIC SURGERY

## 2020-12-29 PROCEDURE — 1101F PT FALLS ASSESS-DOCD LE1/YR: CPT | Mod: CPTII,S$GLB,, | Performed by: ORTHOPAEDIC SURGERY

## 2020-12-29 PROCEDURE — 3288F PR FALLS RISK ASSESSMENT DOCUMENTED: ICD-10-PCS | Mod: CPTII,S$GLB,, | Performed by: ORTHOPAEDIC SURGERY

## 2020-12-29 PROCEDURE — 99999 PR PBB SHADOW E&M-EST. PATIENT-LVL III: CPT | Mod: PBBFAC,,, | Performed by: ORTHOPAEDIC SURGERY

## 2020-12-29 PROCEDURE — 1125F PR PAIN SEVERITY QUANTIFIED, PAIN PRESENT: ICD-10-PCS | Mod: S$GLB,,, | Performed by: ORTHOPAEDIC SURGERY

## 2020-12-29 PROCEDURE — 3008F PR BODY MASS INDEX (BMI) DOCUMENTED: ICD-10-PCS | Mod: CPTII,S$GLB,, | Performed by: ORTHOPAEDIC SURGERY

## 2020-12-29 PROCEDURE — 99499 NO LOS: ICD-10-PCS | Mod: S$GLB,,, | Performed by: ORTHOPAEDIC SURGERY

## 2020-12-29 PROCEDURE — 3288F FALL RISK ASSESSMENT DOCD: CPT | Mod: CPTII,S$GLB,, | Performed by: ORTHOPAEDIC SURGERY

## 2020-12-29 PROCEDURE — 3008F BODY MASS INDEX DOCD: CPT | Mod: CPTII,S$GLB,, | Performed by: ORTHOPAEDIC SURGERY

## 2020-12-29 PROCEDURE — 20610 DRAIN/INJ JOINT/BURSA W/O US: CPT | Mod: 50,S$GLB,, | Performed by: ORTHOPAEDIC SURGERY

## 2020-12-29 PROCEDURE — 99499 UNLISTED E&M SERVICE: CPT | Mod: S$GLB,,, | Performed by: ORTHOPAEDIC SURGERY

## 2020-12-29 PROCEDURE — 1101F PR PT FALLS ASSESS DOC 0-1 FALLS W/OUT INJ PAST YR: ICD-10-PCS | Mod: CPTII,S$GLB,, | Performed by: ORTHOPAEDIC SURGERY

## 2020-12-29 PROCEDURE — 1125F AMNT PAIN NOTED PAIN PRSNT: CPT | Mod: S$GLB,,, | Performed by: ORTHOPAEDIC SURGERY

## 2020-12-29 RX ORDER — CARBIDOPA AND LEVODOPA 25; 100 MG/1; MG/1
TABLET ORAL
COMMUNITY

## 2020-12-29 NOTE — PROCEDURES
Large Joint Aspiration/Injection: bilateral knee    Date/Time: 12/29/2020 1:15 PM  Performed by: Yash Wing MD  Authorized by: Yash Wing MD     Indications:  Arthritis and pain  Approach:  Lateral  Location:  Knee  Laterality:  Bilateral  Site:  Bilateral knee  Medications (Right):  30 mg sodium hyaluronate (orthovisc) 30 mg/2 mL  Medications (Left):  30 mg sodium hyaluronate (orthovisc) 30 mg/2 mL

## 2020-12-29 NOTE — PROGRESS NOTES
12/29/2020    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/2018    Dr Lance Foy- Also Renal cysts bilateral    Hyperlipidemia     Hypertension     MVP (mitral valve prolapse)     Primary osteoarthritis of left knee     Restless leg     Schizophrenia, simple, chronic     Urinary, incontinence, stress female        Past Surgical History:   Procedure Laterality Date    FOOT SURGERY      HYSTERECTOMY      TUBAL LIGATION         Current Outpatient Medications   Medication Sig    albuterol (PROVENTIL/VENTOLIN HFA) 90 mcg/actuation inhaler Inhale 2 puffs into the lungs every 6 (six) hours as needed for Wheezing. Rescue    amLODIPine (NORVASC) 10 MG tablet     ammonium lactate 12 % Crea Apply 1 application topically 2 (two) times daily as needed (dry skin).    aspirin (ECOTRIN) 81 MG EC tablet Take 81 mg by mouth once daily.    BREO ELLIPTA 100-25 mcg/dose diskus inhaler Inhale 1 puff by mouth daily.    calcium carbonate-vitamin D3 (CALCIUM 600 + D,3,) 600-125 mg-unit Tab     carbidopa-levodopa  mg (SINEMET)  mg per tablet carbidopa 25 mg-levodopa 100 mg tablet   Take 0.5 tablets 3 times a day by oral route.    cefdinir (OMNICEF) 300 MG capsule TAKE 1 CAPSULE BY MOUTH EVERY 12 HOURS FOR 11 DAYS    docusate sodium (COLACE) 100 MG capsule Take 100 mg by mouth 2 (two) times daily.    donepeziL (ARICEPT) 10 MG tablet     escitalopram oxalate (LEXAPRO) 20 MG tablet Take 20 mg by mouth once daily.    flash glucose scanning reader ([employer]YLE JAY 14 DAY READER) Misc 1 Device by Misc.(Non-Drug; Combo Route) route once daily.    flash glucose sensor (FREESTYLE JAY 14 DAY SENSOR) Kit 1  Device by Misc.(Non-Drug; Combo Route) route every 14 (fourteen) days.    fluticasone propionate (FLONASE) 50 mcg/actuation nasal spray 2 sprays (100 mcg total) by Each Nostril route once daily.    gabapentin (NEURONTIN) 100 MG capsule TAKE 1 CAPSULE BY MOUTH ONCE DAILY AT BEDTIME    hydrALAZINE (APRESOLINE) 10 MG tablet Take 1 tablet (10 mg total) by mouth 3 (three) times daily.    hydroCHLOROthiazide (MICROZIDE) 12.5 mg capsule     losartan (COZAAR) 50 MG tablet Take 50 mg by mouth once daily.    magnesium oxide (MAG-OX) 400 mg (241.3 mg magnesium) tablet Take 1 tablet (400 mg total) by mouth every Mon, Wed, Fri.    metoprolol succinate (TOPROL-XL) 50 MG 24 hr tablet Take 50 mg by mouth 2 (two) times daily.    montelukast (SINGULAIR) 10 mg tablet Take 1 tablet (10 mg total) by mouth every evening.    niacinamide 500 mg Tab     OLANZAPINE ORAL Take 10 mg by mouth once daily.     pantoprazole (PROTONIX) 40 MG tablet Take 1 tablet (40 mg total) by mouth once daily.    potassium chloride SA (K-DUR,KLOR-CON) 20 MEQ tablet Take 1 tablet (20 mEq total) by mouth once daily.    pravastatin (PRAVACHOL) 40 MG tablet Take 1 tablet (40 mg total) by mouth nightly.    SITagliptin (JANUVIA) 50 MG Tab Take 100 mg by mouth once daily.    traZODone (DESYREL) 100 MG tablet Take 2 tablets (200 mg total) by mouth nightly as needed. (Patient taking differently: Take 100 mg by mouth nightly as needed. )     No current facility-administered medications for this visit.        Review of patient's allergies indicates:  No Known Allergies    Family History   Problem Relation Age of Onset    Sudden death Father     Cancer Mother     Hypertension Mother     Breast cancer Mother     Cancer Sister     Breast cancer Sister        Social History     Socioeconomic History    Marital status:      Spouse name: Willy Perez    Number of children: 2    Years of education: Not on file    Highest education level: Not on  file   Occupational History    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     Comment: [employer]   Social Needs    Financial resource strain: Not hard at all    Food insecurity     Worry: Never true     Inability: Never true    Transportation needs     Medical: No     Non-medical: No   Tobacco Use    Smoking status: Former Smoker     Packs/day: 1.00     Years: 5.00     Pack years: 5.00     Types: Cigarettes     Quit date: 1992     Years since quittin.3    Smokeless tobacco: Never Used   Substance and Sexual Activity    Alcohol use: No     Frequency: Never     Drinks per session: Patient refused     Binge frequency: Never    Drug use: No    Sexual activity: Not Currently     Partners: Male   Lifestyle    Physical activity     Days per week: 0 days     Minutes per session: Not on file    Stress: Very much   Relationships    Social connections     Talks on phone: More than three times a week     Gets together: More than three times a week     Attends Jainism service: Not on file     Active member of club or organization: Yes     Attends meetings of clubs or organizations: Never     Relationship status:    Other Topics Concern    Not on file   Social History Narrative    Not on file       Chief Complaint:   Chief Complaint   Patient presents with    Left Knee - Injections, Osteoarthritis     Orthovisc #1 Left Knee. Ambulates with walker. Left knee pain greater than right today. PL 7/10.     Right Knee - Injections, Osteoarthritis     Orthovisc #1 Right Knee          History of present illness:    This is a 75 y.o. year old female who complains of patient presents with osteoarthritis of both knees she is here for her 1st Orthovisc injection to both knees    Review of Systems:    Constitution: Denies chills, fever, and sweats.  HENT: Denies headaches or blurry vision.  Cardiovascular: Denies chest pain or irregular heart beat.  Respiratory: Denies cough or shortness of  "breath.  Gastrointestinal: Denies abdominal pain, nausea, or vomiting.  Musculoskeletal:  Denies muscle cramps.  Neurological: Denies dizziness or focal weakness.  Psychiatric/Behavioral: Normal mental status.  Hematologic/Lymphatic: Denies bleeding problem or easy bruising/bleeding.  Skin: Denies rash or suspicious lesions.    Examination:    Vital Signs:    Vitals:    12/29/20 1318   Resp: 16   Weight: 67.6 kg (149 lb)   Height: 5' 7" (1.702 m)   PainSc:   7   PainLoc: Knee       Body mass index is 23.34 kg/m².    This a well-developed, well nourished patient in no acute distress.    Alert and oriented x 3 and cooperative to examination.       Physical Exam:  Left and right knees-no cellulitis or effusion    Imaging:  No x-rays today       Assessment: Primary osteoarthritis of right knee    Primary osteoarthritis of left knee        Plan:  We will inject both knees with Orthovisc      DISCLAIMER: This note may have been dictated using voice recognition software and may contain grammatical errors.     NOTE: Consult report sent to referring provider via EPIC EMR.    "

## 2021-01-05 ENCOUNTER — OFFICE VISIT (OUTPATIENT)
Dept: ORTHOPEDICS | Facility: CLINIC | Age: 76
End: 2021-01-05
Payer: MEDICARE

## 2021-01-05 VITALS — BODY MASS INDEX: 23.39 KG/M2 | HEIGHT: 67 IN | WEIGHT: 149 LBS

## 2021-01-05 DIAGNOSIS — M17.12 PRIMARY OSTEOARTHRITIS OF LEFT KNEE: ICD-10-CM

## 2021-01-05 DIAGNOSIS — M17.11 PRIMARY OSTEOARTHRITIS OF RIGHT KNEE: Primary | ICD-10-CM

## 2021-01-05 PROCEDURE — 3288F FALL RISK ASSESSMENT DOCD: CPT | Mod: CPTII,S$GLB,, | Performed by: ORTHOPAEDIC SURGERY

## 2021-01-05 PROCEDURE — 20610 LARGE JOINT ASPIRATION/INJECTION: BILATERAL KNEE: ICD-10-PCS | Mod: 50,S$GLB,, | Performed by: ORTHOPAEDIC SURGERY

## 2021-01-05 PROCEDURE — 99499 NO LOS: ICD-10-PCS | Mod: S$GLB,,, | Performed by: ORTHOPAEDIC SURGERY

## 2021-01-05 PROCEDURE — 1125F AMNT PAIN NOTED PAIN PRSNT: CPT | Mod: S$GLB,,, | Performed by: ORTHOPAEDIC SURGERY

## 2021-01-05 PROCEDURE — 99999 PR PBB SHADOW E&M-EST. PATIENT-LVL II: CPT | Mod: PBBFAC,,, | Performed by: ORTHOPAEDIC SURGERY

## 2021-01-05 PROCEDURE — 1125F PR PAIN SEVERITY QUANTIFIED, PAIN PRESENT: ICD-10-PCS | Mod: S$GLB,,, | Performed by: ORTHOPAEDIC SURGERY

## 2021-01-05 PROCEDURE — 3008F PR BODY MASS INDEX (BMI) DOCUMENTED: ICD-10-PCS | Mod: CPTII,S$GLB,, | Performed by: ORTHOPAEDIC SURGERY

## 2021-01-05 PROCEDURE — 1101F PT FALLS ASSESS-DOCD LE1/YR: CPT | Mod: CPTII,S$GLB,, | Performed by: ORTHOPAEDIC SURGERY

## 2021-01-05 PROCEDURE — 3288F PR FALLS RISK ASSESSMENT DOCUMENTED: ICD-10-PCS | Mod: CPTII,S$GLB,, | Performed by: ORTHOPAEDIC SURGERY

## 2021-01-05 PROCEDURE — 20610 DRAIN/INJ JOINT/BURSA W/O US: CPT | Mod: 50,S$GLB,, | Performed by: ORTHOPAEDIC SURGERY

## 2021-01-05 PROCEDURE — 99999 PR PBB SHADOW E&M-EST. PATIENT-LVL II: ICD-10-PCS | Mod: PBBFAC,,, | Performed by: ORTHOPAEDIC SURGERY

## 2021-01-05 PROCEDURE — 3008F BODY MASS INDEX DOCD: CPT | Mod: CPTII,S$GLB,, | Performed by: ORTHOPAEDIC SURGERY

## 2021-01-05 PROCEDURE — 99499 UNLISTED E&M SERVICE: CPT | Mod: S$GLB,,, | Performed by: ORTHOPAEDIC SURGERY

## 2021-01-05 PROCEDURE — 1101F PR PT FALLS ASSESS DOC 0-1 FALLS W/OUT INJ PAST YR: ICD-10-PCS | Mod: CPTII,S$GLB,, | Performed by: ORTHOPAEDIC SURGERY

## 2021-01-09 ENCOUNTER — IMMUNIZATION (OUTPATIENT)
Dept: PRIMARY CARE CLINIC | Facility: CLINIC | Age: 76
End: 2021-01-09
Payer: MEDICARE

## 2021-01-09 DIAGNOSIS — Z23 NEED FOR VACCINATION: ICD-10-CM

## 2021-01-09 PROCEDURE — 91300 COVID-19, MRNA, LNP-S, PF, 30 MCG/0.3 ML DOSE VACCINE: ICD-10-PCS | Mod: S$GLB,,, | Performed by: FAMILY MEDICINE

## 2021-01-09 PROCEDURE — 91300 COVID-19, MRNA, LNP-S, PF, 30 MCG/0.3 ML DOSE VACCINE: CPT | Mod: S$GLB,,, | Performed by: FAMILY MEDICINE

## 2021-01-09 PROCEDURE — 0001A COVID-19, MRNA, LNP-S, PF, 30 MCG/0.3 ML DOSE VACCINE: CPT | Mod: S$GLB,,, | Performed by: FAMILY MEDICINE

## 2021-01-09 PROCEDURE — 0001A COVID-19, MRNA, LNP-S, PF, 30 MCG/0.3 ML DOSE VACCINE: ICD-10-PCS | Mod: S$GLB,,, | Performed by: FAMILY MEDICINE

## 2021-01-12 ENCOUNTER — OFFICE VISIT (OUTPATIENT)
Dept: ORTHOPEDICS | Facility: CLINIC | Age: 76
End: 2021-01-12
Payer: MEDICARE

## 2021-01-12 VITALS — BODY MASS INDEX: 23.39 KG/M2 | HEIGHT: 67 IN | WEIGHT: 149 LBS | RESPIRATION RATE: 16 BRPM

## 2021-01-12 DIAGNOSIS — M17.11 PRIMARY OSTEOARTHRITIS OF RIGHT KNEE: Primary | ICD-10-CM

## 2021-01-12 DIAGNOSIS — M17.12 PRIMARY OSTEOARTHRITIS OF LEFT KNEE: ICD-10-CM

## 2021-01-12 PROCEDURE — 3288F FALL RISK ASSESSMENT DOCD: CPT | Mod: CPTII,S$GLB,, | Performed by: ORTHOPAEDIC SURGERY

## 2021-01-12 PROCEDURE — 20610 DRAIN/INJ JOINT/BURSA W/O US: CPT | Mod: 50,S$GLB,, | Performed by: ORTHOPAEDIC SURGERY

## 2021-01-12 PROCEDURE — 1101F PR PT FALLS ASSESS DOC 0-1 FALLS W/OUT INJ PAST YR: ICD-10-PCS | Mod: CPTII,S$GLB,, | Performed by: ORTHOPAEDIC SURGERY

## 2021-01-12 PROCEDURE — 3008F BODY MASS INDEX DOCD: CPT | Mod: CPTII,S$GLB,, | Performed by: ORTHOPAEDIC SURGERY

## 2021-01-12 PROCEDURE — 1125F PR PAIN SEVERITY QUANTIFIED, PAIN PRESENT: ICD-10-PCS | Mod: S$GLB,,, | Performed by: ORTHOPAEDIC SURGERY

## 2021-01-12 PROCEDURE — 3008F PR BODY MASS INDEX (BMI) DOCUMENTED: ICD-10-PCS | Mod: CPTII,S$GLB,, | Performed by: ORTHOPAEDIC SURGERY

## 2021-01-12 PROCEDURE — 20610 LARGE JOINT ASPIRATION/INJECTION: BILATERAL KNEE: ICD-10-PCS | Mod: 50,S$GLB,, | Performed by: ORTHOPAEDIC SURGERY

## 2021-01-12 PROCEDURE — 99499 NO LOS: ICD-10-PCS | Mod: S$GLB,,, | Performed by: ORTHOPAEDIC SURGERY

## 2021-01-12 PROCEDURE — 3288F PR FALLS RISK ASSESSMENT DOCUMENTED: ICD-10-PCS | Mod: CPTII,S$GLB,, | Performed by: ORTHOPAEDIC SURGERY

## 2021-01-12 PROCEDURE — 1101F PT FALLS ASSESS-DOCD LE1/YR: CPT | Mod: CPTII,S$GLB,, | Performed by: ORTHOPAEDIC SURGERY

## 2021-01-12 PROCEDURE — 99999 PR PBB SHADOW E&M-EST. PATIENT-LVL III: CPT | Mod: PBBFAC,,, | Performed by: ORTHOPAEDIC SURGERY

## 2021-01-12 PROCEDURE — 99499 UNLISTED E&M SERVICE: CPT | Mod: S$GLB,,, | Performed by: ORTHOPAEDIC SURGERY

## 2021-01-12 PROCEDURE — 99999 PR PBB SHADOW E&M-EST. PATIENT-LVL III: ICD-10-PCS | Mod: PBBFAC,,, | Performed by: ORTHOPAEDIC SURGERY

## 2021-01-12 PROCEDURE — 1125F AMNT PAIN NOTED PAIN PRSNT: CPT | Mod: S$GLB,,, | Performed by: ORTHOPAEDIC SURGERY

## 2021-01-30 ENCOUNTER — IMMUNIZATION (OUTPATIENT)
Dept: PRIMARY CARE CLINIC | Facility: CLINIC | Age: 76
End: 2021-01-30
Payer: MEDICARE

## 2021-01-30 DIAGNOSIS — Z23 NEED FOR VACCINATION: Primary | ICD-10-CM

## 2021-01-30 PROCEDURE — 91300 COVID-19, MRNA, LNP-S, PF, 30 MCG/0.3 ML DOSE VACCINE: CPT | Mod: S$GLB,,, | Performed by: FAMILY MEDICINE

## 2021-01-30 PROCEDURE — 0002A COVID-19, MRNA, LNP-S, PF, 30 MCG/0.3 ML DOSE VACCINE: ICD-10-PCS | Mod: S$GLB,,, | Performed by: FAMILY MEDICINE

## 2021-01-30 PROCEDURE — 91300 COVID-19, MRNA, LNP-S, PF, 30 MCG/0.3 ML DOSE VACCINE: ICD-10-PCS | Mod: S$GLB,,, | Performed by: FAMILY MEDICINE

## 2021-01-30 PROCEDURE — 0002A COVID-19, MRNA, LNP-S, PF, 30 MCG/0.3 ML DOSE VACCINE: CPT | Mod: S$GLB,,, | Performed by: FAMILY MEDICINE

## 2021-02-09 ENCOUNTER — OFFICE VISIT (OUTPATIENT)
Dept: PODIATRY | Facility: CLINIC | Age: 76
End: 2021-02-09
Payer: MEDICARE

## 2021-02-09 VITALS — OXYGEN SATURATION: 96 % | WEIGHT: 149 LBS | HEIGHT: 67 IN | BODY MASS INDEX: 23.39 KG/M2

## 2021-02-09 DIAGNOSIS — L60.2 ONYCHOGRYPHOSIS: ICD-10-CM

## 2021-02-09 DIAGNOSIS — E11.42 DIABETIC POLYNEUROPATHY ASSOCIATED WITH TYPE 2 DIABETES MELLITUS: Primary | ICD-10-CM

## 2021-02-09 DIAGNOSIS — I87.2 VENOUS STASIS DERMATITIS OF BOTH LOWER EXTREMITIES: ICD-10-CM

## 2021-02-09 DIAGNOSIS — B35.1 ONYCHOMYCOSIS DUE TO DERMATOPHYTE: ICD-10-CM

## 2021-02-09 PROCEDURE — 1101F PR PT FALLS ASSESS DOC 0-1 FALLS W/OUT INJ PAST YR: ICD-10-PCS | Mod: CPTII,S$GLB,, | Performed by: PODIATRIST

## 2021-02-09 PROCEDURE — 3288F PR FALLS RISK ASSESSMENT DOCUMENTED: ICD-10-PCS | Mod: CPTII,S$GLB,, | Performed by: PODIATRIST

## 2021-02-09 PROCEDURE — 3288F FALL RISK ASSESSMENT DOCD: CPT | Mod: CPTII,S$GLB,, | Performed by: PODIATRIST

## 2021-02-09 PROCEDURE — 3008F BODY MASS INDEX DOCD: CPT | Mod: CPTII,S$GLB,, | Performed by: PODIATRIST

## 2021-02-09 PROCEDURE — 3008F PR BODY MASS INDEX (BMI) DOCUMENTED: ICD-10-PCS | Mod: CPTII,S$GLB,, | Performed by: PODIATRIST

## 2021-02-09 PROCEDURE — 1126F PR PAIN SEVERITY QUANTIFIED, NO PAIN PRESENT: ICD-10-PCS | Mod: S$GLB,,, | Performed by: PODIATRIST

## 2021-02-09 PROCEDURE — 1101F PT FALLS ASSESS-DOCD LE1/YR: CPT | Mod: CPTII,S$GLB,, | Performed by: PODIATRIST

## 2021-02-09 PROCEDURE — 1126F AMNT PAIN NOTED NONE PRSNT: CPT | Mod: S$GLB,,, | Performed by: PODIATRIST

## 2021-02-09 PROCEDURE — 11721 PR DEBRIDEMENT OF NAILS, 6 OR MORE: ICD-10-PCS | Mod: Q8,S$GLB,, | Performed by: PODIATRIST

## 2021-02-09 PROCEDURE — 99499 NO LOS: ICD-10-PCS | Mod: S$GLB,,, | Performed by: PODIATRIST

## 2021-02-09 PROCEDURE — 11721 DEBRIDE NAIL 6 OR MORE: CPT | Mod: Q8,S$GLB,, | Performed by: PODIATRIST

## 2021-02-09 PROCEDURE — 99499 UNLISTED E&M SERVICE: CPT | Mod: S$GLB,,, | Performed by: PODIATRIST

## 2021-02-09 RX ORDER — OLANZAPINE 10 MG/1
TABLET ORAL
COMMUNITY
Start: 2021-01-27

## 2021-02-12 ENCOUNTER — TELEPHONE (OUTPATIENT)
Dept: CARDIOLOGY | Facility: CLINIC | Age: 76
End: 2021-02-12

## 2021-02-23 ENCOUNTER — OFFICE VISIT (OUTPATIENT)
Dept: FAMILY MEDICINE | Facility: CLINIC | Age: 76
End: 2021-02-23
Payer: MEDICARE

## 2021-02-23 VITALS
DIASTOLIC BLOOD PRESSURE: 73 MMHG | HEIGHT: 67 IN | WEIGHT: 158 LBS | HEART RATE: 76 BPM | TEMPERATURE: 98 F | BODY MASS INDEX: 24.8 KG/M2 | SYSTOLIC BLOOD PRESSURE: 119 MMHG

## 2021-02-23 DIAGNOSIS — I10 BENIGN ESSENTIAL HYPERTENSION: Primary | ICD-10-CM

## 2021-02-23 DIAGNOSIS — E11.9 TYPE 2 DIABETES MELLITUS WITHOUT COMPLICATION, WITHOUT LONG-TERM CURRENT USE OF INSULIN: ICD-10-CM

## 2021-02-23 DIAGNOSIS — Z12.31 ENCOUNTER FOR SCREENING MAMMOGRAM FOR BREAST CANCER: ICD-10-CM

## 2021-02-23 DIAGNOSIS — F20.9 CHRONIC SCHIZOPHRENIA: ICD-10-CM

## 2021-02-23 DIAGNOSIS — G20.A1 PARKINSONS: ICD-10-CM

## 2021-02-23 DIAGNOSIS — E78.2 MULTIPLE-TYPE HYPERLIPIDEMIA: ICD-10-CM

## 2021-02-23 DIAGNOSIS — R63.4 UNINTENTIONAL WEIGHT LOSS: ICD-10-CM

## 2021-02-23 DIAGNOSIS — R26.2 DIFFICULTY WALKING: ICD-10-CM

## 2021-02-23 DIAGNOSIS — Z86.16 HISTORY OF COVID-19: ICD-10-CM

## 2021-02-23 DIAGNOSIS — N18.32 STAGE 3B CHRONIC KIDNEY DISEASE: ICD-10-CM

## 2021-02-23 PROCEDURE — 1101F PR PT FALLS ASSESS DOC 0-1 FALLS W/OUT INJ PAST YR: ICD-10-PCS | Mod: S$GLB,,, | Performed by: INTERNAL MEDICINE

## 2021-02-23 PROCEDURE — 3008F BODY MASS INDEX DOCD: CPT | Mod: S$GLB,,, | Performed by: INTERNAL MEDICINE

## 2021-02-23 PROCEDURE — 3008F PR BODY MASS INDEX (BMI) DOCUMENTED: ICD-10-PCS | Mod: S$GLB,,, | Performed by: INTERNAL MEDICINE

## 2021-02-23 PROCEDURE — 3052F PR MOST RECENT HEMOGLOBIN A1C LEVEL 8.0 - < 9.0%: ICD-10-PCS | Mod: S$GLB,,, | Performed by: INTERNAL MEDICINE

## 2021-02-23 PROCEDURE — 99214 OFFICE O/P EST MOD 30 MIN: CPT | Mod: S$GLB,,, | Performed by: INTERNAL MEDICINE

## 2021-02-23 PROCEDURE — 3078F PR MOST RECENT DIASTOLIC BLOOD PRESSURE < 80 MM HG: ICD-10-PCS | Mod: S$GLB,,, | Performed by: INTERNAL MEDICINE

## 2021-02-23 PROCEDURE — 3288F PR FALLS RISK ASSESSMENT DOCUMENTED: ICD-10-PCS | Mod: S$GLB,,, | Performed by: INTERNAL MEDICINE

## 2021-02-23 PROCEDURE — 99214 PR OFFICE/OUTPT VISIT, EST, LEVL IV, 30-39 MIN: ICD-10-PCS | Mod: S$GLB,,, | Performed by: INTERNAL MEDICINE

## 2021-02-23 PROCEDURE — 3074F SYST BP LT 130 MM HG: CPT | Mod: S$GLB,,, | Performed by: INTERNAL MEDICINE

## 2021-02-23 PROCEDURE — 3052F HG A1C>EQUAL 8.0%<EQUAL 9.0%: CPT | Mod: S$GLB,,, | Performed by: INTERNAL MEDICINE

## 2021-02-23 PROCEDURE — 1170F FXNL STATUS ASSESSED: CPT | Mod: S$GLB,,, | Performed by: INTERNAL MEDICINE

## 2021-02-23 PROCEDURE — 3078F DIAST BP <80 MM HG: CPT | Mod: S$GLB,,, | Performed by: INTERNAL MEDICINE

## 2021-02-23 PROCEDURE — 1159F MED LIST DOCD IN RCRD: CPT | Mod: S$GLB,,, | Performed by: INTERNAL MEDICINE

## 2021-02-23 PROCEDURE — 1126F PR PAIN SEVERITY QUANTIFIED, NO PAIN PRESENT: ICD-10-PCS | Mod: S$GLB,,, | Performed by: INTERNAL MEDICINE

## 2021-02-23 PROCEDURE — 1126F AMNT PAIN NOTED NONE PRSNT: CPT | Mod: S$GLB,,, | Performed by: INTERNAL MEDICINE

## 2021-02-23 PROCEDURE — 3288F FALL RISK ASSESSMENT DOCD: CPT | Mod: S$GLB,,, | Performed by: INTERNAL MEDICINE

## 2021-02-23 PROCEDURE — 3074F PR MOST RECENT SYSTOLIC BLOOD PRESSURE < 130 MM HG: ICD-10-PCS | Mod: S$GLB,,, | Performed by: INTERNAL MEDICINE

## 2021-02-23 PROCEDURE — 1159F PR MEDICATION LIST DOCUMENTED IN MEDICAL RECORD: ICD-10-PCS | Mod: S$GLB,,, | Performed by: INTERNAL MEDICINE

## 2021-02-23 PROCEDURE — 1170F PR FUNCTIONAL STATUS ASSESSED: ICD-10-PCS | Mod: S$GLB,,, | Performed by: INTERNAL MEDICINE

## 2021-02-23 PROCEDURE — 1101F PT FALLS ASSESS-DOCD LE1/YR: CPT | Mod: S$GLB,,, | Performed by: INTERNAL MEDICINE

## 2021-02-23 RX ORDER — FERROUS SULFATE 324(65)MG
324 TABLET, DELAYED RELEASE (ENTERIC COATED) ORAL DAILY
COMMUNITY
End: 2021-05-28

## 2021-02-23 RX ORDER — BUDESONIDE AND FORMOTEROL FUMARATE DIHYDRATE 160; 4.5 UG/1; UG/1
2 AEROSOL RESPIRATORY (INHALATION) EVERY 12 HOURS
COMMUNITY
End: 2021-05-28

## 2021-02-25 DIAGNOSIS — Z12.31 ENCOUNTER FOR MAMMOGRAM TO ESTABLISH BASELINE MAMMOGRAM: Primary | ICD-10-CM

## 2021-03-09 ENCOUNTER — OFFICE VISIT (OUTPATIENT)
Dept: ORTHOPEDICS | Facility: CLINIC | Age: 76
End: 2021-03-09
Payer: MEDICARE

## 2021-03-09 ENCOUNTER — LAB VISIT (OUTPATIENT)
Dept: LAB | Facility: HOSPITAL | Age: 76
End: 2021-03-09
Attending: NURSE PRACTITIONER
Payer: MEDICARE

## 2021-03-09 VITALS — BODY MASS INDEX: 24.8 KG/M2 | WEIGHT: 158 LBS | HEIGHT: 67 IN | TEMPERATURE: 97 F

## 2021-03-09 DIAGNOSIS — R10.9 STOMACH ACHE: Primary | ICD-10-CM

## 2021-03-09 DIAGNOSIS — E11.9 DIABETES MELLITUS WITHOUT COMPLICATION: ICD-10-CM

## 2021-03-09 DIAGNOSIS — M17.11 PRIMARY OSTEOARTHRITIS OF RIGHT KNEE: Primary | ICD-10-CM

## 2021-03-09 DIAGNOSIS — M17.12 PRIMARY OSTEOARTHRITIS OF LEFT KNEE: ICD-10-CM

## 2021-03-09 LAB
ALBUMIN SERPL BCP-MCNC: 4 G/DL (ref 3.5–5.2)
ALP SERPL-CCNC: 73 U/L (ref 55–135)
ALT SERPL W/O P-5'-P-CCNC: 8 U/L (ref 10–44)
ANION GAP SERPL CALC-SCNC: 13 MMOL/L (ref 8–16)
AST SERPL-CCNC: 17 U/L (ref 10–40)
BACTERIA #/AREA URNS HPF: NEGATIVE /HPF
BILIRUB SERPL-MCNC: 0.4 MG/DL (ref 0.1–1)
BILIRUB UR QL STRIP: NEGATIVE
BUN SERPL-MCNC: 19 MG/DL (ref 8–23)
CALCIUM SERPL-MCNC: 10.3 MG/DL (ref 8.7–10.5)
CHLORIDE SERPL-SCNC: 101 MMOL/L (ref 95–110)
CLARITY UR: CLEAR
CO2 SERPL-SCNC: 27 MMOL/L (ref 23–29)
COLOR UR: COLORLESS
CREAT SERPL-MCNC: 1.8 MG/DL (ref 0.5–1.4)
EST. GFR  (AFRICAN AMERICAN): 31.5 ML/MIN/1.73 M^2
EST. GFR  (NON AFRICAN AMERICAN): 27.3 ML/MIN/1.73 M^2
GLUCOSE SERPL-MCNC: 284 MG/DL (ref 70–110)
GLUCOSE UR QL STRIP: ABNORMAL
HGB UR QL STRIP: NEGATIVE
HYALINE CASTS #/AREA URNS LPF: 1 /LPF
KETONES UR QL STRIP: NEGATIVE
LEUKOCYTE ESTERASE UR QL STRIP: NEGATIVE
MICROSCOPIC COMMENT: NORMAL
NITRITE UR QL STRIP: NEGATIVE
PH UR STRIP: 7 [PH] (ref 5–8)
POTASSIUM SERPL-SCNC: 3.9 MMOL/L (ref 3.5–5.1)
PROT SERPL-MCNC: 8.1 G/DL (ref 6–8.4)
PROT UR QL STRIP: NEGATIVE
RBC #/AREA URNS HPF: 1 /HPF (ref 0–4)
SODIUM SERPL-SCNC: 141 MMOL/L (ref 136–145)
SP GR UR STRIP: 1.01 (ref 1–1.03)
SQUAMOUS #/AREA URNS HPF: 0 /HPF
URN SPEC COLLECT METH UR: ABNORMAL
UROBILINOGEN UR STRIP-ACNC: NEGATIVE EU/DL
WBC #/AREA URNS HPF: 0 /HPF (ref 0–5)
YEAST URNS QL MICRO: NORMAL

## 2021-03-09 PROCEDURE — 83036 HEMOGLOBIN GLYCOSYLATED A1C: CPT | Performed by: NURSE PRACTITIONER

## 2021-03-09 PROCEDURE — 36415 COLL VENOUS BLD VENIPUNCTURE: CPT | Performed by: NURSE PRACTITIONER

## 2021-03-09 PROCEDURE — 80053 COMPREHEN METABOLIC PANEL: CPT | Performed by: NURSE PRACTITIONER

## 2021-03-09 PROCEDURE — 99999 PR PBB SHADOW E&M-EST. PATIENT-LVL IV: CPT | Mod: PBBFAC,,, | Performed by: ORTHOPAEDIC SURGERY

## 2021-03-09 PROCEDURE — 99999 PR PBB SHADOW E&M-EST. PATIENT-LVL IV: ICD-10-PCS | Mod: PBBFAC,,, | Performed by: ORTHOPAEDIC SURGERY

## 2021-03-09 PROCEDURE — 3288F PR FALLS RISK ASSESSMENT DOCUMENTED: ICD-10-PCS | Mod: CPTII,S$GLB,, | Performed by: ORTHOPAEDIC SURGERY

## 2021-03-09 PROCEDURE — 1125F PR PAIN SEVERITY QUANTIFIED, PAIN PRESENT: ICD-10-PCS | Mod: S$GLB,,, | Performed by: ORTHOPAEDIC SURGERY

## 2021-03-09 PROCEDURE — 1101F PR PT FALLS ASSESS DOC 0-1 FALLS W/OUT INJ PAST YR: ICD-10-PCS | Mod: CPTII,S$GLB,, | Performed by: ORTHOPAEDIC SURGERY

## 2021-03-09 PROCEDURE — 20610 LARGE JOINT ASPIRATION/INJECTION: BILATERAL KNEE: ICD-10-PCS | Mod: 50,S$GLB,, | Performed by: ORTHOPAEDIC SURGERY

## 2021-03-09 PROCEDURE — 99213 OFFICE O/P EST LOW 20 MIN: CPT | Mod: 25,S$GLB,, | Performed by: ORTHOPAEDIC SURGERY

## 2021-03-09 PROCEDURE — 1101F PT FALLS ASSESS-DOCD LE1/YR: CPT | Mod: CPTII,S$GLB,, | Performed by: ORTHOPAEDIC SURGERY

## 2021-03-09 PROCEDURE — 3288F FALL RISK ASSESSMENT DOCD: CPT | Mod: CPTII,S$GLB,, | Performed by: ORTHOPAEDIC SURGERY

## 2021-03-09 PROCEDURE — 3008F BODY MASS INDEX DOCD: CPT | Mod: CPTII,S$GLB,, | Performed by: ORTHOPAEDIC SURGERY

## 2021-03-09 PROCEDURE — 3008F PR BODY MASS INDEX (BMI) DOCUMENTED: ICD-10-PCS | Mod: CPTII,S$GLB,, | Performed by: ORTHOPAEDIC SURGERY

## 2021-03-09 PROCEDURE — 1159F MED LIST DOCD IN RCRD: CPT | Mod: S$GLB,,, | Performed by: ORTHOPAEDIC SURGERY

## 2021-03-09 PROCEDURE — 20610 DRAIN/INJ JOINT/BURSA W/O US: CPT | Mod: 50,S$GLB,, | Performed by: ORTHOPAEDIC SURGERY

## 2021-03-09 PROCEDURE — 1125F AMNT PAIN NOTED PAIN PRSNT: CPT | Mod: S$GLB,,, | Performed by: ORTHOPAEDIC SURGERY

## 2021-03-09 PROCEDURE — 99213 PR OFFICE/OUTPT VISIT, EST, LEVL III, 20-29 MIN: ICD-10-PCS | Mod: 25,S$GLB,, | Performed by: ORTHOPAEDIC SURGERY

## 2021-03-09 PROCEDURE — 1159F PR MEDICATION LIST DOCUMENTED IN MEDICAL RECORD: ICD-10-PCS | Mod: S$GLB,,, | Performed by: ORTHOPAEDIC SURGERY

## 2021-03-09 PROCEDURE — 81001 URINALYSIS AUTO W/SCOPE: CPT | Performed by: NURSE PRACTITIONER

## 2021-03-09 RX ORDER — TRIAMCINOLONE ACETONIDE 40 MG/ML
60 INJECTION, SUSPENSION INTRA-ARTICULAR; INTRAMUSCULAR
Status: DISCONTINUED | OUTPATIENT
Start: 2021-03-09 | End: 2021-03-09 | Stop reason: HOSPADM

## 2021-03-09 RX ADMIN — TRIAMCINOLONE ACETONIDE 60 MG: 40 INJECTION, SUSPENSION INTRA-ARTICULAR; INTRAMUSCULAR at 02:03

## 2021-03-10 LAB
ESTIMATED AVG GLUCOSE: 171 MG/DL (ref 68–131)
HBA1C MFR BLD: 7.6 % (ref 4.5–6.2)

## 2021-03-11 ENCOUNTER — HOSPITAL ENCOUNTER (OUTPATIENT)
Dept: RADIOLOGY | Facility: HOSPITAL | Age: 76
Discharge: HOME | End: 2021-03-11
Attending: INTERNAL MEDICINE
Payer: MEDICARE

## 2021-03-11 DIAGNOSIS — Z12.31 ENCOUNTER FOR SCREENING MAMMOGRAM FOR BREAST CANCER: ICD-10-CM

## 2021-03-11 PROCEDURE — 77067 SCR MAMMO BI INCL CAD: CPT | Mod: TC,PO

## 2021-04-19 ENCOUNTER — TELEPHONE (OUTPATIENT)
Dept: CARDIOLOGY | Facility: CLINIC | Age: 76
End: 2021-04-19

## 2021-04-19 DIAGNOSIS — E11.9 TYPE 2 DIABETES MELLITUS WITHOUT COMPLICATION, WITHOUT LONG-TERM CURRENT USE OF INSULIN: ICD-10-CM

## 2021-04-19 RX ORDER — LOSARTAN POTASSIUM 50 MG/1
TABLET ORAL
Qty: 180 TABLET | Refills: 2 | Status: SHIPPED | OUTPATIENT
Start: 2021-04-19

## 2021-04-19 RX ORDER — AMLODIPINE BESYLATE 10 MG/1
TABLET ORAL
Qty: 90 TABLET | Refills: 2 | Status: SHIPPED | OUTPATIENT
Start: 2021-04-19

## 2021-04-19 RX ORDER — METOPROLOL SUCCINATE 50 MG/1
TABLET, EXTENDED RELEASE ORAL
Qty: 180 TABLET | Refills: 2 | Status: SHIPPED | OUTPATIENT
Start: 2021-04-19

## 2021-04-19 RX ORDER — FLASH GLUCOSE SENSOR
1 KIT MISCELLANEOUS
Qty: 6 KIT | Refills: 2 | Status: SHIPPED | OUTPATIENT
Start: 2021-04-19

## 2021-04-20 DIAGNOSIS — R06.02 SOB (SHORTNESS OF BREATH): ICD-10-CM

## 2021-04-20 RX ORDER — ALBUTEROL SULFATE 90 UG/1
2 AEROSOL, METERED RESPIRATORY (INHALATION) EVERY 6 HOURS PRN
Qty: 18 G | Refills: 5 | Status: SHIPPED | OUTPATIENT
Start: 2021-04-20

## 2021-04-27 ENCOUNTER — LAB VISIT (OUTPATIENT)
Dept: LAB | Facility: HOSPITAL | Age: 76
End: 2021-04-27
Attending: INTERNAL MEDICINE
Payer: MEDICARE

## 2021-04-27 DIAGNOSIS — N18.30 CHRONIC KIDNEY DISEASE, STAGE III (MODERATE): Primary | ICD-10-CM

## 2021-04-27 LAB
ALBUMIN SERPL BCP-MCNC: 4.2 G/DL (ref 3.5–5.2)
ANION GAP SERPL CALC-SCNC: 9 MMOL/L (ref 8–16)
BASOPHILS # BLD AUTO: 0.02 K/UL (ref 0–0.2)
BASOPHILS NFR BLD: 0.3 % (ref 0–1.9)
BILIRUB UR QL STRIP: NEGATIVE
BUN SERPL-MCNC: 19 MG/DL (ref 8–23)
CALCIUM SERPL-MCNC: 10.4 MG/DL (ref 8.7–10.5)
CHLORIDE SERPL-SCNC: 103 MMOL/L (ref 95–110)
CLARITY UR: CLEAR
CO2 SERPL-SCNC: 27 MMOL/L (ref 23–29)
COLOR UR: YELLOW
CREAT SERPL-MCNC: 1.9 MG/DL (ref 0.5–1.4)
CREAT UR-MCNC: 106 MG/DL (ref 15–325)
DIFFERENTIAL METHOD: ABNORMAL
EOSINOPHIL # BLD AUTO: 0.1 K/UL (ref 0–0.5)
EOSINOPHIL NFR BLD: 0.9 % (ref 0–8)
ERYTHROCYTE [DISTWIDTH] IN BLOOD BY AUTOMATED COUNT: 13.8 % (ref 11.5–14.5)
EST. GFR  (AFRICAN AMERICAN): 29.5 ML/MIN/1.73 M^2
EST. GFR  (NON AFRICAN AMERICAN): 25.6 ML/MIN/1.73 M^2
FERRITIN SERPL-MCNC: 143 NG/ML (ref 20–300)
GLUCOSE SERPL-MCNC: 144 MG/DL (ref 70–110)
GLUCOSE UR QL STRIP: NEGATIVE
HCT VFR BLD AUTO: 38.8 % (ref 37–48.5)
HGB BLD-MCNC: 12.3 G/DL (ref 12–16)
HGB UR QL STRIP: NEGATIVE
IMM GRANULOCYTES # BLD AUTO: 0.03 K/UL (ref 0–0.04)
IMM GRANULOCYTES NFR BLD AUTO: 0.5 % (ref 0–0.5)
IRON SERPL-MCNC: 57 UG/DL (ref 30–160)
KETONES UR QL STRIP: NEGATIVE
LEUKOCYTE ESTERASE UR QL STRIP: NEGATIVE
LYMPHOCYTES # BLD AUTO: 1.8 K/UL (ref 1–4.8)
LYMPHOCYTES NFR BLD: 27.1 % (ref 18–48)
MCH RBC QN AUTO: 26.9 PG (ref 27–31)
MCHC RBC AUTO-ENTMCNC: 31.7 G/DL (ref 32–36)
MCV RBC AUTO: 85 FL (ref 82–98)
MONOCYTES # BLD AUTO: 0.5 K/UL (ref 0.3–1)
MONOCYTES NFR BLD: 8.1 % (ref 4–15)
NEUTROPHILS # BLD AUTO: 4.1 K/UL (ref 1.8–7.7)
NEUTROPHILS NFR BLD: 63.1 % (ref 38–73)
NITRITE UR QL STRIP: NEGATIVE
NRBC BLD-RTO: 0 /100 WBC
PH UR STRIP: 7 [PH] (ref 5–8)
PHOSPHATE SERPL-MCNC: 3.1 MG/DL (ref 2.7–4.5)
PLATELET # BLD AUTO: 258 K/UL (ref 150–450)
PMV BLD AUTO: 10.1 FL (ref 9.2–12.9)
POTASSIUM SERPL-SCNC: 4.2 MMOL/L (ref 3.5–5.1)
PROT UR QL STRIP: ABNORMAL
PROT UR-MCNC: 17 MG/DL (ref 6–15)
PROT/CREAT UR: 0.16 MG/G{CREAT} (ref 0–0.2)
PTH-INTACT SERPL-MCNC: 83.9 PG/ML (ref 9–77)
RBC # BLD AUTO: 4.58 M/UL (ref 4–5.4)
SATURATED IRON: 21 % (ref 20–50)
SODIUM SERPL-SCNC: 139 MMOL/L (ref 136–145)
SP GR UR STRIP: 1.01 (ref 1–1.03)
TOTAL IRON BINDING CAPACITY: 273 UG/DL (ref 250–450)
TRANSFERRIN SERPL-MCNC: 195 MG/DL (ref 200–375)
URN SPEC COLLECT METH UR: ABNORMAL
UROBILINOGEN UR STRIP-ACNC: NEGATIVE EU/DL
WBC # BLD AUTO: 6.56 K/UL (ref 3.9–12.7)

## 2021-04-27 PROCEDURE — 83970 ASSAY OF PARATHORMONE: CPT | Performed by: INTERNAL MEDICINE

## 2021-04-27 PROCEDURE — 80069 RENAL FUNCTION PANEL: CPT | Performed by: INTERNAL MEDICINE

## 2021-04-27 PROCEDURE — 82728 ASSAY OF FERRITIN: CPT | Performed by: INTERNAL MEDICINE

## 2021-04-27 PROCEDURE — 84156 ASSAY OF PROTEIN URINE: CPT | Performed by: INTERNAL MEDICINE

## 2021-04-27 PROCEDURE — 81003 URINALYSIS AUTO W/O SCOPE: CPT | Performed by: INTERNAL MEDICINE

## 2021-04-27 PROCEDURE — 36415 COLL VENOUS BLD VENIPUNCTURE: CPT | Performed by: INTERNAL MEDICINE

## 2021-04-27 PROCEDURE — 85025 COMPLETE CBC W/AUTO DIFF WBC: CPT | Performed by: INTERNAL MEDICINE

## 2021-04-27 PROCEDURE — 83540 ASSAY OF IRON: CPT | Performed by: INTERNAL MEDICINE

## 2021-05-03 DIAGNOSIS — I10 BENIGN ESSENTIAL HYPERTENSION: ICD-10-CM

## 2021-05-03 RX ORDER — HYDRALAZINE HYDROCHLORIDE 10 MG/1
10 TABLET, FILM COATED ORAL 3 TIMES DAILY
Qty: 270 TABLET | Refills: 0 | Status: SHIPPED | OUTPATIENT
Start: 2021-05-03 | End: 2021-08-03

## 2021-05-03 RX ORDER — FLUTICASONE FUROATE AND VILANTEROL TRIFENATATE 100; 25 UG/1; UG/1
POWDER RESPIRATORY (INHALATION)
Qty: 1 EACH | Refills: 5 | Status: SHIPPED | OUTPATIENT
Start: 2021-05-03

## 2021-05-03 RX ORDER — POTASSIUM CHLORIDE 20 MEQ/1
20 TABLET, EXTENDED RELEASE ORAL DAILY
Qty: 90 TABLET | Refills: 3 | Status: SHIPPED | OUTPATIENT
Start: 2021-05-03

## 2021-05-26 ENCOUNTER — OFFICE VISIT (OUTPATIENT)
Dept: ORTHOPEDICS | Facility: CLINIC | Age: 76
End: 2021-05-26
Payer: MEDICARE

## 2021-05-26 VITALS — RESPIRATION RATE: 16 BRPM | BODY MASS INDEX: 24.8 KG/M2 | WEIGHT: 158 LBS | HEIGHT: 67 IN

## 2021-05-26 DIAGNOSIS — M17.0 PRIMARY OSTEOARTHRITIS OF BOTH KNEES: Primary | ICD-10-CM

## 2021-05-26 DIAGNOSIS — M17.11 PRIMARY OSTEOARTHRITIS OF RIGHT KNEE: Primary | ICD-10-CM

## 2021-05-26 DIAGNOSIS — M17.12 PRIMARY OSTEOARTHRITIS OF LEFT KNEE: ICD-10-CM

## 2021-05-26 PROCEDURE — 1101F PR PT FALLS ASSESS DOC 0-1 FALLS W/OUT INJ PAST YR: ICD-10-PCS | Mod: CPTII,S$GLB,, | Performed by: ORTHOPAEDIC SURGERY

## 2021-05-26 PROCEDURE — 1159F MED LIST DOCD IN RCRD: CPT | Mod: S$GLB,,, | Performed by: ORTHOPAEDIC SURGERY

## 2021-05-26 PROCEDURE — 3008F BODY MASS INDEX DOCD: CPT | Mod: CPTII,S$GLB,, | Performed by: ORTHOPAEDIC SURGERY

## 2021-05-26 PROCEDURE — 1126F PR PAIN SEVERITY QUANTIFIED, NO PAIN PRESENT: ICD-10-PCS | Mod: S$GLB,,, | Performed by: ORTHOPAEDIC SURGERY

## 2021-05-26 PROCEDURE — 1159F PR MEDICATION LIST DOCUMENTED IN MEDICAL RECORD: ICD-10-PCS | Mod: S$GLB,,, | Performed by: ORTHOPAEDIC SURGERY

## 2021-05-26 PROCEDURE — 3288F PR FALLS RISK ASSESSMENT DOCUMENTED: ICD-10-PCS | Mod: CPTII,S$GLB,, | Performed by: ORTHOPAEDIC SURGERY

## 2021-05-26 PROCEDURE — 99999 PR PBB SHADOW E&M-EST. PATIENT-LVL III: CPT | Mod: PBBFAC,,, | Performed by: ORTHOPAEDIC SURGERY

## 2021-05-26 PROCEDURE — 99212 PR OFFICE/OUTPT VISIT, EST, LEVL II, 10-19 MIN: ICD-10-PCS | Mod: S$GLB,,, | Performed by: ORTHOPAEDIC SURGERY

## 2021-05-26 PROCEDURE — 3008F PR BODY MASS INDEX (BMI) DOCUMENTED: ICD-10-PCS | Mod: CPTII,S$GLB,, | Performed by: ORTHOPAEDIC SURGERY

## 2021-05-26 PROCEDURE — 1126F AMNT PAIN NOTED NONE PRSNT: CPT | Mod: S$GLB,,, | Performed by: ORTHOPAEDIC SURGERY

## 2021-05-26 PROCEDURE — 99212 OFFICE O/P EST SF 10 MIN: CPT | Mod: S$GLB,,, | Performed by: ORTHOPAEDIC SURGERY

## 2021-05-26 PROCEDURE — 1101F PT FALLS ASSESS-DOCD LE1/YR: CPT | Mod: CPTII,S$GLB,, | Performed by: ORTHOPAEDIC SURGERY

## 2021-05-26 PROCEDURE — 3288F FALL RISK ASSESSMENT DOCD: CPT | Mod: CPTII,S$GLB,, | Performed by: ORTHOPAEDIC SURGERY

## 2021-05-26 PROCEDURE — 99999 PR PBB SHADOW E&M-EST. PATIENT-LVL III: ICD-10-PCS | Mod: PBBFAC,,, | Performed by: ORTHOPAEDIC SURGERY

## 2021-05-28 ENCOUNTER — OFFICE VISIT (OUTPATIENT)
Dept: PODIATRY | Facility: CLINIC | Age: 76
End: 2021-05-28
Payer: MEDICARE

## 2021-05-28 VITALS
DIASTOLIC BLOOD PRESSURE: 80 MMHG | HEART RATE: 80 BPM | BODY MASS INDEX: 24.8 KG/M2 | WEIGHT: 158 LBS | OXYGEN SATURATION: 97 % | SYSTOLIC BLOOD PRESSURE: 134 MMHG | HEIGHT: 67 IN

## 2021-05-28 DIAGNOSIS — L60.2 ONYCHOGRYPHOSIS: ICD-10-CM

## 2021-05-28 DIAGNOSIS — E11.42 DIABETIC POLYNEUROPATHY ASSOCIATED WITH TYPE 2 DIABETES MELLITUS: Primary | ICD-10-CM

## 2021-05-28 DIAGNOSIS — B35.1 ONYCHOMYCOSIS DUE TO DERMATOPHYTE: ICD-10-CM

## 2021-05-28 PROCEDURE — 3008F PR BODY MASS INDEX (BMI) DOCUMENTED: ICD-10-PCS | Mod: CPTII,S$GLB,, | Performed by: PODIATRIST

## 2021-05-28 PROCEDURE — 1125F AMNT PAIN NOTED PAIN PRSNT: CPT | Mod: S$GLB,,, | Performed by: PODIATRIST

## 2021-05-28 PROCEDURE — 3051F HG A1C>EQUAL 7.0%<8.0%: CPT | Mod: CPTII,S$GLB,, | Performed by: PODIATRIST

## 2021-05-28 PROCEDURE — 1125F PR PAIN SEVERITY QUANTIFIED, PAIN PRESENT: ICD-10-PCS | Mod: S$GLB,,, | Performed by: PODIATRIST

## 2021-05-28 PROCEDURE — 99499 NO LOS: ICD-10-PCS | Mod: S$GLB,,, | Performed by: PODIATRIST

## 2021-05-28 PROCEDURE — 3008F BODY MASS INDEX DOCD: CPT | Mod: CPTII,S$GLB,, | Performed by: PODIATRIST

## 2021-05-28 PROCEDURE — 11721 PR DEBRIDEMENT OF NAILS, 6 OR MORE: ICD-10-PCS | Mod: Q8,S$GLB,, | Performed by: PODIATRIST

## 2021-05-28 PROCEDURE — 3051F PR MOST RECENT HEMOGLOBIN A1C LEVEL 7.0 - < 8.0%: ICD-10-PCS | Mod: CPTII,S$GLB,, | Performed by: PODIATRIST

## 2021-05-28 PROCEDURE — 3288F PR FALLS RISK ASSESSMENT DOCUMENTED: ICD-10-PCS | Mod: CPTII,S$GLB,, | Performed by: PODIATRIST

## 2021-05-28 PROCEDURE — 1101F PT FALLS ASSESS-DOCD LE1/YR: CPT | Mod: CPTII,S$GLB,, | Performed by: PODIATRIST

## 2021-05-28 PROCEDURE — 3288F FALL RISK ASSESSMENT DOCD: CPT | Mod: CPTII,S$GLB,, | Performed by: PODIATRIST

## 2021-05-28 PROCEDURE — 11721 DEBRIDE NAIL 6 OR MORE: CPT | Mod: Q8,S$GLB,, | Performed by: PODIATRIST

## 2021-05-28 PROCEDURE — 1101F PR PT FALLS ASSESS DOC 0-1 FALLS W/OUT INJ PAST YR: ICD-10-PCS | Mod: CPTII,S$GLB,, | Performed by: PODIATRIST

## 2021-05-28 PROCEDURE — 99499 UNLISTED E&M SERVICE: CPT | Mod: S$GLB,,, | Performed by: PODIATRIST

## 2021-05-28 RX ORDER — NIACINAMIDE 500 MG
TABLET ORAL
COMMUNITY

## 2021-05-28 RX ORDER — DOCUSATE SODIUM 100 MG/1
CAPSULE, LIQUID FILLED ORAL
COMMUNITY

## 2021-07-09 NOTE — PATIENT INSTRUCTIONS
Step-by-Step  Checking Your Blood Pressure    Date Last Reviewed: 4/27/2016  © 0162-0349 [employer]. [address] [place] [address], [place] [address]. All rights reserved. This information is not intended as a substitute for professional medical care. Always follow your healthcare professional's instructions.         2

## 2021-07-28 ENCOUNTER — OFFICE VISIT (OUTPATIENT)
Dept: ORTHOPEDICS | Facility: CLINIC | Age: 76
End: 2021-07-28
Payer: MEDICARE

## 2021-07-28 VITALS — BODY MASS INDEX: 24.81 KG/M2 | WEIGHT: 158.06 LBS | HEIGHT: 67 IN

## 2021-07-28 DIAGNOSIS — M17.0 PRIMARY OSTEOARTHRITIS OF BOTH KNEES: Primary | ICD-10-CM

## 2021-07-28 PROCEDURE — 3288F PR FALLS RISK ASSESSMENT DOCUMENTED: ICD-10-PCS | Mod: CPTII,S$GLB,, | Performed by: ORTHOPAEDIC SURGERY

## 2021-07-28 PROCEDURE — 1160F RVW MEDS BY RX/DR IN RCRD: CPT | Mod: CPTII,S$GLB,, | Performed by: ORTHOPAEDIC SURGERY

## 2021-07-28 PROCEDURE — 99999 PR PBB SHADOW E&M-EST. PATIENT-LVL IV: CPT | Mod: PBBFAC,,, | Performed by: ORTHOPAEDIC SURGERY

## 2021-07-28 PROCEDURE — 99499 UNLISTED E&M SERVICE: CPT | Mod: S$GLB,,, | Performed by: ORTHOPAEDIC SURGERY

## 2021-07-28 PROCEDURE — 20610 LARGE JOINT ASPIRATION/INJECTION: BILATERAL KNEE: ICD-10-PCS | Mod: 50,S$GLB,, | Performed by: ORTHOPAEDIC SURGERY

## 2021-07-28 PROCEDURE — 3288F FALL RISK ASSESSMENT DOCD: CPT | Mod: CPTII,S$GLB,, | Performed by: ORTHOPAEDIC SURGERY

## 2021-07-28 PROCEDURE — 1125F PR PAIN SEVERITY QUANTIFIED, PAIN PRESENT: ICD-10-PCS | Mod: CPTII,S$GLB,, | Performed by: ORTHOPAEDIC SURGERY

## 2021-07-28 PROCEDURE — 99999 PR PBB SHADOW E&M-EST. PATIENT-LVL IV: ICD-10-PCS | Mod: PBBFAC,,, | Performed by: ORTHOPAEDIC SURGERY

## 2021-07-28 PROCEDURE — 3008F PR BODY MASS INDEX (BMI) DOCUMENTED: ICD-10-PCS | Mod: CPTII,S$GLB,, | Performed by: ORTHOPAEDIC SURGERY

## 2021-07-28 PROCEDURE — 1101F PR PT FALLS ASSESS DOC 0-1 FALLS W/OUT INJ PAST YR: ICD-10-PCS | Mod: CPTII,S$GLB,, | Performed by: ORTHOPAEDIC SURGERY

## 2021-07-28 PROCEDURE — 3051F PR MOST RECENT HEMOGLOBIN A1C LEVEL 7.0 - < 8.0%: ICD-10-PCS | Mod: CPTII,S$GLB,, | Performed by: ORTHOPAEDIC SURGERY

## 2021-07-28 PROCEDURE — 99499 NO LOS: ICD-10-PCS | Mod: S$GLB,,, | Performed by: ORTHOPAEDIC SURGERY

## 2021-07-28 PROCEDURE — 1125F AMNT PAIN NOTED PAIN PRSNT: CPT | Mod: CPTII,S$GLB,, | Performed by: ORTHOPAEDIC SURGERY

## 2021-07-28 PROCEDURE — 1159F PR MEDICATION LIST DOCUMENTED IN MEDICAL RECORD: ICD-10-PCS | Mod: CPTII,S$GLB,, | Performed by: ORTHOPAEDIC SURGERY

## 2021-07-28 PROCEDURE — 1101F PT FALLS ASSESS-DOCD LE1/YR: CPT | Mod: CPTII,S$GLB,, | Performed by: ORTHOPAEDIC SURGERY

## 2021-07-28 PROCEDURE — 20610 DRAIN/INJ JOINT/BURSA W/O US: CPT | Mod: 50,S$GLB,, | Performed by: ORTHOPAEDIC SURGERY

## 2021-07-28 PROCEDURE — 3008F BODY MASS INDEX DOCD: CPT | Mod: CPTII,S$GLB,, | Performed by: ORTHOPAEDIC SURGERY

## 2021-07-28 PROCEDURE — 1160F PR REVIEW ALL MEDS BY PRESCRIBER/CLIN PHARMACIST DOCUMENTED: ICD-10-PCS | Mod: CPTII,S$GLB,, | Performed by: ORTHOPAEDIC SURGERY

## 2021-07-28 PROCEDURE — 3051F HG A1C>EQUAL 7.0%<8.0%: CPT | Mod: CPTII,S$GLB,, | Performed by: ORTHOPAEDIC SURGERY

## 2021-07-28 PROCEDURE — 1159F MED LIST DOCD IN RCRD: CPT | Mod: CPTII,S$GLB,, | Performed by: ORTHOPAEDIC SURGERY

## 2021-08-03 ENCOUNTER — OFFICE VISIT (OUTPATIENT)
Dept: ORTHOPEDICS | Facility: CLINIC | Age: 76
End: 2021-08-03
Payer: MEDICARE

## 2021-08-03 VITALS — HEIGHT: 67 IN | RESPIRATION RATE: 16 BRPM | WEIGHT: 158.06 LBS | BODY MASS INDEX: 24.81 KG/M2

## 2021-08-03 DIAGNOSIS — M17.0 PRIMARY OSTEOARTHRITIS OF BOTH KNEES: Primary | ICD-10-CM

## 2021-08-03 PROCEDURE — 3288F PR FALLS RISK ASSESSMENT DOCUMENTED: ICD-10-PCS | Mod: CPTII,S$GLB,, | Performed by: ORTHOPAEDIC SURGERY

## 2021-08-03 PROCEDURE — 99999 PR PBB SHADOW E&M-EST. PATIENT-LVL IV: CPT | Mod: PBBFAC,,, | Performed by: ORTHOPAEDIC SURGERY

## 2021-08-03 PROCEDURE — 1159F MED LIST DOCD IN RCRD: CPT | Mod: CPTII,S$GLB,, | Performed by: ORTHOPAEDIC SURGERY

## 2021-08-03 PROCEDURE — 3288F FALL RISK ASSESSMENT DOCD: CPT | Mod: CPTII,S$GLB,, | Performed by: ORTHOPAEDIC SURGERY

## 2021-08-03 PROCEDURE — 1125F PR PAIN SEVERITY QUANTIFIED, PAIN PRESENT: ICD-10-PCS | Mod: CPTII,S$GLB,, | Performed by: ORTHOPAEDIC SURGERY

## 2021-08-03 PROCEDURE — 1101F PR PT FALLS ASSESS DOC 0-1 FALLS W/OUT INJ PAST YR: ICD-10-PCS | Mod: CPTII,S$GLB,, | Performed by: ORTHOPAEDIC SURGERY

## 2021-08-03 PROCEDURE — 20610 DRAIN/INJ JOINT/BURSA W/O US: CPT | Mod: 50,S$GLB,, | Performed by: ORTHOPAEDIC SURGERY

## 2021-08-03 PROCEDURE — 99999 PR PBB SHADOW E&M-EST. PATIENT-LVL IV: ICD-10-PCS | Mod: PBBFAC,,, | Performed by: ORTHOPAEDIC SURGERY

## 2021-08-03 PROCEDURE — 20610 LARGE JOINT ASPIRATION/INJECTION: BILATERAL KNEE JOINT: ICD-10-PCS | Mod: 50,S$GLB,, | Performed by: ORTHOPAEDIC SURGERY

## 2021-08-03 PROCEDURE — 3008F PR BODY MASS INDEX (BMI) DOCUMENTED: ICD-10-PCS | Mod: CPTII,S$GLB,, | Performed by: ORTHOPAEDIC SURGERY

## 2021-08-03 PROCEDURE — 99499 NO LOS: ICD-10-PCS | Mod: S$GLB,,, | Performed by: ORTHOPAEDIC SURGERY

## 2021-08-03 PROCEDURE — 1159F PR MEDICATION LIST DOCUMENTED IN MEDICAL RECORD: ICD-10-PCS | Mod: CPTII,S$GLB,, | Performed by: ORTHOPAEDIC SURGERY

## 2021-08-03 PROCEDURE — 1125F AMNT PAIN NOTED PAIN PRSNT: CPT | Mod: CPTII,S$GLB,, | Performed by: ORTHOPAEDIC SURGERY

## 2021-08-03 PROCEDURE — 3051F HG A1C>EQUAL 7.0%<8.0%: CPT | Mod: CPTII,S$GLB,, | Performed by: ORTHOPAEDIC SURGERY

## 2021-08-03 PROCEDURE — 3051F PR MOST RECENT HEMOGLOBIN A1C LEVEL 7.0 - < 8.0%: ICD-10-PCS | Mod: CPTII,S$GLB,, | Performed by: ORTHOPAEDIC SURGERY

## 2021-08-03 PROCEDURE — 99499 UNLISTED E&M SERVICE: CPT | Mod: S$GLB,,, | Performed by: ORTHOPAEDIC SURGERY

## 2021-08-03 PROCEDURE — 3008F BODY MASS INDEX DOCD: CPT | Mod: CPTII,S$GLB,, | Performed by: ORTHOPAEDIC SURGERY

## 2021-08-03 PROCEDURE — 1101F PT FALLS ASSESS-DOCD LE1/YR: CPT | Mod: CPTII,S$GLB,, | Performed by: ORTHOPAEDIC SURGERY

## 2021-08-16 ENCOUNTER — OFFICE VISIT (OUTPATIENT)
Dept: FAMILY MEDICINE | Facility: CLINIC | Age: 76
End: 2021-08-16
Payer: MEDICARE

## 2021-08-16 VITALS
HEART RATE: 82 BPM | SYSTOLIC BLOOD PRESSURE: 135 MMHG | BODY MASS INDEX: 26.37 KG/M2 | WEIGHT: 168 LBS | DIASTOLIC BLOOD PRESSURE: 72 MMHG | HEIGHT: 67 IN

## 2021-08-16 DIAGNOSIS — Z78.0 ENCOUNTER FOR OSTEOPOROSIS SCREENING IN ASYMPTOMATIC POSTMENOPAUSAL PATIENT: ICD-10-CM

## 2021-08-16 DIAGNOSIS — E11.9 TYPE 2 DIABETES MELLITUS WITHOUT COMPLICATION, WITHOUT LONG-TERM CURRENT USE OF INSULIN: ICD-10-CM

## 2021-08-16 DIAGNOSIS — N18.32 STAGE 3B CHRONIC KIDNEY DISEASE: ICD-10-CM

## 2021-08-16 DIAGNOSIS — R26.2 DIFFICULTY WALKING: ICD-10-CM

## 2021-08-16 DIAGNOSIS — M17.0 ARTHRITIS OF BOTH KNEES: ICD-10-CM

## 2021-08-16 DIAGNOSIS — E78.2 MULTIPLE-TYPE HYPERLIPIDEMIA: ICD-10-CM

## 2021-08-16 DIAGNOSIS — I10 BENIGN ESSENTIAL HYPERTENSION: Primary | ICD-10-CM

## 2021-08-16 DIAGNOSIS — R29.6 RECURRENT FALLS: ICD-10-CM

## 2021-08-16 DIAGNOSIS — Z13.820 ENCOUNTER FOR OSTEOPOROSIS SCREENING IN ASYMPTOMATIC POSTMENOPAUSAL PATIENT: ICD-10-CM

## 2021-08-16 DIAGNOSIS — G20.A1 PARKINSONS: ICD-10-CM

## 2021-08-16 DIAGNOSIS — F20.9 CHRONIC SCHIZOPHRENIA: ICD-10-CM

## 2021-08-16 DIAGNOSIS — B35.4 DERMATOPHYTOSIS OF BODY: ICD-10-CM

## 2021-08-16 PROCEDURE — 1159F PR MEDICATION LIST DOCUMENTED IN MEDICAL RECORD: ICD-10-PCS | Mod: S$GLB,,, | Performed by: INTERNAL MEDICINE

## 2021-08-16 PROCEDURE — 1159F MED LIST DOCD IN RCRD: CPT | Mod: S$GLB,,, | Performed by: INTERNAL MEDICINE

## 2021-08-16 PROCEDURE — 99214 OFFICE O/P EST MOD 30 MIN: CPT | Mod: S$GLB,,, | Performed by: INTERNAL MEDICINE

## 2021-08-16 PROCEDURE — 99214 PR OFFICE/OUTPT VISIT, EST, LEVL IV, 30-39 MIN: ICD-10-PCS | Mod: S$GLB,,, | Performed by: INTERNAL MEDICINE

## 2021-08-16 PROCEDURE — 3008F PR BODY MASS INDEX (BMI) DOCUMENTED: ICD-10-PCS | Mod: S$GLB,,, | Performed by: INTERNAL MEDICINE

## 2021-08-16 PROCEDURE — 3288F FALL RISK ASSESSMENT DOCD: CPT | Mod: S$GLB,,, | Performed by: INTERNAL MEDICINE

## 2021-08-16 PROCEDURE — 1101F PT FALLS ASSESS-DOCD LE1/YR: CPT | Mod: S$GLB,,, | Performed by: INTERNAL MEDICINE

## 2021-08-16 PROCEDURE — 3051F PR MOST RECENT HEMOGLOBIN A1C LEVEL 7.0 - < 8.0%: ICD-10-PCS | Mod: S$GLB,,, | Performed by: INTERNAL MEDICINE

## 2021-08-16 PROCEDURE — 1160F RVW MEDS BY RX/DR IN RCRD: CPT | Mod: S$GLB,,, | Performed by: INTERNAL MEDICINE

## 2021-08-16 PROCEDURE — 1160F PR REVIEW ALL MEDS BY PRESCRIBER/CLIN PHARMACIST DOCUMENTED: ICD-10-PCS | Mod: S$GLB,,, | Performed by: INTERNAL MEDICINE

## 2021-08-16 PROCEDURE — 3075F PR MOST RECENT SYSTOLIC BLOOD PRESS GE 130-139MM HG: ICD-10-PCS | Mod: S$GLB,,, | Performed by: INTERNAL MEDICINE

## 2021-08-16 PROCEDURE — 1101F PR PT FALLS ASSESS DOC 0-1 FALLS W/OUT INJ PAST YR: ICD-10-PCS | Mod: S$GLB,,, | Performed by: INTERNAL MEDICINE

## 2021-08-16 PROCEDURE — 1125F PR PAIN SEVERITY QUANTIFIED, PAIN PRESENT: ICD-10-PCS | Mod: S$GLB,,, | Performed by: INTERNAL MEDICINE

## 2021-08-16 PROCEDURE — 3078F DIAST BP <80 MM HG: CPT | Mod: S$GLB,,, | Performed by: INTERNAL MEDICINE

## 2021-08-16 PROCEDURE — 3078F PR MOST RECENT DIASTOLIC BLOOD PRESSURE < 80 MM HG: ICD-10-PCS | Mod: S$GLB,,, | Performed by: INTERNAL MEDICINE

## 2021-08-16 PROCEDURE — 3288F PR FALLS RISK ASSESSMENT DOCUMENTED: ICD-10-PCS | Mod: S$GLB,,, | Performed by: INTERNAL MEDICINE

## 2021-08-16 PROCEDURE — 3075F SYST BP GE 130 - 139MM HG: CPT | Mod: S$GLB,,, | Performed by: INTERNAL MEDICINE

## 2021-08-16 PROCEDURE — 3051F HG A1C>EQUAL 7.0%<8.0%: CPT | Mod: S$GLB,,, | Performed by: INTERNAL MEDICINE

## 2021-08-16 PROCEDURE — 1125F AMNT PAIN NOTED PAIN PRSNT: CPT | Mod: S$GLB,,, | Performed by: INTERNAL MEDICINE

## 2021-08-16 PROCEDURE — 3008F BODY MASS INDEX DOCD: CPT | Mod: S$GLB,,, | Performed by: INTERNAL MEDICINE

## 2021-08-16 RX ORDER — NYSTATIN 100000 U/G
CREAM TOPICAL 2 TIMES DAILY
Qty: 30 G | Refills: 1 | Status: SHIPPED | OUTPATIENT
Start: 2021-08-16

## 2021-08-17 ENCOUNTER — OFFICE VISIT (OUTPATIENT)
Dept: ORTHOPEDICS | Facility: CLINIC | Age: 76
End: 2021-08-17
Payer: MEDICARE

## 2021-08-17 VITALS — HEIGHT: 67 IN | BODY MASS INDEX: 26.37 KG/M2 | RESPIRATION RATE: 18 BRPM | WEIGHT: 168 LBS

## 2021-08-17 DIAGNOSIS — M17.0 PRIMARY OSTEOARTHRITIS OF BOTH KNEES: Primary | ICD-10-CM

## 2021-08-17 PROCEDURE — 1160F PR REVIEW ALL MEDS BY PRESCRIBER/CLIN PHARMACIST DOCUMENTED: ICD-10-PCS | Mod: CPTII,S$GLB,, | Performed by: ORTHOPAEDIC SURGERY

## 2021-08-17 PROCEDURE — 99499 NO LOS: ICD-10-PCS | Mod: S$GLB,,, | Performed by: ORTHOPAEDIC SURGERY

## 2021-08-17 PROCEDURE — 3288F FALL RISK ASSESSMENT DOCD: CPT | Mod: CPTII,S$GLB,, | Performed by: ORTHOPAEDIC SURGERY

## 2021-08-17 PROCEDURE — 3008F PR BODY MASS INDEX (BMI) DOCUMENTED: ICD-10-PCS | Mod: CPTII,S$GLB,, | Performed by: ORTHOPAEDIC SURGERY

## 2021-08-17 PROCEDURE — 1159F MED LIST DOCD IN RCRD: CPT | Mod: CPTII,S$GLB,, | Performed by: ORTHOPAEDIC SURGERY

## 2021-08-17 PROCEDURE — 20610 LARGE JOINT ASPIRATION/INJECTION: BILATERAL KNEE: ICD-10-PCS | Mod: 50,S$GLB,, | Performed by: ORTHOPAEDIC SURGERY

## 2021-08-17 PROCEDURE — 1125F AMNT PAIN NOTED PAIN PRSNT: CPT | Mod: CPTII,S$GLB,, | Performed by: ORTHOPAEDIC SURGERY

## 2021-08-17 PROCEDURE — 1101F PR PT FALLS ASSESS DOC 0-1 FALLS W/OUT INJ PAST YR: ICD-10-PCS | Mod: CPTII,S$GLB,, | Performed by: ORTHOPAEDIC SURGERY

## 2021-08-17 PROCEDURE — 3008F BODY MASS INDEX DOCD: CPT | Mod: CPTII,S$GLB,, | Performed by: ORTHOPAEDIC SURGERY

## 2021-08-17 PROCEDURE — 3051F HG A1C>EQUAL 7.0%<8.0%: CPT | Mod: CPTII,S$GLB,, | Performed by: ORTHOPAEDIC SURGERY

## 2021-08-17 PROCEDURE — 99999 PR PBB SHADOW E&M-EST. PATIENT-LVL IV: CPT | Mod: PBBFAC,,, | Performed by: ORTHOPAEDIC SURGERY

## 2021-08-17 PROCEDURE — 1159F PR MEDICATION LIST DOCUMENTED IN MEDICAL RECORD: ICD-10-PCS | Mod: CPTII,S$GLB,, | Performed by: ORTHOPAEDIC SURGERY

## 2021-08-17 PROCEDURE — 3051F PR MOST RECENT HEMOGLOBIN A1C LEVEL 7.0 - < 8.0%: ICD-10-PCS | Mod: CPTII,S$GLB,, | Performed by: ORTHOPAEDIC SURGERY

## 2021-08-17 PROCEDURE — 1160F RVW MEDS BY RX/DR IN RCRD: CPT | Mod: CPTII,S$GLB,, | Performed by: ORTHOPAEDIC SURGERY

## 2021-08-17 PROCEDURE — 99499 UNLISTED E&M SERVICE: CPT | Mod: S$GLB,,, | Performed by: ORTHOPAEDIC SURGERY

## 2021-08-17 PROCEDURE — 1101F PT FALLS ASSESS-DOCD LE1/YR: CPT | Mod: CPTII,S$GLB,, | Performed by: ORTHOPAEDIC SURGERY

## 2021-08-17 PROCEDURE — 1125F PR PAIN SEVERITY QUANTIFIED, PAIN PRESENT: ICD-10-PCS | Mod: CPTII,S$GLB,, | Performed by: ORTHOPAEDIC SURGERY

## 2021-08-17 PROCEDURE — 99999 PR PBB SHADOW E&M-EST. PATIENT-LVL IV: ICD-10-PCS | Mod: PBBFAC,,, | Performed by: ORTHOPAEDIC SURGERY

## 2021-08-17 PROCEDURE — 3288F PR FALLS RISK ASSESSMENT DOCUMENTED: ICD-10-PCS | Mod: CPTII,S$GLB,, | Performed by: ORTHOPAEDIC SURGERY

## 2021-08-17 PROCEDURE — 20610 DRAIN/INJ JOINT/BURSA W/O US: CPT | Mod: 50,S$GLB,, | Performed by: ORTHOPAEDIC SURGERY

## 2021-08-26 ENCOUNTER — HOSPITAL ENCOUNTER (OUTPATIENT)
Dept: RADIOLOGY | Facility: HOSPITAL | Age: 76
Discharge: HOME | End: 2021-08-26
Attending: INTERNAL MEDICINE
Payer: MEDICARE

## 2021-08-26 DIAGNOSIS — Z13.820 ENCOUNTER FOR OSTEOPOROSIS SCREENING IN ASYMPTOMATIC POSTMENOPAUSAL PATIENT: ICD-10-CM

## 2021-08-26 DIAGNOSIS — Z78.0 ENCOUNTER FOR OSTEOPOROSIS SCREENING IN ASYMPTOMATIC POSTMENOPAUSAL PATIENT: ICD-10-CM

## 2021-08-26 PROCEDURE — 77080 DXA BONE DENSITY AXIAL: CPT | Mod: TC,PO

## 2021-09-02 DIAGNOSIS — E83.42 HYPOMAGNESEMIA: ICD-10-CM

## 2021-09-02 RX ORDER — LANOLIN ALCOHOL/MO/W.PET/CERES
1 CREAM (GRAM) TOPICAL
Qty: 36 TABLET | Refills: 1 | Status: SHIPPED | OUTPATIENT
Start: 2021-09-03

## 2021-09-26 NOTE — PROGRESS NOTES
CT scan N - No hydrocephalus-pt grand daughter will be notified PAST SURGICAL HISTORY:  No significant past surgical history

## 2025-05-21 NOTE — MR AVS SNAPSHOT
[facility]  [address] [place] [address]  Phone: [phone]                  Adriana Perez   3/20/2017 9:15 AM   Office Visit    Description:  Female : 1945   Provider:  Joseph Luke DPM   Department:  [facility]           Reason for Visit     Toe Pain           Diagnoses this Visit        Comments    Diabetic polyneuropathy associated with type 2 diabetes mellitus    -  Primary     Onychomycosis due to dermatophyte         Toe pain, right                To Do List           Goals (5 Years of Data)     None      Follow-Up and Disposition     Return in about 1 year (around 3/20/2018) for AR exam.       These Medications        Disp Refills Start End    ciclopirox (PENLAC) 8 % Soln 6.6 mL 11 3/20/2017     Apply topically nightly. - Topical    Pharmacy: [facility] [place]  [address] [facility] #: [phone]         [facility]     [facility] Nurse Care [facility] -  Assistance  Registered nurses in the [facility] provide clinical advisement, health education, appointment booking, and other advisory services.  Call for this free service at [phone].             Medications           Message regarding Medications     Verify the changes and/or additions to your medication regime listed below are the same as discussed with your clinician today.  If any of these changes or additions are incorrect, please notify your healthcare provider.        START taking these NEW medications        Refills    ciclopirox (PENLAC) 8 % Soln 11    Sig: Apply topically nightly.    Class: Normal    Route: Topical           Verify that the below list of medications is an accurate representation of the medications you are currently taking.  If none reported, the list may be blank. If incorrect, please contact your healthcare provider. Carry this list with you in case of emergency.           Current Medications     amlodipine (NORVASC) 10 MG  "tablet Take 10 mg by mouth once daily.    aspirin (ECOTRIN) 81 MG EC tablet Take 81 mg by mouth once daily.    calcitRIOL (ROCALTROL) 0.25 MCG Cap Take 0.25 mcg by mouth every Mon, Wed, Fri.    cholecalciferol, vitamin D3, (VITAMIN D3) 2,000 unit Cap Take 1 capsule by mouth once daily.    cyclobenzaprine (FLEXERIL) 10 MG tablet Take 10 mg by mouth nightly as needed.    metoprolol succinate (TOPROL-XL) 50 MG 24 hr tablet Take 50 mg by mouth 2 (two) times daily.    olanzapine (ZYPREXA) 10 MG tablet Take 10 mg by mouth every evening.    pantoprazole (PROTONIX) 40 MG tablet Take 40 mg by mouth once daily.    potassium chloride SA (K-DUR,KLOR-CON) 20 MEQ tablet Take 20 mEq by mouth once daily.    pramipexole (MIRAPEX) 0.125 MG tablet Take 0.125 mg by mouth 3 (three) times daily.    SITagliptan (JANUVIA) 25 MG Tab Take 100 mg by mouth once daily.    trazodone (DESYREL) 100 MG tablet Take 100 mg by mouth nightly as needed.    ciclopirox (PENLAC) 8 % Soln Apply topically nightly.           Clinical Reference Information           Your Vitals Were     Height Weight BMI          5' 7" (1.702 m) 79.1 kg (174 lb 6.1 oz) 27.31 kg/m2        Allergies as of 3/20/2017     No Known Allergies      Immunizations Administered on Date of Encounter - 3/20/2017     None      [facility] Sign-Up     Activating your [facility] account is as easy as 1-2-3!     1) Visit [email or web address], select Sign Up Now, enter this activation code and your date of birth, then select Next.  [email or web address]  Expires: 4/22/2017  8:50 AM      2) Create a username and password to use when you visit [facility] in the future and select a security question in case you lose your password and select Next.    3) Enter your e-mail address and click Sign Up!    Additional Information  If you have questions, please e-mail [email or web address] or call [phone] to talk to our [facility] staff. Remember, [facility] is NOT to be used for urgent needs. For medical " emergencies, dial 911.         Language Assistance Services     ATTENTION: Language assistance services are available, free of charge. Please call [phone].      ATENCIÓN: Si habla adamaris, tiene a blackburn disposición servicios gratuitos de asistencia lingüística. Llame al [phone].     CHÚ Ý: N?u b?n nói Ti?ng Vi?t, có các d?ch v? h? tr? ngôn ng? mi?n phí dành cho b?n. G?i s? [phone].         [facility] complies with applicable Federal civil rights laws and does not discriminate on the basis of race, color, national origin, age, disability, or sex.         99